# Patient Record
Sex: MALE | Race: WHITE | NOT HISPANIC OR LATINO
[De-identification: names, ages, dates, MRNs, and addresses within clinical notes are randomized per-mention and may not be internally consistent; named-entity substitution may affect disease eponyms.]

---

## 2018-02-27 ENCOUNTER — RESULT REVIEW (OUTPATIENT)
Age: 78
End: 2018-02-27

## 2018-05-29 ENCOUNTER — RESULT REVIEW (OUTPATIENT)
Age: 78
End: 2018-05-29

## 2018-10-03 ENCOUNTER — APPOINTMENT (OUTPATIENT)
Dept: UROLOGY | Facility: CLINIC | Age: 78
End: 2018-10-03
Payer: MEDICARE

## 2018-10-03 VITALS
HEART RATE: 76 BPM | TEMPERATURE: 98.2 F | WEIGHT: 230 LBS | DIASTOLIC BLOOD PRESSURE: 84 MMHG | OXYGEN SATURATION: 96 % | HEIGHT: 73 IN | BODY MASS INDEX: 30.48 KG/M2 | SYSTOLIC BLOOD PRESSURE: 166 MMHG

## 2018-10-03 DIAGNOSIS — Z80.0 FAMILY HISTORY OF MALIGNANT NEOPLASM OF DIGESTIVE ORGANS: ICD-10-CM

## 2018-10-03 DIAGNOSIS — Z78.9 OTHER SPECIFIED HEALTH STATUS: ICD-10-CM

## 2018-10-03 DIAGNOSIS — Z80.7 FAMILY HISTORY OF OTHER MALIGNANT NEOPLASMS OF LYMPHOID, HEMATOPOIETIC AND RELATED TISSUES: ICD-10-CM

## 2018-10-03 PROCEDURE — 99204 OFFICE O/P NEW MOD 45 MIN: CPT

## 2018-10-03 RX ORDER — ALLOPURINOL 100 MG/1
100 TABLET ORAL
Refills: 0 | Status: ACTIVE | COMMUNITY

## 2018-10-04 PROBLEM — Z78.9 SOCIAL ALCOHOL USE: Status: ACTIVE | Noted: 2018-10-04

## 2018-10-04 PROBLEM — Z80.7 FAMILY HISTORY OF NON-HODGKIN'S LYMPHOMA: Status: ACTIVE | Noted: 2018-10-04

## 2018-10-04 PROBLEM — Z80.0 FAMILY HISTORY OF COLON CANCER: Status: ACTIVE | Noted: 2018-10-04

## 2018-10-04 LAB
APPEARANCE: CLEAR
BACTERIA: NEGATIVE
BILIRUBIN URINE: NEGATIVE
BLOOD URINE: NEGATIVE
COLOR: YELLOW
CORE LAB FLUID CYTOLOGY: NORMAL
GLUCOSE QUALITATIVE U: NEGATIVE MG/DL
KETONES URINE: NEGATIVE
LEUKOCYTE ESTERASE URINE: NEGATIVE
MICROSCOPIC-UA: NORMAL
NITRITE URINE: NEGATIVE
PH URINE: 6
PROTEIN URINE: 100 MG/DL
RED BLOOD CELLS URINE: 0 /HPF
SPECIFIC GRAVITY URINE: 1.01
SQUAMOUS EPITHELIAL CELLS: 0 /HPF
UROBILINOGEN URINE: NEGATIVE MG/DL
WHITE BLOOD CELLS URINE: 0 /HPF

## 2018-10-31 ENCOUNTER — APPOINTMENT (OUTPATIENT)
Dept: UROLOGY | Facility: CLINIC | Age: 78
End: 2018-10-31
Payer: MEDICARE

## 2018-10-31 VITALS
DIASTOLIC BLOOD PRESSURE: 73 MMHG | WEIGHT: 230 LBS | OXYGEN SATURATION: 97 % | HEIGHT: 73 IN | HEART RATE: 71 BPM | RESPIRATION RATE: 14 BRPM | BODY MASS INDEX: 30.48 KG/M2 | SYSTOLIC BLOOD PRESSURE: 136 MMHG

## 2018-10-31 PROCEDURE — 99213 OFFICE O/P EST LOW 20 MIN: CPT

## 2019-04-17 ENCOUNTER — APPOINTMENT (OUTPATIENT)
Dept: UROLOGY | Facility: CLINIC | Age: 79
End: 2019-04-17
Payer: MEDICARE

## 2019-04-17 VITALS
SYSTOLIC BLOOD PRESSURE: 163 MMHG | WEIGHT: 231 LBS | OXYGEN SATURATION: 97 % | HEIGHT: 73 IN | DIASTOLIC BLOOD PRESSURE: 79 MMHG | BODY MASS INDEX: 30.62 KG/M2 | HEART RATE: 77 BPM

## 2019-04-17 PROCEDURE — 99213 OFFICE O/P EST LOW 20 MIN: CPT

## 2019-04-17 NOTE — HISTORY OF PRESENT ILLNESS
[FreeTextEntry1] : Here for 6 month follow up.\par Remains on oxybutynin ER 10 mg.  No adverse effects from the medication.\par Has not had any further episodes of urge incontinence following the medication.\par Normal flow, complete emptying.\par

## 2019-04-17 NOTE — PHYSICAL EXAM
[General Appearance - Well Developed] : well developed [General Appearance - Well Nourished] : well nourished [Normal Appearance] : normal appearance [Well Groomed] : well groomed [General Appearance - In No Acute Distress] : no acute distress [Abdomen Soft] : soft [Costovertebral Angle Tenderness] : no ~M costovertebral angle tenderness [Urinary Bladder Findings] : the bladder was normal on palpation [Abdomen Tenderness] : non-tender

## 2019-05-21 ENCOUNTER — RESULT REVIEW (OUTPATIENT)
Age: 79
End: 2019-05-21

## 2019-07-17 ENCOUNTER — APPOINTMENT (OUTPATIENT)
Dept: UROLOGY | Facility: CLINIC | Age: 79
End: 2019-07-17
Payer: MEDICARE

## 2019-07-17 VITALS
OXYGEN SATURATION: 99 % | HEART RATE: 68 BPM | SYSTOLIC BLOOD PRESSURE: 112 MMHG | BODY MASS INDEX: 28.49 KG/M2 | DIASTOLIC BLOOD PRESSURE: 64 MMHG | WEIGHT: 215 LBS | HEIGHT: 73 IN

## 2019-07-17 PROCEDURE — 99213 OFFICE O/P EST LOW 20 MIN: CPT

## 2019-08-09 ENCOUNTER — INPATIENT (INPATIENT)
Facility: HOSPITAL | Age: 79
LOS: 12 days | Discharge: HOME HEALTH | DRG: 344 | End: 2019-08-22
Attending: SURGERY | Admitting: SURGERY
Payer: COMMERCIAL

## 2019-08-09 VITALS
DIASTOLIC BLOOD PRESSURE: 53 MMHG | HEIGHT: 73 IN | HEART RATE: 107 BPM | OXYGEN SATURATION: 97 % | RESPIRATION RATE: 20 BRPM | TEMPERATURE: 98 F | WEIGHT: 205.03 LBS | SYSTOLIC BLOOD PRESSURE: 83 MMHG

## 2019-08-09 DIAGNOSIS — N17.9 ACUTE KIDNEY FAILURE, UNSPECIFIED: ICD-10-CM

## 2019-08-09 DIAGNOSIS — A41.9 SEPSIS, UNSPECIFIED ORGANISM: ICD-10-CM

## 2019-08-09 DIAGNOSIS — I10 ESSENTIAL (PRIMARY) HYPERTENSION: ICD-10-CM

## 2019-08-09 DIAGNOSIS — K26.9 DUODENAL ULCER, UNSPECIFIED AS ACUTE OR CHRONIC, WITHOUT HEMORRHAGE OR PERFORATION: ICD-10-CM

## 2019-08-09 DIAGNOSIS — K56.609 UNSPECIFIED INTESTINAL OBSTRUCTION, UNSPECIFIED AS TO PARTIAL VERSUS COMPLETE OBSTRUCTION: ICD-10-CM

## 2019-08-09 DIAGNOSIS — E87.5 HYPERKALEMIA: ICD-10-CM

## 2019-08-09 DIAGNOSIS — E87.1 HYPO-OSMOLALITY AND HYPONATREMIA: ICD-10-CM

## 2019-08-09 DIAGNOSIS — E87.2 ACIDOSIS: ICD-10-CM

## 2019-08-09 LAB
ALBUMIN SERPL ELPH-MCNC: 2.3 G/DL — LOW (ref 3.3–5)
ALP SERPL-CCNC: 491 U/L — HIGH (ref 40–120)
ALT FLD-CCNC: 8 U/L — LOW (ref 10–45)
ANION GAP SERPL CALC-SCNC: 13 MMOL/L — SIGNIFICANT CHANGE UP (ref 5–17)
ANION GAP SERPL CALC-SCNC: 15 MMOL/L — SIGNIFICANT CHANGE UP (ref 5–17)
ANION GAP SERPL CALC-SCNC: 16 MMOL/L — SIGNIFICANT CHANGE UP (ref 5–17)
APPEARANCE UR: ABNORMAL
APTT BLD: 36.2 SEC — SIGNIFICANT CHANGE UP (ref 27.5–36.3)
APTT BLD: 38.2 SEC — HIGH (ref 27.5–36.3)
AST SERPL-CCNC: 7 U/L — LOW (ref 10–40)
BACTERIA # UR AUTO: NEGATIVE — SIGNIFICANT CHANGE UP
BASE EXCESS BLDV CALC-SCNC: -6 MMOL/L — LOW (ref -2–2)
BASE EXCESS BLDV CALC-SCNC: -7.1 MMOL/L — LOW (ref -2–2)
BASOPHILS # BLD AUTO: 0 K/UL — SIGNIFICANT CHANGE UP (ref 0–0.2)
BASOPHILS NFR BLD AUTO: 0.1 % — SIGNIFICANT CHANGE UP (ref 0–2)
BILIRUB SERPL-MCNC: 0.7 MG/DL — SIGNIFICANT CHANGE UP (ref 0.2–1.2)
BILIRUB UR-MCNC: ABNORMAL
BLD GP AB SCN SERPL QL: NEGATIVE — SIGNIFICANT CHANGE UP
BUN SERPL-MCNC: 63 MG/DL — HIGH (ref 7–23)
BUN SERPL-MCNC: 63 MG/DL — HIGH (ref 7–23)
BUN SERPL-MCNC: 68 MG/DL — HIGH (ref 7–23)
CA-I SERPL-SCNC: 1.17 MMOL/L — SIGNIFICANT CHANGE UP (ref 1.12–1.3)
CA-I SERPL-SCNC: 1.22 MMOL/L — SIGNIFICANT CHANGE UP (ref 1.12–1.3)
CALCIUM SERPL-MCNC: 8.2 MG/DL — LOW (ref 8.4–10.5)
CALCIUM SERPL-MCNC: 8.4 MG/DL — SIGNIFICANT CHANGE UP (ref 8.4–10.5)
CALCIUM SERPL-MCNC: 9.7 MG/DL — SIGNIFICANT CHANGE UP (ref 8.4–10.5)
CHLORIDE BLDV-SCNC: 103 MMOL/L — SIGNIFICANT CHANGE UP (ref 96–108)
CHLORIDE BLDV-SCNC: 105 MMOL/L — SIGNIFICANT CHANGE UP (ref 96–108)
CHLORIDE SERPL-SCNC: 100 MMOL/L — SIGNIFICANT CHANGE UP (ref 96–108)
CHLORIDE SERPL-SCNC: 100 MMOL/L — SIGNIFICANT CHANGE UP (ref 96–108)
CHLORIDE SERPL-SCNC: 96 MMOL/L — SIGNIFICANT CHANGE UP (ref 96–108)
CO2 BLDV-SCNC: 19 MMOL/L — LOW (ref 22–30)
CO2 BLDV-SCNC: 20 MMOL/L — LOW (ref 22–30)
CO2 SERPL-SCNC: 14 MMOL/L — LOW (ref 22–31)
CO2 SERPL-SCNC: 15 MMOL/L — LOW (ref 22–31)
CO2 SERPL-SCNC: 17 MMOL/L — LOW (ref 22–31)
COLOR SPEC: YELLOW — SIGNIFICANT CHANGE UP
CREAT SERPL-MCNC: 2.24 MG/DL — HIGH (ref 0.5–1.3)
CREAT SERPL-MCNC: 2.39 MG/DL — HIGH (ref 0.5–1.3)
CREAT SERPL-MCNC: 2.74 MG/DL — HIGH (ref 0.5–1.3)
DIFF PNL FLD: NEGATIVE — SIGNIFICANT CHANGE UP
EOSINOPHIL # BLD AUTO: 0 K/UL — SIGNIFICANT CHANGE UP (ref 0–0.5)
EOSINOPHIL NFR BLD AUTO: 0.1 % — SIGNIFICANT CHANGE UP (ref 0–6)
EPI CELLS # UR: 1 /HPF — SIGNIFICANT CHANGE UP
GAS PNL BLDA: SIGNIFICANT CHANGE UP
GAS PNL BLDV: 124 MMOL/L — LOW (ref 135–145)
GAS PNL BLDV: 125 MMOL/L — LOW (ref 135–145)
GAS PNL BLDV: SIGNIFICANT CHANGE UP
GLUCOSE BLDV-MCNC: 100 MG/DL — HIGH (ref 70–99)
GLUCOSE BLDV-MCNC: 108 MG/DL — HIGH (ref 70–99)
GLUCOSE SERPL-MCNC: 108 MG/DL — HIGH (ref 70–99)
GLUCOSE SERPL-MCNC: 110 MG/DL — HIGH (ref 70–99)
GLUCOSE SERPL-MCNC: 89 MG/DL — SIGNIFICANT CHANGE UP (ref 70–99)
GLUCOSE UR QL: NEGATIVE — SIGNIFICANT CHANGE UP
HCO3 BLDV-SCNC: 18 MMOL/L — LOW (ref 21–29)
HCO3 BLDV-SCNC: 19 MMOL/L — LOW (ref 21–29)
HCT VFR BLD CALC: 26 % — LOW (ref 39–50)
HCT VFR BLD CALC: 32.6 % — LOW (ref 39–50)
HCT VFR BLDA CALC: 27 % — LOW (ref 39–50)
HCT VFR BLDA CALC: 31 % — LOW (ref 39–50)
HGB BLD CALC-MCNC: 10.1 G/DL — LOW (ref 13–17)
HGB BLD CALC-MCNC: 8.7 G/DL — LOW (ref 13–17)
HGB BLD-MCNC: 10 G/DL — LOW (ref 13–17)
HGB BLD-MCNC: 8.1 G/DL — LOW (ref 13–17)
HYALINE CASTS # UR AUTO: 3 /LPF — HIGH (ref 0–2)
INR BLD: 1.46 RATIO — HIGH (ref 0.88–1.16)
INR BLD: 1.67 RATIO — HIGH (ref 0.88–1.16)
KETONES UR-MCNC: NEGATIVE — SIGNIFICANT CHANGE UP
LACTATE BLDV-MCNC: 3.2 MMOL/L — HIGH (ref 0.7–2)
LACTATE BLDV-MCNC: 3.2 MMOL/L — HIGH (ref 0.7–2)
LEUKOCYTE ESTERASE UR-ACNC: NEGATIVE — SIGNIFICANT CHANGE UP
LYMPHOCYTES # BLD AUTO: 1.6 K/UL — SIGNIFICANT CHANGE UP (ref 1–3.3)
LYMPHOCYTES # BLD AUTO: 13.5 % — SIGNIFICANT CHANGE UP (ref 13–44)
MAGNESIUM SERPL-MCNC: 2.6 MG/DL — SIGNIFICANT CHANGE UP (ref 1.6–2.6)
MCHC RBC-ENTMCNC: 26.7 PG — LOW (ref 27–34)
MCHC RBC-ENTMCNC: 27.2 PG — SIGNIFICANT CHANGE UP (ref 27–34)
MCHC RBC-ENTMCNC: 30.5 GM/DL — LOW (ref 32–36)
MCHC RBC-ENTMCNC: 31.1 GM/DL — LOW (ref 32–36)
MCV RBC AUTO: 87.3 FL — SIGNIFICANT CHANGE UP (ref 80–100)
MCV RBC AUTO: 87.5 FL — SIGNIFICANT CHANGE UP (ref 80–100)
MONOCYTES # BLD AUTO: 1.7 K/UL — HIGH (ref 0–0.9)
MONOCYTES NFR BLD AUTO: 14.6 % — HIGH (ref 2–14)
NEUTROPHILS # BLD AUTO: 8.4 K/UL — HIGH (ref 1.8–7.4)
NEUTROPHILS NFR BLD AUTO: 71.7 % — SIGNIFICANT CHANGE UP (ref 43–77)
NITRITE UR-MCNC: NEGATIVE — SIGNIFICANT CHANGE UP
OTHER CELLS CSF MANUAL: 3 ML/DL — LOW (ref 18–22)
PCO2 BLDV: 36 MMHG — SIGNIFICANT CHANGE UP (ref 35–50)
PCO2 BLDV: 37 MMHG — SIGNIFICANT CHANGE UP (ref 35–50)
PH BLDV: 7.32 — LOW (ref 7.35–7.45)
PH BLDV: 7.32 — LOW (ref 7.35–7.45)
PH UR: 5.5 — SIGNIFICANT CHANGE UP (ref 5–8)
PHOSPHATE SERPL-MCNC: 4.6 MG/DL — HIGH (ref 2.5–4.5)
PLATELET # BLD AUTO: 339 K/UL — SIGNIFICANT CHANGE UP (ref 150–400)
PLATELET # BLD AUTO: 430 K/UL — HIGH (ref 150–400)
PO2 BLDV: 22 MMHG — LOW (ref 25–45)
PO2 BLDV: <20 MMHG — LOW (ref 25–45)
POTASSIUM BLDV-SCNC: 5.2 MMOL/L — SIGNIFICANT CHANGE UP (ref 3.5–5.3)
POTASSIUM BLDV-SCNC: 5.7 MMOL/L — HIGH (ref 3.5–5.3)
POTASSIUM SERPL-MCNC: 5.6 MMOL/L — HIGH (ref 3.5–5.3)
POTASSIUM SERPL-MCNC: 5.6 MMOL/L — HIGH (ref 3.5–5.3)
POTASSIUM SERPL-MCNC: 6.2 MMOL/L — CRITICAL HIGH (ref 3.5–5.3)
POTASSIUM SERPL-SCNC: 5.6 MMOL/L — HIGH (ref 3.5–5.3)
POTASSIUM SERPL-SCNC: 5.6 MMOL/L — HIGH (ref 3.5–5.3)
POTASSIUM SERPL-SCNC: 6.2 MMOL/L — CRITICAL HIGH (ref 3.5–5.3)
PROT SERPL-MCNC: 6.7 G/DL — SIGNIFICANT CHANGE UP (ref 6–8.3)
PROT UR-MCNC: ABNORMAL
PROTHROM AB SERPL-ACNC: 16.8 SEC — HIGH (ref 10–12.9)
PROTHROM AB SERPL-ACNC: 19.4 SEC — HIGH (ref 10–12.9)
RBC # BLD: 2.98 M/UL — LOW (ref 4.2–5.8)
RBC # BLD: 3.73 M/UL — LOW (ref 4.2–5.8)
RBC # FLD: 13.7 % — SIGNIFICANT CHANGE UP (ref 10.3–14.5)
RBC # FLD: 13.9 % — SIGNIFICANT CHANGE UP (ref 10.3–14.5)
RBC CASTS # UR COMP ASSIST: 4 /HPF — SIGNIFICANT CHANGE UP (ref 0–4)
RH IG SCN BLD-IMP: POSITIVE — SIGNIFICANT CHANGE UP
SAO2 % BLDV: 21 % — LOW (ref 67–88)
SAO2 % BLDV: 29 % — LOW (ref 67–88)
SODIUM SERPL-SCNC: 128 MMOL/L — LOW (ref 135–145)
SODIUM SERPL-SCNC: 129 MMOL/L — LOW (ref 135–145)
SODIUM SERPL-SCNC: 129 MMOL/L — LOW (ref 135–145)
SP GR SPEC: 1.02 — SIGNIFICANT CHANGE UP (ref 1.01–1.02)
UROBILINOGEN FLD QL: ABNORMAL
WBC # BLD: 11.8 K/UL — HIGH (ref 3.8–10.5)
WBC # BLD: 7.8 K/UL — SIGNIFICANT CHANGE UP (ref 3.8–10.5)
WBC # FLD AUTO: 11.8 K/UL — HIGH (ref 3.8–10.5)
WBC # FLD AUTO: 7.8 K/UL — SIGNIFICANT CHANGE UP (ref 3.8–10.5)
WBC UR QL: 1 /HPF — SIGNIFICANT CHANGE UP (ref 0–5)

## 2019-08-09 PROCEDURE — 99291 CRITICAL CARE FIRST HOUR: CPT

## 2019-08-09 PROCEDURE — 93010 ELECTROCARDIOGRAM REPORT: CPT

## 2019-08-09 PROCEDURE — 74174 CTA ABD&PLVS W/CONTRAST: CPT | Mod: 26

## 2019-08-09 PROCEDURE — 74176 CT ABD & PELVIS W/O CONTRAST: CPT | Mod: 26,59

## 2019-08-09 PROCEDURE — 99285 EMERGENCY DEPT VISIT HI MDM: CPT

## 2019-08-09 PROCEDURE — 74018 RADEX ABDOMEN 1 VIEW: CPT | Mod: 26,59

## 2019-08-09 PROCEDURE — 71045 X-RAY EXAM CHEST 1 VIEW: CPT | Mod: 26

## 2019-08-09 RX ORDER — PANTOPRAZOLE SODIUM 20 MG/1
40 TABLET, DELAYED RELEASE ORAL DAILY
Refills: 0 | Status: DISCONTINUED | OUTPATIENT
Start: 2019-08-09 | End: 2019-08-10

## 2019-08-09 RX ORDER — ALBUTEROL 90 UG/1
10 AEROSOL, METERED ORAL ONCE
Refills: 0 | Status: COMPLETED | OUTPATIENT
Start: 2019-08-09 | End: 2019-08-09

## 2019-08-09 RX ORDER — INSULIN HUMAN 100 [IU]/ML
10 INJECTION, SOLUTION SUBCUTANEOUS ONCE
Refills: 0 | Status: COMPLETED | OUTPATIENT
Start: 2019-08-09 | End: 2019-08-09

## 2019-08-09 RX ORDER — SODIUM CHLORIDE 9 MG/ML
1000 INJECTION, SOLUTION INTRAVENOUS ONCE
Refills: 0 | Status: COMPLETED | OUTPATIENT
Start: 2019-08-09 | End: 2019-08-09

## 2019-08-09 RX ORDER — DEXTROSE 50 % IN WATER 50 %
50 SYRINGE (ML) INTRAVENOUS ONCE
Refills: 0 | Status: COMPLETED | OUTPATIENT
Start: 2019-08-09 | End: 2019-08-09

## 2019-08-09 RX ORDER — SODIUM CHLORIDE 9 MG/ML
1000 INJECTION INTRAMUSCULAR; INTRAVENOUS; SUBCUTANEOUS
Refills: 0 | Status: DISCONTINUED | OUTPATIENT
Start: 2019-08-09 | End: 2019-08-10

## 2019-08-09 RX ORDER — ALBUTEROL 90 UG/1
10 AEROSOL, METERED ORAL ONCE
Refills: 0 | Status: DISCONTINUED | OUTPATIENT
Start: 2019-08-09 | End: 2019-08-09

## 2019-08-09 RX ORDER — PIPERACILLIN AND TAZOBACTAM 4; .5 G/20ML; G/20ML
3.38 INJECTION, POWDER, LYOPHILIZED, FOR SOLUTION INTRAVENOUS ONCE
Refills: 0 | Status: COMPLETED | OUTPATIENT
Start: 2019-08-09 | End: 2019-08-09

## 2019-08-09 RX ORDER — SODIUM CHLORIDE 9 MG/ML
500 INJECTION INTRAMUSCULAR; INTRAVENOUS; SUBCUTANEOUS ONCE
Refills: 0 | Status: COMPLETED | OUTPATIENT
Start: 2019-08-09 | End: 2019-08-10

## 2019-08-09 RX ORDER — CHLORHEXIDINE GLUCONATE 213 G/1000ML
1 SOLUTION TOPICAL DAILY
Refills: 0 | Status: DISCONTINUED | OUTPATIENT
Start: 2019-08-09 | End: 2019-08-10

## 2019-08-09 RX ORDER — SODIUM CHLORIDE 9 MG/ML
1000 INJECTION INTRAMUSCULAR; INTRAVENOUS; SUBCUTANEOUS ONCE
Refills: 0 | Status: COMPLETED | OUTPATIENT
Start: 2019-08-09 | End: 2019-08-09

## 2019-08-09 RX ORDER — LEVOTHYROXINE SODIUM 125 MCG
87.5 TABLET ORAL AT BEDTIME
Refills: 0 | Status: DISCONTINUED | OUTPATIENT
Start: 2019-08-09 | End: 2019-08-10

## 2019-08-09 RX ORDER — SODIUM CHLORIDE 9 MG/ML
1000 INJECTION, SOLUTION INTRAVENOUS
Refills: 0 | Status: DISCONTINUED | OUTPATIENT
Start: 2019-08-09 | End: 2019-08-09

## 2019-08-09 RX ORDER — ENOXAPARIN SODIUM 100 MG/ML
30 INJECTION SUBCUTANEOUS DAILY
Refills: 0 | Status: DISCONTINUED | OUTPATIENT
Start: 2019-08-09 | End: 2019-08-10

## 2019-08-09 RX ADMIN — INSULIN HUMAN 10 UNIT(S): 100 INJECTION, SOLUTION SUBCUTANEOUS at 13:04

## 2019-08-09 RX ADMIN — ALBUTEROL 10 MILLIGRAM(S): 90 AEROSOL, METERED ORAL at 13:30

## 2019-08-09 RX ADMIN — SODIUM CHLORIDE 125 MILLILITER(S): 9 INJECTION, SOLUTION INTRAVENOUS at 20:03

## 2019-08-09 RX ADMIN — SODIUM CHLORIDE 1000 MILLILITER(S): 9 INJECTION, SOLUTION INTRAVENOUS at 18:38

## 2019-08-09 RX ADMIN — SODIUM CHLORIDE 1000 MILLILITER(S): 9 INJECTION, SOLUTION INTRAVENOUS at 12:40

## 2019-08-09 RX ADMIN — SODIUM CHLORIDE 1000 MILLILITER(S): 9 INJECTION INTRAMUSCULAR; INTRAVENOUS; SUBCUTANEOUS at 15:43

## 2019-08-09 RX ADMIN — Medication 50 MILLILITER(S): at 23:41

## 2019-08-09 RX ADMIN — ALBUTEROL 10 MILLIGRAM(S): 90 AEROSOL, METERED ORAL at 23:50

## 2019-08-09 RX ADMIN — INSULIN HUMAN 10 UNIT(S): 100 INJECTION, SOLUTION SUBCUTANEOUS at 23:41

## 2019-08-09 RX ADMIN — Medication 50 MILLILITER(S): at 13:03

## 2019-08-09 RX ADMIN — PIPERACILLIN AND TAZOBACTAM 200 GRAM(S): 4; .5 INJECTION, POWDER, LYOPHILIZED, FOR SOLUTION INTRAVENOUS at 15:43

## 2019-08-09 NOTE — H&P ADULT - ASSESSMENT
78y old  Male who presents with PMHx of duodenal ulcer (questionable duodenal perforation in past in charts however family denies), duodenal stricture requiring EGD and ballooning presenting to the ER with three weeks of decreased PO intake and abdominal pain found to have a small obstruction on CT imaging with transition point in the RLQ. Patient without nausea or vomiting and emptied stomach on CT scan. Will need admission for SBO and hopeful conservative management. 78y old  Male who presents with PMHx of duodenal ulcer (questionable duodenal perforation in past in charts however family denies), duodenal stricture requiring EGD and ballooning presenting to the ER with three weeks of decreased PO intake and abdominal pain found to have a small obstruction on CT imaging with transition point in the RLQ. In ED pt was persistently hypotensive w/o response to fluids. Pt was admitted to SICU for hemodynamic monitoring.      - Admit to Dr. Byrd   - Admit to SICU for hemodynamic monitoring   - Monitor GI fxn   - NPO/IVF  - CT abd/pelv w/ con       Discussed with Dr. Byrd

## 2019-08-09 NOTE — ED PROVIDER NOTE - PMH
BRIGID (acute kidney injury)    Duodenal ulcer disease    Hyperparathyroidism    Hypertension    SBO (small bowel obstruction)

## 2019-08-09 NOTE — ED PROVIDER NOTE - ATTENDING CONTRIBUTION TO CARE
78M pmhx of Duodenal ulcer (h/o perf), HTN, hyperparathyroid presents with 3 weeks of worsening weakness with decrease po intake and abd pain distension with prior sbo due to multiple duodenal ulcers causing strictures requiring dilation by egd.  plain films dilated small loops, ct ordered, ivf with bolus likely dehydration, ivf bolus.

## 2019-08-09 NOTE — H&P ADULT - HISTORY OF PRESENT ILLNESS
Patient is a 78y old  Male who presents with PMHx of duodenal ulcer (questionable duodenal perforation in past in charts however family denies), duodenal stricture requiring EGD and ballooning presenting to the ER with three weeks of decreased PO intake and abdominal pain that was vague and located in the epigastric area, worse with increased intake however not associated with types of foods. He states no radiation, na denies experiencing pain similar to this before. States passed flatus and BM last night, however admits to bloating today. He states he has never been nausea or had emesis. Denies fever, chills, CP, SOB, nausea, vomiting, diarrhea, constipation, and urinary symptoms.     ROS: 10-system review is otherwise negative except HPI above.      --------------------------------------------------------------------------------------------

## 2019-08-09 NOTE — ED PROVIDER NOTE - OBJECTIVE STATEMENT
78M pmhx of Duodenal ulcer (h/o perf), HTN, hyperparathyroid presents with 3 weeks of worsening weakness and decreased appetite, though more pronounced in last 3 days. Denies fevers, chills, N/V/D. Notes some abdominal girth. Denies rectal bleeding, urinary complaints. Daughter also notes significant weight loss over this time period.

## 2019-08-09 NOTE — H&P ADULT - NSHPLABSRESULTS_GEN_ALL_CORE
8.1    7.8   )-----------( 339      ( 09 Aug 2019 22:20 )             26.0           128<L>  |  100  |  63<H>  ----------------------------<  110<H>  5.6<H>   |  15<L>  |  2.24<H>    Ca    8.4      09 Aug 2019 22:20  Phos  4.6       Mg     2.6         TPro  6.7  /  Alb  2.3<L>  /  TBili  0.7  /  DBili  x   /  AST  7<L>  /  ALT  8<L>  /  AlkPhos  491<H>            ABG - ( 09 Aug 2019 22:17 )  pH, Arterial: 7.42  pH, Blood: x     /  pCO2: 26    /  pO2: 83    / HCO3: 16    / Base Excess: -6.9  /  SaO2: 95                  Urinalysis Basic - ( 09 Aug 2019 17:33 )    Color: Yellow / Appearance: Slightly Turbid / S.023 / pH: x  Gluc: x / Ketone: Negative  / Bili: Small / Urobili: 2 mg/dL   Blood: x / Protein: 30 mg/dL / Nitrite: Negative   Leuk Esterase: Negative / RBC: 4 /hpf / WBC 1 /HPF   Sq Epi: x / Non Sq Epi: 1 /hpf / Bacteria: Negative        PT/INR - ( 09 Aug 2019 22:21 )   PT: 19.4 sec;   INR: 1.67 ratio         PTT - ( 09 Aug 2019 22:21 )  PTT:38.2 sec    Lactate Trend      CT abd/pel  Prelim read   INTERPRETATION: Unchanged small bowel obstruction. The bilateral renal   arteries, celiac artery, superior mesenteric artery, and inferior mesenteric   artery are patent. Limited evaluation of the bowel for ischemia secondary to   oral contrast from prior study. No pneumatosis.

## 2019-08-09 NOTE — CONSULT NOTE ADULT - PROBLEM SELECTOR RECOMMENDATION 3
Hyponatremia hypovolemia likely 2/2 volume depletion   - on admission Na 129, glucose normal 108, UA specific gravity 1.023    PLAN:  - please obtain serum osmolality, urine osmolality, urine sodium level  - trend Na level

## 2019-08-09 NOTE — ED PROVIDER NOTE - CLINICAL SUMMARY MEDICAL DECISION MAKING FREE TEXT BOX
78M p/w weight loss, decreased appetite, hypotensive in ED. CA vs. duodenal ulcer vs. SBO. Will get labs, CTAP. admit.

## 2019-08-09 NOTE — CHART NOTE - NSCHARTNOTEFT_GEN_A_CORE
Patient may receive IV contrast despite BRIGID in order to rule out life-threatening ischemia.    Discussed with attending, Dr. Byrd  Pager [phone] Patient may receive IV contrast despite BRIGID in order to rule out life-threatening ischemia.  Explained risks and benefits of receiving IV contrast, specifically contrast nephropathy and worsening renal failure that may require dialysis, to patient and his family. Patient is agreeable to undergoing CT.     Discussed with attending, Dr. Byrd  Pager [phone]

## 2019-08-09 NOTE — H&P ADULT - NSICDXPASTMEDICALHX_GEN_ALL_CORE_FT
PAST MEDICAL HISTORY:  BRIGID (acute kidney injury)     Duodenal ulcer disease     Hyperparathyroidism     Hypertension     SBO (small bowel obstruction)

## 2019-08-09 NOTE — CONSULT NOTE ADULT - SUBJECTIVE AND OBJECTIVE BOX
[facility] DIVISION OF KIDNEY DISEASES AND HYPERTENSION -- INITIAL CONSULT NOTE  --------------------------------------------------------------------------------  HPI:  78M PMHx duodenal ulcer and duodenal stricture s/p EGD and ballooning p/w epigastric pain & decrease PO intake x 3 weeks.  Abdominal pain described as vague, non radiating, epigastric, exacerbated by food intake.  Denies nausea, vomiting, fever, chills, able pass gas.      In ED, pertinent findings:   - Triage vitals noted 102/51,    - labs significant for K 6.2 (non hemolyzed), Cr 2.74 (baseline fluctuates 1.4-1.8 March-July 2019 per [facility]), HCO3 17, lactate 3.2, Urinalysis specific gravity 1.023, protein/urobilinogen, small bilirubin.  - repeat lab Cr downtrend 2.74 to 2.39 and K 6.2 to 5.6 after treatment albuterol/insulin/D50, IVF LR 1L bolus x 2, NS 1L bolus x 1  - imaging significant for CT A/P & AXR for small bowel obstruction  - Admit for concern SBO    Nephrology consulted for hyperkalemia and acute on CKD.       PAST HISTORY  --------------------------------------------------------------------------------  PAST MEDICAL & SURGICAL HISTORY:  Hyperparathyroidism  Hypertension  BRIGID (acute kidney injury)  SBO (small bowel obstruction)  Duodenal ulcer disease  No significant past surgical history    FAMILY HISTORY:  Family history of non-Hodgkin's lymphoma    PAST SOCIAL HISTORY:    ALLERGIES & MEDICATIONS  --------------------------------------------------------------------------------  Allergies    Cipro (Rash)  niacin (Flushing; Rash)  Reglan (Anaphylaxis)    Intolerances      Standing Inpatient Medications  lactated ringers. 1000 milliLiter(s) IV Continuous <Continuous>    PRN Inpatient Medications      REVIEW OF SYSTEMS  --------------------------------------------------------------------------------  Gen: + decrease PO intake.  No fatigue, fevers/chills, weakness  Skin: No rashes  Respiratory: No dyspnea, cough, wheezing, hemoptysis  CV: No chest pain, PND, orthopnea  GI: + abdominal pain, bloating.  No diarrhea, constipation, nausea, vomiting, melena, hematochezia  : No increased frequency, dysuria, hematuria, nocturia  MSK: No joint pain/swelling; no back pain; no edema  Neuro: No dizziness/lightheadedness, weakness  Heme: No easy bruising or bleeding      All other systems were reviewed and are negative, except as noted.    VITALS/PHYSICAL EXAM  --------------------------------------------------------------------------------  T(C): 36.7 (08-09-19 @ 19:42), Max: 36.7 (08-09-19 @ 19:42)  HR: 104 (08-09-19 @ 20:55) (94 - 107)  BP: 102/51 (08-09-19 @ 20:55) (83/53 - 102/51)  RR: 18 (08-09-19 @ 20:55) (18 - 20)  SpO2: 97% (08-09-19 @ 20:55) (97% - 100%)  Wt(kg): --  Height (cm): 185.42 (08-09-19 @ 11:14)  Weight (kg): 93 (08-09-19 @ 11:14)  BMI (kg/m2): 27.1 (08-09-19 @ 11:14)  BSA (m2): 2.17 (08-09-19 @ 11:14)      Physical Exam:  	Gen: NAD, well-appearing  	HEENT: PERRL, supple neck, clear oropharynx  	Pulm: CTA B/L  	CV: RRR, S1S2; no rub  	Abd: +BS, soft, + distended, nontender  	: No suprapubic tenderness  	LE: Warm, intact strength; no edema  	Skin: Warm, without rashes  	Vascular access:    LABS/STUDIES  --------------------------------------------------------------------------------              10.0   11.8  >-----------<  430      [08-09-19 @ 11:56]              32.6     129  |  100  |  63  ----------------------------<  89      [08-09-19 @ 15:25]  5.6   |  14  |  2.39        Ca     8.2     [08-09-19 @ 15:25]    TPro  6.7  /  Alb  2.3  /  TBili  0.7  /  DBili  x   /  AST  7   /  ALT  8   /  AlkPhos  491  [08-09-19 @ 11:56]    PT/INR: PT 16.8 , INR 1.46       [08-09-19 @ 11:56]  PTT: 36.2       [08-09-19 @ 11:56]      Creatinine Trend:  SCr 2.39 [08-09 @ 15:25]  SCr 2.74 [08-09 @ 11:56]    Urinalysis - [08-09-19 @ 17:33]      Color Yellow / Appearance Slightly Turbid / SG 1.023 / pH 5.5      Gluc Negative / Ketone Negative  / Bili Small / Urobili 2 mg/dL       Blood Negative / Protein 30 mg/dL / Leuk Est Negative / Nitrite Negative      RBC 4 / WBC 1 / Hyaline 3 / Gran  / Sq Epi  / Non Sq Epi 1 / Bacteria Negative [facility] DIVISION OF KIDNEY DISEASES AND HYPERTENSION -- INITIAL CONSULT NOTE  --------------------------------------------------------------------------------  HPI:  78M PMHx duodenal ulcer and duodenal stricture s/p EGD and ballooning p/w epigastric pain & decrease PO intake x 3 weeks.  Abdominal pain described as vague, non radiating, epigastric, exacerbated by food intake.  Denies nausea, vomiting, fever, chills, able pass gas.      In ED, pertinent findings:   - Triage vitals noted 102/51,    - labs significant for K 6.2 (non hemolyzed), Cr 2.74 (baseline fluctuates 1.4-1.8 March-July 2019 per [facility]), HCO3 17, lactate 3.2, Urinalysis specific gravity 1.023, protein/urobilinogen, small bilirubin.  - repeat lab Cr downtrend 2.74 to 2.39 and K 6.2 to 5.6 after treatment albuterol/insulin/D50, IVF LR 1L bolus x 2, NS 1L bolus x 1  - imaging significant for CT A/P & AXR for small bowel obstruction  - Admit for SBO concern for ischemic bowel given elevated lactate (plan CT w/ contrast)    Nephrology consulted for hyperkalemia and acute on CKD.       PAST HISTORY  --------------------------------------------------------------------------------  PAST MEDICAL & SURGICAL HISTORY:  Hyperparathyroidism  Hypertension  BRIGID (acute kidney injury)  SBO (small bowel obstruction)  Duodenal ulcer disease  No significant past surgical history    FAMILY HISTORY:  Family history of non-Hodgkin's lymphoma    PAST SOCIAL HISTORY:    ALLERGIES & MEDICATIONS  --------------------------------------------------------------------------------  Allergies    Cipro (Rash)  niacin (Flushing; Rash)  Reglan (Anaphylaxis)    Intolerances      Standing Inpatient Medications  lactated ringers. 1000 milliLiter(s) IV Continuous <Continuous>    PRN Inpatient Medications      REVIEW OF SYSTEMS  --------------------------------------------------------------------------------  Gen: + decrease PO intake.  No fatigue, fevers/chills, weakness  Skin: No rashes  Respiratory: No dyspnea, cough, wheezing, hemoptysis  CV: No chest pain, PND, orthopnea  GI: + abdominal pain, bloating.  No diarrhea, constipation, nausea, vomiting, melena, hematochezia  : No increased frequency, dysuria, hematuria, nocturia  MSK: No joint pain/swelling; no back pain; no edema  Neuro: No dizziness/lightheadedness, weakness  Heme: No easy bruising or bleeding      All other systems were reviewed and are negative, except as noted.    VITALS/PHYSICAL EXAM  --------------------------------------------------------------------------------  T(C): 36.7 (08-09-19 @ 19:42), Max: 36.7 (08-09-19 @ 19:42)  HR: 104 (08-09-19 @ 20:55) (94 - 107)  BP: 102/51 (08-09-19 @ 20:55) (83/53 - 102/51)  RR: 18 (08-09-19 @ 20:55) (18 - 20)  SpO2: 97% (08-09-19 @ 20:55) (97% - 100%)  Wt(kg): --  Height (cm): 185.42 (08-09-19 @ 11:14)  Weight (kg): 93 (08-09-19 @ 11:14)  BMI (kg/m2): 27.1 (08-09-19 @ 11:14)  BSA (m2): 2.17 (08-09-19 @ 11:14)      Physical Exam:  	Gen: NAD, well-appearing  	HEENT: PERRL, supple neck, clear oropharynx  	Pulm: CTA B/L  	CV: RRR, S1S2; no rub  	Abd: +BS, soft, + distended, nontender  	: No suprapubic tenderness  	LE: Warm, intact strength; no edema  	Skin: Warm, without rashes  	Vascular access:    LABS/STUDIES  --------------------------------------------------------------------------------              10.0   11.8  >-----------<  430      [08-09-19 @ 11:56]              32.6     129  |  100  |  63  ----------------------------<  89      [08-09-19 @ 15:25]  5.6   |  14  |  2.39        Ca     8.2     [08-09-19 @ 15:25]    TPro  6.7  /  Alb  2.3  /  TBili  0.7  /  DBili  x   /  AST  7   /  ALT  8   /  AlkPhos  491  [08-09-19 @ 11:56]    PT/INR: PT 16.8 , INR 1.46       [08-09-19 @ 11:56]  PTT: 36.2       [08-09-19 @ 11:56]      Creatinine Trend:  SCr 2.39 [08-09 @ 15:25]  SCr 2.74 [08-09 @ 11:56]    Urinalysis - [08-09-19 @ 17:33]      Color Yellow / Appearance Slightly Turbid / SG 1.023 / pH 5.5      Gluc Negative / Ketone Negative  / Bili Small / Urobili 2 mg/dL       Blood Negative / Protein 30 mg/dL / Leuk Est Negative / Nitrite Negative      RBC 4 / WBC 1 / Hyaline 3 / Gran  / Sq Epi  / Non Sq Epi 1 / Bacteria Negative [facility] DIVISION OF KIDNEY DISEASES AND HYPERTENSION -- INITIAL CONSULT NOTE  --------------------------------------------------------------------------------  HPI:  78M PMHx CKDII/III, duodenal ulcer and duodenal stricture s/p EGD and ballooning p/w epigastric pain & decrease PO intake x 3 weeks.  Abdominal pain described as vague, non radiating, epigastric, exacerbated by food intake.  Denies nausea, vomiting, fever, chills, able pass gas.  Patient report last few weeks has decrease PO intake in order to "loss weight" and noted decrease UOP.  Had multiple episodes of diarrhea after started on fiber for constipation by PMD (stopped fiber 2-3 weeks ago).    In ED, pertinent findings:   - Triage vitals noted 102/51,    - labs significant for K 6.2 (non hemolyzed), Cr 2.74 (baseline fluctuates 1.4-1.8 March-July 2019 per [facility]), HCO3 17, lactate 3.2, Urinalysis specific gravity 1.023, protein/urobilinogen, small bilirubin.  - repeat lab Cr downtrend 2.74 to 2.39 and K 6.2 to 5.6 after treatment albuterol/insulin/D50, IVF LR 1L bolus x 2, NS 1L bolus x 1  - imaging significant for CT A/P & AXR for small bowel obstruction  - Admit for SBO concern for ischemic bowel given elevated lactate (plan CT w/ contrast)  - outpatient nephrologist Dr. Corbett    Nephrology consulted for hyperkalemia and acute on CKD.       PAST HISTORY  --------------------------------------------------------------------------------  PAST MEDICAL & SURGICAL HISTORY:  Hyperparathyroidism  Hypertension  BRIGID (acute kidney injury)  SBO (small bowel obstruction)  Duodenal ulcer disease  No significant past surgical history    FAMILY HISTORY:  Family history of non-Hodgkin's lymphoma    PAST SOCIAL HISTORY:    ALLERGIES & MEDICATIONS  --------------------------------------------------------------------------------  Allergies    Cipro (Rash)  niacin (Flushing; Rash)  Reglan (Anaphylaxis)    Intolerances      Standing Inpatient Medications  lactated ringers. 1000 milliLiter(s) IV Continuous <Continuous>    PRN Inpatient Medications      REVIEW OF SYSTEMS  --------------------------------------------------------------------------------  Gen: + decrease PO intake.  No fatigue, fevers/chills, weakness  Skin: No rashes  Respiratory: No dyspnea, cough, wheezing, hemoptysis  CV: No chest pain, PND, orthopnea  GI: + abdominal pain, bloating.  No diarrhea, constipation, nausea, vomiting, melena, hematochezia  : No increased frequency, dysuria, hematuria, nocturia  MSK: No joint pain/swelling; no back pain; no edema  Neuro: No dizziness/lightheadedness, weakness  Heme: No easy bruising or bleeding      All other systems were reviewed and are negative, except as noted.    VITALS/PHYSICAL EXAM  --------------------------------------------------------------------------------  T(C): 36.7 (08-09-19 @ 19:42), Max: 36.7 (08-09-19 @ 19:42)  HR: 104 (08-09-19 @ 20:55) (94 - 107)  BP: 102/51 (08-09-19 @ 20:55) (83/53 - 102/51)  RR: 18 (08-09-19 @ 20:55) (18 - 20)  SpO2: 97% (08-09-19 @ 20:55) (97% - 100%)  Wt(kg): --  Height (cm): 185.42 (08-09-19 @ 11:14)  Weight (kg): 93 (08-09-19 @ 11:14)  BMI (kg/m2): 27.1 (08-09-19 @ 11:14)  BSA (m2): 2.17 (08-09-19 @ 11:14)      Physical Exam:  	Gen: NAD, well-appearing  	HEENT: PERRL, supple neck, clear oropharynx  	Pulm: CTA B/L  	CV: systolic murmur, RRR, S1S2  	Abd: decrease BS, soft, + distended, nontender  	: No suprapubic tenderness, + talavera catheter in place  	LE: Warm, intact strength; no edema  	Skin: Warm, without rashes  	Vascular access:    LABS/STUDIES  --------------------------------------------------------------------------------              10.0   11.8  >-----------<  430      [08-09-19 @ 11:56]              32.6     129  |  100  |  63  ----------------------------<  89      [08-09-19 @ 15:25]  5.6   |  14  |  2.39        Ca     8.2     [08-09-19 @ 15:25]    TPro  6.7  /  Alb  2.3  /  TBili  0.7  /  DBili  x   /  AST  7   /  ALT  8   /  AlkPhos  491  [08-09-19 @ 11:56]    PT/INR: PT 16.8 , INR 1.46       [08-09-19 @ 11:56]  PTT: 36.2       [08-09-19 @ 11:56]      Creatinine Trend:  SCr 2.39 [08-09 @ 15:25]  SCr 2.74 [08-09 @ 11:56]    Urinalysis - [08-09-19 @ 17:33]      Color Yellow / Appearance Slightly Turbid / SG 1.023 / pH 5.5      Gluc Negative / Ketone Negative  / Bili Small / Urobili 2 mg/dL       Blood Negative / Protein 30 mg/dL / Leuk Est Negative / Nitrite Negative      RBC 4 / WBC 1 / Hyaline 3 / Gran  / Sq Epi  / Non Sq Epi 1 / Bacteria Negative

## 2019-08-09 NOTE — CONSULT NOTE ADULT - ASSESSMENT
ASSESSMENT: 78yMale with PMH hypothyroidism, HTN, duodenal ulcer who presents with small bowel obstruction    PLAN:   Neurologic: acute pain  - Serial abdominal exams before administration of pain medication    Respiratory: no acute issues  - Incentive spirometry to prevent atelectasis    Cardiovascular: hypotension likely 2/2 septic shock, s/p 3L fluid resuscitation in ED, with hyperlactatemia    Gastrointestinal/Nutrition:    Genitourinary/Renal:    Hematologic:    Infectious Disease:    Endocrine:    Disposition: ASSESSMENT: 78yMale with PMH hypothyroidism, HTN, duodenal ulcer who presents with small bowel obstruction    PLAN:   Neurologic: acute pain  - Serial abdominal exams before administration of pain medication    Respiratory: no acute issues  - Incentive spirometry to prevent atelectasis    Cardiovascular: hypotension likely 2/2 septsis, s/p 3L fluid resuscitation in ED, with hyperlactatemia  - Monitor vital signs  - Trend lactate q 4 hours  - Bedside echocardiography to determine volume status    Gastrointestinal/Nutrition: h/o duodenal ulcer; primary SBO  - Serial abdominal exams  - NPO  - Protonix for h/o duodenal ulcer  - Follow up read of CTA  to r/o mesenteric ischemia    Genitourinary/Renal: BRIGID; hyperkalemia to 6.2 s/p insulin, D50  - NS @ 100 ml/hr  - carlos Magaña I&Os  - Nephrology consult  - BMP q 4 hours    Hematologic: no acute issues  - Lovenox 30mg qd for VTE ppx\    Infectious Disease: no acute issues; s/p 1 dose of Zosyn in ED  - Culture if febrile  - No need to continue antibiotics    Endocrine: hypothyroidism  - Continue levothyroxine    Disposition: FAITH PhippsC  [id number]

## 2019-08-09 NOTE — ED PROVIDER NOTE - NS ED ROS FT
CONST: no fevers, no chills, +weight loss, +fatigue  EYES: no pain, no vision changes  ENT: no sore throat, no ear pain, no change in hearing  CV: no chest pain, no leg swelling  RESP: no shortness of breath, no cough  ABD: +abdominal pain, no nausea, no vomiting, no diarrhea  : no dysuria, no flank pain, no hematuria  MSK: no back pain, no extremity pain  NEURO: no headache or additional neurologic complaints  HEME: no easy bleeding  SKIN:  no rash

## 2019-08-09 NOTE — CONSULT NOTE ADULT - PROBLEM SELECTOR RECOMMENDATION 2
Hyperkalemia likely 2/2 Acute on CKD  - on admission K 6.2, downtrend to 5.6, s/p ED tx albuterol/insulin/d50/IVF    PLAN:  - continue medical management for now, can use lokelma (new potassium binder) AVOID Kayexalate  - trend K Hyperkalemia likely 2/2 Acute on CKD  - on admission K 6.2, downtrend to 5.6, s/p ED tx albuterol/insulin/d50/IVF    PLAN:  - trend K, if repeat K elevated can give lasix IV 20 for potassium excretion  - no indication for HD for now Hyperkalemia likely 2/2 Acute on CKD  - on admission K 6.2, downtrend to 5.6, s/p ED tx albuterol/insulin/d50/IVF    PLAN:  - trend K, if repeat K elevated can give lasix IV 20 for urinary potassium excretion  - no indication for HD

## 2019-08-09 NOTE — H&P ADULT - PROBLEM SELECTOR PLAN 1
Admit to General Surgery   NPO and IVF  No need for Abx at this time (leukocytosis can be elevated in setting of dehydration)  Hold NGT at this time, patient has not had nausea or vomiting if he does become nauseas or vomits will need NGT placement  Serial abdominal exams  F/u Abd XR in AM  F/u labs in AM  Antiemetics  Pain control  Electrolytes repletions PRN

## 2019-08-09 NOTE — ED ADULT NURSE NOTE - OBJECTIVE STATEMENT
78y male arrived to ED complaining of weakness. PMHx HTN, anxiety, duodenal ulcer, hyperparathyroidism, BRIGID, benign ab mass. No PSHx. Patient accompanied by daughter at bedside, reports that patient has been weak, unsteady gait, weight loss x several days. Patient endorses ab pain, radiating to back. Ab distended. +pain to palpitation. Patient took Tylenol prior to arrival with little relief. 78y male arrived to ED complaining of weakness. PMHx HTN, anxiety, duodenal ulcer, hyperparathyroidism, BRIGID, benign ab mass. No PSHx. Patient accompanied by daughter at bedside, reports that patient has been weak, unsteady gait, weight loss x several days. Patient endorses ab pain, radiating to back. Ab distended. +pain to palpitation. Patient took Tylenol prior to arrival with little relief. Patient endorses last BM last night (diarrhea), no blood in stool. Patient denies CP, SOB, n/v, fever, burning urination. Endorses chills. Had denture placed 2 days ago. Patient A&Ox4, ambulates independently at baseline, hypotensive in ED, mildly tachycardic.

## 2019-08-09 NOTE — CONSULT NOTE ADULT - SUBJECTIVE AND OBJECTIVE BOX
SURGICAL INTENSIVE CARE UNIT CONSULT NOTE    HPI:  Patient is a 78y old  Male who presents with PMHx of duodenal ulcer (questionable duodenal perforation in past in charts however family denies), duodenal stricture requiring EGD and ballooning presenting to the ER with three weeks of decreased PO intake and abdominal pain that was vague and located in the epigastric area, worse with increased intake however not associated with types of foods. He states no radiation, na denies experiencing pain similar to this before. States passed flatus and BM last night, however admits to bloating today. He states he has never been nausea or had emesis. Denies fever, chills, CP, SOB, nausea, vomiting, diarrhea, constipation, and urinary symptoms.     ROS: 10-system review is otherwise negative except HPI above    SICU consulted for Persistent Lactate, hypotension with fluid resuscitation, Hyperkalemia. Evaluated in ED, patient having diffuse abdominal pain, distension.     He will be undergoing CTA to rule out mesenteric ischemia in setting of BRIGID.       PAST MEDICAL HISTORY: Hyperparathyroidism  Hypertension  BRIGID (acute kidney injury)  SBO (small bowel obstruction)  Duodenal ulcer disease      PAST SURGICAL HISTORY: No significant past surgical history      FAMILY HISTORY: Family history of non-Hodgkin's lymphoma    HOME MEDICATIONS:  · 	amLODIPine 10 mg oral tablet: 1 tab(s) orally once a day  · 	ALPRAZolam 0.25 mg oral tablet: 1 tab(s) orally 2 times a day MDD:2  · 	levothyroxine 175 mcg (0.175 mg) oral tablet: 1 tab(s) orally once a day  · 	Protonix 40 mg oral delayed release tablet: 1 tab(s) orally once a day  · 	metoprolol tartrate 50 mg oral tablet: 1 tab(s) orally 2 times a day  · 	Diovan 160 mg oral tablet: 1 tab(s) orally once a day  · 	sucralfate 1 g oral tablet:  orally once a day  · 	simvastatin 40 mg oral tablet: 1 tab(s) orally once a day (at bedtime)    ALLERGIES: Cipro (Rash)  niacin (Flushing; Rash)  Reglan (Anaphylaxis)      VITAL SIGNS:  ICU Vital Signs Last 24 Hrs  T(C): 36.7 (09 Aug 2019 19:42), Max: 36.7 (09 Aug 2019 19:42)  T(F): 98 (09 Aug 2019 19:42), Max: 98 (09 Aug 2019 19:42)  HR: 102 (09 Aug 2019 19:42) (94 - 107)  BP: 96/47 (09 Aug 2019 19:42) (83/53 - 99/62)  RR: 18 (09 Aug 2019 19:42) (18 - 20)  SpO2: 97% (09 Aug 2019 19:42) (97% - 100%)      NEURO  Exam: AOx3.     RESPIRATORY:   Exam: Nonlabored breathing.       CARDIOVASCULAR:   VBG - ( 09 Aug 2019 20:32 )  pH: 7.32  /  pCO2: 36    /  pO2: 22    / HCO3: 18    / Base Excess: -7.1  /  SaO2: 29     Lactate: 3.2              Exam:  Cardiac Rhythm:      GI/NUTRITION  Exam:  Diet:      GENITOURINARY/RENAL  lactated ringers. 1000 milliLiter(s) IV Continuous <Continuous>      Weight (kg): 93 (08-09 @ 11:14)  08-09    129<L>  |  100  |  63<H>  ----------------------------<  89  5.6<H>   |  14<L>  |  2.39<H>    Ca    8.2<L>      09 Aug 2019 15:25    TPro  6.7  /  Alb  2.3<L>  /  TBili  0.7  /  DBili  x   /  AST  7<L>  /  ALT  8<L>  /  AlkPhos  491<H>  08-09    [ ] Magaña catheter, indication: urine output monitoring in critically ill patient    HEMATOLOGIC  [ ] VTE Prophylaxis:                          10.0   11.8  )-----------( 430      ( 09 Aug 2019 11:56 )             32.6     PT/INR - ( 09 Aug 2019 11:56 )   PT: 16.8 sec;   INR: 1.46 ratio         PTT - ( 09 Aug 2019 11:56 )  PTT:36.2 sec  Transfusion: [ ] PRBC	[ ] Platelets	[ ] FFP	[ ] Cryoprecipitate      INFECTIOUS DISEASES    RECENT CULTURES:      ENDOCRINE    CAPILLARY BLOOD GLUCOSE      POCT Blood Glucose.: 93 mg/dL (09 Aug 2019 15:11)  POCT Blood Glucose.: 88 mg/dL (09 Aug 2019 12:52)      PATIENT CARE ACCESS DEVICES:  [ ] Peripheral IV  [ ] Central Venous Line	[ ] R	[ ] L	[ ] IJ	[ ] Fem	[ ] SC	Placed:   [ ] Arterial Line		[ ] R	[ ] L	[ ] Fem	[ ] Rad	[ ] Ax	Placed:   [ ] PICC:					[ ] Mediport  [ ] Urinary Catheter, Date Placed:   [x] Necessity of urinary, arterial, and venous catheters discussed    OTHER MEDICATIONS:     IMAGING STUDIES: SURGICAL INTENSIVE CARE UNIT CONSULT NOTE    HPI:  Patient is a 78y old male who presents with PMHx of duodenal ulcer (questionable duodenal perforation in past in charts however family denies), duodenal stricture requiring EGD and ballooning, hypothyroidism, HTN presenting to the ER on 8/9 with three weeks of decreased PO intake and abdominal pain that was vague and located in the epigastric area, worse with increased intake however not associated with types of foods. He states no radiation, and denies experiencing pain similar to this before. States passed flatus and BM last night, endorses diarrhea, however admits to bloating today. He states he has never been nauseous or had emesis. Denies fever, chills, CP, SOB, nausea, vomiting, constipation, and urinary symptoms.     ROS: 10-system review is otherwise negative except HPI above    SICU consulted for Persistent Lactate, hypotension with fluid resuscitation, Hyperkalemia. Evaluated in ED, patient having diffuse abdominal pain, distension.     He will be undergoing CTA to rule out mesenteric ischemia in setting of BRIGID.       PAST MEDICAL HISTORY: Hyperparathyroidism  Hypertension  BRIGID (acute kidney injury)  SBO (small bowel obstruction)  Duodenal ulcer disease      PAST SURGICAL HISTORY: No significant past surgical history      FAMILY HISTORY: Family history of non-Hodgkin's lymphoma    HOME MEDICATIONS:  · 	amLODIPine 10 mg oral tablet: 1 tab(s) orally once a day  · 	ALPRAZolam 0.25 mg oral tablet: 1 tab(s) orally 2 times a day MDD:2  · 	levothyroxine 175 mcg (0.175 mg) oral tablet: 1 tab(s) orally once a day  · 	Protonix 40 mg oral delayed release tablet: 1 tab(s) orally once a day  · 	metoprolol tartrate 50 mg oral tablet: 1 tab(s) orally 2 times a day  · 	Diovan 160 mg oral tablet: 1 tab(s) orally once a day  · 	sucralfate 1 g oral tablet:  orally once a day  · 	simvastatin 40 mg oral tablet: 1 tab(s) orally once a day (at bedtime)    ALLERGIES: Cipro (Rash)  niacin (Flushing; Rash)  Reglan (Anaphylaxis)      VITAL SIGNS:  ICU Vital Signs Last 24 Hrs  T(C): 36.7 (09 Aug 2019 19:42), Max: 36.7 (09 Aug 2019 19:42)  T(F): 98 (09 Aug 2019 19:42), Max: 98 (09 Aug 2019 19:42)  HR: 102 (09 Aug 2019 19:42) (94 - 107)  BP: 96/47 (09 Aug 2019 19:42) (83/53 - 99/62)  RR: 18 (09 Aug 2019 19:42) (18 - 20)  SpO2: 97% (09 Aug 2019 19:42) (97% - 100%)      NEURO  Exam: AOx3.     RESPIRATORY:   Exam: Nonlabored breathing.       CARDIOVASCULAR:   VBG - ( 09 Aug 2019 20:32 )  pH: 7.32  /  pCO2: 36    /  pO2: 22    / HCO3: 18    / Base Excess: -7.1  /  SaO2: 29     Lactate: 3.2      Exam: regular rhythm, tachycardia  Cardiac Rhythm: sinus tachycardia      GI/NUTRITION  Exam: soft, nontender, nondistended  Diet: NPO      GENITOURINARY/RENAL  lactated ringers. 1000 milliLiter(s) IV Continuous <Continuous>      Weight (kg): 93 (08-09 @ 11:14)  08-09    129<L>  |  100  |  63<H>  ----------------------------<  89  5.6<H>   |  14<L>  |  2.39<H>    Ca    8.2<L>      09 Aug 2019 15:25    TPro  6.7  /  Alb  2.3<L>  /  TBili  0.7  /  DBili  x   /  AST  7<L>  /  ALT  8<L>  /  AlkPhos  491<H>  08-09    [ x] Magaña catheter, indication: urine output monitoring in critically ill patient    HEMATOLOGIC  [x ] VTE Prophylaxis: Lovenox                          10.0   11.8  )-----------( 430      ( 09 Aug 2019 11:56 )             32.6     PT/INR - ( 09 Aug 2019 11:56 )   PT: 16.8 sec;   INR: 1.46 ratio         PTT - ( 09 Aug 2019 11:56 )  PTT:36.2 sec  Transfusion: [ ] PRBC	[ ] Platelets	[ ] FFP	[ ] Cryoprecipitate      INFECTIOUS DISEASES    RECENT CULTURES: x      ENDOCRINE  CAPILLARY BLOOD GLUCOSE  POCT Blood Glucose.: 93 mg/dL (09 Aug 2019 15:11)  POCT Blood Glucose.: 88 mg/dL (09 Aug 2019 12:52)      PATIENT CARE ACCESS DEVICES:  [x ] Peripheral IV  [ ] Central Venous Line	[ ] R	[ ] L	[ ] IJ	[ ] Fem	[ ] SC	Placed:   [ ] Arterial Line		[ ] R	[ ] L	[ ] Fem	[ ] Rad	[ ] Ax	Placed:   [ ] PICC:					[ ] Mediport  [x ] Urinary Catheter, Date Placed: 8/9  [x] Necessity of urinary, arterial, and venous catheters discussed    OTHER MEDICATIONS: x    IMAGING STUDIES: < from: CT Abdomen and Pelvis w/ Oral Cont (08.09.19 @ 13:47) >    BOWEL: Small bowel obstruction with transition in the right hemiabdomen.   Colonic diverticulosis.  PERITONEUM: Trace ascites. A 4.3 x 3.5 cm peripherally calcified   structure within the mesentery of the left upper quadrant, etiology   unclear and without change from prior imaging dating to 2014.  VESSELS: Atherosclerotic changes.  RETROPERITONEUM/LYMPH NODES: No lymphadenopathy.    ABDOMINAL WALL: Within normal limits.  BONES: Degenerative changes. Transitional L5 vertebral body.    LIVER: Hepatomegaly.  BILE DUCTS: Normal caliber.  GALLBLADDER: Within normal limits.  SPLEEN: Splenomegaly. Mild calcification at the periphery of the spleen   likely related to prior trauma.  PANCREAS: Within normal limits.  ADRENALS: Withinnormal limits.  KIDNEYS/URETERS: Right renal cyst.    IMPRESSION:     Small bowel obstruction.

## 2019-08-09 NOTE — H&P ADULT - NSHPPHYSICALEXAM_GEN_ALL_CORE
PHYSICAL EXAM:      Constitutional: Laying in bed comfortable appearing    Eyes: PERRLA, EOMI    ENMT: Appearing WNL    Neck: Supple, no masses    Breasts: Nontender    Back: No C/T/L/S midline tenderness    Respiratory: CTA bilaterally.    Cardiovascular: RRR, no murmur, no rubs no gallops    Gastrointestinal: Soft, distended, nontender, no palpable hernias, no rebound, no guarding and no peritoneal signs.    Extremities: Moving all 4 well.    Vascular: intact    Neurological: AxO x 3    Skin: Intact PHYSICAL EXAM:      Constitutional: Laying in bed comfortable appearing    Eyes: PERRLA, EOMI    ENMT: Appearing WNL    Neck: Supple, no masses    Back: No C/T/L/S midline tenderness    Respiratory: CTA bilaterally.    Cardiovascular: RRR, no murmur, no rubs no gallops    Gastrointestinal: Soft, distended, nontender, no palpable hernias, no rebound, no guarding and no peritoneal signs.    Extremities: Moving all 4 well.    Vascular: intact    Neurological: AxO x 3    Skin: Intact

## 2019-08-09 NOTE — CONSULT NOTE ADULT - ASSESSMENT
78M PMHx duodenal ulcer and duodenal stricture s/p EGD and ballooning p/w epigastric pain & decrease PO intake x 3 weeks - admit for SBO seen CT A/P.      Nephrology consulted for Acute on CKD and hyperkalemia  - on admission SCr 2.74 (baseline fluctuates 1.4-1.8 March-July 2019 in [facility]), K 6.2 downtrend 5.6  - s/p ED 1LR bolus x 2, NS 1L Bolus x 1 and hyperK treatement (albuterol/insulin/d50/IVF)  - prior renal u/s 2016 normal kidneys 78M PMHx CKDII/III, duodenal ulcer and duodenal stricture s/p EGD and ballooning p/w epigastric pain & decrease PO intake x 3 weeks - admit for SBO seen CT A/P.      Nephrology consulted for Acute on CKD and hyperkalemia  - on admission SCr 2.74 (baseline fluctuates 1.4-1.8 March-July 2019 in [facility]), K 6.2 downtrend 5.6  - s/p ED 1LR bolus x 2, NS 1L Bolus x 1 and hyperK treatement (albuterol/insulin/d50/IVF)  - prior renal u/s 2016 normal kidneys

## 2019-08-09 NOTE — CONSULT NOTE ADULT - PROBLEM SELECTOR RECOMMENDATION 9
Acute on CKD likely pre renal in setting decrease PO intake, oxybutynin  - on admission SCr 2.74 (baseline fluctuates 1.4-1.8 March-July 2019 in [facility]) downtrend SCr 2.39 post IVF, UA specific gravity 1.023  - s/p ED 2L LR and 1L NS     PLAN:  - please obtain urine electrolytes (Na, K, Cr, Urea, Osmolality)  - please obtain renal ultrasound  - bladder scan w/ post void residual r/o obstruction  - avoid nephrotoxic agents.  Renally dose medications  - Trend SCr, monitor UOP  - no absolute indication for hemodialysis for now (will consent in case emergent indication arises) Acute on CKD likely pre renal in setting decrease PO intake, oxybutynin  - on admission SCr 2.74 (baseline fluctuates 1.4-1.8 March-July 2019 in [facility]) downtrend SCr 2.39 post IVF, UA specific gravity 1.023  - s/p ED 2L LR and 1L NS     PLAN:  - c/w IVF Normal saline   - c/w talavera catheter  - avoid nephrotoxic agents.  Renally dose medications  - Trend SCr, monitor UOP  - no absolute indication for hemodialysis for now (will consent in case emergent indication arises) Acute on CKD likely pre renal volume depletion in setting decrease PO intake, diarrhea  - on admission SCr 2.74 (baseline fluctuates 1.4-1.8 March-July 2019 in [facility]) downtrend SCr 2.39 post IVF, UA specific gravity 1.023  - s/p ED 2L LR and 1L NS     PLAN:  - c/w IVF Normal saline 100 cc/hr  - c/w talavera catheter  - avoid nephrotoxic agents.  Renally dose medications  - Trend SCr, monitor UOP  - no absolute indication for hemodialysis for now Acute on CKDII/III likely pre renal volume depletion in setting decrease PO intake, diarrhea  - on admission SCr 2.74 (baseline fluctuates 1.4-1.8 March-July 2019 in [facility]) downtrend SCr 2.39 post IVF, UA specific gravity 1.023  - s/p ED 2L LR and 1L NS     PLAN:  - c/w IVF Normal saline 100 cc/hr  - c/w talavera catheter  - avoid nephrotoxic agents.  Renally dose medications  - Trend SCr, monitor UOP  - no absolute indication for hemodialysis

## 2019-08-09 NOTE — ED PROVIDER NOTE - PHYSICAL EXAMINATION
Heather Nicolas DO.:   GENERAL: Patient awake alert NAD.  HEENT: NC/AT, very dry mucous membranes, PERRL, EOMI.  LUNGS: CTAB, no wheezes or crackles.   CARDIAC: RRR, no m/r/g.    ABDOMEN: Soft, +tender in epigastric area, +distended, No rebound, guarding. No CVA tenderness.   EXT: No edema. No calf tenderness. CV 2+DP/PT bilaterally.   MSK: No spinal tenderness, no pain with movement, no deformities.  NEURO: A&Ox3. Moving all extremities.  SKIN: Warm and dry. No rash.  PSYCH: Normal affect.

## 2019-08-10 ENCOUNTER — TRANSCRIPTION ENCOUNTER (OUTPATIENT)
Age: 79
End: 2019-08-10

## 2019-08-10 LAB
ANION GAP SERPL CALC-SCNC: 13 MMOL/L — SIGNIFICANT CHANGE UP (ref 5–17)
ANION GAP SERPL CALC-SCNC: 14 MMOL/L — SIGNIFICANT CHANGE UP (ref 5–17)
ANION GAP SERPL CALC-SCNC: 16 MMOL/L — SIGNIFICANT CHANGE UP (ref 5–17)
BUN SERPL-MCNC: 48 MG/DL — HIGH (ref 7–23)
BUN SERPL-MCNC: 51 MG/DL — HIGH (ref 7–23)
BUN SERPL-MCNC: 54 MG/DL — HIGH (ref 7–23)
BUN SERPL-MCNC: 57 MG/DL — HIGH (ref 7–23)
BUN SERPL-MCNC: 60 MG/DL — HIGH (ref 7–23)
BUN SERPL-MCNC: 61 MG/DL — HIGH (ref 7–23)
CALCIUM SERPL-MCNC: 8.3 MG/DL — LOW (ref 8.4–10.5)
CALCIUM SERPL-MCNC: 8.5 MG/DL — SIGNIFICANT CHANGE UP (ref 8.4–10.5)
CALCIUM SERPL-MCNC: 8.6 MG/DL — SIGNIFICANT CHANGE UP (ref 8.4–10.5)
CALCIUM SERPL-MCNC: 8.8 MG/DL — SIGNIFICANT CHANGE UP (ref 8.4–10.5)
CHLORIDE SERPL-SCNC: 101 MMOL/L — SIGNIFICANT CHANGE UP (ref 96–108)
CHLORIDE SERPL-SCNC: 102 MMOL/L — SIGNIFICANT CHANGE UP (ref 96–108)
CHLORIDE SERPL-SCNC: 103 MMOL/L — SIGNIFICANT CHANGE UP (ref 96–108)
CHLORIDE SERPL-SCNC: 103 MMOL/L — SIGNIFICANT CHANGE UP (ref 96–108)
CO2 SERPL-SCNC: 14 MMOL/L — LOW (ref 22–31)
CO2 SERPL-SCNC: 15 MMOL/L — LOW (ref 22–31)
CO2 SERPL-SCNC: 17 MMOL/L — LOW (ref 22–31)
CO2 SERPL-SCNC: 17 MMOL/L — LOW (ref 22–31)
CO2 SERPL-SCNC: 18 MMOL/L — LOW (ref 22–31)
CO2 SERPL-SCNC: 19 MMOL/L — LOW (ref 22–31)
CREAT ?TM UR-MCNC: 35 MG/DL — SIGNIFICANT CHANGE UP
CREAT SERPL-MCNC: 1.91 MG/DL — HIGH (ref 0.5–1.3)
CREAT SERPL-MCNC: 1.94 MG/DL — HIGH (ref 0.5–1.3)
CREAT SERPL-MCNC: 2.01 MG/DL — HIGH (ref 0.5–1.3)
CREAT SERPL-MCNC: 2.11 MG/DL — HIGH (ref 0.5–1.3)
CREAT SERPL-MCNC: 2.16 MG/DL — HIGH (ref 0.5–1.3)
CREAT SERPL-MCNC: 2.17 MG/DL — HIGH (ref 0.5–1.3)
CULTURE RESULTS: NO GROWTH — SIGNIFICANT CHANGE UP
GAS PNL BLDA: SIGNIFICANT CHANGE UP
GLUCOSE BLDC GLUCOMTR-MCNC: 146 MG/DL — HIGH (ref 70–99)
GLUCOSE SERPL-MCNC: 122 MG/DL — HIGH (ref 70–99)
GLUCOSE SERPL-MCNC: 126 MG/DL — HIGH (ref 70–99)
GLUCOSE SERPL-MCNC: 140 MG/DL — HIGH (ref 70–99)
GLUCOSE SERPL-MCNC: 154 MG/DL — HIGH (ref 70–99)
GLUCOSE SERPL-MCNC: 157 MG/DL — HIGH (ref 70–99)
GLUCOSE SERPL-MCNC: 175 MG/DL — HIGH (ref 70–99)
HCT VFR BLD CALC: 28.3 % — LOW (ref 39–50)
HCT VFR BLD CALC: 28.5 % — LOW (ref 39–50)
HCT VFR BLD CALC: 29.3 % — LOW (ref 39–50)
HCT VFR BLD CALC: 30 % — LOW (ref 39–50)
HCT VFR BLD CALC: 30.1 % — LOW (ref 39–50)
HGB BLD-MCNC: 8.8 G/DL — LOW (ref 13–17)
HGB BLD-MCNC: 9 G/DL — LOW (ref 13–17)
HGB BLD-MCNC: 9.3 G/DL — LOW (ref 13–17)
HGB BLD-MCNC: 9.4 G/DL — LOW (ref 13–17)
HGB BLD-MCNC: 9.4 G/DL — LOW (ref 13–17)
MAGNESIUM SERPL-MCNC: 2.6 MG/DL — SIGNIFICANT CHANGE UP (ref 1.6–2.6)
MAGNESIUM SERPL-MCNC: 2.7 MG/DL — HIGH (ref 1.6–2.6)
MAGNESIUM SERPL-MCNC: 2.8 MG/DL — HIGH (ref 1.6–2.6)
MCHC RBC-ENTMCNC: 27 PG — SIGNIFICANT CHANGE UP (ref 27–34)
MCHC RBC-ENTMCNC: 27.3 PG — SIGNIFICANT CHANGE UP (ref 27–34)
MCHC RBC-ENTMCNC: 27.5 PG — SIGNIFICANT CHANGE UP (ref 27–34)
MCHC RBC-ENTMCNC: 27.6 PG — SIGNIFICANT CHANGE UP (ref 27–34)
MCHC RBC-ENTMCNC: 27.7 PG — SIGNIFICANT CHANGE UP (ref 27–34)
MCHC RBC-ENTMCNC: 31 GM/DL — LOW (ref 32–36)
MCHC RBC-ENTMCNC: 31.1 GM/DL — LOW (ref 32–36)
MCHC RBC-ENTMCNC: 31.2 GM/DL — LOW (ref 32–36)
MCHC RBC-ENTMCNC: 31.6 GM/DL — LOW (ref 32–36)
MCHC RBC-ENTMCNC: 31.6 GM/DL — LOW (ref 32–36)
MCV RBC AUTO: 87.1 FL — SIGNIFICANT CHANGE UP (ref 80–100)
MCV RBC AUTO: 87.2 FL — SIGNIFICANT CHANGE UP (ref 80–100)
MCV RBC AUTO: 87.5 FL — SIGNIFICANT CHANGE UP (ref 80–100)
MCV RBC AUTO: 87.5 FL — SIGNIFICANT CHANGE UP (ref 80–100)
MCV RBC AUTO: 88.4 FL — SIGNIFICANT CHANGE UP (ref 80–100)
OSMOLALITY SERPL: 300 MOSMOL/KG — SIGNIFICANT CHANGE UP (ref 280–301)
OSMOLALITY UR: 305 MOS/KG — SIGNIFICANT CHANGE UP (ref 300–900)
PHOSPHATE SERPL-MCNC: 5.2 MG/DL — HIGH (ref 2.5–4.5)
PHOSPHATE SERPL-MCNC: 5.5 MG/DL — HIGH (ref 2.5–4.5)
PHOSPHATE SERPL-MCNC: 5.5 MG/DL — HIGH (ref 2.5–4.5)
PHOSPHATE SERPL-MCNC: 5.7 MG/DL — HIGH (ref 2.5–4.5)
PHOSPHATE SERPL-MCNC: 6 MG/DL — HIGH (ref 2.5–4.5)
PLATELET # BLD AUTO: 437 K/UL — HIGH (ref 150–400)
PLATELET # BLD AUTO: 441 K/UL — HIGH (ref 150–400)
PLATELET # BLD AUTO: 447 K/UL — HIGH (ref 150–400)
PLATELET # BLD AUTO: 456 K/UL — HIGH (ref 150–400)
PLATELET # BLD AUTO: 474 K/UL — HIGH (ref 150–400)
POTASSIUM SERPL-MCNC: 5 MMOL/L — SIGNIFICANT CHANGE UP (ref 3.5–5.3)
POTASSIUM SERPL-MCNC: 5.1 MMOL/L — SIGNIFICANT CHANGE UP (ref 3.5–5.3)
POTASSIUM SERPL-MCNC: 5.2 MMOL/L — SIGNIFICANT CHANGE UP (ref 3.5–5.3)
POTASSIUM SERPL-MCNC: 5.9 MMOL/L — HIGH (ref 3.5–5.3)
POTASSIUM SERPL-SCNC: 5 MMOL/L — SIGNIFICANT CHANGE UP (ref 3.5–5.3)
POTASSIUM SERPL-SCNC: 5.1 MMOL/L — SIGNIFICANT CHANGE UP (ref 3.5–5.3)
POTASSIUM SERPL-SCNC: 5.2 MMOL/L — SIGNIFICANT CHANGE UP (ref 3.5–5.3)
POTASSIUM SERPL-SCNC: 5.9 MMOL/L — HIGH (ref 3.5–5.3)
POTASSIUM UR-SCNC: 24 MMOL/L — SIGNIFICANT CHANGE UP
RBC # BLD: 3.23 M/UL — LOW (ref 4.2–5.8)
RBC # BLD: 3.27 M/UL — LOW (ref 4.2–5.8)
RBC # BLD: 3.35 M/UL — LOW (ref 4.2–5.8)
RBC # BLD: 3.4 M/UL — LOW (ref 4.2–5.8)
RBC # BLD: 3.46 M/UL — LOW (ref 4.2–5.8)
RBC # FLD: 13.8 % — SIGNIFICANT CHANGE UP (ref 10.3–14.5)
RBC # FLD: 13.8 % — SIGNIFICANT CHANGE UP (ref 10.3–14.5)
RBC # FLD: 13.9 % — SIGNIFICANT CHANGE UP (ref 10.3–14.5)
SODIUM SERPL-SCNC: 130 MMOL/L — LOW (ref 135–145)
SODIUM SERPL-SCNC: 131 MMOL/L — LOW (ref 135–145)
SODIUM SERPL-SCNC: 132 MMOL/L — LOW (ref 135–145)
SODIUM SERPL-SCNC: 133 MMOL/L — LOW (ref 135–145)
SODIUM SERPL-SCNC: 134 MMOL/L — LOW (ref 135–145)
SODIUM SERPL-SCNC: 135 MMOL/L — SIGNIFICANT CHANGE UP (ref 135–145)
SODIUM UR-SCNC: 29 MMOL/L — SIGNIFICANT CHANGE UP
SPECIMEN SOURCE: SIGNIFICANT CHANGE UP
UUN UR-MCNC: 449 MG/DL — SIGNIFICANT CHANGE UP
WBC # BLD: 10.6 K/UL — HIGH (ref 3.8–10.5)
WBC # BLD: 11 K/UL — HIGH (ref 3.8–10.5)
WBC # BLD: 11.1 K/UL — HIGH (ref 3.8–10.5)
WBC # BLD: 11.5 K/UL — HIGH (ref 3.8–10.5)
WBC # BLD: 12.7 K/UL — HIGH (ref 3.8–10.5)
WBC # FLD AUTO: 10.6 K/UL — HIGH (ref 3.8–10.5)
WBC # FLD AUTO: 11 K/UL — HIGH (ref 3.8–10.5)
WBC # FLD AUTO: 11.1 K/UL — HIGH (ref 3.8–10.5)
WBC # FLD AUTO: 11.5 K/UL — HIGH (ref 3.8–10.5)
WBC # FLD AUTO: 12.7 K/UL — HIGH (ref 3.8–10.5)

## 2019-08-10 PROCEDURE — 99223 1ST HOSP IP/OBS HIGH 75: CPT | Mod: GC

## 2019-08-10 PROCEDURE — 99291 CRITICAL CARE FIRST HOUR: CPT

## 2019-08-10 PROCEDURE — 49000 EXPLORATION OF ABDOMEN: CPT

## 2019-08-10 PROCEDURE — 71045 X-RAY EXAM CHEST 1 VIEW: CPT | Mod: 26

## 2019-08-10 PROCEDURE — 36620 INSERTION CATHETER ARTERY: CPT

## 2019-08-10 RX ORDER — HYDROMORPHONE HYDROCHLORIDE 2 MG/ML
0.25 INJECTION INTRAMUSCULAR; INTRAVENOUS; SUBCUTANEOUS
Refills: 0 | Status: DISCONTINUED | OUTPATIENT
Start: 2019-08-10 | End: 2019-08-12

## 2019-08-10 RX ORDER — FENTANYL CITRATE 50 UG/ML
1 INJECTION INTRAVENOUS
Qty: 5000 | Refills: 0 | Status: DISCONTINUED | OUTPATIENT
Start: 2019-08-10 | End: 2019-08-10

## 2019-08-10 RX ORDER — PIPERACILLIN AND TAZOBACTAM 4; .5 G/20ML; G/20ML
3.38 INJECTION, POWDER, LYOPHILIZED, FOR SOLUTION INTRAVENOUS ONCE
Refills: 0 | Status: DISCONTINUED | OUTPATIENT
Start: 2019-08-10 | End: 2019-08-10

## 2019-08-10 RX ORDER — FENTANYL CITRATE 50 UG/ML
50 INJECTION INTRAVENOUS ONCE
Refills: 0 | Status: DISCONTINUED | OUTPATIENT
Start: 2019-08-10 | End: 2019-08-10

## 2019-08-10 RX ORDER — HYDROMORPHONE HYDROCHLORIDE 2 MG/ML
0.5 INJECTION INTRAMUSCULAR; INTRAVENOUS; SUBCUTANEOUS
Refills: 0 | Status: DISCONTINUED | OUTPATIENT
Start: 2019-08-10 | End: 2019-08-10

## 2019-08-10 RX ORDER — CHLORHEXIDINE GLUCONATE 213 G/1000ML
1 SOLUTION TOPICAL DAILY
Refills: 0 | Status: DISCONTINUED | OUTPATIENT
Start: 2019-08-10 | End: 2019-08-14

## 2019-08-10 RX ORDER — PANTOPRAZOLE SODIUM 20 MG/1
40 TABLET, DELAYED RELEASE ORAL DAILY
Refills: 0 | Status: DISCONTINUED | OUTPATIENT
Start: 2019-08-10 | End: 2019-08-15

## 2019-08-10 RX ORDER — INSULIN HUMAN 100 [IU]/ML
10 INJECTION, SOLUTION SUBCUTANEOUS ONCE
Refills: 0 | Status: COMPLETED | OUTPATIENT
Start: 2019-08-10 | End: 2019-08-10

## 2019-08-10 RX ORDER — HYDROMORPHONE HYDROCHLORIDE 2 MG/ML
0.5 INJECTION INTRAMUSCULAR; INTRAVENOUS; SUBCUTANEOUS ONCE
Refills: 0 | Status: DISCONTINUED | OUTPATIENT
Start: 2019-08-10 | End: 2019-08-10

## 2019-08-10 RX ORDER — DEXTROSE 50 % IN WATER 50 %
50 SYRINGE (ML) INTRAVENOUS ONCE
Refills: 0 | Status: COMPLETED | OUTPATIENT
Start: 2019-08-10 | End: 2019-08-10

## 2019-08-10 RX ORDER — ACETAMINOPHEN 500 MG
1000 TABLET ORAL ONCE
Refills: 0 | Status: COMPLETED | OUTPATIENT
Start: 2019-08-10 | End: 2019-08-10

## 2019-08-10 RX ORDER — SODIUM CHLORIDE 9 MG/ML
1000 INJECTION INTRAMUSCULAR; INTRAVENOUS; SUBCUTANEOUS
Refills: 0 | Status: DISCONTINUED | OUTPATIENT
Start: 2019-08-10 | End: 2019-08-10

## 2019-08-10 RX ORDER — SODIUM BICARBONATE 1 MEQ/ML
50 SYRINGE (ML) INTRAVENOUS ONCE
Refills: 0 | Status: COMPLETED | OUTPATIENT
Start: 2019-08-10 | End: 2019-08-10

## 2019-08-10 RX ORDER — SODIUM BICARBONATE 1 MEQ/ML
0.08 SYRINGE (ML) INTRAVENOUS
Qty: 75 | Refills: 0 | Status: DISCONTINUED | OUTPATIENT
Start: 2019-08-10 | End: 2019-08-11

## 2019-08-10 RX ORDER — ALBUMIN HUMAN 25 %
250 VIAL (ML) INTRAVENOUS ONCE
Refills: 0 | Status: COMPLETED | OUTPATIENT
Start: 2019-08-10 | End: 2019-08-10

## 2019-08-10 RX ORDER — CHLORHEXIDINE GLUCONATE 213 G/1000ML
15 SOLUTION TOPICAL EVERY 12 HOURS
Refills: 0 | Status: DISCONTINUED | OUTPATIENT
Start: 2019-08-10 | End: 2019-08-10

## 2019-08-10 RX ORDER — ENOXAPARIN SODIUM 100 MG/ML
30 INJECTION SUBCUTANEOUS DAILY
Refills: 0 | Status: DISCONTINUED | OUTPATIENT
Start: 2019-08-10 | End: 2019-08-11

## 2019-08-10 RX ORDER — PIPERACILLIN AND TAZOBACTAM 4; .5 G/20ML; G/20ML
3.38 INJECTION, POWDER, LYOPHILIZED, FOR SOLUTION INTRAVENOUS EVERY 8 HOURS
Refills: 0 | Status: DISCONTINUED | OUTPATIENT
Start: 2019-08-10 | End: 2019-08-12

## 2019-08-10 RX ORDER — PHENYLEPHRINE HYDROCHLORIDE 10 MG/ML
1 INJECTION INTRAVENOUS
Qty: 40 | Refills: 0 | Status: DISCONTINUED | OUTPATIENT
Start: 2019-08-10 | End: 2019-08-11

## 2019-08-10 RX ORDER — SODIUM CHLORIDE 9 MG/ML
1000 INJECTION INTRAMUSCULAR; INTRAVENOUS; SUBCUTANEOUS ONCE
Refills: 0 | Status: COMPLETED | OUTPATIENT
Start: 2019-08-10 | End: 2019-08-10

## 2019-08-10 RX ORDER — DEXMEDETOMIDINE HYDROCHLORIDE IN 0.9% SODIUM CHLORIDE 4 UG/ML
0.5 INJECTION INTRAVENOUS
Qty: 200 | Refills: 0 | Status: DISCONTINUED | OUTPATIENT
Start: 2019-08-10 | End: 2019-08-10

## 2019-08-10 RX ORDER — LEVOTHYROXINE SODIUM 125 MCG
125 TABLET ORAL AT BEDTIME
Refills: 0 | Status: DISCONTINUED | OUTPATIENT
Start: 2019-08-10 | End: 2019-08-20

## 2019-08-10 RX ADMIN — CHLORHEXIDINE GLUCONATE 15 MILLILITER(S): 213 SOLUTION TOPICAL at 05:40

## 2019-08-10 RX ADMIN — Medication 100 MEQ/KG/HR: at 10:45

## 2019-08-10 RX ADMIN — HYDROMORPHONE HYDROCHLORIDE 0.5 MILLIGRAM(S): 2 INJECTION INTRAMUSCULAR; INTRAVENOUS; SUBCUTANEOUS at 04:29

## 2019-08-10 RX ADMIN — PIPERACILLIN AND TAZOBACTAM 25 GRAM(S): 4; .5 INJECTION, POWDER, LYOPHILIZED, FOR SOLUTION INTRAVENOUS at 05:40

## 2019-08-10 RX ADMIN — Medication 1000 MILLIGRAM(S): at 05:16

## 2019-08-10 RX ADMIN — Medication 125 MILLILITER(S): at 22:14

## 2019-08-10 RX ADMIN — HYDROMORPHONE HYDROCHLORIDE 0.5 MILLIGRAM(S): 2 INJECTION INTRAMUSCULAR; INTRAVENOUS; SUBCUTANEOUS at 16:06

## 2019-08-10 RX ADMIN — SODIUM CHLORIDE 3000 MILLILITER(S): 9 INJECTION INTRAMUSCULAR; INTRAVENOUS; SUBCUTANEOUS at 00:26

## 2019-08-10 RX ADMIN — PHENYLEPHRINE HYDROCHLORIDE 34.88 MICROGRAM(S)/KG/MIN: 10 INJECTION INTRAVENOUS at 10:45

## 2019-08-10 RX ADMIN — FENTANYL CITRATE 50 MICROGRAM(S): 50 INJECTION INTRAVENOUS at 04:28

## 2019-08-10 RX ADMIN — Medication 125 MICROGRAM(S): at 21:56

## 2019-08-10 RX ADMIN — HYDROMORPHONE HYDROCHLORIDE 0.5 MILLIGRAM(S): 2 INJECTION INTRAMUSCULAR; INTRAVENOUS; SUBCUTANEOUS at 16:21

## 2019-08-10 RX ADMIN — SODIUM CHLORIDE 6000 MILLILITER(S): 9 INJECTION INTRAMUSCULAR; INTRAVENOUS; SUBCUTANEOUS at 05:35

## 2019-08-10 RX ADMIN — Medication 100 MEQ/KG/HR: at 06:21

## 2019-08-10 RX ADMIN — HYDROMORPHONE HYDROCHLORIDE 0.5 MILLIGRAM(S): 2 INJECTION INTRAMUSCULAR; INTRAVENOUS; SUBCUTANEOUS at 04:14

## 2019-08-10 RX ADMIN — Medication 50 MILLIEQUIVALENT(S): at 05:36

## 2019-08-10 RX ADMIN — Medication 400 MILLIGRAM(S): at 10:42

## 2019-08-10 RX ADMIN — FENTANYL CITRATE 4.65 MICROGRAM(S)/KG/HR: 50 INJECTION INTRAVENOUS at 05:35

## 2019-08-10 RX ADMIN — INSULIN HUMAN 10 UNIT(S): 100 INJECTION, SOLUTION SUBCUTANEOUS at 05:36

## 2019-08-10 RX ADMIN — HYDROMORPHONE HYDROCHLORIDE 0.25 MILLIGRAM(S): 2 INJECTION INTRAMUSCULAR; INTRAVENOUS; SUBCUTANEOUS at 20:00

## 2019-08-10 RX ADMIN — PHENYLEPHRINE HYDROCHLORIDE 34.88 MICROGRAM(S)/KG/MIN: 10 INJECTION INTRAVENOUS at 05:35

## 2019-08-10 RX ADMIN — HYDROMORPHONE HYDROCHLORIDE 0.5 MILLIGRAM(S): 2 INJECTION INTRAMUSCULAR; INTRAVENOUS; SUBCUTANEOUS at 12:21

## 2019-08-10 RX ADMIN — Medication 400 MILLIGRAM(S): at 16:08

## 2019-08-10 RX ADMIN — ENOXAPARIN SODIUM 30 MILLIGRAM(S): 100 INJECTION SUBCUTANEOUS at 12:13

## 2019-08-10 RX ADMIN — PHENYLEPHRINE HYDROCHLORIDE 34.88 MICROGRAM(S)/KG/MIN: 10 INJECTION INTRAVENOUS at 21:56

## 2019-08-10 RX ADMIN — DEXMEDETOMIDINE HYDROCHLORIDE IN 0.9% SODIUM CHLORIDE 11.62 MICROGRAM(S)/KG/HR: 4 INJECTION INTRAVENOUS at 04:14

## 2019-08-10 RX ADMIN — Medication 1000 MILLIGRAM(S): at 16:21

## 2019-08-10 RX ADMIN — HYDROMORPHONE HYDROCHLORIDE 0.25 MILLIGRAM(S): 2 INJECTION INTRAMUSCULAR; INTRAVENOUS; SUBCUTANEOUS at 19:45

## 2019-08-10 RX ADMIN — PIPERACILLIN AND TAZOBACTAM 25 GRAM(S): 4; .5 INJECTION, POWDER, LYOPHILIZED, FOR SOLUTION INTRAVENOUS at 21:55

## 2019-08-10 RX ADMIN — FENTANYL CITRATE 50 MICROGRAM(S): 50 INJECTION INTRAVENOUS at 04:13

## 2019-08-10 RX ADMIN — Medication 50 MILLILITER(S): at 05:36

## 2019-08-10 RX ADMIN — Medication 1000 MILLIGRAM(S): at 11:00

## 2019-08-10 RX ADMIN — Medication 400 MILLIGRAM(S): at 05:01

## 2019-08-10 RX ADMIN — Medication 100 MEQ/KG/HR: at 21:55

## 2019-08-10 RX ADMIN — PANTOPRAZOLE SODIUM 40 MILLIGRAM(S): 20 TABLET, DELAYED RELEASE ORAL at 12:13

## 2019-08-10 RX ADMIN — PIPERACILLIN AND TAZOBACTAM 25 GRAM(S): 4; .5 INJECTION, POWDER, LYOPHILIZED, FOR SOLUTION INTRAVENOUS at 13:50

## 2019-08-10 RX ADMIN — HYDROMORPHONE HYDROCHLORIDE 0.5 MILLIGRAM(S): 2 INJECTION INTRAMUSCULAR; INTRAVENOUS; SUBCUTANEOUS at 12:36

## 2019-08-10 RX ADMIN — SODIUM CHLORIDE 100 MILLILITER(S): 9 INJECTION INTRAMUSCULAR; INTRAVENOUS; SUBCUTANEOUS at 04:15

## 2019-08-10 NOTE — PROGRESS NOTE ADULT - ATTENDING COMMENTS
#vanessa on ckd- prerenal- improving with ivf; has sadaf  #hyperkalemia- stable  #met acidosis- stable; on bicarb drip  monitor serum ph  dr mcclelland (pt's nephrologist) to resume care

## 2019-08-10 NOTE — PROGRESS NOTE ADULT - ATTENDING COMMENTS
Patient seen and examined on AM SICU rounds  Chart reviewed  Off sedation able to follow commands  Hemodynamically unstable, requiring low-dose Marino-Synephrine infusion to maintain MAP greater than 65.  SVV = 6-10  oxygenating well  Urine output adequate, creatinine =2.17  + BRIGID with mild hyperkalemia (K=5.9 this AM)     - Spontaneous breathing trail on PSV 5 / PEEP 5 / FiO2 30% done on rounds.  ABG and wean parameters good - patient extubated without immediate complications.  - Will follow serial electrolytes / creatinine to assess BRIGID  - Likely will need repeat exploration in OR next 24-48 hours  - 35 minutes direct critical care

## 2019-08-10 NOTE — PROGRESS NOTE ADULT - SUBJECTIVE AND OBJECTIVE BOX
Trauma Surgery Daily Progress Note     77 yo Male who presents with PMHx of duodenal ulcer, duodenal stricture requiring EGD and ballooning presenting to the ER with three weeks of decreased PO intake and abdominal pain that was vague and located in the epigastric area, worse with increased intake however not associated with types of foods. He states no radiation, na denies experiencing pain similar to this before. States passed flatus and BM last night, however admits to bloating today. He states he has never been nausea or had emesis. Denies fever, chills, CP, SOB, nausea, vomiting, diarrhea, constipation, and urinary symptoms.     --------------------------------------------------------------------------------------------------------------------  SUBJECTIVE / 24H EVENTS  Patient seen and examined on morning rounds. No acute events overnight.      OBJECTIVE:    VITAL SIGNS:  T(C): 36.3 (08-10-19 @ 07:15), Max: 37.9 (19 @ 21:49)  HR: 90 (08-10-19 @ 10:37) (75 - 129)  BP: 105/56 (08-10-19 @ 08:15) (81/49 - 168/75)  RR: 23 (08-10-19 @ 10:36) (15 - 32)  SpO2: 99% (08-10-19 @ 10:37) (93% - 100%)  Daily Height in cm: 185.42 (10 Aug 2019 00:44)    Daily   POCT Blood Glucose.: 146 mg/dL (08-10-19 @ 00:55)  POCT Blood Glucose.: 93 mg/dL (19 @ 15:11)  POCT Blood Glucose.: 88 mg/dL (19 @ 12:52)      PHYSICAL EXAM:  Gen: NAD  LS: Respirations unlabored  GI: Soft. Nontender. Nondistended. BS+.  Ext: Warm, well perfused      19 @ 07:01  -  08-10-19 @ 07:00  --------------------------------------------------------  IN:    fentaNYL Infusion.: 9.4 mL    IV PiggyBack: 50 mL    phenylephrine   Infusion: 80.2 mL    sodium bicarbonate  Infusion: 200 mL    sodium chloride 0.9%: 200 mL    sodium chloride 0.9%: 500 mL    Sodium Chloride 0.9% IV Bolus: 1500 mL  Total IN: 2539.6 mL    OUT:    Indwelling Catheter - Urethral: 1200 mL    Nasoenteral Tube: 400 mL    VAC (Vacuum Assisted Closure) System: 100 mL  Total OUT: 1700 mL    Total NET: 839.6 mL      08-10-19 @ 07:01  -  08-10-19 @ 11:06  --------------------------------------------------------  IN:    fentaNYL Infusion.: 4.7 mL    IV PiggyBack: 50 mL    phenylephrine   Infusion: 129 mL    sodium bicarbonate  Infusion: 300 mL  Total IN: 483.7 mL    OUT:    Indwelling Catheter - Urethral: 420 mL  Total OUT: 420 mL    Total NET: 63.7 mL          LAB VALUES:  08-10    133<L>  |  102  |  57<H>  ----------------------------<  175<H>  5.2   |  17<L>  |  2.17<H>    Ca    8.5      10 Aug 2019 08:11  Phos  5.2     08-10  Mg     2.8     08-10    TPro  6.7  /  Alb  2.3<L>  /  TBili  0.7  /  DBili  x   /  AST  7<L>  /  ALT  8<L>  /  AlkPhos  491<H>                                 8.8    11.5  )-----------( 456      ( 10 Aug 2019 08:11 )             28.3     LIVER FUNCTIONS - ( 09 Aug 2019 11:56 )  Alb: 2.3 g/dL / Pro: 6.7 g/dL / ALK PHOS: 491 U/L / ALT: 8 U/L / AST: 7 U/L / GGT: x           PT/INR - ( 09 Aug 2019 22:21 )   PT: 19.4 sec;   INR: 1.67 ratio         PTT - ( 09 Aug 2019 22:21 )  PTT:38.2 sec  ABG - ( 10 Aug 2019 10:11 )  pH, Arterial: 7.39  pH, Blood: x     /  pCO2: 30    /  pO2: 184   / HCO3: 18    / Base Excess: -5.8  /  SaO2: 97                    Urinalysis Basic - ( 09 Aug 2019 17:33 )    Color: Yellow / Appearance: Slightly Turbid / S.023 / pH: x  Gluc: x / Ketone: Negative  / Bili: Small / Urobili: 2 mg/dL   Blood: x / Protein: 30 mg/dL / Nitrite: Negative   Leuk Esterase: Negative / RBC: 4 /hpf / WBC 1 /HPF   Sq Epi: x / Non Sq Epi: 1 /hpf / Bacteria: Negative        MICROBIOLOGY:      RADIOLOGY:  PACS Image: Image(s) Available (08-10-19 @ 05:43)  PACS Image: Image(s) Available (19 @ 21:29)  PACS Image: Image(s) Available (19 @ 15:26)  PACS Image: Image(s) Available (19 @ 13:47)  PACS Image: Image(s) Available (19 @ 12:05)        MEDICATIONS  (STANDING):  acetaminophen  IVPB .. 1000 milliGRAM(s) IV Intermittent once  chlorhexidine 2% Cloths 1 Application(s) Topical daily  enoxaparin Injectable 30 milliGRAM(s) SubCutaneous daily  levothyroxine Injectable 125 MICROGram(s) IV Push at bedtime  pantoprazole  Injectable 40 milliGRAM(s) IV Push daily  phenylephrine    Infusion 1 MICROgram(s)/kG/Min (34.875 mL/Hr) IV Continuous <Continuous>  piperacillin/tazobactam IVPB.. 3.375 Gram(s) IV Intermittent every 8 hours  sodium bicarbonate  Infusion 0.081 mEq/kG/Hr (100 mL/Hr) IV Continuous <Continuous>    MEDICATIONS  (PRN):  HYDROmorphone  Injectable 0.5 milliGRAM(s) IV Push every 3 hours PRN Moderate Pain (4 - 6) Trauma Surgery Daily Progress Note     77 yo Male who presents with PMHx of duodenal ulcer, duodenal stricture requiring EGD and ballooning presenting to the ER with three weeks of decreased PO intake and abdominal pain that was vague and located in the epigastric area, worse with increased intake. Went urgently to OR overnight for exlap. Diffuse mesenteric lymphadenopathy noted, bowel palpated at transition point, no discrete obstruction felt. Surgical Oncology consulted intra-operatively. Placement of abthera, brought back to ICU intubated.    --------------------------------------------------------------------------------------------------------------------  SUBJECTIVE / 24H EVENTS  Patient seen and examined on morning rounds.   Post Op check performed bedside 4 hours after procedure  Pt doing better this AM. Decreased pressor requirements and Pt extubated successfully      OBJECTIVE:  VITAL SIGNS:  T(C): 36.3 (08-10-19 @ 07:15), Max: 37.9 (19 @ 21:49)  HR: 90 (08-10-19 @ 10:37) (75 - 129)  BP: 105/56 (08-10-19 @ 08:15) (81/49 - 168/75)  RR: 23 (08-10-19 @ 10:36) (15 - 32)  SpO2: 99% (08-10-19 @ 10:37) (93% - 100%)  Daily Height in cm: 185.42 (10 Aug 2019 00:44)    Daily   POCT Blood Glucose.: 146 mg/dL (08-10-19 @ 00:55)  POCT Blood Glucose.: 93 mg/dL (19 @ 15:11)  POCT Blood Glucose.: 88 mg/dL (19 @ 12:52)      PHYSICAL EXAM:  Gen: Sedated and Intubated  Pulm: Mechanical Ventilation  GI: Soft, nondistended. Lower abdominal abthera in place, holding suction.         19 @ 07:01  -  08-10-19 @ 07:00  --------------------------------------------------------  IN:    fentaNYL Infusion.: 9.4 mL    IV PiggyBack: 50 mL    phenylephrine   Infusion: 80.2 mL    sodium bicarbonate  Infusion: 200 mL    sodium chloride 0.9%: 200 mL    sodium chloride 0.9%: 500 mL    Sodium Chloride 0.9% IV Bolus: 1500 mL  Total IN: 2539.6 mL    OUT:    Indwelling Catheter - Urethral: 1200 mL    Nasoenteral Tube: 400 mL    VAC (Vacuum Assisted Closure) System: 100 mL  Total OUT: 1700 mL    Total NET: 839.6 mL      08-10-19 @ 07:  -  08-10-19 @ 11:06  --------------------------------------------------------  IN:    fentaNYL Infusion.: 4.7 mL    IV PiggyBack: 50 mL    phenylephrine   Infusion: 129 mL    sodium bicarbonate  Infusion: 300 mL  Total IN: 483.7 mL    OUT:    Indwelling Catheter - Urethral: 420 mL  Total OUT: 420 mL    Total NET: 63.7 mL          LAB VALUES:  08-10    133<L>  |  102  |  57<H>  ----------------------------<  175<H>  5.2   |  17<L>  |  2.17<H>    Ca    8.5      10 Aug 2019 08:11  Phos  5.2     0810  Mg     2.8     10    TPro  6.7  /  Alb  2.3<L>  /  TBili  0.7  /  DBili  x   /  AST  7<L>  /  ALT  8<L>  /  AlkPhos  491<H>                                 8.8    11.5  )-----------( 456      ( 10 Aug 2019 08:11 )             28.3     LIVER FUNCTIONS - ( 09 Aug 2019 11:56 )  Alb: 2.3 g/dL / Pro: 6.7 g/dL / ALK PHOS: 491 U/L / ALT: 8 U/L / AST: 7 U/L / GGT: x           PT/INR - ( 09 Aug 2019 22:21 )   PT: 19.4 sec;   INR: 1.67 ratio         PTT - ( 09 Aug 2019 22:21 )  PTT:38.2 sec  ABG - ( 10 Aug 2019 10:11 )  pH, Arterial: 7.39  pH, Blood: x     /  pCO2: 30    /  pO2: 184   / HCO3: 18    / Base Excess: -5.8  /  SaO2: 97                    Urinalysis Basic - ( 09 Aug 2019 17:33 )    Color: Yellow / Appearance: Slightly Turbid / S.023 / pH: x  Gluc: x / Ketone: Negative  / Bili: Small / Urobili: 2 mg/dL   Blood: x / Protein: 30 mg/dL / Nitrite: Negative   Leuk Esterase: Negative / RBC: 4 /hpf / WBC 1 /HPF   Sq Epi: x / Non Sq Epi: 1 /hpf / Bacteria: Negative        MICROBIOLOGY:      RADIOLOGY:  PACS Image: Image(s) Available (08-10-19 @ 05:43)  PACS Image: Image(s) Available (19 @ 21:29)  PACS Image: Image(s) Available (19 @ 15:26)  PACS Image: Image(s) Available (19 @ 13:47)  PACS Image: Image(s) Available (19 @ 12:05)        MEDICATIONS  (STANDING):  acetaminophen  IVPB .. 1000 milliGRAM(s) IV Intermittent once  chlorhexidine 2% Cloths 1 Application(s) Topical daily  enoxaparin Injectable 30 milliGRAM(s) SubCutaneous daily  levothyroxine Injectable 125 MICROGram(s) IV Push at bedtime  pantoprazole  Injectable 40 milliGRAM(s) IV Push daily  phenylephrine    Infusion 1 MICROgram(s)/kG/Min (34.875 mL/Hr) IV Continuous <Continuous>  piperacillin/tazobactam IVPB.. 3.375 Gram(s) IV Intermittent every 8 hours  sodium bicarbonate  Infusion 0.081 mEq/kG/Hr (100 mL/Hr) IV Continuous <Continuous>    MEDICATIONS  (PRN):  HYDROmorphone  Injectable 0.5 milliGRAM(s) IV Push every 3 hours PRN Moderate Pain (4 - 6)

## 2019-08-10 NOTE — BRIEF OPERATIVE NOTE - COMMENTS
will return for second look in 48-72 hours to re-evaluate small bowel for viability and obtain frozen sections for pathology

## 2019-08-10 NOTE — AIRWAY REMOVAL NOTE  ADULT & PEDS - ARTIFICAL AIRWAY REMOVAL COMMENTS
Written order for extubation verified. The patient was identified by full name and birth date compared to the identification band. Present during the procedure was AYUSH Levine.

## 2019-08-10 NOTE — PROGRESS NOTE ADULT - ASSESSMENT
A/P: 79 yo Male with PMHx of duodenal ulcer, duodenal stricture requiring EGD and ballooning who presented with concern for small bowel obstruction s/p emergent exlap. Findings: Diffuse mesenteric lymphadenopathy noted, bowel palpated at transition point, no discrete obstruction felt. Surgical Oncology consulted intra-operatively. Placement of abthera, brought back to ICU intubated.    - Plan to return to OR Sunday: re-evaluate small bowel for viability and obtain frozen sections for pathology  - Surgical Oncology  - Continue NGT, NPO, IVF  - CTA - f/u official read  - Lovenox 30mg qd for VTE ppx. A/P: 77 yo Male with PMHx of duodenal ulcer, duodenal stricture requiring EGD and ballooning who presented with concern for small bowel obstruction s/p emergent exlap. Findings: Diffuse mesenteric lymphadenopathy noted, bowel palpated at transition point, no discrete obstruction felt. Surgical Oncology consulted intra-operatively. Placement of abthera, brought back to ICU intubated.    - Plan to return to OR Sunday: re-evaluate small bowel for viability and obtain frozen sections for pathology  - Consult Surgical Oncology  - CTA - f/u official read  - Continue NGT, NPO, IVF  - Lovenox 30mg qd for VTE ppx.

## 2019-08-10 NOTE — PROGRESS NOTE ADULT - ASSESSMENT
ASSESSMENT: 78yMale with PMH hypothyroidism, HTN, duodenal ulcer who presents with small bowel obstruction, now s/p 8/10 exploratory laparotomy, findings of bulky lymphadenopathy, placement of Abthera with planned takeback 8/11.     PLAN:   Neurologic: Postoperative pain.   - Precedex for sedation.   - Dilaudid, IV tylenol for pain control. Toradol if additional pain needs.     Respiratory: Ventilated.   - Current settings 500/5/50/14.  - Wean vent as tolerated.     Cardiovascular: hypotension likely 2/2 sepsis, s/p 3L fluid resuscitation in ED, with hyperlactatemia  - Monitor vital signs  - Lactate downtrending.   - BP within normal limits, no pressor requirements.     Gastrointestinal/Nutrition: h/o duodenal ulcer; primary SBO.   - Appreciate Surgical Oncology recs: plan takeback Sunday, 8/11.   - To continue Nasogastric Tube, Nil Per Os, Intravenous Fluids.   - Protonix for h/o duodenal ulcer  - Follow up read of official CTA  to r/o mesenteric ischemia    Genitourinary/Renal: BRIGID; hyperkalemia to 6.2 s/p insulin, D50, hyponatremia  - Appreciate Nephrology recs: no urgent HD needs.   -- Hyperkalemia improved, last at 5.2, okay for lasix if needed.   -- Hyponatremia: F/u Serum osm, urine osm, urine sodium.   -- BRIGID with downtrending Cr, last 2.1.   - Magaña, strict I&Os  - BMP q 4 hours    Hematologic: no acute issues  - Lovenox 30mg qd for VTE ppx.     Infectious Disease: no acute issues; s/p 1 dose of Zosyn in ED  - Culture if febrile  - No need to continue antibiotics    Endocrine: hypothyroidism  - Continue levothyroxine    DISCO:   SICU.

## 2019-08-10 NOTE — PROGRESS NOTE ADULT - ASSESSMENT
78M with PMHx of duodenal ulcer, duodenal stricture requiring EGD and ballooning who presented with concern for small bowel obstruction s/p emergent exlap. Findings: Diffuse mesenteric lymphadenopathy noted, bowel palpated at transition point, no discrete obstruction felt. Surgical Oncology consulted intra-operatively. Placement of abthera, brought back to ICU intubated.    - OR Sunday 8/11: re-evaluate small bowel for viability and obtain frozen sections for pathology

## 2019-08-10 NOTE — PROGRESS NOTE ADULT - ASSESSMENT
78M PMHx CKDII/III, duodenal ulcer and duodenal stricture s/p EGD and ballooning p/w epigastric pain & decrease PO intake x 3 weeks - admit for SBO seen CT A/P.    Nephrology consulted for Acute on CKD and hyperkalemia

## 2019-08-10 NOTE — PROCEDURE NOTE - NSPROCDETAILS_GEN_ALL_CORE
positive blood return obtained via catheter/connected to a pressurized flush line/Seldinger technique/all materials/supplies accounted for at end of procedure/location identified, draped/prepped, sterile technique used, needle inserted/introduced/sutured in place

## 2019-08-10 NOTE — PROGRESS NOTE ADULT - SUBJECTIVE AND OBJECTIVE BOX
[facility] DIVISION OF KIDNEY DISEASES AND HYPERTENSION -- FOLLOW UP NOTE  --------------------------------------------------------------------------------    24 hour events/subjective: Patient seen and examined at the bedside. Awake on the vent, on fentanyl. Nods no for any pain        PAST HISTORY  --------------------------------------------------------------------------------  No significant changes to PMH, PSH, FHx, SHx, unless otherwise noted    ALLERGIES & MEDICATIONS  --------------------------------------------------------------------------------  Allergies    Cipro (Rash)  niacin (Flushing; Rash)  Reglan (Anaphylaxis)    Intolerances      Standing Inpatient Medications  acetaminophen  IVPB .. 1000 milliGRAM(s) IV Intermittent once  chlorhexidine 2% Cloths 1 Application(s) Topical daily  enoxaparin Injectable 30 milliGRAM(s) SubCutaneous daily  levothyroxine Injectable 125 MICROGram(s) IV Push at bedtime  pantoprazole  Injectable 40 milliGRAM(s) IV Push daily  phenylephrine    Infusion 1 MICROgram(s)/kG/Min IV Continuous <Continuous>  piperacillin/tazobactam IVPB.. 3.375 Gram(s) IV Intermittent every 8 hours  sodium bicarbonate  Infusion 0.081 mEq/kG/Hr IV Continuous <Continuous>    PRN Inpatient Medications  HYDROmorphone  Injectable 0.5 milliGRAM(s) IV Push every 3 hours PRN      REVIEW OF SYSTEMS  --------------------------------------------------------------------------------  Unable to obtain 2/2 clinical condition. Nods no for pain    >>> <<<    VITALS/PHYSICAL EXAM  --------------------------------------------------------------------------------  T(C): 36.6 (08-10-19 @ 11:00), Max: 37.9 (08-09-19 @ 21:49)  HR: 79 (08-10-19 @ 11:00) (75 - 129)  BP: 105/56 (08-10-19 @ 08:15) (81/49 - 168/75)  RR: 16 (08-10-19 @ 11:00) (15 - 32)  SpO2: 97% (08-10-19 @ 11:00) (93% - 100%)  Wt(kg): --  Height (cm): 185.42 (08-10-19 @ 00:44)  Weight (kg): 93 (08-10-19 @ 00:44)  BMI (kg/m2): 27.1 (08-10-19 @ 00:44)  BSA (m2): 2.17 (08-10-19 @ 00:44)      08-09-19 @ 07:01  -  08-10-19 @ 07:00  --------------------------------------------------------  IN: 2539.6 mL / OUT: 1700 mL / NET: 839.6 mL    08-10-19 @ 07:01  -  08-10-19 @ 11:38  --------------------------------------------------------  IN: 611.6 mL / OUT: 560 mL / NET: 51.6 mL      Physical Exam:    	Gen: NAD, ET tube in placed  	HEENT: PERRL, ET tube in placed  	Pulm: CTA B/L anteriorly   	CV: RRR, S1S2; no rub  	Abd: +BS, soft, nontender/nondistended       	: No suprapubic tenderness, talavera cathter in place  	LE: Warm, no clubbing, intact strength; no edema  	Neuro: Unable to assess      	Vascular Access:      LABS/STUDIES  --------------------------------------------------------------------------------              8.8    11.5  >-----------<  456      [08-10-19 @ 08:11]              28.3     133  |  102  |  57  ----------------------------<  175      [08-10-19 @ 08:11]  5.2   |  17  |  2.17        Ca     8.5     [08-10-19 @ 08:11]      Mg     2.8     [08-10-19 @ 08:11]      Phos  5.2     [08-10-19 @ 08:11]    TPro  6.7  /  Alb  2.3  /  TBili  0.7  /  DBili  x   /  AST  7   /  ALT  8   /  AlkPhos  491  [08-09-19 @ 11:56]    PT/INR: PT 19.4 , INR 1.67       [08-09-19 @ 22:21]  PTT: 38.2       [08-09-19 @ 22:21]    Serum Osmolality 300      [08-10-19 @ 08:11]    Creatinine Trend:  SCr 2.17 [08-10 @ 08:11]  SCr 2.16 [08-10 @ 04:23]  SCr 2.11 [08-10 @ 01:40]  SCr 2.24 [08-09 @ 22:20]  SCr 2.39 [08-09 @ 15:25]    Urinalysis - [08-09-19 @ 17:33]      Color Yellow / Appearance Slightly Turbid / SG 1.023 / pH 5.5      Gluc Negative / Ketone Negative  / Bili Small / Urobili 2 mg/dL       Blood Negative / Protein 30 mg/dL / Leuk Est Negative / Nitrite Negative      RBC 4 / WBC 1 / Hyaline 3 / Gran  / Sq Epi  / Non Sq Epi 1 / Bacteria Negative    Urine Creatinine 35      [08-10-19 @ 01:43]  Urine Sodium 29      [08-10-19 @ 01:43]  Urine Potassium 24      [08-10-19 @ 01:43]  Urine Osmolality 305      [08-10-19 @ 01:43]

## 2019-08-10 NOTE — PROGRESS NOTE ADULT - SUBJECTIVE AND OBJECTIVE BOX
GENERAL SURGERY DAILY PROGRESS NOTE:    Interval:  No acute events overnight endorsed.    Subjective:  Patient seen and examined this am. Marlene holland. Answering questions. Fentanyl. Marino 1.3    Vital Signs Last 24 Hrs  T(C): 36.1 (10 Aug 2019 15:00), Max: 37.9 (09 Aug 2019 21:49)  T(F): 96.9 (10 Aug 2019 15:00), Max: 100.3 (09 Aug 2019 21:49)  HR: 78 (10 Aug 2019 15:15) (73 - 129)  BP: 105/56 (10 Aug 2019 08:15) (81/49 - 168/75)  BP(mean): 75 (10 Aug 2019 08:15) (60 - 108)  RR: 20 (10 Aug 2019 15:15) (14 - 32)  SpO2: 98% (10 Aug 2019 15:15) (93% - 100%)    Exam:  Gen: NAD, resting in bed, alert and responding appropriately  Abd: Soft, distended, Abthera vac  Ext: No edema, WWP  Neuro: AAOx3, no focal deficits    I&O's Detail    09 Aug 2019 07:01  -  10 Aug 2019 07:00  --------------------------------------------------------  IN:    fentaNYL Infusion.: 9.4 mL    IV PiggyBack: 50 mL    phenylephrine   Infusion: 80.2 mL    sodium bicarbonate  Infusion: 200 mL    sodium chloride 0.9%: 200 mL    sodium chloride 0.9%: 500 mL    Sodium Chloride 0.9% IV Bolus: 1500 mL  Total IN: 2539.6 mL    OUT:    Indwelling Catheter - Urethral: 1200 mL    Nasoenteral Tube: 400 mL    VAC (Vacuum Assisted Closure) System: 100 mL  Total OUT: 1700 mL    Total NET: 839.6 mL      10 Aug 2019 07:  -  10 Aug 2019 16:45  --------------------------------------------------------  IN:    fentaNYL Infusion.: 4.7 mL    IV PiggyBack: 75 mL    phenylephrine   Infusion: 240.3 mL    sodium bicarbonate  Infusion: 800 mL  Total IN: 1120 mL    OUT:    Indwelling Catheter - Urethral: 1065 mL  Total OUT: 1065 mL    Total NET: 55 mL          Daily Height in cm: 185.42 (10 Aug 2019 00:44)    Daily     MEDICATIONS  (STANDING):  chlorhexidine 2% Cloths 1 Application(s) Topical daily  enoxaparin Injectable 30 milliGRAM(s) SubCutaneous daily  levothyroxine Injectable 125 MICROGram(s) IV Push at bedtime  pantoprazole  Injectable 40 milliGRAM(s) IV Push daily  phenylephrine    Infusion 1 MICROgram(s)/kG/Min (34.875 mL/Hr) IV Continuous <Continuous>  piperacillin/tazobactam IVPB.. 3.375 Gram(s) IV Intermittent every 8 hours  sodium bicarbonate  Infusion 0.081 mEq/kG/Hr (100 mL/Hr) IV Continuous <Continuous>    MEDICATIONS  (PRN):  HYDROmorphone  Injectable 0.5 milliGRAM(s) IV Push every 3 hours PRN Moderate Pain (4 - 6)      LABS:                        9.4    11.0  )-----------( 474      ( 10 Aug 2019 16:25 )             30.1     08-10    132<L>  |  102  |  54<H>  ----------------------------<  157<H>  5.2   |  17<L>  |  2.01<H>    Ca    8.5      10 Aug 2019 12:38  Phos  5.5     08-10  Mg     2.8     08-10    TPro  6.7  /  Alb  2.3<L>  /  TBili  0.7  /  DBili  x   /  AST  7<L>  /  ALT  8<L>  /  AlkPhos  491<H>      PT/INR - ( 09 Aug 2019 22:21 )   PT: 19.4 sec;   INR: 1.67 ratio         PTT - ( 09 Aug 2019 22:21 )  PTT:38.2 sec  Urinalysis Basic - ( 09 Aug 2019 17:33 )    Color: Yellow / Appearance: Slightly Turbid / S.023 / pH: x  Gluc: x / Ketone: Negative  / Bili: Small / Urobili: 2 mg/dL   Blood: x / Protein: 30 mg/dL / Nitrite: Negative   Leuk Esterase: Negative / RBC: 4 /hpf / WBC 1 /HPF   Sq Epi: x / Non Sq Epi: 1 /hpf / Bacteria: Negative        Dispo:     CHAS Brown, PGY-1  [facility] Surgery  [id number] with any questions

## 2019-08-10 NOTE — PROGRESS NOTE ADULT - SUBJECTIVE AND OBJECTIVE BOX
SURGICAL INTENSIVE CARE UNIT DAILY PROGRESS NOTE    HPI:  Patient is a 78y old  Male who presents with PMHx of duodenal ulcer (questionable duodenal perforation in past in charts however family denies), duodenal stricture requiring EGD and ballooning presenting to the ER with three weeks of decreased PO intake and abdominal pain that was vague and located in the epigastric area, worse with increased intake however not associated with types of foods. He states no radiation, na denies experiencing pain similar to this before. States passed flatus and BM last night, however admits to bloating today. He states he has never been nausea or had emesis. Denies fever, chills, CP, SOB, nausea, vomiting, diarrhea, constipation, and urinary symptoms.     ROS: 10-system review is otherwise negative except HPI above.      -------------------------------------------------------------------------------------------- (09 Aug 2019 17:35)      24 HOUR EVENTS:  Went urgently to OR overnight for exlap. Diffuse mesenteric lymphadenopathy noted, bowel palpated at transition point, no discrete obstruction felt. Surgical Oncology consulted intra-operatively. Placement of abthera, brought back to ICU intubated.     SUBJECTIVE/ROS:  [ ] A ten-point review of systems was otherwise negative except as noted.  [ x] Due to altered mental status/intubation, subjective information were not able to be obtained from the patient. History was obtained, to the extent possible, from review of the chart and collateral sources of information.      NEURO  Exam: Intubated.   Meds: dexmedetomidine Infusion 0.5 MICROgram(s)/kG/Hr IV Continuous <Continuous>  fentaNYL    Injectable 50 MICROGram(s) IV Push once  HYDROmorphone  Injectable 0.5 milliGRAM(s) IV Push every 3 hours PRN Moderate Pain (4 - 6)    [x] Adequacy of sedation and pain control has been assessed and adjusted      RESPIRATORY  RR: 28 (08-10-19 @ 04:08) (18 - 32)  SpO2: 98% (08-10-19 @ 04:08) (93% - 100%)  Exam: Mechanical.   Mechanical Ventilation:   ABG - ( 10 Aug 2019 03:08 )  pH: 7.25  /  pCO2: 43    /  pO2: 204   / HCO3: 18    / Base Excess: -8.3  /  SaO2: 97      Lactate: x          CARDIOVASCULAR  HR: 129 (08-10-19 @ 04:08) (94 - 129)  BP: 168/75 (08-10-19 @ 04:08) (83/53 - 168/75)  BP(mean): 108 (08-10-19 @ 04:08) (68 - 108)  ABP: 162/55 (08-10-19 @ 04:08) (119/39 - 162/55)  ABP(mean): 99 (08-10-19 @ 04:08) (63 - 99)  VBG - ( 09 Aug 2019 20:32 )  pH: 7.32  /  pCO2: 36    /  pO2: 22    / HCO3: 18    / Base Excess: -7.1  /  SaO2: 29     Lactate: 3.2                Exam: tachycardic,   Cardiac Rhythm: sinus  Perfusion     [x]Adequate   [ ]Inadequate  Mentation   [x]Normal       [ ]Reduced  Extremities  [x]Warm         [ ]Cool  Volume Status [ ]Hypervolemic [x]Euvolemic [ ]Hypovolemic  Meds:       GI/NUTRITION  Exam: soft, nondistended. Lower abdominal abthera placed, holding suction.   Diet: NPO.   Meds: pantoprazole  Injectable 40 milliGRAM(s) IV Push daily      GENITOURINARY  I&O's Detail    08-09 @ 07:01  -  08-10 @ 04:12  --------------------------------------------------------  IN:    sodium chloride 0.9%: 400 mL    Sodium Chloride 0.9% IV Bolus: 500 mL  Total IN: 900 mL    OUT:    Indwelling Catheter - Urethral: 800 mL  Total OUT: 800 mL    Total NET: 100 mL        Weight (kg): 93 (08-10 @ 00:44)  08-10    130<L>  |  102  |  61<H>  ----------------------------<  122<H>  5.2   |  15<L>  |  2.11<H>    Ca    8.3<L>      10 Aug 2019 01:40  Phos  4.6     08-09  Mg     2.6     08-09    TPro  6.7  /  Alb  2.3<L>  /  TBili  0.7  /  DBili  x   /  AST  7<L>  /  ALT  8<L>  /  AlkPhos  491<H>  08-09    [x] Magaña catheter, indication: N/A  Meds: sodium chloride 0.9%. 1000 milliLiter(s) IV Continuous <Continuous>        HEMATOLOGIC  Meds: enoxaparin Injectable 30 milliGRAM(s) SubCutaneous daily    [x] VTE Prophylaxis                        8.1    7.8   )-----------( 339      ( 09 Aug 2019 22:20 )             26.0     PT/INR - ( 09 Aug 2019 22:21 )   PT: 19.4 sec;   INR: 1.67 ratio         PTT - ( 09 Aug 2019 22:21 )  PTT:38.2 sec  Transfusion     [ ] PRBC   [ ] Platelets   [ ] FFP   [ ] Cryoprecipitate      INFECTIOUS DISEASES  WBC Count: 7.8 K/uL (08-09 @ 22:20)  WBC Count: 11.8 K/uL (08-09 @ 11:56)    RECENT CULTURES:    Meds:       ENDOCRINE  CAPILLARY BLOOD GLUCOSE      POCT Blood Glucose.: 146 mg/dL (10 Aug 2019 00:55)  POCT Blood Glucose.: 93 mg/dL (09 Aug 2019 15:11)  POCT Blood Glucose.: 88 mg/dL (09 Aug 2019 12:52)    Meds: levothyroxine Injectable 125 MICROGram(s) IV Push at bedtime        ACCESS DEVICES:  [ ] Peripheral IV  [ ] Central Venous Line	[ ] R	[ ] L	[ ] IJ	[ ] Fem	[ ] SC	Placed:   [ ] Arterial Line		[ ] R	[ ] L	[ ] Fem	[ ] Rad	[ ] Ax	Placed:   [ ] PICC:					[ ] Mediport  [ ] Urinary Catheter, Date Placed:   [x] Necessity of urinary, arterial, and venous catheters discussed    OTHER MEDICATIONS:  chlorhexidine 0.12% Liquid 15 milliLiter(s) Oral Mucosa every 12 hours  chlorhexidine 2% Cloths 1 Application(s) Topical daily      CODE STATUS: Full code.       IMAGING:   < from: CT Angio Abdomen and Pelvis w/ IV Cont (08.09.19 @ 21:29) >    ******PRELIMINARY REPORT******    ******PRELIMINARY REPORT******              INTERPRETATION:  Unchanged small bowel obstruction. The bilateral renal   arteries, celiac artery, superior mesenteric artery, and inferior   mesenteric artery are patent. Limited evaluation of the bowel for   ischemia secondary to oral contrast from prior study. No pneumatosis.   Follow-up official report. These findings were discussed with SUNITA Zepeda at 8/9/2019 11:06 PM by Dr. Jones with read back confirmation.          < end of copied text >

## 2019-08-11 ENCOUNTER — RESULT REVIEW (OUTPATIENT)
Age: 79
End: 2019-08-11

## 2019-08-11 DIAGNOSIS — A41.9 SEPSIS, UNSPECIFIED ORGANISM: ICD-10-CM

## 2019-08-11 LAB
ANION GAP SERPL CALC-SCNC: 10 MMOL/L — SIGNIFICANT CHANGE UP (ref 5–17)
ANION GAP SERPL CALC-SCNC: 13 MMOL/L — SIGNIFICANT CHANGE UP (ref 5–17)
ANION GAP SERPL CALC-SCNC: 9 MMOL/L — SIGNIFICANT CHANGE UP (ref 5–17)
ANISOCYTOSIS BLD QL: SLIGHT — SIGNIFICANT CHANGE UP
APTT BLD: 31.9 SEC — SIGNIFICANT CHANGE UP (ref 27.5–36.3)
APTT BLD: 35.4 SEC — SIGNIFICANT CHANGE UP (ref 27.5–36.3)
BASOPHILS # BLD AUTO: 0 K/UL — SIGNIFICANT CHANGE UP (ref 0–0.2)
BLD GP AB SCN SERPL QL: NEGATIVE — SIGNIFICANT CHANGE UP
BUN SERPL-MCNC: 37 MG/DL — HIGH (ref 7–23)
BUN SERPL-MCNC: 41 MG/DL — HIGH (ref 7–23)
BUN SERPL-MCNC: 45 MG/DL — HIGH (ref 7–23)
CALCIUM SERPL-MCNC: 8.8 MG/DL — SIGNIFICANT CHANGE UP (ref 8.4–10.5)
CALCIUM SERPL-MCNC: 8.8 MG/DL — SIGNIFICANT CHANGE UP (ref 8.4–10.5)
CALCIUM SERPL-MCNC: 8.9 MG/DL — SIGNIFICANT CHANGE UP (ref 8.4–10.5)
CEA SERPL-MCNC: 1.6 NG/ML — SIGNIFICANT CHANGE UP (ref 0–3.8)
CHLORIDE SERPL-SCNC: 103 MMOL/L — SIGNIFICANT CHANGE UP (ref 96–108)
CHLORIDE SERPL-SCNC: 103 MMOL/L — SIGNIFICANT CHANGE UP (ref 96–108)
CHLORIDE SERPL-SCNC: 104 MMOL/L — SIGNIFICANT CHANGE UP (ref 96–108)
CO2 SERPL-SCNC: 20 MMOL/L — LOW (ref 22–31)
CO2 SERPL-SCNC: 20 MMOL/L — LOW (ref 22–31)
CO2 SERPL-SCNC: 22 MMOL/L — SIGNIFICANT CHANGE UP (ref 22–31)
CREAT SERPL-MCNC: 1.69 MG/DL — HIGH (ref 0.5–1.3)
CREAT SERPL-MCNC: 1.8 MG/DL — HIGH (ref 0.5–1.3)
CREAT SERPL-MCNC: 1.83 MG/DL — HIGH (ref 0.5–1.3)
EOSINOPHIL # BLD AUTO: 0 K/UL — SIGNIFICANT CHANGE UP (ref 0–0.5)
GLUCOSE SERPL-MCNC: 117 MG/DL — HIGH (ref 70–99)
GLUCOSE SERPL-MCNC: 121 MG/DL — HIGH (ref 70–99)
GLUCOSE SERPL-MCNC: 135 MG/DL — HIGH (ref 70–99)
GRAM STN FLD: SIGNIFICANT CHANGE UP
HCT VFR BLD CALC: 28.2 % — LOW (ref 39–50)
HCT VFR BLD CALC: 28.7 % — LOW (ref 39–50)
HGB BLD-MCNC: 9 G/DL — LOW (ref 13–17)
HGB BLD-MCNC: 9.1 G/DL — LOW (ref 13–17)
HYPOCHROMIA BLD QL: SLIGHT — SIGNIFICANT CHANGE UP
INR BLD: 1.23 RATIO — HIGH (ref 0.88–1.16)
INR BLD: 1.98 RATIO — HIGH (ref 0.88–1.16)
LYMPHOCYTES # BLD AUTO: 1.4 K/UL — SIGNIFICANT CHANGE UP (ref 1–3.3)
LYMPHOCYTES # BLD AUTO: 13 % — SIGNIFICANT CHANGE UP (ref 13–44)
MAGNESIUM SERPL-MCNC: 2.3 MG/DL — SIGNIFICANT CHANGE UP (ref 1.6–2.6)
MAGNESIUM SERPL-MCNC: 2.4 MG/DL — SIGNIFICANT CHANGE UP (ref 1.6–2.6)
MAGNESIUM SERPL-MCNC: 2.5 MG/DL — SIGNIFICANT CHANGE UP (ref 1.6–2.6)
MCHC RBC-ENTMCNC: 27.8 PG — SIGNIFICANT CHANGE UP (ref 27–34)
MCHC RBC-ENTMCNC: 27.9 PG — SIGNIFICANT CHANGE UP (ref 27–34)
MCHC RBC-ENTMCNC: 31.8 GM/DL — LOW (ref 32–36)
MCHC RBC-ENTMCNC: 31.9 GM/DL — LOW (ref 32–36)
MCV RBC AUTO: 87.4 FL — SIGNIFICANT CHANGE UP (ref 80–100)
MCV RBC AUTO: 87.6 FL — SIGNIFICANT CHANGE UP (ref 80–100)
MONOCYTES # BLD AUTO: 2 K/UL — HIGH (ref 0–0.9)
MONOCYTES NFR BLD AUTO: 25 % — HIGH (ref 2–14)
NEUTROPHILS # BLD AUTO: 8.1 K/UL — HIGH (ref 1.8–7.4)
NEUTROPHILS NFR BLD AUTO: 45 % — SIGNIFICANT CHANGE UP (ref 43–77)
NEUTS BAND # BLD: 17 % — HIGH (ref 0–8)
PHOSPHATE SERPL-MCNC: 4.5 MG/DL — SIGNIFICANT CHANGE UP (ref 2.5–4.5)
PHOSPHATE SERPL-MCNC: 5 MG/DL — HIGH (ref 2.5–4.5)
PHOSPHATE SERPL-MCNC: 5.6 MG/DL — HIGH (ref 2.5–4.5)
PLAT MORPH BLD: NORMAL — SIGNIFICANT CHANGE UP
PLATELET # BLD AUTO: 419 K/UL — HIGH (ref 150–400)
PLATELET # BLD AUTO: 448 K/UL — HIGH (ref 150–400)
POTASSIUM SERPL-MCNC: 4.8 MMOL/L — SIGNIFICANT CHANGE UP (ref 3.5–5.3)
POTASSIUM SERPL-MCNC: 4.9 MMOL/L — SIGNIFICANT CHANGE UP (ref 3.5–5.3)
POTASSIUM SERPL-MCNC: 5 MMOL/L — SIGNIFICANT CHANGE UP (ref 3.5–5.3)
POTASSIUM SERPL-SCNC: 4.8 MMOL/L — SIGNIFICANT CHANGE UP (ref 3.5–5.3)
POTASSIUM SERPL-SCNC: 4.9 MMOL/L — SIGNIFICANT CHANGE UP (ref 3.5–5.3)
POTASSIUM SERPL-SCNC: 5 MMOL/L — SIGNIFICANT CHANGE UP (ref 3.5–5.3)
PROTHROM AB SERPL-ACNC: 14.1 SEC — HIGH (ref 10–12.9)
PROTHROM AB SERPL-ACNC: 23.2 SEC — HIGH (ref 10–12.9)
RBC # BLD: 3.21 M/UL — LOW (ref 4.2–5.8)
RBC # BLD: 3.28 M/UL — LOW (ref 4.2–5.8)
RBC # FLD: 13.7 % — SIGNIFICANT CHANGE UP (ref 10.3–14.5)
RBC # FLD: 13.9 % — SIGNIFICANT CHANGE UP (ref 10.3–14.5)
RBC BLD AUTO: ABNORMAL
RH IG SCN BLD-IMP: POSITIVE — SIGNIFICANT CHANGE UP
SODIUM SERPL-SCNC: 133 MMOL/L — LOW (ref 135–145)
SODIUM SERPL-SCNC: 135 MMOL/L — SIGNIFICANT CHANGE UP (ref 135–145)
SODIUM SERPL-SCNC: 136 MMOL/L — SIGNIFICANT CHANGE UP (ref 135–145)
SPECIMEN SOURCE: SIGNIFICANT CHANGE UP
WBC # BLD: 11.5 K/UL — HIGH (ref 3.8–10.5)
WBC # BLD: 7.2 K/UL — SIGNIFICANT CHANGE UP (ref 3.8–10.5)
WBC # FLD AUTO: 11.5 K/UL — HIGH (ref 3.8–10.5)
WBC # FLD AUTO: 7.2 K/UL — SIGNIFICANT CHANGE UP (ref 3.8–10.5)

## 2019-08-11 PROCEDURE — 99291 CRITICAL CARE FIRST HOUR: CPT

## 2019-08-11 PROCEDURE — 71045 X-RAY EXAM CHEST 1 VIEW: CPT | Mod: 26,77

## 2019-08-11 PROCEDURE — 49000 EXPLORATION OF ABDOMEN: CPT | Mod: 58

## 2019-08-11 PROCEDURE — 49203: CPT

## 2019-08-11 PROCEDURE — 71045 X-RAY EXAM CHEST 1 VIEW: CPT | Mod: 26

## 2019-08-11 PROCEDURE — 99233 SBSQ HOSP IP/OBS HIGH 50: CPT | Mod: GC

## 2019-08-11 PROCEDURE — 38747 REMOVE ABDOMINAL LYMPH NODES: CPT

## 2019-08-11 RX ORDER — HYDROMORPHONE HYDROCHLORIDE 2 MG/ML
0.5 INJECTION INTRAMUSCULAR; INTRAVENOUS; SUBCUTANEOUS ONCE
Refills: 0 | Status: DISCONTINUED | OUTPATIENT
Start: 2019-08-11 | End: 2019-08-11

## 2019-08-11 RX ORDER — PHYTONADIONE (VIT K1) 5 MG
10 TABLET ORAL ONCE
Refills: 0 | Status: COMPLETED | OUTPATIENT
Start: 2019-08-11 | End: 2019-08-11

## 2019-08-11 RX ORDER — ACETAMINOPHEN 500 MG
1000 TABLET ORAL ONCE
Refills: 0 | Status: COMPLETED | OUTPATIENT
Start: 2019-08-11 | End: 2019-08-11

## 2019-08-11 RX ORDER — ENOXAPARIN SODIUM 100 MG/ML
40 INJECTION SUBCUTANEOUS DAILY
Refills: 0 | Status: DISCONTINUED | OUTPATIENT
Start: 2019-08-11 | End: 2019-08-22

## 2019-08-11 RX ORDER — SODIUM CHLORIDE 9 MG/ML
1000 INJECTION, SOLUTION INTRAVENOUS
Refills: 0 | Status: DISCONTINUED | OUTPATIENT
Start: 2019-08-11 | End: 2019-08-14

## 2019-08-11 RX ORDER — METOPROLOL TARTRATE 50 MG
5 TABLET ORAL EVERY 6 HOURS
Refills: 0 | Status: DISCONTINUED | OUTPATIENT
Start: 2019-08-11 | End: 2019-08-21

## 2019-08-11 RX ORDER — SODIUM CHLORIDE 9 MG/ML
1000 INJECTION, SOLUTION INTRAVENOUS
Refills: 0 | Status: DISCONTINUED | OUTPATIENT
Start: 2019-08-11 | End: 2019-08-11

## 2019-08-11 RX ORDER — ERYTHROPOIETIN 10000 [IU]/ML
10000 INJECTION, SOLUTION INTRAVENOUS; SUBCUTANEOUS
Refills: 0 | Status: DISCONTINUED | OUTPATIENT
Start: 2019-08-11 | End: 2019-08-12

## 2019-08-11 RX ADMIN — PANTOPRAZOLE SODIUM 40 MILLIGRAM(S): 20 TABLET, DELAYED RELEASE ORAL at 12:01

## 2019-08-11 RX ADMIN — Medication 1000 MILLIGRAM(S): at 15:37

## 2019-08-11 RX ADMIN — Medication 1000 MILLIGRAM(S): at 03:30

## 2019-08-11 RX ADMIN — Medication 100 MEQ/KG/HR: at 09:14

## 2019-08-11 RX ADMIN — HYDROMORPHONE HYDROCHLORIDE 0.5 MILLIGRAM(S): 2 INJECTION INTRAMUSCULAR; INTRAVENOUS; SUBCUTANEOUS at 20:12

## 2019-08-11 RX ADMIN — Medication 400 MILLIGRAM(S): at 03:15

## 2019-08-11 RX ADMIN — Medication 1000 MILLIGRAM(S): at 21:27

## 2019-08-11 RX ADMIN — PIPERACILLIN AND TAZOBACTAM 25 GRAM(S): 4; .5 INJECTION, POWDER, LYOPHILIZED, FOR SOLUTION INTRAVENOUS at 21:43

## 2019-08-11 RX ADMIN — HYDROMORPHONE HYDROCHLORIDE 0.25 MILLIGRAM(S): 2 INJECTION INTRAMUSCULAR; INTRAVENOUS; SUBCUTANEOUS at 19:13

## 2019-08-11 RX ADMIN — HYDROMORPHONE HYDROCHLORIDE 0.25 MILLIGRAM(S): 2 INJECTION INTRAMUSCULAR; INTRAVENOUS; SUBCUTANEOUS at 06:08

## 2019-08-11 RX ADMIN — SODIUM CHLORIDE 50 MILLILITER(S): 9 INJECTION, SOLUTION INTRAVENOUS at 10:33

## 2019-08-11 RX ADMIN — HYDROMORPHONE HYDROCHLORIDE 0.25 MILLIGRAM(S): 2 INJECTION INTRAMUSCULAR; INTRAVENOUS; SUBCUTANEOUS at 19:28

## 2019-08-11 RX ADMIN — CHLORHEXIDINE GLUCONATE 1 APPLICATION(S): 213 SOLUTION TOPICAL at 05:02

## 2019-08-11 RX ADMIN — Medication 400 MILLIGRAM(S): at 21:12

## 2019-08-11 RX ADMIN — Medication 1000 MILLIGRAM(S): at 09:29

## 2019-08-11 RX ADMIN — HYDROMORPHONE HYDROCHLORIDE 0.25 MILLIGRAM(S): 2 INJECTION INTRAMUSCULAR; INTRAVENOUS; SUBCUTANEOUS at 05:13

## 2019-08-11 RX ADMIN — Medication 125 MICROGRAM(S): at 21:45

## 2019-08-11 RX ADMIN — PIPERACILLIN AND TAZOBACTAM 25 GRAM(S): 4; .5 INJECTION, POWDER, LYOPHILIZED, FOR SOLUTION INTRAVENOUS at 05:02

## 2019-08-11 RX ADMIN — HYDROMORPHONE HYDROCHLORIDE 0.25 MILLIGRAM(S): 2 INJECTION INTRAMUSCULAR; INTRAVENOUS; SUBCUTANEOUS at 01:12

## 2019-08-11 RX ADMIN — PHENYLEPHRINE HYDROCHLORIDE 34.88 MICROGRAM(S)/KG/MIN: 10 INJECTION INTRAVENOUS at 09:15

## 2019-08-11 RX ADMIN — Medication 400 MILLIGRAM(S): at 09:14

## 2019-08-11 RX ADMIN — HYDROMORPHONE HYDROCHLORIDE 0.25 MILLIGRAM(S): 2 INJECTION INTRAMUSCULAR; INTRAVENOUS; SUBCUTANEOUS at 10:39

## 2019-08-11 RX ADMIN — SODIUM CHLORIDE 50 MILLILITER(S): 9 INJECTION, SOLUTION INTRAVENOUS at 21:42

## 2019-08-11 RX ADMIN — ENOXAPARIN SODIUM 40 MILLIGRAM(S): 100 INJECTION SUBCUTANEOUS at 12:01

## 2019-08-11 RX ADMIN — HYDROMORPHONE HYDROCHLORIDE 0.5 MILLIGRAM(S): 2 INJECTION INTRAMUSCULAR; INTRAVENOUS; SUBCUTANEOUS at 19:57

## 2019-08-11 RX ADMIN — HYDROMORPHONE HYDROCHLORIDE 0.25 MILLIGRAM(S): 2 INJECTION INTRAMUSCULAR; INTRAVENOUS; SUBCUTANEOUS at 10:24

## 2019-08-11 RX ADMIN — HYDROMORPHONE HYDROCHLORIDE 0.25 MILLIGRAM(S): 2 INJECTION INTRAMUSCULAR; INTRAVENOUS; SUBCUTANEOUS at 01:27

## 2019-08-11 RX ADMIN — Medication 102 MILLIGRAM(S): at 10:32

## 2019-08-11 RX ADMIN — Medication 400 MILLIGRAM(S): at 15:22

## 2019-08-11 RX ADMIN — PIPERACILLIN AND TAZOBACTAM 25 GRAM(S): 4; .5 INJECTION, POWDER, LYOPHILIZED, FOR SOLUTION INTRAVENOUS at 13:32

## 2019-08-11 NOTE — BRIEF OPERATIVE NOTE - NSICDXBRIEFPREOP_GEN_ALL_CORE_FT
PRE-OP DIAGNOSIS:  Small bowel obstruction 10-Aug-2019 04:08:31  Patrica Brown
PRE-OP DIAGNOSIS:  Small bowel obstruction 11-Aug-2019 19:10:30  Romie Ingram

## 2019-08-11 NOTE — PROGRESS NOTE ADULT - ATTENDING COMMENTS
Patient seen and examined on AM SICU rounds  Extubated yesterday, no signs of recurrent respiratory failure  Requiring vasopressors yesterday, now with MAP greater than 65 consistently without pressors  hematocrit stable  Creatinine slowly trending down    - Resolving respiratory failure and postoperative shock  - Acute kidney injury slowly improving  - For OR today for 2nd look laparotomy  - 35 minutes direct critical care today Patient seen and examined on AM SICU rounds  Extubated yesterday, no signs of recurrent respiratory failure  Requiring vasopressors yesterday, now with MAP greater than 65 consistently without pressors  hematocrit stable  Creatinine slowly trending down    - Resolving respiratory failure and postoperative shock (the patient's postoperative respiratory failure was related to the complex nature of the surgical procedures, and now has resolved)  - Acute kidney injury slowly improving  - For OR today for 2nd look laparotomy  - 35 minutes direct critical care today

## 2019-08-11 NOTE — PRE-ANESTHESIA EVALUATION ADULT - NSANTHINPATMEDSFT_GEN_ALL_CORE
zosyn at 15:43 today
ofirmev at 9 AM, protonix and lovenox at noon, zosyn at 12:00, dilaudid at 10 Am

## 2019-08-11 NOTE — PROGRESS NOTE ADULT - ASSESSMENT
A/P: 77 yo Male with PMHx of duodenal ulcer, duodenal stricture requiring EGD and ballooning who presented with concern for small bowel obstruction s/p emergent exlap. Findings: Diffuse mesenteric lymphadenopathy noted, bowel palpated at transition point, no discrete obstruction felt. Surgical Oncology consulted intra-operatively. Placement of abthera, brought back to ICU intubated.    - RTOR today: re-evaluate small bowel for viability and obtain frozen sections for pathology  - Continue NGT, NPO, IVF  - Lovenox 30mg qd for VTE ppx.   - Care per SICU    ACS [id number]

## 2019-08-11 NOTE — BRIEF OPERATIVE NOTE - NSICDXBRIEFPROCEDURE_GEN_ALL_CORE_FT
PROCEDURES:  Exploratory laparotomy 10-Aug-2019 04:08:06  Patrica Brown
PROCEDURES:  Biopsy, lymph node, mesenteric 11-Aug-2019 19:11:07  Romie Ingram  Exploratory laparotomy 10-Aug-2019 04:08:06  Patrica Brown

## 2019-08-11 NOTE — PRE-ANESTHESIA EVALUATION ADULT - NSANTHPMHFT_GEN_ALL_CORE
78M PMHx CKDII/III, elevated cholesterol, duodenal ulcer and duodenal stricture s/p EGD 11/2016 and ballooning p/w epigastric pain & decrease PO intake x 3 weeks.  Abdominal pain described as vague, non radiating, epigastric, exacerbated by food intake.  Denies nausea, vomiting, fever, chills, able pass gas.  Patient report last few weeks has decrease PO intake in order to "loss weight" and noted decrease UOP.  Had multiple episodes of diarrhea after started on fiber for constipation by PMD (stopped fiber 2-3 weeks ago).  K 6.2 (non hemolyzed), Cr 2.74 (baseline fluctuates 1.4-1.8 March-July 2019, lactate 3.2,............ Cr downtrend 2.74 to 2.39 and K 6.2 to 5.6 after treatment albuterol/insulin/D50, IVF LR 1L bolus x 2, NS 1L bolus x 1...CT ABDOMEN:  Small bowel obstruction with transition in the right hemiabdomen. Colonic diverticulosis. postop creat 1.8 and k 4.8  s/p emergent exlap. Findings: Diffuse mesenteric lymphadenopathy noted, bowel palpated at transition point, no discrete obstruction felt. Surgical Oncology consulted intra-operatively. Placement of abthera, brought back to ICU intubated. extubated 8/10/2019. Now to OR to re-evaluate small bowel for viability and obtain frozen sections for pathology
78M PMHx CKDII/III, elevated cholesterol, duodenal ulcer and duodenal stricture s/p EGD 11/2016 and ballooning p/w epigastric pain & decrease PO intake x 3 weeks.  Abdominal pain described as vague, non radiating, epigastric, exacerbated by food intake.  Denies nausea, vomiting, fever, chills, able pass gas.  Patient report last few weeks has decrease PO intake in order to "loss weight" and noted decrease UOP.  Had multiple episodes of diarrhea after started on fiber for constipation by PMD (stopped fiber 2-3 weeks ago).  K 6.2 (non hemolyzed), Cr 2.74 (baseline fluctuates 1.4-1.8 March-July 2019, lactate 3.2,............ Cr downtrend 2.74 to 2.39 and K 6.2 to 5.6 after treatment albuterol/insulin/D50, IVF LR 1L bolus x 2, NS 1L bolus x 1...CT ABDOMEN:  Small bowel obstruction with transition in the right hemiabdomen.   Colonic diverticulosis.

## 2019-08-11 NOTE — CONSULT NOTE ADULT - SUBJECTIVE AND OBJECTIVE BOX
Patient is a 78y Male whom presented to the hospital with ckd and brigid , hyperkalemia     PAST MEDICAL & SURGICAL HISTORY:  Hyperparathyroidism  Hypertension  BRIGID (acute kidney injury)  SBO (small bowel obstruction)  Duodenal ulcer disease  No significant past surgical history      MEDICATIONS  (STANDING):  acetaminophen  IVPB .. 1000 milliGRAM(s) IV Intermittent once  acetaminophen  IVPB .. 1000 milliGRAM(s) IV Intermittent once  chlorhexidine 2% Cloths 1 Application(s) Topical daily  dextrose 5% + lactated ringers. 1000 milliLiter(s) (50 mL/Hr) IV Continuous <Continuous>  enoxaparin Injectable 40 milliGRAM(s) SubCutaneous daily  epoetin ava Injectable 33405 Unit(s) SubCutaneous <User Schedule>  levothyroxine Injectable 125 MICROGram(s) IV Push at bedtime  pantoprazole  Injectable 40 milliGRAM(s) IV Push daily  piperacillin/tazobactam IVPB.. 3.375 Gram(s) IV Intermittent every 8 hours      Allergies    Cipro (Rash)  niacin (Flushing; Rash)  Reglan (Anaphylaxis)    Intolerances        SOCIAL HISTORY:  Denies ETOh,Smoking,     FAMILY HISTORY:  Family history of non-Hodgkin's lymphoma      REVIEW OF SYSTEMS:    CONSTITUTIONAL: pos  weakness, no  fevers or chills  EYES/ENT: No visual changes;  no throat pain   NECK: No pain or stiffness  RESPIRATORY: No cough, wheezing, hemoptysis; No shortness of breath  CARDIOVASCULAR: No chest pain or palpitations  GASTROINTESTINAL: No abdominal or epigastric pain. No nausea, vomiting,     No diarrhea or constipation. No melena   GENITOURINARY: No dysuria, frequency or hematuria  NEUROLOGICAL: No numbness or weakness  SKIN: dry      VITAL:  T(C): , Max: 37.3 (19 @ 11:00)  T(F): , Max: 99.1 (19 @ 11:00)  HR: 78 (19 @ 13:00)  BP: 116/56 (19 @ 07:45)  BP(mean): 80 (19 @ 07:45)  RR: 15 (19 @ 13:00)  SpO2: 93% (19 @ 13:00)  Wt(kg): --    I and O's:    08-10 @ 07:  -   @ 07:00  --------------------------------------------------------  IN: 3643.3 mL / OUT: 3540 mL / NET: 103.3 mL     @ 07:01  -   @ 13:26  --------------------------------------------------------  IN: 500 mL / OUT: 530 mL / NET: -30 mL          PHYSICAL EXAM:  ng tube is place    Constitutional: NAD  HEENT: conjunctive   clear   Neck:  No JVD  Respiratory: CTAB  Cardiovascular: S1 and S2  Gastrointestinal:  soft, pos distened   Extremities: No peripheral edema  Neurological:  no focal deficits  Psychiatric: Normal mood, normal affect  Skin: dry   Access: Not applicable    LABS:                        9.0    11.5  )-----------( 448      ( 11 Aug 2019 00:33 )             28.2     0811    136  |  103  |  45<H>  ----------------------------<  121<H>  4.9   |  20<L>  |  1.83<H>    Ca    8.8      11 Aug 2019 00:33  Phos  5.6       Mg     2.4     11        Urine Studies:  Urinalysis Basic - ( 09 Aug 2019 17:33 )    Color: Yellow / Appearance: Slightly Turbid / S.023 / pH: x  Gluc: x / Ketone: Negative  / Bili: Small / Urobili: 2 mg/dL   Blood: x / Protein: 30 mg/dL / Nitrite: Negative   Leuk Esterase: Negative / RBC: 4 /hpf / WBC 1 /HPF   Sq Epi: x / Non Sq Epi: 1 /hpf / Bacteria: Negative      Creatinine, Random Urine: 35 mg/dL (08-10 @ 01:43)  Sodium, Random Urine: 29 mmol/L (08-10 @ 01:43)  Osmolality, Random Urine: 305 mos/kg (08-10 @ 01:43)  Potassium, Random Urine: 24 mmol/L (08-10 @ 01:43)        RADIOLOGY & ADDITIONAL STUDIES:

## 2019-08-11 NOTE — BRIEF OPERATIVE NOTE - OPERATION/FINDINGS
primary small bowel obstruction  midline laparotomy  murky ascites, small volume, encountered  distended, hyperemic small bowel from LoT to ileum -- terminal ileum collapsed with gradual transition point where bowel appeared thickened, nodular, but not completely obstructed  small bowel contents milked proximally (500 suctioned from NGT)  distended small bowel with areas of hyperemia and some dusky patches  bulky lymphadenopathy within small bowel mesentery; large calcified mass at root of mesentery  small bowel run from LoT to TI multiple times, no other abnormalities  unable to palpate liver as it was adherent to abdominal wall  abthera vac left in place
Re-exploratory laparotomy. All bowel appeared pink and viable. Serosal implants noted on terminal ileum along with adjacant enlarged mesenteric lymph nodes. Frozen biopsies taken of serosal implant (lymphoid aggregates) and mesenteric lymph node (suspicious for follicular lymphoma). Second mesenteric lymph node taken, placed in formalin and sent for flow cytology. Abdomen closed using #1 looped PDS. Skin stapled.

## 2019-08-11 NOTE — PROGRESS NOTE ADULT - SUBJECTIVE AND OBJECTIVE BOX
SICU DAILY PROGRESS NOTE    78y old  Male who presents with PMHx of duodenal ulcer (questionable duodenal perforation in past in charts however family denies), duodenal stricture requiring EGD and ballooning presenting to the ER with three weeks of decreased PO intake and abdominal pain that was vague and located in the epigastric area, worse with increased intake however not associated with types of foods. He states no radiation, na denies experiencing pain similar to this before. States passed flatus and BM last night, however admits to bloating today. He states he has never been nausea or had emesis. Denies fever, chills, CP, SOB, nausea, vomiting, diarrhea, constipation, and urinary symptoms.       24 HOUR EVENTS:  -extubted successfully  -potassium trending down  -lactate trending down      SUBJECTIVE/ROS:  [x] A ten-point review of systems was otherwise negative except as noted.  [ ] Due to altered mental status/intubation, subjective information were not able to be obtained from the patient. History was obtained, to the extent possible, from review of the chart and collateral sources of information.      NEURO  Exam: awake, alert, oriented  Meds: acetaminophen  IVPB .. 1000 milliGRAM(s) IV Intermittent once  acetaminophen  IVPB .. 1000 milliGRAM(s) IV Intermittent once  acetaminophen  IVPB .. 1000 milliGRAM(s) IV Intermittent once  acetaminophen  IVPB .. 1000 milliGRAM(s) IV Intermittent once  HYDROmorphone  Injectable 0.25 milliGRAM(s) IV Push every 3 hours PRN Severe Pain (7 - 10)    [x] Adequacy of sedation and pain control has been assessed and adjusted    RESPIRATORY  RR: 21 (08-11-19 @ 01:30) (14 - 28)  SpO2: 93% (08-11-19 @ 01:30) (92% - 100%  Exam: unlabored, clear to auscultation bilaterally  Mechanical Ventilation: Mode: CPAP with PS, RR (patient): 15, FiO2: 30, PEEP: 5, PS: 5, MAP: 11, PIP: 11  ABG - ( 10 Aug 2019 16:22 )  pH: 7.39  /  pCO2: 32    /  pO2: 87    / HCO3: 19    / Base Excess: -4.5  /  SaO2: 94      Lactate: x        CARDIOVASCULAR  HR: 82 (08-11-19 @ 01:30) (67 - 129)  BP: 100/56 (08-10-19 @ 22:45) (81/49 - 168/75)  BP(mean): 76 (08-10-19 @ 22:45) (60 - 108)  ABP: 117/45 (08-11-19 @ 01:30) (86/35 - 169/64)  ABP(mean): 72 (08-11-19 @ 01:30) (52 - 105)  Wt(kg): --  CVP(cm H2O): --  VBG - ( 09 Aug 2019 20:32 )  pH: 7.32  /  pCO2: 36    /  pO2: 22    / HCO3: 18    / Base Excess: -7.1  /  SaO2: 29       Exam: regular rate and rhythm  Cardiac Rhythm: sinus  Perfusion     [x]Adequate   [ ]Inadequate  Mentation   [x]Normal       [ ]Reduced  Extremities  [x]Warm         [ ]Cool  Volume Status [ ]Hypervolemic [x]Euvolemic [ ]Hypovolemic  Meds: phenylephrine    Infusion 1 MICROgram(s)/kG/Min IV Continuous <Continuous>    GI/NUTRITION  Exam: soft, nontender, nondistended, incision C/D/I  Diet: NPO  Meds: pantoprazole  Injectable 40 milliGRAM(s) IV Push daily      GENITOURINARY  I&O's Detail    08-09 @ 07:01  -  08-10 @ 07:00  --------------------------------------------------------  IN:    fentaNYL Infusion.: 9.4 mL    IV PiggyBack: 50 mL    phenylephrine   Infusion: 80.2 mL    sodium bicarbonate  Infusion: 200 mL    sodium chloride 0.9%: 200 mL    sodium chloride 0.9%: 500 mL    Sodium Chloride 0.9% IV Bolus: 1500 mL  Total IN: 2539.6 mL    OUT:    Indwelling Catheter - Urethral: 1200 mL    Nasoenteral Tube: 400 mL    VAC (Vacuum Assisted Closure) System: 100 mL  Total OUT: 1700 mL    Total NET: 839.6 mL      08-10 @ 07:01  -  08-11 @ 01:51  --------------------------------------------------------  IN:    Albumin 5%  - 250 mL: 250 mL    fentaNYL Infusion.: 4.7 mL    IV PiggyBack: 250 mL    phenylephrine   Infusion: 522.4 mL    sodium bicarbonate  Infusion: 1800 mL  Total IN: 2827.1 mL    OUT:    Indwelling Catheter - Urethral: 2290 mL    VAC (Vacuum Assisted Closure) System: 300 mL  Total OUT: 2590 mL    Total NET: 237.1 mL          08-11    136  |  103  |  45<H>  ----------------------------<  121<H>  4.9   |  20<L>  |  1.83<H>    Ca    8.8      11 Aug 2019 00:33  Phos  5.6     08-11  Mg     2.4     08-11    TPro  6.7  /  Alb  2.3<L>  /  TBili  0.7  /  DBili  x   /  AST  7<L>  /  ALT  8<L>  /  AlkPhos  491<H>  08-09    [ ] Magaña catheter, indication: N/A  Meds: sodium bicarbonate  Infusion 0.081 mEq/kG/Hr IV Continuous <Continuous>      HEMATOLOGIC  Meds: enoxaparin Injectable 30 milliGRAM(s) SubCutaneous daily    [x] VTE Prophylaxis                        9.0    11.5  )-----------( 448      ( 11 Aug 2019 00:33 )             28.2     PT/INR - ( 11 Aug 2019 00:33 )   PT: 23.2 sec;   INR: 1.98 ratio         PTT - ( 11 Aug 2019 00:33 )  PTT:35.4 sec  Transfusion     [ ] PRBC   [ ] Platelets   [ ] FFP   [ ] Cryoprecipitate      INFECTIOUS DISEASES  WBC Count: 11.5 K/uL (08-11 @ 00:33)  WBC Count: 11.1 K/uL (08-10 @ 20:51)  WBC Count: 11.0 K/uL (08-10 @ 16:25)  WBC Count: 10.6 K/uL (08-10 @ 12:38)  WBC Count: 11.5 K/uL (08-10 @ 08:11)  WBC Count: 12.7 K/uL (08-10 @ 04:23)    RECENT CULTURES:  Specimen Source: .Blood  Date/Time: 08-09 @ 21:32  Culture Results:   No growth to date.  Gram Stain: --  Organism: --  Specimen Source: .Urine  Date/Time: 08-09 @ 20:11  Culture Results:   No growth  Gram Stain: --  Organism: --    Meds: piperacillin/tazobactam IVPB.. 3.375 Gram(s) IV Intermittent every 8 hours        ENDOCRINE  CAPILLARY BLOOD GLUCOSE        Meds: levothyroxine Injectable 125 MICROGram(s) IV Push at bedtime        ACCESS DEVICES:  [x] Peripheral IV  [ ] Central Venous Line	[ ] R	[ ] L	[ ] IJ	[ ] Fem	[ ] SC	Placed:   [ ] Arterial Line		[ ] R	[ ] L	[ ] Fem	[ ] Rad	[ ] Ax	Placed:   [ ] PICC:					[ ] Mediport  [ ] Urinary Catheter, Date Placed:   [x] Necessity of urinary, arterial, and venous catheters discussed    OTHER MEDICATIONS:  chlorhexidine 2% Cloths 1 Application(s) Topical daily      CODE STATUS: full

## 2019-08-11 NOTE — PRE-ANESTHESIA EVALUATION ADULT - NSANTHOSAYNRD_GEN_A_CORE
No. CRISTÓBAL screening performed.  STOP BANG Legend: 0-2 = LOW Risk; 3-4 = INTERMEDIATE Risk; 5-8 = HIGH Risk
No. CRISTÓBAL screening performed.  STOP BANG Legend: 0-2 = LOW Risk; 3-4 = INTERMEDIATE Risk; 5-8 = HIGH Risk

## 2019-08-11 NOTE — CONSULT NOTE ADULT - ASSESSMENT
Patient is a 78y old  Male who presents with PMHx of duodenal ulcer (questionable duodenal perforation in past in charts however family denies), duodenal stricture requiring EGD and ballooning presenting to the ER with three weeks of decreased PO intake and abdominal pain that was vague and located in the epigastric area, worse with increased intake however not associated with types of foods. He states no radiation, na denies experiencing pain similar to this before. States passed flatus and BM last night, however admits to bloating today. He states he has never been nausea or had emesis. Denies fever, chills, CP, SOB, nausea, vomiting, diarrhea, constipation, and urinary symptoms.

## 2019-08-11 NOTE — BRIEF OPERATIVE NOTE - NSICDXBRIEFPOSTOP_GEN_ALL_CORE_FT
POST-OP DIAGNOSIS:  Small bowel obstruction 10-Aug-2019 04:08:43  Patrica Brown
POST-OP DIAGNOSIS:  Small bowel obstruction 11-Aug-2019 19:10:50  Romie Ingram

## 2019-08-11 NOTE — CONSULT NOTE ADULT - PROBLEM SELECTOR RECOMMENDATION 9
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

## 2019-08-11 NOTE — PROGRESS NOTE ADULT - ASSESSMENT
Neurologic: Postoperative pain.   - Dilaudid, IV tylenol for pain control. Toradol if additional pain needs.     Respiratory:  - saturating 95% on RA    Cardiovascular: hypotension likely 2/2 sepsis, s/p 3L fluid resuscitation in ED, with hyperlactatemia  - Monitor vital signs  - Lactate downtrending.   - BP within normal limits, no pressor requirements.     Gastrointestinal/Nutrition: h/o duodenal ulcer; primary SBO.   - Appreciate Surgical Oncology recs: plan takeback today  - continue NGT/NPO/IVF  - Protonix for h/o duodenal ulcer    Genitourinary/Renal: BRIGID; hyperkalemia to 6.2 s/p insulin, D50, hyponatremia  - Appreciate Nephrology recs: no urgent HD needs.   - Hyperkalemia improved, last at 4.9, okay for lasix if needed.   - BRIGID with downtrending Cr, last 1.83   - Magaña, strict I&Os      Hematologic: no acute issues  - Lovenox 30mg qd for VTE ppx.     Infectious Disease: no acute issues; s/p 1 dose of Zosyn in ED  - Culture if febrile  - No need to continue antibiotics    Endocrine: hypothyroidism  - Continue levothyroxine    DISCO:   SICU.

## 2019-08-11 NOTE — PROGRESS NOTE ADULT - SUBJECTIVE AND OBJECTIVE BOX
ACS Post-op Note  STATUS POST:  ex lap, mesenteric lymph node biopsy    POST OPERATIVE DAY #: 0    pt seen and examined at bedside. pain well controlled. not yet passing flatus. talavera in place. denies dizziness, SOB, CP, or palpitations.     Vital Signs Last 24 Hrs  T(C): 36.9 (12 Aug 2019 03:00), Max: 37.3 (11 Aug 2019 11:00)  T(F): 98.4 (12 Aug 2019 03:00), Max: 99.1 (11 Aug 2019 11:00)  HR: 76 (12 Aug 2019 04:00) (73 - 98)  BP: 147/86 (11 Aug 2019 22:00) (116/56 - 173/80)  BP(mean): 112 (11 Aug 2019 22:00) (80 - 115)  RR: 17 (12 Aug 2019 04:00) (14 - 28)  SpO2: 94% (12 Aug 2019 04:00) (91% - 98%)  I&O's Summary    10 Aug 2019 07:01  -  11 Aug 2019 07:00  --------------------------------------------------------  IN: 3643.3 mL / OUT: 3540 mL / NET: 103.3 mL    11 Aug 2019 07:01  -  12 Aug 2019 04:48  --------------------------------------------------------  IN: 1450 mL / OUT: 2275 mL / NET: -825 mL      I&O's Detail    10 Aug 2019 07:01  -  11 Aug 2019 07:00  --------------------------------------------------------  IN:    Albumin 5%  - 250 mL: 250 mL    fentaNYL Infusion.: 4.7 mL    IV PiggyBack: 400 mL    phenylephrine   Infusion: 588.6 mL    sodium bicarbonate  Infusion: 2400 mL  Total IN: 3643.3 mL    OUT:    Indwelling Catheter - Urethral: 2890 mL    Nasoenteral Tube: 150 mL    VAC (Vacuum Assisted Closure) System: 500 mL  Total OUT: 3540 mL    Total NET: 103.3 mL      11 Aug 2019 07:01  -  12 Aug 2019 04:48  --------------------------------------------------------  IN:    dextrose 5% + lactated ringers.: 500 mL    dextrose 5% + sodium chloride 0.9%.: 350 mL    IV PiggyBack: 400 mL    sodium bicarbonate  Infusion: 200 mL  Total IN: 1450 mL    OUT:    Indwelling Catheter - Urethral: 1725 mL    Nasoenteral Tube: 350 mL    VAC (Vacuum Assisted Closure) System: 200 mL  Total OUT: 2275 mL    Total NET: -825 mL      MEDICATIONS  (STANDING):  chlorhexidine 2% Cloths 1 Application(s) Topical daily  dextrose 5% + sodium chloride 0.9%. 1000 milliLiter(s) (50 mL/Hr) IV Continuous <Continuous>  enoxaparin Injectable 40 milliGRAM(s) SubCutaneous daily  epoetin ava Injectable 44700 Unit(s) SubCutaneous <User Schedule>  levothyroxine Injectable 125 MICROGram(s) IV Push at bedtime  metoprolol tartrate Injectable 5 milliGRAM(s) IV Push every 6 hours  pantoprazole  Injectable 40 milliGRAM(s) IV Push daily  piperacillin/tazobactam IVPB.. 3.375 Gram(s) IV Intermittent every 8 hours    MEDICATIONS  (PRN):  HYDROmorphone  Injectable 0.25 milliGRAM(s) IV Push every 3 hours PRN Severe Pain (7 - 10)      LABS:                        8.6    5.5   )-----------( 391      ( 12 Aug 2019 00:35 )             27.6     08-12    136  |  106  |  36<H>  ----------------------------<  132<H>  5.1   |  20<L>  |  1.67<H>    Ca    8.5      12 Aug 2019 00:35  Phos  4.7     08-12  Mg     2.3     08-12      PT/INR - ( 12 Aug 2019 00:35 )   PT: 13.8 sec;   INR: 1.20 ratio         PTT - ( 12 Aug 2019 00:35 )  PTT:31.0 sec      RADIOLOGY & ADDITIONAL STUDIES:    PHYSICAL EXAM:      Constitutional: NAD, A&O x 3  Respiratory: unlabor breathing   Cardiovascular: RRR  Gastrointestinal: abd soft, ND, NT      A/P: 78y Male s/p ex lap and mesenteric lymph node biopsy POD #0    - Diet: NPO  - Activity: OOB as tolerated  - Labs: f/u AM labs   - Pain medicationL dilaudid prn   - DVT ppx: Lovenox  - Continue care in SICU    Modesta Mercado PA-C

## 2019-08-11 NOTE — PROGRESS NOTE ADULT - SUBJECTIVE AND OBJECTIVE BOX
Trauma Surgery Progress Note    SUBJECTIVE/ROS: Patient examined at bed side. NGT in place, lethargic but arousable.     24hr Events as per SICU :   -extubted successfully  -potassium trending down  -lactate trending down      MEDICATIONS  (STANDING):  acetaminophen  IVPB .. 1000 milliGRAM(s) IV Intermittent once  acetaminophen  IVPB .. 1000 milliGRAM(s) IV Intermittent once  chlorhexidine 2% Cloths 1 Application(s) Topical daily  dextrose 5% + lactated ringers. 1000 milliLiter(s) (50 mL/Hr) IV Continuous <Continuous>  enoxaparin Injectable 40 milliGRAM(s) SubCutaneous daily  epoetin ava Injectable 45534 Unit(s) SubCutaneous <User Schedule>  levothyroxine Injectable 125 MICROGram(s) IV Push at bedtime  pantoprazole  Injectable 40 milliGRAM(s) IV Push daily  piperacillin/tazobactam IVPB.. 3.375 Gram(s) IV Intermittent every 8 hours    MEDICATIONS  (PRN):  HYDROmorphone  Injectable 0.25 milliGRAM(s) IV Push every 3 hours PRN Severe Pain (7 - 10)        Objective:  Vital Signs Last 24 Hrs  T(C): 37.3 (11 Aug 2019 11:00), Max: 37.3 (11 Aug 2019 11:00)  T(F): 99.1 (11 Aug 2019 11:00), Max: 99.1 (11 Aug 2019 11:00)  HR: 78 (11 Aug 2019 13:00) (67 - 92)  BP: 116/56 (11 Aug 2019 07:45) (97/54 - 116/56)  BP(mean): 80 (11 Aug 2019 07:45) (71 - 80)  RR: 15 (11 Aug 2019 13:00) (13 - 25)  SpO2: 93% (11 Aug 2019 13:00) (92% - 98%)    Physical Exam:  Gen: NAD, resting comfortably in bed  Resp: No increased WOB   Abd: nontender, nondistended, soft, compressible  Extr: WWP distally, cap refill <2s    I&O's Summary    10 Aug 2019 07:01  -  11 Aug 2019 07:00  --------------------------------------------------------  IN: 3643.3 mL / OUT: 3540 mL / NET: 103.3 mL    11 Aug 2019 07:01  -  11 Aug 2019 13:59  --------------------------------------------------------  IN: 550 mL / OUT: 530 mL / NET: 20 mL        LABS:                        9.0    11.5  )-----------( 448      ( 11 Aug 2019 00:33 )             28.2     0811    136  |  103  |  45<H>  ----------------------------<  121<H>  4.9   |  20<L>  |  1.83<H>    Ca    8.8      11 Aug 2019 00:33  Phos  5.6       Mg     2.4     11      PT/INR - ( 11 Aug 2019 00:33 )   PT: 23.2 sec;   INR: 1.98 ratio         PTT - ( 11 Aug 2019 00:33 )  PTT:35.4 sec  Urinalysis Basic - ( 09 Aug 2019 17:33 )    Color: Yellow / Appearance: Slightly Turbid / S.023 / pH: x  Gluc: x / Ketone: Negative  / Bili: Small / Urobili: 2 mg/dL   Blood: x / Protein: 30 mg/dL / Nitrite: Negative   Leuk Esterase: Negative / RBC: 4 /hpf / WBC 1 /HPF   Sq Epi: x / Non Sq Epi: 1 /hpf / Bacteria: Negative        EXAM:  CT ANGIO ABD PELV (W)AW IC                            PROCEDURE DATE:  2019            INTERPRETATION:  CLINICAL INFORMATION: Weakness, decreased appetite,   weight loss, abdominal distention. Evaluate for mesenteric ischemia.    COMPARISON: CT abdomen pelvis from 11/10/2016. Correlate with CT abdomen   pelvis obtained on same day, 2019.    PROCEDURE:   CT Angiography of the Abdomen and Pelvis with and without intravenous   contrast.  Noncontrast imaging was performed followed by arterial phase and venous   phases.  Intravenous contrast: 90 ml Omnipaque 350. 10 ml discarded.  Oral contrast: None.  Sagittal and coronal reformats were performed as well as 3D (MIP)   reconstructions.    FINDINGS:    LOWER CHEST: Bilateral lower lung dependent atelectasis. Coronary artery   calcification.    LIVER: Within normal limits.  BILE DUCTS: Normal caliber.  GALLBLADDER: Within normal limits.  SPLEEN: Splenomegaly.  PANCREAS: Atrophic.  ADRENALS: Within normal limits.  KIDNEYS/URETERS: Right middle pole 2.5 cm simple renal cysts..    BLADDER: Within normal limits.  REPRODUCTIVE ORGANS: Prostamegaly    BOWEL: Mild residual oral contrast. No bowel wall thickening. No abnormal   wall enhancement. Redemonstration of mildly dilated, fluid-filled loops   of small bowel in the left hemidiaphragm with a gradual transition to   normal caliber in the mid abdomen (series 3, images 67 through   87.)Diverticulosis involving the descending and sigmoid colon.   PERITONEUM: Trace pelvic ascites. Redemonstration of 4.3 x 3.7 cm   peripherally calcified soft tissue structure in the left upper quadrant,   which is of uncertain etiology and unchanged from 2014. No   pneumoperitoneum.  VESSELS: Aortic atherosclerosis. Mild calcification at the origin of the   JACQUELIN. The celiac artery, SMA, and JACQUELIN remain patent.  RETROPERITONEUM/LYMPH NODES: No lymphadenopathy.    ABDOMINAL WALL: Within normal limits.  BONES: Degenerative spinal disease.    IMPRESSION:     No CT evidence of mesenteric ischemia. Unchanged small bowel ileus versus   partial small bowel obstruction.

## 2019-08-12 LAB
ANION GAP SERPL CALC-SCNC: 10 MMOL/L — SIGNIFICANT CHANGE UP (ref 5–17)
APTT BLD: 31 SEC — SIGNIFICANT CHANGE UP (ref 27.5–36.3)
BUN SERPL-MCNC: 36 MG/DL — HIGH (ref 7–23)
CALCIUM SERPL-MCNC: 8.5 MG/DL — SIGNIFICANT CHANGE UP (ref 8.4–10.5)
CGA FLD-MCNC: 2046 NG/ML — HIGH
CHLORIDE SERPL-SCNC: 106 MMOL/L — SIGNIFICANT CHANGE UP (ref 96–108)
CO2 SERPL-SCNC: 20 MMOL/L — LOW (ref 22–31)
CREAT SERPL-MCNC: 1.67 MG/DL — HIGH (ref 0.5–1.3)
GLUCOSE SERPL-MCNC: 132 MG/DL — HIGH (ref 70–99)
HCT VFR BLD CALC: 27.6 % — LOW (ref 39–50)
HGB BLD-MCNC: 8.6 G/DL — LOW (ref 13–17)
INR BLD: 1.2 RATIO — HIGH (ref 0.88–1.16)
IRON SATN MFR SERPL: 30 % — SIGNIFICANT CHANGE UP (ref 16–55)
IRON SATN MFR SERPL: 38 UG/DL — LOW (ref 45–165)
MAGNESIUM SERPL-MCNC: 2.3 MG/DL — SIGNIFICANT CHANGE UP (ref 1.6–2.6)
MCHC RBC-ENTMCNC: 27.2 PG — SIGNIFICANT CHANGE UP (ref 27–34)
MCHC RBC-ENTMCNC: 30.9 GM/DL — LOW (ref 32–36)
MCV RBC AUTO: 87.8 FL — SIGNIFICANT CHANGE UP (ref 80–100)
PHOSPHATE SERPL-MCNC: 4.7 MG/DL — HIGH (ref 2.5–4.5)
PLATELET # BLD AUTO: 391 K/UL — SIGNIFICANT CHANGE UP (ref 150–400)
POTASSIUM SERPL-MCNC: 5.1 MMOL/L — SIGNIFICANT CHANGE UP (ref 3.5–5.3)
POTASSIUM SERPL-SCNC: 5.1 MMOL/L — SIGNIFICANT CHANGE UP (ref 3.5–5.3)
PROTHROM AB SERPL-ACNC: 13.8 SEC — HIGH (ref 10–12.9)
RBC # BLD: 3.15 M/UL — LOW (ref 4.2–5.8)
RBC # FLD: 13.9 % — SIGNIFICANT CHANGE UP (ref 10.3–14.5)
SODIUM SERPL-SCNC: 136 MMOL/L — SIGNIFICANT CHANGE UP (ref 135–145)
TIBC SERPL-MCNC: 128 UG/DL — LOW (ref 220–430)
UIBC SERPL-MCNC: 90 UG/DL — LOW (ref 110–370)
WBC # BLD: 5.5 K/UL — SIGNIFICANT CHANGE UP (ref 3.8–10.5)
WBC # FLD AUTO: 5.5 K/UL — SIGNIFICANT CHANGE UP (ref 3.8–10.5)

## 2019-08-12 PROCEDURE — 99232 SBSQ HOSP IP/OBS MODERATE 35: CPT

## 2019-08-12 PROCEDURE — 88189 FLOWCYTOMETRY/READ 16 & >: CPT

## 2019-08-12 RX ORDER — ACETAMINOPHEN 500 MG
1000 TABLET ORAL ONCE
Refills: 0 | Status: COMPLETED | OUTPATIENT
Start: 2019-08-13 | End: 2019-08-13

## 2019-08-12 RX ORDER — HYDROMORPHONE HYDROCHLORIDE 2 MG/ML
0.5 INJECTION INTRAMUSCULAR; INTRAVENOUS; SUBCUTANEOUS
Refills: 0 | Status: DISCONTINUED | OUTPATIENT
Start: 2019-08-12 | End: 2019-08-13

## 2019-08-12 RX ORDER — ACETAMINOPHEN 500 MG
1000 TABLET ORAL ONCE
Refills: 0 | Status: COMPLETED | OUTPATIENT
Start: 2019-08-12 | End: 2019-08-12

## 2019-08-12 RX ADMIN — PIPERACILLIN AND TAZOBACTAM 25 GRAM(S): 4; .5 INJECTION, POWDER, LYOPHILIZED, FOR SOLUTION INTRAVENOUS at 05:41

## 2019-08-12 RX ADMIN — Medication 400 MILLIGRAM(S): at 19:24

## 2019-08-12 RX ADMIN — HYDROMORPHONE HYDROCHLORIDE 0.25 MILLIGRAM(S): 2 INJECTION INTRAMUSCULAR; INTRAVENOUS; SUBCUTANEOUS at 04:43

## 2019-08-12 RX ADMIN — HYDROMORPHONE HYDROCHLORIDE 0.25 MILLIGRAM(S): 2 INJECTION INTRAMUSCULAR; INTRAVENOUS; SUBCUTANEOUS at 18:28

## 2019-08-12 RX ADMIN — HYDROMORPHONE HYDROCHLORIDE 0.25 MILLIGRAM(S): 2 INJECTION INTRAMUSCULAR; INTRAVENOUS; SUBCUTANEOUS at 08:15

## 2019-08-12 RX ADMIN — ENOXAPARIN SODIUM 40 MILLIGRAM(S): 100 INJECTION SUBCUTANEOUS at 11:45

## 2019-08-12 RX ADMIN — Medication 5 MILLIGRAM(S): at 00:25

## 2019-08-12 RX ADMIN — HYDROMORPHONE HYDROCHLORIDE 0.25 MILLIGRAM(S): 2 INJECTION INTRAMUSCULAR; INTRAVENOUS; SUBCUTANEOUS at 08:30

## 2019-08-12 RX ADMIN — Medication 125 MICROGRAM(S): at 21:34

## 2019-08-12 RX ADMIN — HYDROMORPHONE HYDROCHLORIDE 0.25 MILLIGRAM(S): 2 INJECTION INTRAMUSCULAR; INTRAVENOUS; SUBCUTANEOUS at 01:15

## 2019-08-12 RX ADMIN — SODIUM CHLORIDE 50 MILLILITER(S): 9 INJECTION, SOLUTION INTRAVENOUS at 17:12

## 2019-08-12 RX ADMIN — Medication 1000 MILLIGRAM(S): at 19:54

## 2019-08-12 RX ADMIN — HYDROMORPHONE HYDROCHLORIDE 0.5 MILLIGRAM(S): 2 INJECTION INTRAMUSCULAR; INTRAVENOUS; SUBCUTANEOUS at 21:34

## 2019-08-12 RX ADMIN — CHLORHEXIDINE GLUCONATE 1 APPLICATION(S): 213 SOLUTION TOPICAL at 05:41

## 2019-08-12 RX ADMIN — HYDROMORPHONE HYDROCHLORIDE 0.25 MILLIGRAM(S): 2 INJECTION INTRAMUSCULAR; INTRAVENOUS; SUBCUTANEOUS at 12:00

## 2019-08-12 RX ADMIN — HYDROMORPHONE HYDROCHLORIDE 0.25 MILLIGRAM(S): 2 INJECTION INTRAMUSCULAR; INTRAVENOUS; SUBCUTANEOUS at 11:45

## 2019-08-12 RX ADMIN — HYDROMORPHONE HYDROCHLORIDE 0.5 MILLIGRAM(S): 2 INJECTION INTRAMUSCULAR; INTRAVENOUS; SUBCUTANEOUS at 21:49

## 2019-08-12 RX ADMIN — HYDROMORPHONE HYDROCHLORIDE 0.25 MILLIGRAM(S): 2 INJECTION INTRAMUSCULAR; INTRAVENOUS; SUBCUTANEOUS at 04:28

## 2019-08-12 RX ADMIN — HYDROMORPHONE HYDROCHLORIDE 0.25 MILLIGRAM(S): 2 INJECTION INTRAMUSCULAR; INTRAVENOUS; SUBCUTANEOUS at 18:43

## 2019-08-12 RX ADMIN — HYDROMORPHONE HYDROCHLORIDE 0.25 MILLIGRAM(S): 2 INJECTION INTRAMUSCULAR; INTRAVENOUS; SUBCUTANEOUS at 01:00

## 2019-08-12 RX ADMIN — Medication 5 MILLIGRAM(S): at 17:12

## 2019-08-12 RX ADMIN — PANTOPRAZOLE SODIUM 40 MILLIGRAM(S): 20 TABLET, DELAYED RELEASE ORAL at 11:45

## 2019-08-12 RX ADMIN — Medication 5 MILLIGRAM(S): at 11:45

## 2019-08-12 RX ADMIN — Medication 5 MILLIGRAM(S): at 05:41

## 2019-08-12 NOTE — PROGRESS NOTE ADULT - SUBJECTIVE AND OBJECTIVE BOX
Trauma Surgery Progress Note    SUBJECTIVE/ROS: Patient examined at bed side. NGT in place, lethargic but arousable.     24hr Events as per SICU :   -extubted successfully  -potassium trending down  -lactate trending down      Physical Exam:  Gen: NAD, resting comfortably in bed  Resp: No increased WOB   Abd: nontender, nondistended, soft, compressible  Extr: WWP distally, cap refill <2s    Trauma Surgery Daily Progress Note     77yo Male with PMHx of duodenal ulcer, duodenal stricture requiring EGD and ballooning who presented with concern for small bowel obstruction s/p emergent exlap. Findings: Diffuse mesenteric lymphadenopathy noted, bowel palpated at transition point, no discrete obstruction felt. Surgical Oncology consulted intra-operatively. Placement of abthera, brought back to ICU intubated.    --------------------------------------------------------------------------------------------------------------------  SUBJECTIVE / 24H EVENTS  Patient seen and examined on morning rounds. No acute events overnight.      OBJECTIVE:    VITAL SIGNS:  T(C): 36.9 (19 @ 11:00), Max: 37.2 (19 @ 15:00)  HR: 73 (19 @ 11:00) (69 - 98)  BP: 136/65 (19 @ 11:00) (130/60 - 173/80)  RR: 17 (19 @ 11:00) (14 - 28)  SpO2: 95% (19 @ 11:00) (91% - 98%)  Daily Height in cm: 185.42 (11 Aug 2019 15:14)    Daily Weight in k.6 (12 Aug 2019 04:00)      PHYSICAL EXAM:  Gen: NAD  LS: Respirations unlabored  GI: Soft. Nontender. Nondistended. BS+.  Ext: Warm, well perfused      19 @ 07:19 @ 07:00  --------------------------------------------------------  IN:    dextrose 5% + lactated ringers.: 500 mL    dextrose 5% + sodium chloride 0.9%.: 400 mL    IV PiggyBack: 400 mL    sodium bicarbonate  Infusion: 200 mL  Total IN: 1500 mL    OUT:    Indwelling Catheter - Urethral: 1925 mL    Nasoenteral Tube: 350 mL    VAC (Vacuum Assisted Closure) System: 200 mL  Total OUT: 2475 mL    Total NET: -975 mL      19 @ 07:  -  19 @ 11:11  --------------------------------------------------------  IN:    dextrose 5% + sodium chloride 0.9%.: 250 mL    IV PiggyBack: 50 mL  Total IN: 300 mL    OUT:  Total OUT: 0 mL    Total NET: 300 mL          LAB VALUES:      136  |  106  |  36<H>  ----------------------------<  132<H>  5.1   |  20<L>  |  1.67<H>    Ca    8.5      12 Aug 2019 00:35  Phos  4.7       Mg     2.3                                      8.6    5.5   )-----------( 391      ( 12 Aug 2019 00:35 )             27.6       PT/INR - ( 12 Aug 2019 00:35 )   PT: 13.8 sec;   INR: 1.20 ratio         PTT - ( 12 Aug 2019 00:35 )  PTT:31.0 sec  ABG - ( 10 Aug 2019 16:22 )  pH, Arterial: 7.39  pH, Blood: x     /  pCO2: 32    /  pO2: 87    / HCO3: 19    / Base Excess: -4.5  /  SaO2: 94                        MICROBIOLOGY:    Culture - Surgical Swab (collected 10 Aug 2019 09:46)  Source: .Surgical Swab  Gram Stain (11 Aug 2019 09:59):    Specimen received on a culturette    Culture - Blood (collected 09 Aug 2019 21:32)  Source: .Blood  Preliminary Report (10 Aug 2019 22:01):    No growth to date.    Culture - Blood (collected 09 Aug 2019 21:32)  Source: .Blood  Preliminary Report (10 Aug 2019 22:01):    No growth to date.    Culture - Urine (collected 09 Aug 2019 20:11)  Source: .Urine  Final Report (10 Aug 2019 22:34):    No growth        RADIOLOGY:  PACS Image: Image(s) Available (19 @ 20:17)        MEDICATIONS  (STANDING):  chlorhexidine 2% Cloths 1 Application(s) Topical daily  dextrose 5% + sodium chloride 0.9%. 1000 milliLiter(s) (50 mL/Hr) IV Continuous <Continuous>  enoxaparin Injectable 40 milliGRAM(s) SubCutaneous daily  levothyroxine Injectable 125 MICROGram(s) IV Push at bedtime  metoprolol tartrate Injectable 5 milliGRAM(s) IV Push every 6 hours  pantoprazole  Injectable 40 milliGRAM(s) IV Push daily    MEDICATIONS  (PRN):  HYDROmorphone  Injectable 0.25 milliGRAM(s) IV Push every 3 hours PRN Severe Pain (7 - 10) Trauma Surgery Daily Progress Note     79 yo Male with PMHx of hypothyroidism, HTN, duodenal ulcer, duodenal stricture requiring EGD and ballooning found to have primary small bowel obstruction with transition point in right lower quadrant. Patient taken to OR on 8/10, no obstruction found but mesenteric lymphadenopathy noted and taken back to OR on  with surg/onc for frozen biopsies, which was preliminarily follicular lymphoma.     --------------------------------------------------------------------------------------------------------------------  SUBJECTIVE / 24H EVENTS  Patient seen and examined on morning rounds. No acute events overnight.  Magaña removed    OBJECTIVE:  VITAL SIGNS:  T(C): 36.9 (19 @ 11:00), Max: 37.2 (19 @ 15:00)  HR: 73 (19 @ 11:00) (69 - 98)  BP: 136/65 (19 @ 11:00) (130/60 - 173/80)  RR: 17 (19 @ 11:00) (14 - 28)  SpO2: 95% (19 @ 11:00) (91% - 98%)  Daily Height in cm: 185.42 (11 Aug 2019 15:14)    Daily Weight in k.6 (12 Aug 2019 04:00)      PHYSICAL EXAM:  Gen: NAD, resting comfortably in bed  Resp: No increased WOB   Abd: (+) appropriately tender, nondistended, soft, compressible  Extr: WWP distally, cap refill <2s      19 @ 07:01  -  19 @ 07:00  --------------------------------------------------------  IN:    dextrose 5% + lactated ringers.: 500 mL    dextrose 5% + sodium chloride 0.9%.: 400 mL    IV PiggyBack: 400 mL    sodium bicarbonate  Infusion: 200 mL  Total IN: 1500 mL    OUT:    Indwelling Catheter - Urethral: 1925 mL    Nasoenteral Tube: 350 mL    VAC (Vacuum Assisted Closure) System: 200 mL  Total OUT: 2475 mL    Total NET: -975 mL      19 @ 07:01  -  19 @ 11:11  --------------------------------------------------------  IN:    dextrose 5% + sodium chloride 0.9%.: 250 mL    IV PiggyBack: 50 mL  Total IN: 300 mL    OUT:  Total OUT: 0 mL    Total NET: 300 mL          LAB VALUES:      136  |  106  |  36<H>  ----------------------------<  132<H>  5.1   |  20<L>  |  1.67<H>    Ca    8.5      12 Aug 2019 00:35  Phos  4.7       Mg     2.3                                      8.6    5.5   )-----------( 391      ( 12 Aug 2019 00:35 )             27.6       PT/INR - ( 12 Aug 2019 00:35 )   PT: 13.8 sec;   INR: 1.20 ratio         PTT - ( 12 Aug 2019 00:35 )  PTT:31.0 sec  ABG - ( 10 Aug 2019 16:22 )  pH, Arterial: 7.39  pH, Blood: x     /  pCO2: 32    /  pO2: 87    / HCO3: 19    / Base Excess: -4.5  /  SaO2: 94                        MICROBIOLOGY:    Culture - Surgical Swab (collected 10 Aug 2019 09:46)  Source: .Surgical Swab  Gram Stain (11 Aug 2019 09:59):    Specimen received on a culturette    Culture - Blood (collected 09 Aug 2019 21:32)  Source: .Blood  Preliminary Report (10 Aug 2019 22:01):    No growth to date.    Culture - Blood (collected 09 Aug 2019 21:32)  Source: .Blood  Preliminary Report (10 Aug 2019 22:01):    No growth to date.    Culture - Urine (collected 09 Aug 2019 20:11)  Source: .Urine  Final Report (10 Aug 2019 22:34):    No growth        RADIOLOGY:  PACS Image: Image(s) Available (19 @ 20:17)        MEDICATIONS  (STANDING):  chlorhexidine 2% Cloths 1 Application(s) Topical daily  dextrose 5% + sodium chloride 0.9%. 1000 milliLiter(s) (50 mL/Hr) IV Continuous <Continuous>  enoxaparin Injectable 40 milliGRAM(s) SubCutaneous daily  levothyroxine Injectable 125 MICROGram(s) IV Push at bedtime  metoprolol tartrate Injectable 5 milliGRAM(s) IV Push every 6 hours  pantoprazole  Injectable 40 milliGRAM(s) IV Push daily    MEDICATIONS  (PRN):  HYDROmorphone  Injectable 0.25 milliGRAM(s) IV Push every 3 hours PRN Severe Pain (7 - 10)

## 2019-08-12 NOTE — PROGRESS NOTE ADULT - SUBJECTIVE AND OBJECTIVE BOX
GENERAL SURGERY DAILY PROGRESS NOTE:    Interval:  No acute events overnight endorsed.    Subjective:  Patient seen and examined this am. Endorses pain. Admits to feeling tired. -Passing flatus. -OOB. Counselled surgery plan bx path    Vital Signs Last 24 Hrs  T(C): 36.9 (12 Aug 2019 03:00), Max: 37.3 (11 Aug 2019 11:00)  T(F): 98.4 (12 Aug 2019 03:00), Max: 99.1 (11 Aug 2019 11:00)  HR: 78 (12 Aug 2019 05:00) (73 - 98)  BP: 147/86 (11 Aug 2019 22:00) (133/45 - 173/80)  BP(mean): 112 (11 Aug 2019 22:00) (97 - 115)  RR: 16 (12 Aug 2019 05:00) (14 - 28)  SpO2: 92% (12 Aug 2019 05:00) (91% - 98%)    Exam:  Gen: NAD, resting in bed, alert and responding appropriately, NGT  Resp: Airway patent, non-labored respirations  Abd: Soft, moderately distended, appropriate TTP, no rebound or guarding. Dressing c/d/i  : Archana  Ext: No edema, WWP  Neuro: AAOx3, no focal deficits    I&O's Detail    11 Aug 2019 07:01  -  12 Aug 2019 07:00  --------------------------------------------------------  IN:    dextrose 5% + lactated ringers.: 500 mL    dextrose 5% + sodium chloride 0.9%.: 400 mL    IV PiggyBack: 400 mL    sodium bicarbonate  Infusion: 200 mL  Total IN: 1500 mL    OUT:    Indwelling Catheter - Urethral: 1925 mL    Nasoenteral Tube: 350 mL    VAC (Vacuum Assisted Closure) System: 200 mL  Total OUT: 2475 mL    Total NET: -975 mL          Daily Height in cm: 185.42 (11 Aug 2019 15:14)    Daily Weight in k.6 (12 Aug 2019 04:00)    MEDICATIONS  (STANDING):  chlorhexidine 2% Cloths 1 Application(s) Topical daily  dextrose 5% + sodium chloride 0.9%. 1000 milliLiter(s) (50 mL/Hr) IV Continuous <Continuous>  enoxaparin Injectable 40 milliGRAM(s) SubCutaneous daily  levothyroxine Injectable 125 MICROGram(s) IV Push at bedtime  metoprolol tartrate Injectable 5 milliGRAM(s) IV Push every 6 hours  pantoprazole  Injectable 40 milliGRAM(s) IV Push daily    MEDICATIONS  (PRN):  HYDROmorphone  Injectable 0.25 milliGRAM(s) IV Push every 3 hours PRN Severe Pain (7 - 10)      LABS:                        8.6    5.5   )-----------( 391      ( 12 Aug 2019 00:35 )             27.6     08-12    136  |  106  |  36<H>  ----------------------------<  132<H>  5.1   |  20<L>  |  1.67<H>    Ca    8.5      12 Aug 2019 00:35  Phos  4.7     08-12  Mg     2.3     08-12      PT/INR - ( 12 Aug 2019 00:35 )   PT: 13.8 sec;   INR: 1.20 ratio         PTT - ( 12 Aug 2019 00:35 )  PTT:31.0 sec      Dispo:     CHAS Brown, PGY-1  Blue Team Surgery  [id number] with any questions

## 2019-08-12 NOTE — PHYSICAL THERAPY INITIAL EVALUATION ADULT - PERTINENT HX OF CURRENT PROBLEM, REHAB EVAL
77 yo M PMHx of duodenal ulcer (questionable duodenal perforation in past in charts however family denies), duodenal stricture requiring EGD and ballooning presenting to the ER decreased PO intake and vague epigastric abd pain x3 weeks, worse with increased intake however not associated with types of foods. He states no radiation, denies experiencing pain similar to this before. States passed flatus and BM last night, however admits to bloating. Found to have SBO, now s/p ex lap on 8/10.

## 2019-08-12 NOTE — PROGRESS NOTE ADULT - ASSESSMENT
78 year old male with primary SBO, concerning for follicular lymphoma, biopsies pending.    PLAN:  Neurologic: Postoperative pain.   - Dilaudid, IV tylenol prn for pain control.     Respiratory: no acute issues  - Incentive spirometry, OOB to prevent atelectasis    Cardiovascular: hypotension resolved  - Monitor vital signs  - Continue IV metoprolol 5mg q 6 hrs     Gastrointestinal/Nutrition: h/o duodenal ulcer; primary SBO possibly 2/2 follicular lymphoma  - Follow up intraoperative frozen biopsies  - NPO with NGT to suction   - Protonix for h/o duodenal ulcer    Genitourinary/Renal: BRIGID resolving  - [place], strict I&Os  - D5 NS @ 50 ml/hr    Hematologic: anemia  - Lovenox 40mg qd for VTE ppx.     Infectious Disease: sepsis, resolving  - Culture if febrile  - Continue Zosyn for 4-7 days total    Endocrine: hypothyroidism  - Continue levothyroxine    DISPO: SICU.     Karolyn Dumont PA-C  [id number] 78 year old male with primary SBO, concerning for follicular lymphoma, biopsies pending.    PLAN:  Neurologic: Postoperative pain.   - Dilaudid, IV tylenol prn for pain control.     Respiratory: no acute issues  - Incentive spirometry, OOB to prevent atelectasis    Cardiovascular: hypotension resolved  - Monitor vital signs  - Continue IV metoprolol 5mg q 6 hrs     Gastrointestinal/Nutrition: h/o duodenal ulcer; primary SBO possibly 2/2 follicular lymphoma  - Follow up intraoperative frozen biopsies  - NPO with NGT to suction   - Protonix for h/o duodenal ulcer    Genitourinary/Renal: BRIGID resolving  - [place], strict I&Os  - D5 NS @ 50 ml/hr    Hematologic: anemia; possible follicular lymphoma  - Lovenox 40mg qd for VTE ppx.   - Heme/onc consult for possible follicular lymphoma    Infectious Disease: sepsis, resolving  - Culture if febrile  - Continue Zosyn for 4-7 days total    Endocrine: hypothyroidism  - Continue levothyroxine    DISPO: SICU.     Karolyn Dumont PA-C  [id number] 78 year old male with primary SBO, concerning for follicular lymphoma, biopsies pending.    PLAN:  Neurologic: Postoperative pain.   - Dilaudid, IV tylenol prn for pain control.     Respiratory: no acute issues  - Incentive spirometry, OOB to prevent atelectasis    Cardiovascular: hypotension resolved  - Monitor vital signs  - Continue IV metoprolol 5mg q 6 hrs     Gastrointestinal/Nutrition: h/o duodenal ulcer; primary SBO possibly 2/2 follicular lymphoma  - Follow up intraoperative frozen biopsies  - NPO with NGT to suction   - Protonix for h/o duodenal ulcer    Genitourinary/Renal: BRIGID resolving  - [place], strict I&Os  - D5 NS @ 50 ml/hr    Hematologic: anemia; possible follicular lymphoma  - Lovenox 40mg qd for VTE ppx.   - Heme/onc consult for possible follicular lymphoma    Infectious Disease: sepsis, resolving  - Culture if febrile  - Discontinue Zosyn     Endocrine: hypothyroidism  - Continue levothyroxine    DISPO: SICU.     Karolyn Dumont PA-C  [id number]

## 2019-08-12 NOTE — PHYSICAL THERAPY INITIAL EVALUATION ADULT - PLANNED THERAPY INTERVENTIONS, PT EVAL
transfer training/balance training/GOAL: Pt will negotiate 10 steps with 1 HR and step to pattern independently in 4 weeks./bed mobility training/gait training

## 2019-08-12 NOTE — PROGRESS NOTE ADULT - SUBJECTIVE AND OBJECTIVE BOX
Patient is a 78y Male whom presented to the hospital with ckd and brigid , hyperkalemia   pt seen and examined in am   PAST MEDICAL & SURGICAL HISTORY:  Hyperparathyroidism  Hypertension  BRIGID (acute kidney injury)  SBO (small bowel obstruction)  Duodenal ulcer disease  No significant past surgical history      MEDICATIONS  (STANDING):  acetaminophen  IVPB .. 1000 milliGRAM(s) IV Intermittent once  acetaminophen  IVPB .. 1000 milliGRAM(s) IV Intermittent once  chlorhexidine 2% Cloths 1 Application(s) Topical daily  dextrose 5% + lactated ringers. 1000 milliLiter(s) (50 mL/Hr) IV Continuous <Continuous>  enoxaparin Injectable 40 milliGRAM(s) SubCutaneous daily  epoetin ava Injectable 52338 Unit(s) SubCutaneous <User Schedule>  levothyroxine Injectable 125 MICROGram(s) IV Push at bedtime  pantoprazole  Injectable 40 milliGRAM(s) IV Push daily  piperacillin/tazobactam IVPB.. 3.375 Gram(s) IV Intermittent every 8 hours      Allergies    Cipro (Rash)  niacin (Flushing; Rash)  Reglan (Anaphylaxis)    Intolerances        SOCIAL HISTORY:  Denies ETOh,Smoking,     FAMILY HISTORY:  Family history of non-Hodgkin's lymphoma      REVIEW OF SYSTEMS:    CONSTITUTIONAL: pos  weakness, no  fevers or chills  RESPIRATORY: No cough, wheezing, hemoptysis; No shortness of breath  CARDIOVASCULAR: No chest pain or palpitations  GASTROINTESTINAL: No abdominal or epigastric pain. No nausea, vomiting,     No diarrhea or constipation. No melena   GENITOURINARY: No dysuria, frequency or hematuria  NEUROLOGICAL: No numbness or weakness  SKIN: dry                          8.6    5.5   )-----------( 391      ( 12 Aug 2019 00:35 )             27.6       CBC Full  -  ( 12 Aug 2019 00:35 )  WBC Count : 5.5 K/uL  RBC Count : 3.15 M/uL  Hemoglobin : 8.6 g/dL  Hematocrit : 27.6 %  Platelet Count - Automated : 391 K/uL  Mean Cell Volume : 87.8 fl  Mean Cell Hemoglobin : 27.2 pg  Mean Cell Hemoglobin Concentration : 30.9 gm/dL  Auto Neutrophil # : x  Auto Lymphocyte # : x  Auto Monocyte # : x  Auto Eosinophil # : x  Auto Basophil # : x  Auto Neutrophil % : x  Auto Lymphocyte % : x  Auto Monocyte % : x  Auto Eosinophil % : x  Auto Basophil % : x      08-12    136  |  106  |  36<H>  ----------------------------<  132<H>  5.1   |  20<L>  |  1.67<H>    Ca    8.5      12 Aug 2019 00:35  Phos  4.7     08-12  Mg     2.3     08-12        CAPILLARY BLOOD GLUCOSE          Vital Signs Last 24 Hrs  T(C): 36.9 (12 Aug 2019 11:00), Max: 37.2 (11 Aug 2019 23:00)  T(F): 98.4 (12 Aug 2019 11:00), Max: 98.9 (11 Aug 2019 23:00)  HR: 73 (12 Aug 2019 14:00) (69 - 98)  BP: 150/64 (12 Aug 2019 14:00) (130/60 - 173/80)  BP(mean): 92 (12 Aug 2019 14:00) (89 - 115)  RR: 18 (12 Aug 2019 14:00) (14 - 28)  SpO2: 94% (12 Aug 2019 14:00) (91% - 98%)        PT/INR - ( 12 Aug 2019 00:35 )   PT: 13.8 sec;   INR: 1.20 ratio         PTT - ( 12 Aug 2019 00:35 )  PTT:31.0 sec      PHYSICAL EXAM:  ng tube is place    Constitutional: NAD  HEENT: conjunctive   clear   Neck:  No JVD  Respiratory: CTAB  Cardiovascular: S1 and S2  Gastrointestinal:  soft, pos distened   Extremities: No peripheral edema  Neurological:  no focal deficits  Psychiatric: Normal mood, normal affect  Skin: dry   Access: Not applicable

## 2019-08-12 NOTE — PROGRESS NOTE ADULT - ASSESSMENT
78M with PMHx of duodenal ulcer, duodenal stricture requiring EGD and ballooning who presented with concern for small bowel obstruction s/p emergent exlap 8/10. Findings: Diffuse mesenteric lymphadenopathy noted, bowel palpated at transition point, no discrete obstruction felt. Surgical Oncology consulted intra-operatively. Placement of abthera, brought back to ICU intubated.  OR Sunday 8/11: small bowel viable frozen sections serosal implant lymphoid aggregate mesenteric lymph node suspicious for follicular lymphoma second sent for cytology    Plan:  - Fu cytology  - Pos transfer to floors under care of ACS

## 2019-08-12 NOTE — PHYSICAL THERAPY INITIAL EVALUATION ADULT - ADDITIONAL COMMENTS
pt lives in private home with spouse, 3 steps to enter +0 HR, 2 flights of stairs +2 HRs. Prior to admission, pt was I with all functional mobility and ADLs without AD. +

## 2019-08-12 NOTE — PROGRESS NOTE ADULT - ATTENDING COMMENTS
Patient seen and examined on AM SICU rounds  Hemodynamically stable.  Respiratory state is stable with SpO2 97% on room air  Essential hypertension- restarted metoprolol. Will continue to monitor and treat with goal SBP < 160 mmHg.   Acute on chronic kidney insufficiency stage 4- continues to improve. Will continue to monitor urine output.  Continue synthroid for hypothyroidism.     I discussed the  plan with the patient and his family. Awaiting final pathology for further management.

## 2019-08-12 NOTE — PROGRESS NOTE ADULT - SUBJECTIVE AND OBJECTIVE BOX
HISTORY  78y old  Male who presents with PMHx of hypothyroidism, HTN, duodenal ulcer, duodenal stricture requiring EGD and ballooning presenting to the ER with three weeks of decreased PO intake and abdominal pain that was vague and located in the epigastric area, worse with increased intake however not associated with types of foods. Found to have primary small bowel obstruction with transition point in right lower quadrant. Patient taken to OR on 8/10, bowel was run and viable, no complete obstruction but bowel noted to be nodular at transition point with mesenteric lymphadenopathy. Pt taken back to OR on 8/11 with surg/onc for frozen biopsies of serosal implant and of mesenteric lymph node, which was preliminarily follicular lymphoma.       24 HOUR EVENTS: - Taken back to OR with surg/onc for biopsies of serosal implant and of mesenteric lymph node, which was preliminarily positive for follicular lymphoma.    SUBJECTIVE/ROS:  [ x] A ten-point review of systems was otherwise negative except as noted.  [ ] Due to altered mental status/intubation, subjective information were not able to be obtained from the patient. History was obtained, to the extent possible, from review of the chart and collateral sources of information.      NEURO  Exam: awake, alert and oriented  Meds: HYDROmorphone  Injectable 0.25 milliGRAM(s) IV Push every 3 hours PRN Severe Pain (7 - 10)    [x] Adequacy of sedation and pain control has been assessed and adjusted      RESPIRATORY  RR: 18 (08-12-19 @ 02:00) (13 - 28)  SpO2: 94% (08-12-19 @ 02:00) (91% - 98%)  Exam: unlabored, clear to auscultation bilaterally  ABG - ( 10 Aug 2019 16:22 )  pH: 7.39  /  pCO2: 32    /  pO2: 87    / HCO3: 19    / Base Excess: -4.5  /  SaO2: 94      Blood Gas Arterial, Lactate: 0.8 mmol/L (08.10.19 @ 16:22)  Meds: x      CARDIOVASCULAR  HR: 77 (08-12-19 @ 02:00) (67 - 98)  BP: 147/86 (08-11-19 @ 22:00) (116/56 - 173/80)  BP(mean): 112 (08-11-19 @ 22:00) (80 - 115)  ABP: 126/47 (08-12-19 @ 02:00) (109/38 - 178/69)  ABP(mean): 78 (08-12-19 @ 02:00) (62 - 113)    Exam: regular rate and rhythm  Cardiac Rhythm: sinus  Perfusion     [ x]Adequate   [ ]Inadequate  Mentation   [ x]Normal       [ ]Reduced  Extremities  [x ]Warm         [ ]Cool  Volume Status [ ]Hypervolemic [x ]Euvolemic [ ]Hypovolemic  Meds: metoprolol tartrate Injectable 5 milliGRAM(s) IV Push every 6 hours        GI/NUTRITION  Exam: softly distended, nontender, incision c/d/i  Diet: NPO  Meds: pantoprazole  Injectable 40 milliGRAM(s) IV Push daily      GENITOURINARY  I&O's Detail    08-10 @ 07:01  -  08-11 @ 07:00  --------------------------------------------------------  IN:    Albumin 5%  - 250 mL: 250 mL    fentaNYL Infusion.: 4.7 mL    IV PiggyBack: 400 mL    phenylephrine   Infusion: 588.6 mL    sodium bicarbonate  Infusion: 2400 mL  Total IN: 3643.3 mL    OUT:    Indwelling Catheter - Urethral: 2890 mL    Nasoenteral Tube: 150 mL    VAC (Vacuum Assisted Closure) System: 500 mL  Total OUT: 3540 mL    Total NET: 103.3 mL      08-11 @ 07:01  -  08-12 @ 02:56  --------------------------------------------------------  IN:    dextrose 5% + lactated ringers.: 500 mL    dextrose 5% + sodium chloride 0.9%.: 200 mL    IV PiggyBack: 400 mL    sodium bicarbonate  Infusion: 200 mL  Total IN: 1300 mL    OUT:    Indwelling Catheter - Urethral: 1575 mL    Nasoenteral Tube: 350 mL    VAC (Vacuum Assisted Closure) System: 200 mL  Total OUT: 2125 mL    Total NET: -825 mL        Weight (kg): 93 (08-11 @ 15:14)  08-12    136  |  106  |  36<H>  ----------------------------<  132<H>  5.1   |  20<L>  |  1.67<H>    Ca    8.5      12 Aug 2019 00:35  Phos  4.7     08-12  Mg     2.3     08-12      [x ] [place] catheter, indication: urine output monitoring  Meds: dextrose 5% + sodium chloride 0.9%. 1000 milliLiter(s) IV Continuous <Continuous>        HEMATOLOGIC  Meds: enoxaparin Injectable 40 milliGRAM(s) SubCutaneous daily    [x] VTE Prophylaxis                        8.6    5.5   )-----------( 391      ( 12 Aug 2019 00:35 )             27.6     PT/INR - ( 12 Aug 2019 00:35 )   PT: 13.8 sec;   INR: 1.20 ratio         PTT - ( 12 Aug 2019 00:35 )  PTT:31.0 sec  Transfusion     [ ] PRBC   [ ] Platelets   [ ] FFP   [ ] Cryoprecipitate      INFECTIOUS DISEASES  T(C): 37.2 (08-11-19 @ 23:00), Max: 37.3 (08-11-19 @ 11:00)  WBC Count: 5.5 K/uL (08-12 @ 00:35)  WBC Count: 7.2 K/uL (08-11 @ 21:06)    Recent Cultures:  Specimen Source: .Surgical Swab, 08-10 @ 09:46; Results --; Gram Stain:   Specimen received on a culturette; Organism: --  Specimen Source: .Blood, 08-09 @ 21:32; Results   No growth to date.; Gram Stain: --; Organism: --  Specimen Source: .Urine, 08-09 @ 20:11; Results   No growth; Gram Stain: --; Organism: --    Meds: epoetin ava Injectable 68126 Unit(s) SubCutaneous <User Schedule>  piperacillin/tazobactam IVPB.. 3.375 Gram(s) IV Intermittent every 8 hours        ENDOCRINE  Meds: levothyroxine Injectable 125 MICROGram(s) IV Push at bedtime        ACCESS DEVICES:  [x ] Peripheral IV  [ ] Central Venous Line	[ ] R	[ ] L	[ ] IJ	[ ] Fem	[ ] SC	Placed:   [x ] Arterial Line		[ ] R	[x ] L	[ ] Fem	[x ] Rad	[ ] Ax	Placed: 8/10  [ ] PICC:					[ ] Mediport  [x ] Urinary Catheter, Date Placed: 8/9  [x ] Necessity of urinary, arterial, and venous catheters discussed    OTHER MEDICATIONS:  chlorhexidine 2% Cloths 1 Application(s) Topical daily      CODE STATUS: full code    IMAGING: x

## 2019-08-12 NOTE — PHYSICAL THERAPY INITIAL EVALUATION ADULT - PRECAUTIONS/LIMITATIONS, REHAB EVAL
CONT:  Bowel was run and viable, no complete obstruction but bowel noted to be nodular at transition point with mesenteric lymphadenopathy. Surgical Oncology consulted intra-operatively, ab thera placed. RTOR 8/11 for mesenteric lymph node biopsy, which was preliminarily positive for follicular lymphoma. CT Angio Abd 8/9: No CT evidence of mesenteric ischemia. Unchanged small bowel ileus versus partial small bowel obstruction. CONT:  Bowel was run and viable, no complete obstruction but bowel noted to be nodular at transition point with mesenteric lymphadenopathy. Surgical Oncology consulted intra-operatively, ab thera placed. RTOR 8/11 for mesenteric lymph node biopsy, which was preliminarily positive for follicular lymphoma. CT Angio Abd 8/9: No CT evidence of mesenteric ischemia. Unchanged small bowel ileus versus partial small bowel obstruction./surgical precautions

## 2019-08-12 NOTE — PROGRESS NOTE ADULT - ASSESSMENT
A/P: 79 yo Male with PMHx of duodenal ulcer, duodenal stricture requiring EGD and ballooning who presented with concern for small bowel obstruction s/p emergent exlap. Findings: Diffuse mesenteric lymphadenopathy noted, bowel palpated at transition point, no discrete obstruction felt. Surgical Oncology consulted intra-operatively. Placement of abthera, brought back to ICU intubated.    - RTOR today: re-evaluate small bowel for viability and obtain frozen sections for pathology  - Continue NGT, NPO, IVF  - Lovenox 30mg qd for VTE ppx.   - Care per SICU    ACS [id number] A/P: 77 yo Male with PMHx of hypothyroidism, HTN, duodenal ulcer, duodenal stricture requiring EGD and ballooning found to have primary small bowel obstruction with transition point in right lower quadrant. Patient taken to OR on 8/10, no obstruction found but mesenteric lymphadenopathy noted and taken back to OR on 8/11 with surg/onc for frozen biopsies, which was preliminarily follicular lymphoma.     - f/u biopsy  - Incentive spirometry, OOB  - Discontinuing Zosyn   - Lovenox 40mg  - Care per SICU  - PT: TBD pending gait assessment    ACS [id number]

## 2019-08-13 DIAGNOSIS — D64.9 ANEMIA, UNSPECIFIED: ICD-10-CM

## 2019-08-13 LAB
ANION GAP SERPL CALC-SCNC: 11 MMOL/L — SIGNIFICANT CHANGE UP (ref 5–17)
BUN SERPL-MCNC: 26 MG/DL — HIGH (ref 7–23)
CALCIUM SERPL-MCNC: 8.7 MG/DL — SIGNIFICANT CHANGE UP (ref 8.4–10.5)
CHLORIDE SERPL-SCNC: 108 MMOL/L — SIGNIFICANT CHANGE UP (ref 96–108)
CO2 SERPL-SCNC: 21 MMOL/L — LOW (ref 22–31)
CREAT SERPL-MCNC: 1.47 MG/DL — HIGH (ref 0.5–1.3)
GLUCOSE SERPL-MCNC: 111 MG/DL — HIGH (ref 70–99)
HCT VFR BLD CALC: 27.2 % — LOW (ref 39–50)
HGB BLD-MCNC: 8.2 G/DL — LOW (ref 13–17)
LDH SERPL L TO P-CCNC: 115 U/L — SIGNIFICANT CHANGE UP (ref 50–242)
MAGNESIUM SERPL-MCNC: 2 MG/DL — SIGNIFICANT CHANGE UP (ref 1.6–2.6)
MCHC RBC-ENTMCNC: 26.1 PG — LOW (ref 27–34)
MCHC RBC-ENTMCNC: 30 GM/DL — LOW (ref 32–36)
MCV RBC AUTO: 87.1 FL — SIGNIFICANT CHANGE UP (ref 80–100)
PHOSPHATE SERPL-MCNC: 3.1 MG/DL — SIGNIFICANT CHANGE UP (ref 2.5–4.5)
PLATELET # BLD AUTO: 387 K/UL — SIGNIFICANT CHANGE UP (ref 150–400)
POTASSIUM SERPL-MCNC: 4.5 MMOL/L — SIGNIFICANT CHANGE UP (ref 3.5–5.3)
POTASSIUM SERPL-SCNC: 4.5 MMOL/L — SIGNIFICANT CHANGE UP (ref 3.5–5.3)
RBC # BLD: 3.12 M/UL — LOW (ref 4.2–5.8)
RBC # FLD: 13.9 % — SIGNIFICANT CHANGE UP (ref 10.3–14.5)
SODIUM SERPL-SCNC: 140 MMOL/L — SIGNIFICANT CHANGE UP (ref 135–145)
TM INTERPRETATION: SIGNIFICANT CHANGE UP
URATE SERPL-MCNC: 5 MG/DL — SIGNIFICANT CHANGE UP (ref 3.4–8.8)
WBC # BLD: 6.4 K/UL — SIGNIFICANT CHANGE UP (ref 3.8–10.5)
WBC # FLD AUTO: 6.4 K/UL — SIGNIFICANT CHANGE UP (ref 3.8–10.5)

## 2019-08-13 PROCEDURE — 71045 X-RAY EXAM CHEST 1 VIEW: CPT | Mod: 26

## 2019-08-13 PROCEDURE — 99232 SBSQ HOSP IP/OBS MODERATE 35: CPT

## 2019-08-13 PROCEDURE — 99223 1ST HOSP IP/OBS HIGH 75: CPT | Mod: GC

## 2019-08-13 RX ORDER — ACETAMINOPHEN 500 MG
1000 TABLET ORAL ONCE
Refills: 0 | Status: COMPLETED | OUTPATIENT
Start: 2019-08-13 | End: 2019-08-13

## 2019-08-13 RX ORDER — HYDROMORPHONE HYDROCHLORIDE 2 MG/ML
0.25 INJECTION INTRAMUSCULAR; INTRAVENOUS; SUBCUTANEOUS
Refills: 0 | Status: DISCONTINUED | OUTPATIENT
Start: 2019-08-13 | End: 2019-08-16

## 2019-08-13 RX ADMIN — CHLORHEXIDINE GLUCONATE 1 APPLICATION(S): 213 SOLUTION TOPICAL at 05:56

## 2019-08-13 RX ADMIN — HYDROMORPHONE HYDROCHLORIDE 0.25 MILLIGRAM(S): 2 INJECTION INTRAMUSCULAR; INTRAVENOUS; SUBCUTANEOUS at 18:15

## 2019-08-13 RX ADMIN — ENOXAPARIN SODIUM 40 MILLIGRAM(S): 100 INJECTION SUBCUTANEOUS at 12:16

## 2019-08-13 RX ADMIN — Medication 1000 MILLIGRAM(S): at 07:45

## 2019-08-13 RX ADMIN — Medication 125 MICROGRAM(S): at 22:09

## 2019-08-13 RX ADMIN — HYDROMORPHONE HYDROCHLORIDE 0.5 MILLIGRAM(S): 2 INJECTION INTRAMUSCULAR; INTRAVENOUS; SUBCUTANEOUS at 05:57

## 2019-08-13 RX ADMIN — Medication 1000 MILLIGRAM(S): at 21:04

## 2019-08-13 RX ADMIN — Medication 400 MILLIGRAM(S): at 20:34

## 2019-08-13 RX ADMIN — SODIUM CHLORIDE 50 MILLILITER(S): 9 INJECTION, SOLUTION INTRAVENOUS at 22:07

## 2019-08-13 RX ADMIN — Medication 400 MILLIGRAM(S): at 12:16

## 2019-08-13 RX ADMIN — HYDROMORPHONE HYDROCHLORIDE 0.25 MILLIGRAM(S): 2 INJECTION INTRAMUSCULAR; INTRAVENOUS; SUBCUTANEOUS at 14:35

## 2019-08-13 RX ADMIN — Medication 5 MILLIGRAM(S): at 12:16

## 2019-08-13 RX ADMIN — SODIUM CHLORIDE 50 MILLILITER(S): 9 INJECTION, SOLUTION INTRAVENOUS at 17:49

## 2019-08-13 RX ADMIN — Medication 5 MILLIGRAM(S): at 05:55

## 2019-08-13 RX ADMIN — HYDROMORPHONE HYDROCHLORIDE 0.25 MILLIGRAM(S): 2 INJECTION INTRAMUSCULAR; INTRAVENOUS; SUBCUTANEOUS at 14:20

## 2019-08-13 RX ADMIN — Medication 1000 MILLIGRAM(S): at 01:02

## 2019-08-13 RX ADMIN — Medication 400 MILLIGRAM(S): at 00:32

## 2019-08-13 RX ADMIN — Medication 5 MILLIGRAM(S): at 00:32

## 2019-08-13 RX ADMIN — Medication 5 MILLIGRAM(S): at 17:49

## 2019-08-13 RX ADMIN — HYDROMORPHONE HYDROCHLORIDE 0.25 MILLIGRAM(S): 2 INJECTION INTRAMUSCULAR; INTRAVENOUS; SUBCUTANEOUS at 18:00

## 2019-08-13 RX ADMIN — Medication 400 MILLIGRAM(S): at 07:30

## 2019-08-13 RX ADMIN — Medication 1000 MILLIGRAM(S): at 12:31

## 2019-08-13 RX ADMIN — PANTOPRAZOLE SODIUM 40 MILLIGRAM(S): 20 TABLET, DELAYED RELEASE ORAL at 12:16

## 2019-08-13 RX ADMIN — HYDROMORPHONE HYDROCHLORIDE 0.25 MILLIGRAM(S): 2 INJECTION INTRAMUSCULAR; INTRAVENOUS; SUBCUTANEOUS at 22:09

## 2019-08-13 RX ADMIN — HYDROMORPHONE HYDROCHLORIDE 0.5 MILLIGRAM(S): 2 INJECTION INTRAMUSCULAR; INTRAVENOUS; SUBCUTANEOUS at 06:12

## 2019-08-13 RX ADMIN — HYDROMORPHONE HYDROCHLORIDE 0.25 MILLIGRAM(S): 2 INJECTION INTRAMUSCULAR; INTRAVENOUS; SUBCUTANEOUS at 22:39

## 2019-08-13 NOTE — CONSULT NOTE ADULT - SUBJECTIVE AND OBJECTIVE BOX
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 Patient was in [place] for vacation and attributes his symptoms to dieting. During the stay in [place], patient began to feel tired, dizzy, and weak, which prompted hospital visit. He denies radiation or experiencing similar pain. He also notes decreased urine output.     Upon admission, CT showed transition point in RLQ suspicious for SBO. Elevated lactate, persistent hypotension despite fluid resuscitation, and hyperkalemia prompted transition to SICU with urgent ex-lap to r/o bowel ischemia (8/10). Intra-op, mesenteric lymphadenopathy was noted, with large calcified mass at root of mesentery. Terminal ileum was collapsed, but true obstruction was not appreciated. Patient RTOR (8/11) for re-ex-lap with surg/onc where frozen sections were taken of mesenteric lymph nodes and of serosal implants. Prelim concern for follicular lymphoma.     This morning (8/13), patient endorses feeling tired, and pain in his abdomen where he had surgery. He denies fever, chills, CP, SOB, nausea, vomiting, diarrhea, constipation, and urinary symptoms.        PAST MEDICAL & SURGICAL HISTORY:  Hyperparathyroidism  Hypertension  BRIGID (acute kidney injury)  SBO (small bowel obstruction)  Duodenal ulcer disease  No significant past surgical history      FAMILY HISTORY:  Family history of non-Hodgkin's lymphoma      MEDICATIONS  (STANDING):  acetaminophen  IVPB .. 1000 milliGRAM(s) IV Intermittent once  chlorhexidine 2% Cloths 1 Application(s) Topical daily  dextrose 5% + sodium chloride 0.9%. 1000 milliLiter(s) (50 mL/Hr) IV Continuous <Continuous>  enoxaparin Injectable 40 milliGRAM(s) SubCutaneous daily  levothyroxine Injectable 125 MICROGram(s) IV Push at bedtime  metoprolol tartrate Injectable 5 milliGRAM(s) IV Push every 6 hours  pantoprazole  Injectable 40 milliGRAM(s) IV Push daily    MEDICATIONS  (PRN):  HYDROmorphone  Injectable 0.5 milliGRAM(s) IV Push every 3 hours PRN Severe Pain (7 - 10)      ALLERGIES:  Cipro (Rash)  Niacin (Flushing; Rash)  Reglan (Anaphylaxis)    SOCIAL HISTORY: No EtOH, no tobacco    REVIEW OF SYSTEMS:    GEN: Feeling tired and weak  EYES/ENT: No visual changes;  No vertigo or throat pain   NECK: No pain or stiffness  RESPIRATORY: No cough, wheezing, hemoptysis; No shortness of breath  CARDIOVASCULAR: No chest pain or palpitations  GASTROINTESTINAL: Abdominal pain in surgical site. No nausea, vomiting, or hematemesis; No diarrhea or constipation. No melena or hematochezia.  GENITOURINARY: No dysuria, frequency or hematuria  NEUROLOGICAL: No numbness or weakness  SKIN: No itching, burning, rashes, or lesions   All other review of systems is negative unless indicated above.        T(F): 97.3 (08-13-19 @ 07:00), Max: 98.6 (08-12-19 @ 23:00)  HR: 67 (08-13-19 @ 08:00)  BP: 137/68 (08-13-19 @ 08:00)  RR: 19 (08-13-19 @ 08:00)  SpO2: 97% (08-13-19 @ 08:00)  Wt(kg): 93    GENERAL: Appears tired but cooperative  HEAD:  Atraumatic  EYES: EOMI, PERRLA, conjunctiva and sclera clear  NECK: Supple, No JVD  CHEST/LUNG: Clear to auscultation bilaterally; No wheeze  HEART: Regular rate and rhythm; No murmurs, rubs, or gallops  ABDOMEN: Appropriately tender, soft  EXTREMITIES:  2+ Peripheral Pulses, No clubbing, cyanosis, or edema  NEUROLOGY: non-focal  PSYCH: AOx3  SKIN: No rashes or lesions                          8.2    6.4   )-----------( 387      ( 13 Aug 2019 00:57 )             27.2       08-13    140  |  108  |  26<H>  ----------------------------<  111<H>  4.5   |  21<L>  |  1.47<H>    Ca    8.7      13 Aug 2019 00:57  Phos  3.1     08-13  Mg     2.0     08-13        Magnesium, Serum: 2.0 mg/dL (08-13 @ 00:57)  Phosphorus Level, Serum: 3.1 mg/dL (08-13 @ 00:57)  Uric Acid, Serum: 5.0 mg/dL (08-13 @ 00:57)  Lactate Dehydrogenase, Serum: 115 U/L (08-13 @ 00:57) Hematology Consult Note    HPI:  Patient is a 78y old male with duodenal ulcer and stricture requiring EGD and ballooning, presents to the ER with three weeks of decreased PO intake and vague abdominal pain in the epigastric area. Patient was in [place] for vacation and attributes his symptoms to dieting. During the stay in [place], patient began to feel tired, dizzy, and weak, which prompted hospital visit. He denies radiation or experiencing similar pain. He also notes decreased urine output.     Upon admission, CT showed transition point in RLQ suspicious for SBO. Elevated lactate, persistent hypotension despite fluid resuscitation, and hyperkalemia prompted transition to SICU with urgent ex-lap to r/o bowel ischemia (8/10). Intra-op, mesenteric lymphadenopathy was noted, with large calcified mass at root of mesentery. Terminal ileum was collapsed, but true obstruction was not appreciated. Patient RTOR (8/11) for re-ex-lap with surg/onc where frozen sections were taken of mesenteric lymph nodes and of serosal implants. Prelim concern for follicular lymphoma.     This morning (8/13), patient endorses feeling tired, and pain in his abdomen where he had surgery. He denies fever, chills, CP, SOB, nausea, vomiting, diarrhea, constipation, and urinary symptoms.        PAST MEDICAL & SURGICAL HISTORY:  Hyperparathyroidism  Hypertension  BRIGID (acute kidney injury)  SBO (small bowel obstruction)  Duodenal ulcer disease  No significant past surgical history      FAMILY HISTORY:  Family history of non-Hodgkin's lymphoma      MEDICATIONS  (STANDING):  acetaminophen  IVPB .. 1000 milliGRAM(s) IV Intermittent once  chlorhexidine 2% Cloths 1 Application(s) Topical daily  dextrose 5% + sodium chloride 0.9%. 1000 milliLiter(s) (50 mL/Hr) IV Continuous <Continuous>  enoxaparin Injectable 40 milliGRAM(s) SubCutaneous daily  levothyroxine Injectable 125 MICROGram(s) IV Push at bedtime  metoprolol tartrate Injectable 5 milliGRAM(s) IV Push every 6 hours  pantoprazole  Injectable 40 milliGRAM(s) IV Push daily    MEDICATIONS  (PRN):  HYDROmorphone  Injectable 0.5 milliGRAM(s) IV Push every 3 hours PRN Severe Pain (7 - 10)      ALLERGIES:  Cipro (Rash)  Niacin (Flushing; Rash)  Reglan (Anaphylaxis)    SOCIAL HISTORY: No EtOH, no tobacco    REVIEW OF SYSTEMS:    GEN: Feeling tired and weak  EYES/ENT: No visual changes;  No vertigo or throat pain   NECK: No pain or stiffness  RESPIRATORY: No cough, wheezing, hemoptysis; No shortness of breath  CARDIOVASCULAR: No chest pain or palpitations  GASTROINTESTINAL: Abdominal pain in surgical site. No nausea, vomiting, or hematemesis; No diarrhea or constipation. No melena or hematochezia.  GENITOURINARY: No dysuria, frequency or hematuria  NEUROLOGICAL: No numbness or weakness  SKIN: No itching, burning, rashes, or lesions   All other review of systems is negative unless indicated above.        T(F): 97.3 (08-13-19 @ 07:00), Max: 98.6 (08-12-19 @ 23:00)  HR: 67 (08-13-19 @ 08:00)  BP: 137/68 (08-13-19 @ 08:00)  RR: 19 (08-13-19 @ 08:00)  SpO2: 97% (08-13-19 @ 08:00)  Wt(kg): 93    GENERAL: Appears tired but cooperative  HEAD:  Atraumatic  EYES: EOMI, PERRLA, conjunctiva and sclera clear  NECK: Supple, No JVD  CHEST/LUNG: Clear to auscultation bilaterally; No wheeze  HEART: Regular rate and rhythm; No murmurs, rubs, or gallops  ABDOMEN: Appropriately tender, soft  EXTREMITIES:  2+ Peripheral Pulses, No clubbing, cyanosis, or edema  NEUROLOGY: non-focal  PSYCH: AOx3  SKIN: No rashes or lesions                          8.2    6.4   )-----------( 387      ( 13 Aug 2019 00:57 )             27.2       08-13    140  |  108  |  26<H>  ----------------------------<  111<H>  4.5   |  21<L>  |  1.47<H>    Ca    8.7      13 Aug 2019 00:57  Phos  3.1     08-13  Mg     2.0     08-13        Magnesium, Serum: 2.0 mg/dL (08-13 @ 00:57)  Phosphorus Level, Serum: 3.1 mg/dL (08-13 @ 00:57)  Uric Acid, Serum: 5.0 mg/dL (08-13 @ 00:57)  Lactate Dehydrogenase, Serum: 115 U/L (08-13 @ 00:57) Hematology Consult Note    HPI:  Patient is a 78y old male with duodenal ulcer and stricture requiring EGD and ballooning, presents to the ER with three weeks of decreased PO intake and vague abdominal pain in the epigastric area. Patient was in [place] for vacation and attributes his symptoms to dieting. During the stay in [place], patient began to feel tired, dizzy, and weak, which prompted hospital visit. He denies radiation or experiencing similar pain. He also notes decreased urine output.     Upon admission, CT showed transition point in RLQ suspicious for SBO. Elevated lactate, persistent hypotension despite fluid resuscitation, and hyperkalemia prompted transition to SICU with urgent ex-lap to r/o bowel ischemia (8/10). Intra-op, mesenteric lymphadenopathy was noted, with large calcified mass at root of mesentery. Terminal ileum was collapsed, but true obstruction was not appreciated. Patient RTOR (8/11) for re-ex-lap with surg/onc where frozen sections were taken of mesenteric lymph nodes and of serosal implants. Prelim concern for follicular lymphoma.     This morning (8/13), patient endorses feeling tired, and pain in his abdomen where he had surgery. He denies fever, chills, CP, SOB, nausea, vomiting, diarrhea, constipation, and urinary symptoms.        PAST MEDICAL & SURGICAL HISTORY:  Hyperparathyroidism  Hypertension  BRIGID (acute kidney injury)  SBO (small bowel obstruction)  Duodenal ulcer disease  No significant past surgical history      FAMILY HISTORY:  Family history of non-Hodgkin's lymphoma      MEDICATIONS  (STANDING):  acetaminophen  IVPB .. 1000 milliGRAM(s) IV Intermittent once  chlorhexidine 2% Cloths 1 Application(s) Topical daily  dextrose 5% + sodium chloride 0.9%. 1000 milliLiter(s) (50 mL/Hr) IV Continuous <Continuous>  enoxaparin Injectable 40 milliGRAM(s) SubCutaneous daily  levothyroxine Injectable 125 MICROGram(s) IV Push at bedtime  metoprolol tartrate Injectable 5 milliGRAM(s) IV Push every 6 hours  pantoprazole  Injectable 40 milliGRAM(s) IV Push daily    MEDICATIONS  (PRN):  HYDROmorphone  Injectable 0.5 milliGRAM(s) IV Push every 3 hours PRN Severe Pain (7 - 10)      ALLERGIES:  Cipro (Rash)  Niacin (Flushing; Rash)  Reglan (Anaphylaxis)    SOCIAL HISTORY: No EtOH, no tobacco    REVIEW OF SYSTEMS:    GEN: Feeling tired and weak  EYES/ENT: No visual changes;  No vertigo or throat pain   NECK: No pain or stiffness  RESPIRATORY: No cough, wheezing, hemoptysis; No shortness of breath  CARDIOVASCULAR: No chest pain or palpitations  GASTROINTESTINAL: Abdominal pain in surgical site. No nausea, vomiting, or hematemesis; No diarrhea or constipation. No melena or hematochezia.  GENITOURINARY: No dysuria, frequency or hematuria  NEUROLOGICAL: No numbness or weakness  SKIN: No itching, burning, rashes, or lesions   All other review of systems is negative unless indicated above.        T(F): 97.3 (08-13-19 @ 07:00), Max: 98.6 (08-12-19 @ 23:00)  HR: 67 (08-13-19 @ 08:00)  BP: 137/68 (08-13-19 @ 08:00)  RR: 19 (08-13-19 @ 08:00)  SpO2: 97% (08-13-19 @ 08:00)  Wt(kg): 93    GENERAL: Appears tired but cooperative  HEAD:  Atraumatic  EYES: EOMI, PERRLA, conjunctiva and sclera clear  NECK: Supple, No JVD  CHEST/LUNG: Clear to auscultation bilaterally; No wheeze  HEART: Regular rate and rhythm; No murmurs, rubs, or gallops  ABDOMEN: Appropriately tender, soft  EXTREMITIES:  2+ Peripheral Pulses, No clubbing, cyanosis, or edema  NEUROLOGY: non-focal  PSYCH: AOx3  SKIN: No rashes or lesions                          8.2    6.4   )-----------( 387      ( 13 Aug 2019 00:57 )             27.2       08-13    140  |  108  |  26<H>  ----------------------------<  111<H>  4.5   |  21<L>  |  1.47<H>    Ca    8.7      13 Aug 2019 00:57  Phos  3.1     08-13  Mg     2.0     08-13        Magnesium, Serum: 2.0 mg/dL (08-13 @ 00:57)  Phosphorus Level, Serum: 3.1 mg/dL (08-13 @ 00:57)  Uric Acid, Serum: 5.0 mg/dL (08-13 @ 00:57)  Lactate Dehydrogenase, Serum: 115 U/L (08-13 @ 00:57)    Mesenteric Lymph node:  DIAGNOSIS:  Mesenteric Lymph Node:       - The lymphocyte immunophenotypic findings show no diagnostic abnormalities.  Please see interpretation.    INTERPRETATION:  MORPHOLOGY:  CYTOSPIN: Heterogeneous population of lymphocytes.    IMMUNOPHENOTYPE: Lymphocytes (85% of cells): Heterogeneous population of T-cells (with normal CD4  to CD8 ratio), and polytypic B-cells.    CT A/P:  FINDINGS:    LOWER CHEST: Subsegmental atelectasis. Coronary calcifications.     LIVER: Hepatomegaly.  BILE DUCTS: Normal caliber.  GALLBLADDER: Within normal limits.  SPLEEN: Splenomegaly. Mild calcification at the periphery of the spleen   likely related to prior trauma.  PANCREAS: Within normal limits.  ADRENALS: Within normal limits.  KIDNEYS/URETERS: Right renal cyst.    BLADDER: Within normal limits.  REPRODUCTIVE ORGANS: Prostate is enlarged.    BOWEL: Small bowel obstruction with transition in the right hemiabdomen.   Colonic diverticulosis.  PERITONEUM: Trace ascites. A 4.3 x 3.5 cm peripherally calcified   structure within the mesentery of the left upper quadrant, etiology   unclear and without change from prior imaging dating to 2014.  VESSELS: Atherosclerotic changes.  RETROPERITONEUM/LYMPH NODES: No lymphadenopathy.    ABDOMINAL WALL: Within normal limits.  BONES: Degenerative changes. Transitional L5 vertebral body.    IMPRESSION:     Small bowel obstruction. 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 Patient was in [place] for vacation and attributes his symptoms to dieting. During the stay in [place], patient began to feel tired, dizzy, and weak, which prompted hospital visit. He denies radiation or experiencing similar pain. He also notes decreased urine output.     Upon admission, CT showed transition point in RLQ suspicious for SBO. Elevated lactate, persistent hypotension despite fluid resuscitation, and hyperkalemia prompted transition to SICU with urgent ex-lap to r/o bowel ischemia (8/10). Intra-op, mesenteric lymphadenopathy was noted, with large calcified mass at root of mesentery. Terminal ileum was collapsed, but true obstruction was not appreciated. Patient RTOR (8/11) for re-ex-lap with surg/onc where frozen sections were taken of mesenteric lymph nodes and of serosal implants. Prelim concern for follicular lymphoma.     This morning (8/13), patient endorses feeling tired, and pain in his abdomen where he had surgery. He denies fever, chills, CP, SOB, nausea, vomiting, diarrhea, constipation, and urinary symptoms.        PAST MEDICAL & SURGICAL HISTORY:  Hyperparathyroidism  Hypertension  BRIGID (acute kidney injury)  SBO (small bowel obstruction)  Duodenal ulcer disease  No significant past surgical history      FAMILY HISTORY:  Family history of non-Hodgkin's lymphoma      MEDICATIONS  (STANDING):  acetaminophen  IVPB .. 1000 milliGRAM(s) IV Intermittent once  chlorhexidine 2% Cloths 1 Application(s) Topical daily  dextrose 5% + sodium chloride 0.9%. 1000 milliLiter(s) (50 mL/Hr) IV Continuous <Continuous>  enoxaparin Injectable 40 milliGRAM(s) SubCutaneous daily  levothyroxine Injectable 125 MICROGram(s) IV Push at bedtime  metoprolol tartrate Injectable 5 milliGRAM(s) IV Push every 6 hours  pantoprazole  Injectable 40 milliGRAM(s) IV Push daily    MEDICATIONS  (PRN):  HYDROmorphone  Injectable 0.5 milliGRAM(s) IV Push every 3 hours PRN Severe Pain (7 - 10)      ALLERGIES:  Cipro (Rash)  Niacin (Flushing; Rash)  Reglan (Anaphylaxis)    SOCIAL HISTORY: No EtOH, no tobacco    REVIEW OF SYSTEMS:    GEN: Feeling tired and weak  EYES/ENT: No visual changes;  No vertigo or throat pain   NECK: No pain or stiffness  RESPIRATORY: No cough, wheezing, hemoptysis; No shortness of breath  CARDIOVASCULAR: No chest pain or palpitations  GASTROINTESTINAL: Abdominal pain in surgical site. No nausea, vomiting, or hematemesis; No diarrhea or constipation. No melena or hematochezia.  GENITOURINARY: No dysuria, frequency or hematuria  NEUROLOGICAL: No numbness or weakness  SKIN: No itching, burning, rashes, or lesions   All other review of systems is negative unless indicated above.        T(F): 97.3 (08-13-19 @ 07:00), Max: 98.6 (08-12-19 @ 23:00)  HR: 67 (08-13-19 @ 08:00)  BP: 137/68 (08-13-19 @ 08:00)  RR: 19 (08-13-19 @ 08:00)  SpO2: 97% (08-13-19 @ 08:00)  Wt(kg): 93    GENERAL: Appears tired but cooperative  HEAD:  Atraumatic  EYES: EOMI, PERRLA, conjunctiva and sclera clear  NECK: Supple, No JVD  CHEST/LUNG: Clear to auscultation bilaterally; No wheeze  HEART: Regular rate and rhythm; No murmurs, rubs, or gallops  ABDOMEN: Appropriately tender, soft  EXTREMITIES:  2+ Peripheral Pulses, No clubbing, cyanosis, or edema  NEUROLOGY: non-focal  PSYCH: AOx3  SKIN: No rashes or lesions                          8.2    6.4   )-----------( 387      ( 13 Aug 2019 00:57 )             27.2       08-13    140  |  108  |  26<H>  ----------------------------<  111<H>  4.5   |  21<L>  |  1.47<H>    Ca    8.7      13 Aug 2019 00:57  Phos  3.1     08-13  Mg     2.0     08-13        Magnesium, Serum: 2.0 mg/dL (08-13 @ 00:57)  Phosphorus Level, Serum: 3.1 mg/dL (08-13 @ 00:57)  Uric Acid, Serum: 5.0 mg/dL (08-13 @ 00:57)  Lactate Dehydrogenase, Serum: 115 U/L (08-13 @ 00:57)    Mesenteric Lymph node:  DIAGNOSIS:  Mesenteric Lymph Node:       - The lymphocyte immunophenotypic findings show no diagnostic abnormalities.  Please see interpretation.    INTERPRETATION:  MORPHOLOGY:  CYTOSPIN: Heterogeneous population of lymphocytes.    IMMUNOPHENOTYPE: Lymphocytes (85% of cells): Heterogeneous population of T-cells (with normal CD4  to CD8 ratio), and polytypic B-cells.    CT A/P:  FINDINGS:    LOWER CHEST: Subsegmental atelectasis. Coronary calcifications.     LIVER: Hepatomegaly.  BILE DUCTS: Normal caliber.  GALLBLADDER: Within normal limits.  SPLEEN: Splenomegaly. Mild calcification at the periphery of the spleen   likely related to prior trauma.  PANCREAS: Within normal limits.  ADRENALS: Within normal limits.  KIDNEYS/URETERS: Right renal cyst.    BLADDER: Within normal limits.  REPRODUCTIVE ORGANS: Prostate is enlarged.    BOWEL: Small bowel obstruction with transition in the right hemiabdomen.   Colonic diverticulosis.  PERITONEUM: Trace ascites. A 4.3 x 3.5 cm peripherally calcified   structure within the mesentery of the left upper quadrant, etiology   unclear and without change from prior imaging dating to 2014.  VESSELS: Atherosclerotic changes.  RETROPERITONEUM/LYMPH NODES: No lymphadenopathy.    ABDOMINAL WALL: Within normal limits.  BONES: Degenerative changes. Transitional L5 vertebral body.    IMPRESSION:     Small bowel obstruction.

## 2019-08-13 NOTE — CONSULT NOTE ADULT - PROBLEM SELECTOR RECOMMENDATION 9
- Surgical specimen results pending - Pt. with partial SBO found to have enlarged mesenteric lymph nodes and serosal implant in terminal ileum  - Surgical specimen results pending - Pt. with partial SBO found to have enlarged mesenteric lymph nodes and serosal implant in terminal ileum. Infection unlikely in context of afebrile status, normal WBC. R/o Immunoproliferative small intestinal disease lymphoma enteropathy-associated T cell lymphoma; less likely with pt.'s negative history of IBD and celiac. Eval for diffuse large B cell, mantle cell, Burkitt, follicular lymphoma.   - Surgical specimen results pending - Pt. with partial SBO found to have enlarged mesenteric lymph nodes and serosal implant in terminal ileum. Infection unlikely in context of afebrile status, normal WBC. R/o Immunoproliferative small intestinal disease lymphoma and enteropathy-associated T cell lymphoma; less likely with pt.'s negative history of IBD and celiac. Eval for diffuse large B cell, mantle cell, Burkitt, follicular lymphoma.   - Surgical specimen results pending - Pt. with partial SBO found to have enlarged mesenteric lymph nodes and serosal implant in terminal ileum. Infection unlikely in context of afebrile status, normal WBC. R/o Immunoproliferative small intestinal disease lymphoma and enteropathy-associated T cell lymphoma; less likely with pt.'s negative history of IBD/celiac, malabsorption, steatorrhea, or weight loss. Eval for diffuse large B cell, mantle cell, Burkitt, follicular lymphoma.   - Surgical specimen results pending

## 2019-08-13 NOTE — PROGRESS NOTE ADULT - ASSESSMENT
78 year old male with primary SBO, concerning for follicular lymphoma, biopsies pending.    PLAN:  Neurologic: Postoperative pain.   - Dilaudid, IV tylenol prn for pain control.     Respiratory: no acute issues  - Incentive spirometry, OOB to prevent atelectasis    Cardiovascular: hypotension resolved  - Monitor vital signs  - Continue IV metoprolol 5mg q 6 hrs     Gastrointestinal/Nutrition: h/o duodenal ulcer; primary SBO possibly 2/2 follicular lymphoma  - Follow up intraoperative frozen biopsies  - NPO with NGT to suction   - Protonix for h/o duodenal ulcer    Genitourinary/Renal: BRIGID resolving  - [place], strict I&Os  - D5 NS @ 50 ml/hr    Hematologic: anemia; possible follicular lymphoma  - Lovenox 40mg qd for VTE ppx.   - Heme/onc consult for possible follicular lymphoma    Infectious Disease: sepsis, resolving  - Culture if febrile  - Discontinue Zosyn     Endocrine: hypothyroidism  - Continue levothyroxine    DISPO: SICU.

## 2019-08-13 NOTE — PROGRESS NOTE ADULT - SUBJECTIVE AND OBJECTIVE BOX
SURGICAL INTENSIVE CARE UNIT DAILY PROGRESS NOTE    HPI:  78y old  Male who presents with PMHx of hypothyroidism, HTN, duodenal ulcer, duodenal stricture requiring EGD and ballooning presenting to the ER with three weeks of decreased PO intake and abdominal pain that was vague and located in the epigastric area, worse with increased intake however not associated with types of foods. Found to have primary small bowel obstruction with transition point in right lower quadrant. Patient taken to OR on 8/10, bowel was run and viable, no complete obstruction but bowel noted to be nodular at transition point with mesenteric lymphadenopathy. Pt taken back to OR on 8/11 with surg/onc for frozen biopsies of serosal implant and of mesenteric lymph node, which was preliminarily follicular lymphoma.     24 HOUR EVENTS:  Magaña removed, passed TOV.   Zosyn discontinued.   No acute events overnight.   Listed for floor.     SUBJECTIVE/ROS:  [x ] A ten-point review of systems was otherwise negative except as noted.  [ ] Due to altered mental status/intubation, subjective information were not able to be obtained from the patient. History was obtained, to the extent possible, from review of the chart and collateral sources of information.      NEURO  Exam: awake, alert, oriented  Meds: acetaminophen  IVPB .. 1000 milliGRAM(s) IV Intermittent once  acetaminophen  IVPB .. 1000 milliGRAM(s) IV Intermittent once  HYDROmorphone  Injectable 0.5 milliGRAM(s) IV Push every 3 hours PRN Severe Pain (7 - 10)    [x] Adequacy of sedation and pain control has been assessed and adjusted      RESPIRATORY  RR: 16 (08-12-19 @ 23:00) (15 - 24)  SpO2: 97% (08-12-19 @ 23:00) (92% - 97%)  Exam: unlabored, clear to auscultation bilaterally      CARDIOVASCULAR  HR: 77 (08-12-19 @ 23:00) (69 - 83)  BP: 155/71 (08-12-19 @ 23:00) (130/60 - 167/73)  BP(mean): 102 (08-12-19 @ 23:00) (89 - 111)  ABP: 154/60 (08-12-19 @ 06:00) (133/49 - 154/60)  ABP(mean): 99 (08-12-19 @ 06:00) (83 - 99)      Exam: regular rate and rhythm  Cardiac Rhythm: sinus  Perfusion     [x]Adequate   [ ]Inadequate  Mentation   [x]Normal       [ ]Reduced  Extremities  [x]Warm         [ ]Cool  Volume Status [ ]Hypervolemic [x]Euvolemic [ ]Hypovolemic  Meds: metoprolol tartrate Injectable 5 milliGRAM(s) IV Push every 6 hours        GI/NUTRITION  Exam: soft, nontender, nondistended, incision c/d/i.   Diet: NPO  Meds: pantoprazole  Injectable 40 milliGRAM(s) IV Push daily      GENITOURINARY  I&O's Detail    08-11 @ 07:01  -  08-12 @ 07:00  --------------------------------------------------------  IN:    dextrose 5% + lactated ringers.: 500 mL    dextrose 5% + sodium chloride 0.9%.: 400 mL    IV PiggyBack: 400 mL    sodium bicarbonate  Infusion: 200 mL  Total IN: 1500 mL    OUT:    Indwelling Catheter - Urethral: 1925 mL    Nasoenteral Tube: 350 mL    VAC (Vacuum Assisted Closure) System: 200 mL  Total OUT: 2475 mL    Total NET: -975 mL      08-12 @ 07:01 - 08-13 @ 02:28  --------------------------------------------------------  IN:    dextrose 5% + sodium chloride 0.9%.: 750 mL    IV PiggyBack: 50 mL  Total IN: 800 mL    OUT:    Nasoenteral Tube: 200 mL    Voided: 1275 mL  Total OUT: 1475 mL    Total NET: -675 mL          08-13    140  |  108  |  26<H>  ----------------------------<  111<H>  4.5   |  21<L>  |  1.47<H>    Ca    8.7      13 Aug 2019 00:57  Phos  3.1     08-13  Mg     2.0     08-13      Meds: dextrose 5% + sodium chloride 0.9%. 1000 milliLiter(s) IV Continuous <Continuous>        HEMATOLOGIC  Meds: enoxaparin Injectable 40 milliGRAM(s) SubCutaneous daily    [x] VTE Prophylaxis                        8.2    6.4   )-----------( 387      ( 13 Aug 2019 00:57 )             27.2     PT/INR - ( 12 Aug 2019 00:35 )   PT: 13.8 sec;   INR: 1.20 ratio         PTT - ( 12 Aug 2019 00:35 )  PTT:31.0 sec  Transfusion     [ ] PRBC   [ ] Platelets   [ ] FFP   [ ] Cryoprecipitate      INFECTIOUS DISEASES  WBC Count: 6.4 K/uL (08-13 @ 00:57)    RECENT CULTURES:  Specimen Source: .Surgical Swab  Date/Time: 08-10 @ 09:46  Culture Results: --  Gram Stain:   Specimen received on a culturette  Organism: --  Specimen Source: .Blood  Date/Time: 08-09 @ 21:32  Culture Results:   No growth to date.  Gram Stain: --  Organism: --  Specimen Source: .Urine  Date/Time: 08-09 @ 20:11  Culture Results:   No growth  Gram Stain: --  Organism: --    Meds:       ENDOCRINE  CAPILLARY BLOOD GLUCOSE        Meds: levothyroxine Injectable 125 MICROGram(s) IV Push at bedtime      OTHER MEDICATIONS:  chlorhexidine 2% Cloths 1 Application(s) Topical daily

## 2019-08-13 NOTE — CONSULT NOTE ADULT - ASSESSMENT
78M with family history of non-Hodgkin lymphoma and personal history of duodenal ulcers and stricture s/p EGD ballooning presents with suspected SBO (8/9), found to have mesenteric lymphadenopathy and calcified mass concerning for follicular lymphoma, HD4/POD1. 78M with family history of non-Hodgkin lymphoma and personal history of duodenal ulcers and stricture s/p EGD ballooning presents with suspected SBO (8/9), found to have mesenteric lymphadenopathy and calcified mass concerning for follicular lymphoma, HD4/POD1.    #mesenteric lymphadenopathy  -Pt. admitted with SBO and at time of surgery found to have enlarged mesenteric lymph nodes and serosal implant in terminal ileum. Differential includes lymphoproliferative diseases (diffuse large B cell, mantle cell, Burkitt, follicular lymphoma), infection, inflammation. Infection less likely in context of afebrile status, normal WBC.  -awaiting final pathology from lymph node biopsy. flow has returned negative.  -CT A/P with splenomegaly. no RP adenopathy  -check LDH, Uric Acid  -check CT chest to evaluate extent of adenopathy.     #anemia  - H/H stable at 8.2/27.2 in the post-operative setting  - Transfuse if Hb < 7.0      Temi Dahl MD  Hematology/Oncology Fellow  Pager: [phone] 78M with family history of non-Hodgkin lymphoma and personal history of duodenal ulcers and stricture s/p EGD ballooning presents with suspected SBO (8/9), found to have mesenteric lymphadenopathy and calcified mass concerning for follicular lymphoma, HD4/POD1.    #mesenteric lymphadenopathy  -Pt. admitted with SBO and at time of surgery found to have enlarged mesenteric lymph nodes and serosal implant in terminal ileum. Differential includes lymphoproliferative diseases (diffuse large B cell, mantle cell, Burkitt, follicular lymphoma), infection, inflammation. Infection less likely in context of afebrile status, normal WBC.  -awaiting final pathology from lymph node biopsy. flow has returned negative.  -CT A/P with splenomegaly. no RP adenopathy  -check LDH    #anemia  - H/H stable at 8.2/27.2 in the post-operative setting  - Transfuse if Hb < 7.0      Temi Dahl MD  Hematology/Oncology Fellow  Pager: [phone] 78M with history of duodenal ulcers and stricture s/p EGD ballooning presents with SBO (8/9), found to have mesenteric lymphadenopathy and calcified mass concerning for possible follicular lymphoma.    #mesenteric lymphadenopathy  -Pt. admitted with SBO and at time of surgery found to have enlarged mesenteric lymph nodes and serosal implant in terminal ileum. Differential includes lymphoproliferative diseases (diffuse large B cell, mantle cell, Burkitt, follicular lymphoma), infection, inflammation. Infection less likely in context of afebrile status, normal WBC.  -awaiting final pathology from lymph node biopsy. flow has returned negative.  -CT A/P with splenomegaly. no RP adenopathy  -check LDH    #anemia  - H/H stable at 8.2/27.2 in the post-operative setting  - Transfuse if Hb < 7.0      Temi Dahl MD  Hematology/Oncology Fellow  Pager: [phone]

## 2019-08-13 NOTE — PROGRESS NOTE ADULT - SUBJECTIVE AND OBJECTIVE BOX
ACS Surgery Progress Note     Subjective/24hour Events: No acute events overnight.    MEDICATIONS  (STANDING):  chlorhexidine 2% Cloths 1 Application(s) Topical daily  dextrose 5% + sodium chloride 0.9%. 1000 milliLiter(s) (50 mL/Hr) IV Continuous <Continuous>  enoxaparin Injectable 40 milliGRAM(s) SubCutaneous daily  levothyroxine Injectable 125 MICROGram(s) IV Push at bedtime  metoprolol tartrate Injectable 5 milliGRAM(s) IV Push every 6 hours  pantoprazole  Injectable 40 milliGRAM(s) IV Push daily    MEDICATIONS  (PRN):  HYDROmorphone  Injectable 0.5 milliGRAM(s) IV Push every 3 hours PRN Severe Pain (7 - 10)      Vital Signs:  Vital Signs Last 24 Hrs  T(C): 36.4 (13 Aug 2019 11:00), Max: 37 (12 Aug 2019 23:00)  T(F): 97.6 (13 Aug 2019 11:00), Max: 98.6 (12 Aug 2019 23:00)  HR: 67 (13 Aug 2019 11:25) (59 - 83)  BP: 173/76 (13 Aug 2019 11:25) (133/64 - 173/76)  BP(mean): 109 (13 Aug 2019 11:25) (90 - 111)  RR: 18 (13 Aug 2019 11:25) (11 - 21)  SpO2: 98% (13 Aug 2019 11:25) (94% - 100%)    CAPILLARY BLOOD GLUCOSE          I&O's Detail    12 Aug 2019 07:01  -  13 Aug 2019 07:00  --------------------------------------------------------  IN:    dextrose 5% + sodium chloride 0.9%.: 1200 mL    IV PiggyBack: 50 mL  Total IN: 1250 mL    OUT:    Nasoenteral Tube: 600 mL    Voided: 1575 mL  Total OUT: 2175 mL    Total NET: -925 mL      13 Aug 2019 07:01  -  13 Aug 2019 12:18  --------------------------------------------------------  IN:    dextrose 5% + sodium chloride 0.9%.: 250 mL  Total IN: 250 mL    OUT:    Voided: 300 mL  Total OUT: 300 mL    Total NET: -50 mL        Physical Exam:  General: NAD, Pleasant  Neurology: Patient is AA&Ox4, follows commands, and speech fluent. EOMI intact, PERRLA  Neck: Neck supple, trachea midline, No JVD  Respiratory: CTA B/L  CV: S1S2, r/r/r  Abdomen: distended, appropriately tender, soft  Extremities: 2+ peripheral pulses bilat throughout; (-)edema appreciated  Skin: No Rashes, Hematoma, Ecchymosis    Labs:    08-13    140  |  108  |  26<H>  ----------------------------<  111<H>  4.5   |  21<L>  |  1.47<H>    Ca    8.7      13 Aug 2019 00:57  Phos  3.1     08-13  Mg     2.0     08-13                              8.2    6.4   )-----------( 387      ( 13 Aug 2019 00:57 )             27.2     PT/INR - ( 12 Aug 2019 00:35 )   PT: 13.8 sec;   INR: 1.20 ratio         PTT - ( 12 Aug 2019 00:35 )  PTT:31.0 sec          Imaging:      EXAM:  XR CHEST PORTABLE URGENT 1V                          PROCEDURE DATE:  08/13/2019        INTERPRETATION:  A single chest x-ray was obtained on August 13, 2019.    Indication: NG tube position.    Impression:    The heart is normal in size. The left costophrenic angle is not included   in this study. Clearing pulmonary vascular congestion.. An NG tube was   placed however the tip is in the midesophagus and should be advanced.      MIGUEL DUNCAN M.D., ATTENDING RADIOLOGIST  This document has been electronically signed. Aug 13 2019 10:01AM

## 2019-08-13 NOTE — PROGRESS NOTE ADULT - ATTENDING COMMENTS
Patient seen and examined on AM SICU rounds  Patient continues to have no bowel function. Will continue NPO with iVF.   Hemodynamically stable.  Respiratory state is stable  on room air  Essential hypertension- continue metoprolol. Will continue to monitor and treat with goal SBP < 160 mmHg.   Acute on chronic kidney insufficiency stage 4- continues to improve. Will continue to monitor urine output.  Continue synthroid for hypothyroidism.     I discussed the  plan with the patient and his family. Awaiting final pathology for further management.  He is stable for transfer to floor.

## 2019-08-13 NOTE — PROGRESS NOTE ADULT - ASSESSMENT
77 yo Male with PMHx of hypothyroidism, HTN, duodenal ulcer, duodenal stricture requiring EGD and ballooning found to have primary small bowel obstruction with transition point in right lower quadrant. Patient taken to OR on 8/10, no obstruction found but mesenteric lymphadenopathy noted and taken back to OR on 8/11 with surg/onc for frozen biopsies, which was preliminarily follicular lymphoma.     - Heme/Onc Consulted, appreciate recs  -- f/u Pathology  - Incentive spirometry, OOB  - PT evaluation today  - Lovenox 40mg  - Care per SICU      ACS [id number]

## 2019-08-13 NOTE — PROGRESS NOTE ADULT - SUBJECTIVE AND OBJECTIVE BOX
Patient is a 78y Male whom presented to the hospital with ckd and brigid , hyperkalemia   pt seen and examined in am   PAST MEDICAL & SURGICAL HISTORY:  Hyperparathyroidism  Hypertension  BRIGID (acute kidney injury)  SBO (small bowel obstruction)  Duodenal ulcer disease  No significant past surgical history      MEDICATIONS  (STANDING):  acetaminophen  IVPB .. 1000 milliGRAM(s) IV Intermittent once  acetaminophen  IVPB .. 1000 milliGRAM(s) IV Intermittent once  chlorhexidine 2% Cloths 1 Application(s) Topical daily  dextrose 5% + lactated ringers. 1000 milliLiter(s) (50 mL/Hr) IV Continuous <Continuous>  enoxaparin Injectable 40 milliGRAM(s) SubCutaneous daily  epoetin ava Injectable 33895 Unit(s) SubCutaneous <User Schedule>  levothyroxine Injectable 125 MICROGram(s) IV Push at bedtime  pantoprazole  Injectable 40 milliGRAM(s) IV Push daily  piperacillin/tazobactam IVPB.. 3.375 Gram(s) IV Intermittent every 8 hours      Allergies    Cipro (Rash)  niacin (Flushing; Rash)  Reglan (Anaphylaxis)    Intolerances        SOCIAL HISTORY:  Denies ETOh,Smoking,     FAMILY HISTORY:  Family history of non-Hodgkin's lymphoma      REVIEW OF SYSTEMS:    CONSTITUTIONAL: pos  weakness, no  fevers or chills  RESPIRATORY: No cough, wheezing, hemoptysis; No shortness of breath  CARDIOVASCULAR: No chest pain or palpitations  GASTROINTESTINAL: No abdominal or epigastric pain. No nausea, vomiting,     No diarrhea or constipation. No melena   GENITOURINARY: No dysuria, frequency or hematuria  NEUROLOGICAL: No numbness or weakness  SKIN: dry                                8.2    6.4   )-----------( 387      ( 13 Aug 2019 00:57 )             27.2       CBC Full  -  ( 13 Aug 2019 00:57 )  WBC Count : 6.4 K/uL  RBC Count : 3.12 M/uL  Hemoglobin : 8.2 g/dL  Hematocrit : 27.2 %  Platelet Count - Automated : 387 K/uL  Mean Cell Volume : 87.1 fl  Mean Cell Hemoglobin : 26.1 pg  Mean Cell Hemoglobin Concentration : 30.0 gm/dL  Auto Neutrophil # : x  Auto Lymphocyte # : x  Auto Monocyte # : x  Auto Eosinophil # : x  Auto Basophil # : x  Auto Neutrophil % : x  Auto Lymphocyte % : x  Auto Monocyte % : x  Auto Eosinophil % : x  Auto Basophil % : x      08-13    140  |  108  |  26<H>  ----------------------------<  111<H>  4.5   |  21<L>  |  1.47<H>    Ca    8.7      13 Aug 2019 00:57  Phos  3.1     08-13  Mg     2.0     08-13        CAPILLARY BLOOD GLUCOSE          Vital Signs Last 24 Hrs  T(C): 36.3 (13 Aug 2019 16:00), Max: 37 (12 Aug 2019 23:00)  T(F): 97.4 (13 Aug 2019 16:00), Max: 98.6 (12 Aug 2019 23:00)  HR: 79 (13 Aug 2019 16:00) (59 - 83)  BP: 146/67 (13 Aug 2019 16:00) (137/63 - 173/76)  BP(mean): 96 (13 Aug 2019 16:00) (90 - 112)  RR: 17 (13 Aug 2019 16:00) (11 - 21)  SpO2: 97% (13 Aug 2019 16:00) (96% - 100%)        PT/INR - ( 12 Aug 2019 00:35 )   PT: 13.8 sec;   INR: 1.20 ratio         PTT - ( 12 Aug 2019 00:35 )  PTT:31.0 sec  PHYSICAL EXAM:  ng tube is place    Constitutional: NAD  HEENT: conjunctive   clear   Neck:  No JVD  Respiratory: CTAB  Cardiovascular: S1 and S2  Gastrointestinal:  soft, pos distened   Extremities: No peripheral edema  Neurological:  no focal deficits  Psychiatric: Normal mood, normal affect  Skin: dry   Access: Not applicable

## 2019-08-13 NOTE — PROGRESS NOTE ADULT - SUBJECTIVE AND OBJECTIVE BOX
ACS FLOOR TRANSFER NOTE    78y Male transferred to floor from SICU    SUBJECTIVE: c/o slightly distended abd. Pain is well controlled. not yet passing flatus. NPO. NGT in place. ambulating. denies dizziness, SOB, CP or palpitations. denies f/c/n/v.     OBJECTIVE:    T(C): 36.8 (08-13-19 @ 19:43), Max: 37 (08-12-19 @ 23:00)  HR: 78 (08-13-19 @ 19:43) (59 - 79)  BP: 166/70 (08-13-19 @ 19:43) (137/63 - 173/76)  RR: 17 (08-13-19 @ 19:43) (11 - 20)  SpO2: 98% (08-13-19 @ 19:43) (96% - 100%)  Wt(kg): --      I&O's Summary    12 Aug 2019 07:01  -  13 Aug 2019 07:00  --------------------------------------------------------  IN: 1250 mL / OUT: 2175 mL / NET: -925 mL    13 Aug 2019 07:01  -  13 Aug 2019 21:12  --------------------------------------------------------  IN: 800 mL / OUT: 1200 mL / NET: -400 mL                              8.2    6.4   )-----------( 387      ( 13 Aug 2019 00:57 )             27.2       08-13    140  |  108  |  26<H>  ----------------------------<  111<H>  4.5   |  21<L>  |  1.47<H>    Ca    8.7      13 Aug 2019 00:57  Phos  3.1     08-13  Mg     2.0     08-13        MEDICATIONS  (STANDING):  chlorhexidine 2% Cloths 1 Application(s) Topical daily  dextrose 5% + sodium chloride 0.9%. 1000 milliLiter(s) (50 mL/Hr) IV Continuous <Continuous>  enoxaparin Injectable 40 milliGRAM(s) SubCutaneous daily  levothyroxine Injectable 125 MICROGram(s) IV Push at bedtime  metoprolol tartrate Injectable 5 milliGRAM(s) IV Push every 6 hours  pantoprazole  Injectable 40 milliGRAM(s) IV Push daily    MEDICATIONS  (PRN):  HYDROmorphone  Injectable 0.25 milliGRAM(s) IV Push every 3 hours PRN Severe Pain (7 - 10)        PHYSICAL EXAM:  Gen: NAD, A&O x 3  Lungs: CTA b/l  Heart: RRR  Abd: soft, slightly distended, NT, incision with dressing in place c/d/i.   Ext: no calf tenderness or swelling     78 year old male with primary SBO, concerning for follicular lymphoma, biopsies pending.    - transfer to floor   - NPO/IVF/NGT, awaiting for GI function   - Pain control  - encourage IS/Ambulation   - f/u AM labs  - DVT prophylaxis: Lovebradley Mercado PA-C

## 2019-08-14 LAB
ANION GAP SERPL CALC-SCNC: 13 MMOL/L — SIGNIFICANT CHANGE UP (ref 5–17)
BUN SERPL-MCNC: 19 MG/DL — SIGNIFICANT CHANGE UP (ref 7–23)
CALCIUM SERPL-MCNC: 9.2 MG/DL — SIGNIFICANT CHANGE UP (ref 8.4–10.5)
CHLORIDE SERPL-SCNC: 110 MMOL/L — HIGH (ref 96–108)
CO2 SERPL-SCNC: 23 MMOL/L — SIGNIFICANT CHANGE UP (ref 22–31)
CREAT SERPL-MCNC: 1.36 MG/DL — HIGH (ref 0.5–1.3)
CULTURE RESULTS: SIGNIFICANT CHANGE UP
CULTURE RESULTS: SIGNIFICANT CHANGE UP
GLUCOSE SERPL-MCNC: 103 MG/DL — HIGH (ref 70–99)
HCT VFR BLD CALC: 27.4 % — LOW (ref 39–50)
HGB BLD-MCNC: 8.6 G/DL — LOW (ref 13–17)
MAGNESIUM SERPL-MCNC: 1.9 MG/DL — SIGNIFICANT CHANGE UP (ref 1.6–2.6)
MCHC RBC-ENTMCNC: 27.4 PG — SIGNIFICANT CHANGE UP (ref 27–34)
MCHC RBC-ENTMCNC: 31.3 GM/DL — LOW (ref 32–36)
MCV RBC AUTO: 87.6 FL — SIGNIFICANT CHANGE UP (ref 80–100)
PHOSPHATE SERPL-MCNC: 2.5 MG/DL — SIGNIFICANT CHANGE UP (ref 2.5–4.5)
PLATELET # BLD AUTO: 342 K/UL — SIGNIFICANT CHANGE UP (ref 150–400)
POTASSIUM SERPL-MCNC: 5.1 MMOL/L — SIGNIFICANT CHANGE UP (ref 3.5–5.3)
POTASSIUM SERPL-SCNC: 5.1 MMOL/L — SIGNIFICANT CHANGE UP (ref 3.5–5.3)
RBC # BLD: 3.12 M/UL — LOW (ref 4.2–5.8)
RBC # FLD: 13.9 % — SIGNIFICANT CHANGE UP (ref 10.3–14.5)
SODIUM SERPL-SCNC: 146 MMOL/L — HIGH (ref 135–145)
SPECIMEN SOURCE: SIGNIFICANT CHANGE UP
SPECIMEN SOURCE: SIGNIFICANT CHANGE UP
WBC # BLD: 7.5 K/UL — SIGNIFICANT CHANGE UP (ref 3.8–10.5)
WBC # FLD AUTO: 7.5 K/UL — SIGNIFICANT CHANGE UP (ref 3.8–10.5)

## 2019-08-14 RX ORDER — ACETAMINOPHEN 500 MG
1000 TABLET ORAL EVERY 6 HOURS
Refills: 0 | Status: COMPLETED | OUTPATIENT
Start: 2019-08-14 | End: 2019-08-15

## 2019-08-14 RX ORDER — SODIUM CHLORIDE 9 MG/ML
1000 INJECTION, SOLUTION INTRAVENOUS
Refills: 0 | Status: DISCONTINUED | OUTPATIENT
Start: 2019-08-14 | End: 2019-08-15

## 2019-08-14 RX ADMIN — Medication 125 MICROGRAM(S): at 22:38

## 2019-08-14 RX ADMIN — Medication 400 MILLIGRAM(S): at 12:28

## 2019-08-14 RX ADMIN — HYDROMORPHONE HYDROCHLORIDE 0.25 MILLIGRAM(S): 2 INJECTION INTRAMUSCULAR; INTRAVENOUS; SUBCUTANEOUS at 01:11

## 2019-08-14 RX ADMIN — Medication 1000 MILLIGRAM(S): at 12:58

## 2019-08-14 RX ADMIN — HYDROMORPHONE HYDROCHLORIDE 0.25 MILLIGRAM(S): 2 INJECTION INTRAMUSCULAR; INTRAVENOUS; SUBCUTANEOUS at 01:41

## 2019-08-14 RX ADMIN — Medication 5 MILLIGRAM(S): at 00:59

## 2019-08-14 RX ADMIN — PANTOPRAZOLE SODIUM 40 MILLIGRAM(S): 20 TABLET, DELAYED RELEASE ORAL at 12:29

## 2019-08-14 RX ADMIN — HYDROMORPHONE HYDROCHLORIDE 0.25 MILLIGRAM(S): 2 INJECTION INTRAMUSCULAR; INTRAVENOUS; SUBCUTANEOUS at 06:00

## 2019-08-14 RX ADMIN — Medication 1000 MILLIGRAM(S): at 06:00

## 2019-08-14 RX ADMIN — SODIUM CHLORIDE 50 MILLILITER(S): 9 INJECTION, SOLUTION INTRAVENOUS at 01:11

## 2019-08-14 RX ADMIN — Medication 5 MILLIGRAM(S): at 12:28

## 2019-08-14 RX ADMIN — Medication 5 MILLIGRAM(S): at 17:52

## 2019-08-14 RX ADMIN — ENOXAPARIN SODIUM 40 MILLIGRAM(S): 100 INJECTION SUBCUTANEOUS at 12:28

## 2019-08-14 RX ADMIN — Medication 400 MILLIGRAM(S): at 17:51

## 2019-08-14 RX ADMIN — Medication 5 MILLIGRAM(S): at 05:35

## 2019-08-14 RX ADMIN — HYDROMORPHONE HYDROCHLORIDE 0.25 MILLIGRAM(S): 2 INJECTION INTRAMUSCULAR; INTRAVENOUS; SUBCUTANEOUS at 05:30

## 2019-08-14 RX ADMIN — Medication 400 MILLIGRAM(S): at 05:31

## 2019-08-14 NOTE — PROGRESS NOTE ADULT - SUBJECTIVE AND OBJECTIVE BOX
Trauma Surgery Daily Progress Note     79 yo Male with PMHx of duodenal ulcer, duodenal stricture requiring EGD and ballooning, now with SBO s/p Ex-lap and mesenteric biopsy, preliminary follicular lymphoma  POD #3,4    --------------------------------------------------------------------------------------------------------------------  SUBJECTIVE / 24H EVENTS  Patient seen and examined on morning rounds. No acute events overnight.  Pt downgraded to floor.  NGT in place (300cc/24H)  No N/V  No flatus    OBJECTIVE:  VITAL SIGNS:  T(C): 37 (08-14-19 @ 05:37), Max: 37 (08-14-19 @ 05:37)  HR: 74 (08-14-19 @ 05:37) (67 - 79)  BP: 159/66 (08-14-19 @ 05:37) (137/68 - 173/76)  RR: 17 (08-14-19 @ 05:37) (11 - 20)  SpO2: 95% (08-14-19 @ 05:37) (95% - 98%)  Daily     Daily       PHYSICAL EXAM:  Gen: NAD, A&O x 3  Lungs: CTA b/l  Abd: soft, slightly distended, NT, incision with dressing in place c/d/i.   Ext: no calf tenderness or swelling       08-13-19 @ 07:01  -  08-14-19 @ 07:00  --------------------------------------------------------  IN:    dextrose 5% + sodium chloride 0.9%.: 1200 mL    IV PiggyBack: 400 mL  Total IN: 1600 mL    OUT:    Nasoenteral Tube: 300 mL    Voided: 1750 mL  Total OUT: 2050 mL    Total NET: -450 mL          LAB VALUES:  08-13    140  |  108  |  26<H>  ----------------------------<  111<H>  4.5   |  21<L>  |  1.47<H>    Ca    8.7      13 Aug 2019 00:57  Phos  3.1     08-13  Mg     2.0     08-13                                 8.2    6.4   )-----------( 387      ( 13 Aug 2019 00:57 )             27.2                   MICROBIOLOGY:      RADIOLOGY:        MEDICATIONS  (STANDING):  acetaminophen  IVPB .. 1000 milliGRAM(s) IV Intermittent every 6 hours  chlorhexidine 2% Cloths 1 Application(s) Topical daily  dextrose 5% + sodium chloride 0.9%. 1000 milliLiter(s) (50 mL/Hr) IV Continuous <Continuous>  enoxaparin Injectable 40 milliGRAM(s) SubCutaneous daily  levothyroxine Injectable 125 MICROGram(s) IV Push at bedtime  metoprolol tartrate Injectable 5 milliGRAM(s) IV Push every 6 hours  pantoprazole  Injectable 40 milliGRAM(s) IV Push daily    MEDICATIONS  (PRN):  HYDROmorphone  Injectable 0.25 milliGRAM(s) IV Push every 3 hours PRN Severe Pain (7 - 10)

## 2019-08-14 NOTE — PROGRESS NOTE ADULT - SUBJECTIVE AND OBJECTIVE BOX
Patient is a 78y Male whom presented to the hospital with ckd and brigid , hyperkalemia   pt seen and examined in am transfer to floor   PAST MEDICAL & SURGICAL HISTORY:  Hyperparathyroidism  Hypertension  BRIGID (acute kidney injury)  SBO (small bowel obstruction)  Duodenal ulcer disease  No significant past surgical history      MEDICATIONS  (STANDING):  acetaminophen  IVPB .. 1000 milliGRAM(s) IV Intermittent once  acetaminophen  IVPB .. 1000 milliGRAM(s) IV Intermittent once  chlorhexidine 2% Cloths 1 Application(s) Topical daily  dextrose 5% + lactated ringers. 1000 milliLiter(s) (50 mL/Hr) IV Continuous <Continuous>  enoxaparin Injectable 40 milliGRAM(s) SubCutaneous daily  epoetin ava Injectable 14206 Unit(s) SubCutaneous <User Schedule>  levothyroxine Injectable 125 MICROGram(s) IV Push at bedtime  pantoprazole  Injectable 40 milliGRAM(s) IV Push daily  piperacillin/tazobactam IVPB.. 3.375 Gram(s) IV Intermittent every 8 hours      Allergies    Cipro (Rash)  niacin (Flushing; Rash)  Reglan (Anaphylaxis)    Intolerances        SOCIAL HISTORY:  Denies ETOh,Smoking,     FAMILY HISTORY:  Family history of non-Hodgkin's lymphoma      REVIEW OF SYSTEMS:    CONSTITUTIONAL: pos  weakness, no  fevers or chills  GENITOURINARY: No dysuria, frequency or hematuria  NEUROLOGICAL: No numbness or weakness  SKIN: dry                          8.6    7.5   )-----------( 342      ( 14 Aug 2019 10:30 )             27.4       CBC Full  -  ( 14 Aug 2019 10:30 )  WBC Count : 7.5 K/uL  RBC Count : 3.12 M/uL  Hemoglobin : 8.6 g/dL  Hematocrit : 27.4 %  Platelet Count - Automated : 342 K/uL  Mean Cell Volume : 87.6 fl  Mean Cell Hemoglobin : 27.4 pg  Mean Cell Hemoglobin Concentration : 31.3 gm/dL  Auto Neutrophil # : x  Auto Lymphocyte # : x  Auto Monocyte # : x  Auto Eosinophil # : x  Auto Basophil # : x  Auto Neutrophil % : x  Auto Lymphocyte % : x  Auto Monocyte % : x  Auto Eosinophil % : x  Auto Basophil % : x      08-14    146<H>  |  110<H>  |  19  ----------------------------<  103<H>  5.1   |  23  |  1.36<H>    Ca    9.2      14 Aug 2019 10:30  Phos  2.5     08-14  Mg     1.9     08-14        CAPILLARY BLOOD GLUCOSE          Vital Signs Last 24 Hrs  T(C): 36.8 (14 Aug 2019 17:40), Max: 37 (14 Aug 2019 05:37)  T(F): 98.3 (14 Aug 2019 17:40), Max: 98.6 (14 Aug 2019 05:37)  HR: 71 (14 Aug 2019 17:40) (61 - 74)  BP: 152/82 (14 Aug 2019 17:40) (138/67 - 159/66)  BP(mean): --  RR: 18 (14 Aug 2019 17:40) (17 - 18)  SpO2: 98% (14 Aug 2019 17:40) (95% - 98%)            PHYSICAL EXAM:  ng tube is place    Constitutional: NAD  HEENT: conjunctive   clear   Neck:  No JVD  Respiratory: CTAB  Cardiovascular: S1 and S2  Gastrointestinal:  soft, pos distened   Extremities: No peripheral edema  Neurological:  no focal deficits  Psychiatric: Normal mood, normal affect  Skin: dry   Access: Not applicable

## 2019-08-14 NOTE — PROGRESS NOTE ADULT - ATTENDING COMMENTS
Pt seen and examined, agree with above. Pt feeling well, had a BM and flatus today. NGT removed, ice chips only for now. Awaiting path results.

## 2019-08-14 NOTE — CHART NOTE - NSCHARTNOTEFT_GEN_A_CORE
Reviewed cytology results. Plan per ACS, f/u heme/onc. Reach out to surg onc with any questions. Reviewed cytology results. Plan per ACS, f/u heme/onc. Reach out to surg onc with any questions. Can f/u with Dr. Boyd  [phone].

## 2019-08-14 NOTE — PROGRESS NOTE ADULT - ASSESSMENT
A/P: 79 yo Male with PMHx of duodenal ulcer, duodenal stricture requiring EGD and ballooning, now with SBO s/p Ex-lap and mesenteric biopsy, preliminary follicular lymphoma - POD #3,4    - Appreciate Heme/Onc consult: awaiting final pathology. check LDH, uric acid, CBC  - ID: Discontinue Zosyn. Culture if febrile   - Dilaudid, IV tylenol prn for pain control.   - Continue IV metoprolol 5mg q 6 hrs   - NPO with NGT to suction  - Monitor GI function  - Lovenox 40mg    Trauma Surgery, [id number]

## 2019-08-15 ENCOUNTER — TRANSCRIPTION ENCOUNTER (OUTPATIENT)
Age: 79
End: 2019-08-15

## 2019-08-15 LAB
ANION GAP SERPL CALC-SCNC: 10 MMOL/L — SIGNIFICANT CHANGE UP (ref 5–17)
BUN SERPL-MCNC: 16 MG/DL — SIGNIFICANT CHANGE UP (ref 7–23)
CALCIUM SERPL-MCNC: 9 MG/DL — SIGNIFICANT CHANGE UP (ref 8.4–10.5)
CHLORIDE SERPL-SCNC: 105 MMOL/L — SIGNIFICANT CHANGE UP (ref 96–108)
CO2 SERPL-SCNC: 23 MMOL/L — SIGNIFICANT CHANGE UP (ref 22–31)
CREAT SERPL-MCNC: 1.29 MG/DL — SIGNIFICANT CHANGE UP (ref 0.5–1.3)
CULTURE RESULTS: SIGNIFICANT CHANGE UP
GLUCOSE SERPL-MCNC: 99 MG/DL — SIGNIFICANT CHANGE UP (ref 70–99)
MAGNESIUM SERPL-MCNC: 1.7 MG/DL — SIGNIFICANT CHANGE UP (ref 1.6–2.6)
PHOSPHATE SERPL-MCNC: 2.8 MG/DL — SIGNIFICANT CHANGE UP (ref 2.5–4.5)
POTASSIUM SERPL-MCNC: 4.2 MMOL/L — SIGNIFICANT CHANGE UP (ref 3.5–5.3)
POTASSIUM SERPL-SCNC: 4.2 MMOL/L — SIGNIFICANT CHANGE UP (ref 3.5–5.3)
SODIUM SERPL-SCNC: 138 MMOL/L — SIGNIFICANT CHANGE UP (ref 135–145)

## 2019-08-15 PROCEDURE — 74018 RADEX ABDOMEN 1 VIEW: CPT | Mod: 26

## 2019-08-15 RX ORDER — SODIUM CHLORIDE 9 MG/ML
1000 INJECTION, SOLUTION INTRAVENOUS
Refills: 0 | Status: DISCONTINUED | OUTPATIENT
Start: 2019-08-15 | End: 2019-08-20

## 2019-08-15 RX ORDER — SODIUM CHLORIDE 9 MG/ML
750 INJECTION, SOLUTION INTRAVENOUS ONCE
Refills: 0 | Status: DISCONTINUED | OUTPATIENT
Start: 2019-08-15 | End: 2019-08-15

## 2019-08-15 RX ORDER — ONDANSETRON 8 MG/1
4 TABLET, FILM COATED ORAL ONCE
Refills: 0 | Status: COMPLETED | OUTPATIENT
Start: 2019-08-15 | End: 2019-08-15

## 2019-08-15 RX ORDER — PANTOPRAZOLE SODIUM 20 MG/1
40 TABLET, DELAYED RELEASE ORAL
Refills: 0 | Status: DISCONTINUED | OUTPATIENT
Start: 2019-08-15 | End: 2019-08-15

## 2019-08-15 RX ORDER — SODIUM CHLORIDE 9 MG/ML
500 INJECTION, SOLUTION INTRAVENOUS ONCE
Refills: 0 | Status: COMPLETED | OUTPATIENT
Start: 2019-08-15 | End: 2019-08-15

## 2019-08-15 RX ORDER — ACETAMINOPHEN 500 MG
1000 TABLET ORAL ONCE
Refills: 0 | Status: COMPLETED | OUTPATIENT
Start: 2019-08-15 | End: 2019-08-15

## 2019-08-15 RX ORDER — PANTOPRAZOLE SODIUM 20 MG/1
40 TABLET, DELAYED RELEASE ORAL DAILY
Refills: 0 | Status: DISCONTINUED | OUTPATIENT
Start: 2019-08-15 | End: 2019-08-20

## 2019-08-15 RX ADMIN — Medication 400 MILLIGRAM(S): at 19:26

## 2019-08-15 RX ADMIN — ONDANSETRON 4 MILLIGRAM(S): 8 TABLET, FILM COATED ORAL at 12:54

## 2019-08-15 RX ADMIN — Medication 1000 MILLIGRAM(S): at 01:23

## 2019-08-15 RX ADMIN — Medication 1000 MILLIGRAM(S): at 19:56

## 2019-08-15 RX ADMIN — SODIUM CHLORIDE 75 MILLILITER(S): 9 INJECTION, SOLUTION INTRAVENOUS at 18:15

## 2019-08-15 RX ADMIN — Medication 125 MICROGRAM(S): at 21:56

## 2019-08-15 RX ADMIN — SODIUM CHLORIDE 500 MILLILITER(S): 9 INJECTION, SOLUTION INTRAVENOUS at 21:56

## 2019-08-15 RX ADMIN — ENOXAPARIN SODIUM 40 MILLIGRAM(S): 100 INJECTION SUBCUTANEOUS at 18:16

## 2019-08-15 RX ADMIN — Medication 5 MILLIGRAM(S): at 05:57

## 2019-08-15 RX ADMIN — Medication 5 MILLIGRAM(S): at 18:16

## 2019-08-15 RX ADMIN — Medication 400 MILLIGRAM(S): at 00:53

## 2019-08-15 RX ADMIN — Medication 5 MILLIGRAM(S): at 00:54

## 2019-08-15 RX ADMIN — PANTOPRAZOLE SODIUM 40 MILLIGRAM(S): 20 TABLET, DELAYED RELEASE ORAL at 18:16

## 2019-08-15 NOTE — DISCHARGE NOTE PROVIDER - CARE PROVIDERS DIRECT ADDRESSES
[email or web address]

## 2019-08-15 NOTE — DISCHARGE NOTE PROVIDER - HOSPITAL COURSE
Patient is a 78y old  Male who presents with PMHx of duodenal ulcer (questionable duodenal perforation in past in charts however family denies), duodenal stricture requiring EGD and ballooning presenting to the ER with three weeks of decreased PO intake and abdominal pain that was vague and located in the epigastric area, worse with increased intake however not associated with types of foods. He states no radiation, na denies experiencing pain similar to this before. States passed flatus and BM last night, however admits to bloating today.         GI was consulted: The lymphadenopathy has been present since previous CT from 2014. The pathologic specimen bring up the possibility of Castleman's disease, given other signs such as thrombcytosis and hypoalbuminemia on admission. Differential also includes undiagnosed IBD, lymphoma less likely given negative pathology for this. Would check serologies as above and obtain rheumatology evaluation. Once patient's bowel function returns and recovers from acute surgery, would consider small bowel imaging with enterography to evaluate for IBD, as the etiology of SBO is still unclear.         Heme Oncology: The mass has actually been documented in the past and is unchanged since 2014.  He had a CT scan with IV contrast which reported no lymphadenopathy, but on Ex- Laparotomy for SBO mesenteric lymphadenopathy and serosal implant in terminal ileum was noted and LN have been biopsied to rule out lymphoma.  Flow came back today and is negative.  Await final pathology report. If negative for lymphoma would attribute enlarged LN to inflammation. Would check LDH, uric acid, and continue to monitor CBC. Will follow. Patient is a 78y old  Male who presents with PMHx of duodenal ulcer (questionable duodenal perforation in past in charts however family denies), duodenal stricture requiring EGD and ballooning presenting to the ER with three weeks of decreased PO intake and abdominal pain that was vague and located in the epigastric area, worse with increased intake however not associated with types of foods. He states no radiation, denies experiencing pain similar to this before. he was found to have closed loop SBO w/ BRIGID and hyperkalemia. He was taken to the OR for exploratory laparotomy with Lysis of adhesions and found to have extensive mesenteric lymphadenopathy with was biopsied. His abdomen was left open and he was closed the following day. Heme Onc was consulted however final pathology came back negative and heme onc signed off. His BRIGID and hyperkalemia resolved with IVF. Patient had NGT removed and had interval replacement for increased abdominal distension, however he progressed appropriately again and NGT was removed and he tolerated CLD and was advanced to regular diet. He was evaluated by GI, and their recommendations are outlined below. He was deemed stable and appropriate for discharge 8/21 with outpatient follow up with rheumatology and GI.         GI was consulted: The lymphadenopathy has been present since previous CT from 2014. The pathologic specimen bring up the possibility of Castleman's disease, given other signs such as thrombcytosis and hypoalbuminemia on admission. Differential also includes undiagnosed IBD, lymphoma less likely given negative pathology for this. Serologies were checked, and patient should follow up with rheumatology for evaluation as outpatient. We will consider small bowel imaging with enterography to evaluate for IBD as an outpatient after acute inflammation has resolved, as the etiology of SBO is still unclear.         Heme Oncology: The mass has actually been documented in the past and is unchanged since 2014.  He had a CT scan with IV contrast which reported no lymphadenopathy, but on Ex- Laparotomy for SBO mesenteric lymphadenopathy and serosal implant in terminal ileum was noted and LN have been biopsied to rule out lymphoma.  Flow came back today and is negative.  Final pathology negative for lymphoma, attribute enlarged LN to inflammation. Heme Onc signed off.

## 2019-08-15 NOTE — PROGRESS NOTE ADULT - ASSESSMENT
A/P: 77 yo Male with PMHx of duodenal ulcer, duodenal stricture requiring EGD and ballooning, now with SBO s/p Ex-lap and mesenteric biopsy, preliminary follicular lymphoma - POD #3,4    - Appreciate Heme/Onc consult: awaiting final pathology.   - Nephrology following: BRIGID resolving. Cr 1.36  - ID: Discontinue Zosyn. Culture if febrile   - Adv diet as tolerated  - Lovenox 40mg    Trauma Surgery, [id number] A/P: 79 yo Male with PMHx of duodenal ulcer, duodenal stricture requiring EGD and ballooning, now with SBO s/p Ex-lap and mesenteric biopsy, preliminary follicular lymphoma.    - Appreciate Heme/Onc consult: awaiting final pathology.   - Nephrology following: BRIGID resolving. Cr normalized to 1.29  - Not tolerating clears, will keep NPO for now, IVF  - Lovenox 40mg    Trauma Surgery, [id number]

## 2019-08-15 NOTE — PROGRESS NOTE ADULT - SUBJECTIVE AND OBJECTIVE BOX
Patient is a 78y Male whom presented to the hospital with ckd and brigid , hyperkalemia   pt seen and examined in am transfer to floor   PAST MEDICAL & SURGICAL HISTORY:  Hyperparathyroidism  Hypertension  BRIGID (acute kidney injury)  SBO (small bowel obstruction)  Duodenal ulcer disease  No significant past surgical history      MEDICATIONS  (STANDING):  acetaminophen  IVPB .. 1000 milliGRAM(s) IV Intermittent once  acetaminophen  IVPB .. 1000 milliGRAM(s) IV Intermittent once  chlorhexidine 2% Cloths 1 Application(s) Topical daily  dextrose 5% + lactated ringers. 1000 milliLiter(s) (50 mL/Hr) IV Continuous <Continuous>  enoxaparin Injectable 40 milliGRAM(s) SubCutaneous daily  epoetin ava Injectable 10121 Unit(s) SubCutaneous <User Schedule>  levothyroxine Injectable 125 MICROGram(s) IV Push at bedtime  pantoprazole  Injectable 40 milliGRAM(s) IV Push daily  piperacillin/tazobactam IVPB.. 3.375 Gram(s) IV Intermittent every 8 hours      Allergies    Cipro (Rash)  niacin (Flushing; Rash)  Reglan (Anaphylaxis)    Intolerances        SOCIAL HISTORY:  Denies ETOh,Smoking,     FAMILY HISTORY:  Family history of non-Hodgkin's lymphoma      REVIEW OF SYSTEMS:    CONSTITUTIONAL: pos  weakness, no  fevers or chills  GENITOURINARY: No dysuria, frequency or hematuria  NEUROLOGICAL: No numbness or weakness  SKIN: dry                                           8.6    7.5   )-----------( 342      ( 14 Aug 2019 10:30 )             27.4       CBC Full  -  ( 14 Aug 2019 10:30 )  WBC Count : 7.5 K/uL  RBC Count : 3.12 M/uL  Hemoglobin : 8.6 g/dL  Hematocrit : 27.4 %  Platelet Count - Automated : 342 K/uL  Mean Cell Volume : 87.6 fl  Mean Cell Hemoglobin : 27.4 pg  Mean Cell Hemoglobin Concentration : 31.3 gm/dL  Auto Neutrophil # : x  Auto Lymphocyte # : x  Auto Monocyte # : x  Auto Eosinophil # : x  Auto Basophil # : x  Auto Neutrophil % : x  Auto Lymphocyte % : x  Auto Monocyte % : x  Auto Eosinophil % : x  Auto Basophil % : x      08-15    138  |  105  |  16  ----------------------------<  99  4.2   |  23  |  1.29    Ca    9.0      15 Aug 2019 07:25  Phos  2.8     08-15  Mg     1.7     08-15        CAPILLARY BLOOD GLUCOSE          Vital Signs Last 24 Hrs  T(C): 36.7 (15 Aug 2019 12:44), Max: 36.9 (14 Aug 2019 22:07)  T(F): 98 (15 Aug 2019 12:44), Max: 98.5 (14 Aug 2019 22:07)  HR: 65 (15 Aug 2019 12:44) (65 - 75)  BP: 165/69 (15 Aug 2019 12:44) (152/82 - 166/75)  BP(mean): --  RR: 18 (15 Aug 2019 12:44) (18 - 18)  SpO2: 95% (15 Aug 2019 12:44) (95% - 98%)              PHYSICAL EXAM:  ng tube is place    Constitutional: NAD  HEENT: conjunctive   clear   Neck:  No JVD  Respiratory: CTAB  Cardiovascular: S1 and S2  Gastrointestinal:  soft, pos abd  distened   Extremities: No peripheral edema  Neurological:  no focal deficits  Psychiatric: Normal mood, normal affect  Skin: dry   Access: Not applicable

## 2019-08-15 NOTE — DISCHARGE NOTE PROVIDER - CARE PROVIDER_API CALL
Gomez Manjarrez)  Gastroenterology; Internal Medicine  [address] [place] [address]  Phone: [phone]  Fax: [phone]  Follow Up Time: 1 week    Lizett Swenson)  Medical Oncology  [address] [place] [address]  Phone: [phone]  Fax: [phone]  Follow Up Time: 1 week    Shon Byrd)  Surgery; Surgical Critical Care  [address] [place] [address]  Phone: [phone]  Fax: [phone]  Follow Up Time: 1 week

## 2019-08-15 NOTE — DIETITIAN INITIAL EVALUATION ADULT. - ADD RECOMMEND
1) Medical team to advance diet when medically feasible via tolerated route. Consider advancing to clear liquid, followed by low fiber diet as tolerated. If NPO status > 7 days, consider alternate means of nutrition if within pt/family GOC. 2) RD to follow-up with diet education as appropriate 3) Malnutrition alert placed in EMR, discussed with team.

## 2019-08-15 NOTE — CHART NOTE - NSCHARTNOTEFT_GEN_A_CORE
Upon Nutritional Assessment by the Registered Dietitian your patient was determined to meet criteria / has evidence of the following diagnosis/diagnoses:          [ ]  Mild Protein Calorie Malnutrition        [ ]  Moderate Protein Calorie Malnutrition        [x ] Severe Protein Calorie Malnutrition        [ ] Unspecified Protein Calorie Malnutrition        [ ] Underweight / BMI <19        [ ] Morbid Obesity / BMI > 40      Findings as based on:  [ ] Comprehensive nutrition assessment   [ ] Nutrition Focused Physical Exam  [x ] Other:  pt with 10% weight loss x 1 month, meeting </=50% of estimated needs >/= 5 days.         Nutrition Plan/Recommendations:      see initial nutrition assessment in documents for recommendations    RD to remain available and follow-up as medically appropriate.     PROVIDER Section:     By signing this assessment you are acknowledging and agree with the diagnosis/diagnoses assigned by the Registered Dietitian    Comments:

## 2019-08-15 NOTE — DISCHARGE NOTE PROVIDER - PROVIDER TOKENS
PROVIDER:[TOKEN:[[id number]],FOLLOWUP:[1 week]],PROVIDER:[TOKEN:[[id number]],FOLLOWUP:[1 week]],PROVIDER:[TOKEN:[[id number]],FOLLOWUP:[1 week]]

## 2019-08-15 NOTE — PROGRESS NOTE ADULT - SUBJECTIVE AND OBJECTIVE BOX
Trauma Surgery Daily Progress Note     77 yo Male with PMHx of duodenal ulcer, duodenal stricture requiring EGD and ballooning, now with SBO s/p Ex-lap and mesenteric biopsy, preliminary follicular lymphoma  POD #4,5    --------------------------------------------------------------------------------------------------------------------  SUBJECTIVE / 24H EVENTS  Patient seen and examined on morning rounds. No acute events overnight.  NGT removed yesterday  Pt denies N/V  Tolerating sips, chips  Had BM overnight, passing flatus       OBJECTIVE:  Vital Signs Last 24 Hrs  T(C): 36.7 (15 Aug 2019 00:26), Max: 37 (14 Aug 2019 05:37)  T(F): 98 (15 Aug 2019 00:26), Max: 98.6 (14 Aug 2019 05:37)  HR: 71 (15 Aug 2019 00:26) (61 - 75)  BP: 163/74 (15 Aug 2019 00:26) (138/67 - 163/74)  BP(mean): --  RR: 18 (15 Aug 2019 00:26) (17 - 18)  SpO2: 97% (15 Aug 2019 00:26) (95% - 98%)      PHYSICAL EXAM:  Gen: NAD, A&O x 3  Lungs: CTA b/l  Abd: soft, ND, NT, incision with dressing in place c/d/i.   Ext: no calf tenderness or swelling     ** I&O's **    13 Aug 2019 07:01  -  14 Aug 2019 07:00  --------------------------------------------------------  IN:    dextrose 5% + sodium chloride 0.9%: 1200 mL    IV PiggyBack: 400 mL  Total IN: 1600 mL    OUT:    Nasoenteral Tube: 300 mL    Voided: 1750 mL  Total OUT: 2050 mL    Total NET: -450 mL      14 Aug 2019 07:01  -  15 Aug 2019 04:00  --------------------------------------------------------  IN:    dextrose 5% + sodium chloride 0.45%.: 900 mL    IV PiggyBack: 100 mL  Total IN: 1000 mL    OUT:    Nasoenteral Tube: 250 mL    Stool: 1 mL    Voided: 825 mL  Total OUT: 1076 mL    Total NET: -76 mL          ** LABS **                        8.6    7.5   )-----------( 342      ( 14 Aug 2019 10:30 )             27.4     14 Aug 2019 10:30    146    |  110    |  19     ----------------------------<  103    5.1     |  23     |  1.36     Ca    9.2        14 Aug 2019 10:30  Phos  2.5       14 Aug 2019 10:30  Mg     1.9       14 Aug 2019 10:30        CAPILLARY BLOOD GLUCOSE                    MEDICATIONS  (STANDING):  dextrose 5% + sodium chloride 0.45%. 1000 milliLiter(s) (75 mL/Hr) IV Continuous <Continuous>  enoxaparin Injectable 40 milliGRAM(s) SubCutaneous daily  levothyroxine Injectable 125 MICROGram(s) IV Push at bedtime  metoprolol tartrate Injectable 5 milliGRAM(s) IV Push every 6 hours  pantoprazole  Injectable 40 milliGRAM(s) IV Push daily    MEDICATIONS  (PRN):  HYDROmorphone  Injectable 0.25 milliGRAM(s) IV Push every 3 hours PRN Severe Pain (7 - 10) Trauma Surgery Daily Progress Note     77 yo Male with PMHx of duodenal ulcer, duodenal stricture requiring EGD and ballooning, now with SBO s/p Ex-lap and mesenteric biopsy, preliminary follicular lymphoma  POD #4,5    --------------------------------------------------------------------------------------------------------------------  SUBJECTIVE / 24H EVENTS    NGT removed yesterday. Patient seen and examined on morning rounds. Had 3 watery bowel movements with flatus overnight. Comfortable.      Patient was advanced to clears earlier this AM. On return to room patient reported nausea, no vomiting. Feels more distended than previous since clears.       OBJECTIVE:  Vital Signs Last 24 Hrs  T(C): 36.8 (15 Aug 2019 09:27), Max: 36.9 (14 Aug 2019 22:07)  T(F): 98.3 (15 Aug 2019 09:27), Max: 98.5 (14 Aug 2019 22:07)  HR: 73 (15 Aug 2019 09:27) (66 - 75)  BP: 166/75 (15 Aug 2019 09:27) (152/71 - 166/75)  BP(mean): --  RR: 18 (15 Aug 2019 09:27) (18 - 18)  SpO2: 95% (15 Aug 2019 09:27) (95% - 98%)      PHYSICAL EXAM:  Gen: NAD, A&O x 3  Lungs: RRR, normal WOB  Abd: soft, significantly distended, NT, incision with staples without surrounding erythema  Ext: no calf tenderness or swelling       I&O's Detail    14 Aug 2019 07:01  -  15 Aug 2019 07:00  --------------------------------------------------------  IN:    dextrose 5% + sodium chloride 0.45%.: 900 mL    IV PiggyBack: 100 mL  Total IN: 1000 mL    OUT:    Nasoenteral Tube: 250 mL    Stool: 1 mL    Voided: 825 mL  Total OUT: 1076 mL    Total NET: -76 mL      15 Aug 2019 07:01  -  15 Aug 2019 11:31  --------------------------------------------------------  IN:    Oral Fluid: 240 mL  Total IN: 240 mL    OUT:  Total OUT: 0 mL    Total NET: 240 mL            ** LABS **         08-15    138  |  105  |  16  ----------------------------<  99  4.2   |  23  |  1.29    Ca    9.0      15 Aug 2019 07:25  Phos  2.8     08-15  Mg     1.7     08-15                            8.6    7.5   )-----------( 342      ( 14 Aug 2019 10:30 )             27.4                   MEDICATIONS  (STANDING):  dextrose 5% + sodium chloride 0.45%. 1000 milliLiter(s) (75 mL/Hr) IV Continuous <Continuous>  enoxaparin Injectable 40 milliGRAM(s) SubCutaneous daily  levothyroxine Injectable 125 MICROGram(s) IV Push at bedtime  metoprolol tartrate Injectable 5 milliGRAM(s) IV Push every 6 hours  pantoprazole  Injectable 40 milliGRAM(s) IV Push daily    MEDICATIONS  (PRN):  HYDROmorphone  Injectable 0.25 milliGRAM(s) IV Push every 3 hours PRN Severe Pain (7 - 10)

## 2019-08-15 NOTE — DISCHARGE NOTE PROVIDER - NSDCACTIVITY_GEN_ALL_CORE
Do not make important decisions/Showering allowed/Do not drive or operate machinery No heavy lifting/straining/Showering allowed/Do not make important decisions/Do not drive or operate machinery

## 2019-08-15 NOTE — DIETITIAN INITIAL EVALUATION ADULT. - PHYSICAL APPEARANCE
other (specify)/overweight Ht: 73inches, Wt: 205lbs, BMI: 27.1kg/m2, IBW: 184lbs +/- 10%, %IBW: 111%  Edema: +1 left/right ankle, Skin: free of pressure injuries per nursing flow sheets     Nutrition focused physical exam deferred as pt sleeping during time of visit, daughter asked to not disturb patient.

## 2019-08-15 NOTE — DISCHARGE NOTE PROVIDER - CONDITIONS AT DISCHARGE
Seen and examined by Attending Physician Dr. Rogers who deemed, and cleared patient stable for discharge.

## 2019-08-15 NOTE — DIETITIAN INITIAL EVALUATION ADULT. - ENERGY NEEDS
Pertinent Information: This is a " 77 yo Male with PMHx of duodenal ulcer, duodenal stricture requiring EGD and ballooning, now with SBO s/p Ex-lap and mesenteric biopsy, preliminary follicular lymphoma."

## 2019-08-15 NOTE — PROVIDER CONTACT NOTE (OTHER) - ASSESSMENT
Pt resting in bed, visibly in discomfort. Daughter @ bedside. MD @ bedside assessing pt. Ordered for urgent abd XR.

## 2019-08-15 NOTE — DISCHARGE NOTE PROVIDER - NSDCCPCAREPLAN_GEN_ALL_CORE_FT
PRINCIPAL DISCHARGE DIAGNOSIS  Diagnosis: Small bowel obstruction  Assessment and Plan of Treatment: S/p operative intervention with Ex-Lap and biopsy      SECONDARY DISCHARGE DIAGNOSES  Diagnosis: Hyperkalemia  Assessment and Plan of Treatment: Hyponatremia, replaced and resolved prior to discharge    Diagnosis: GERD (gastroesophageal reflux disease)  Assessment and Plan of Treatment: GERD (gastroesophageal reflux disease)    Diagnosis: Hyponatremia  Assessment and Plan of Treatment: Hyponatremia, replaced and resolved prior to discharge    Diagnosis: Hypothyroidism  Assessment and Plan of Treatment: Hypothyroidism    Diagnosis: SBO (small bowel obstruction)  Assessment and Plan of Treatment: SBO (small bowel obstruction) PRINCIPAL DISCHARGE DIAGNOSIS  Diagnosis: Small bowel obstruction  Assessment and Plan of Treatment: S/p operative intervention with Ex-Lap and biopsy. Please follow up with your surgeon 1-2 weeks after discharge. Additionally please follow up with GI and Rheumatology for further work up of lymphadenopathy      SECONDARY DISCHARGE DIAGNOSES  Diagnosis: GERD (gastroesophageal reflux disease)  Assessment and Plan of Treatment: GERD (gastroesophageal reflux disease)    Diagnosis: Hypothyroidism  Assessment and Plan of Treatment: Hypothyroidism    Diagnosis: Hyperkalemia  Assessment and Plan of Treatment: Hyponatremia, replaced and resolved prior to discharge    Diagnosis: Hyponatremia  Assessment and Plan of Treatment: Hyponatremia, replaced and resolved prior to discharge    Diagnosis: SBO (small bowel obstruction)  Assessment and Plan of Treatment: SBO (small bowel obstruction)

## 2019-08-15 NOTE — PROGRESS NOTE ADULT - ATTENDING COMMENTS
Pt seen and examined, agree with above. Pt not feeling well today, with abdominal distension, nausea. Had loose BMs overnight. Abd soft but markedly distended. During conversation, pt also started burping and was complaining of acid reflux, so NGT placed, with 1.5L bilious output. Pt felt much better after tube placement. Abd xray reviewed, with dilated loops of small bowel and air in colon. Will plan for CT on Sunday if no improvement, and will also consider TPN after discussion with patient and family tomorrow given that the patient has not had substantial nutrition since admission (6 days). Pt seen and examined, agree with above. Pt not feeling well today, with abdominal distension, nausea. Had loose BMs overnight. Abd soft but markedly distended. During conversation, pt also started burping and was complaining of acid reflux, so NGT placed, with 1.5L bilious output. Pt felt much better after tube placement. Abd xray reviewed, with dilated loops of small bowel and air in colon. Will plan for CT on Sunday if no improvement, and will also consider TPN after discussion with patient and family tomorrow given that the patient has not had substantial nutrition since admission (6 days). BRIGID stage 2 nearly resolved. Continue IVF.

## 2019-08-15 NOTE — DISCHARGE NOTE PROVIDER - NSDCFUADDAPPT_GEN_ALL_CORE_FT
Your PMD--Please call for a follow up appointment upon discharge regarding your recent hospitalization. Your PMD--Please call for a follow up appointment upon discharge regarding your recent hospitalization.  GI recommendations: Once you have fully recovered from surgery, we can consider MR enterography imaging to further evaluate the bowel. In addition we recommend colonscopy as an outpatient when your other active issues have resolved. Finally we recommend follow up with rheumatology to discuss potential etiologies of lymphadenopathy. Labs were obtained to assess potential causes of decreased immunity to see if this may be the cause. Results pending.

## 2019-08-15 NOTE — DISCHARGE NOTE PROVIDER - NSDCFUADDINST_GEN_ALL_CORE_FT
WOUND CARE: Place clean dry gauze on your incision, and change daily. Your bandage may have small noted areas of blood on your dressing this is normal. If your dressing continues to ooze and saturate please notify your surgeon.   BATHING: Please do not submerge wound underwater. You may shower and/or sponge bathe.  ACTIVITY: Do not lift anything heavier than 10 lbs, which is about a gallon of milk. No Strenuous activity for about 6 weeks, and until cleared by your surgeon. Strenuous activity includes yoga, weight lifting, and cycling. Stairs and walking are ok. Otherwise, you may return to your usual level of physical activity. If you are taking narcotic pain medication (such as Percocet), do NOT drive a car, operate machinery or make important decisions.  DIET: Return to your usual diet.  NOTIFY YOUR SURGEON IF: You have any bleeding that does not stop, any pus draining from your wound, any fever (over 100.4 F) or chills, persistent nausea/vomiting, persistent diarrhea, or if your pain is not controlled on your discharge pain medications.  FOLLOW-UP:  1. Please call to make a follow-up appointment within one week of discharge with Dr. Byrd.   2. Please follow up with your primary care physician in one week regarding your hospitalization.  3. Please follow up with your gastroenterology physician within one week of discharge from the hospital. Contact: [phone] WOUND CARE: Place clean dry gauze on your incision, and change daily. Your bandage may have small noted areas of blood on your dressing this is normal. If your dressing continues to ooze and saturate please notify your surgeon.   BATHING: Please do not submerge wound underwater. You may shower and/or sponge bathe.  ACTIVITY: Do not lift anything heavier than 10 lbs, which is about a gallon of milk. No Strenuous activity for about 6 weeks, and until cleared by your surgeon. Strenuous activity includes yoga, weight lifting, and cycling. Stairs and walking are ok. Otherwise, you may return to your usual level of physical activity. If you are taking narcotic pain medication (such as Percocet), do NOT drive a car, operate machinery or make important decisions.    NOTIFY YOUR SURGEON IF: You have any bleeding that does not stop, any pus draining from your wound, any fever (over 100.4 F) or chills, persistent nausea/vomiting, persistent diarrhea, or if your pain is not controlled on your discharge pain medications.  FOLLOW-UP:  1. Please call to make a follow-up appointment within one week of discharge with Dr. Byrd.   2. Please follow up with your primary care physician in one week regarding your hospitalization.  3. Please follow up with your gastroenterology physician within one week of discharge from the hospital. Contact: [phone]

## 2019-08-15 NOTE — DIETITIAN INITIAL EVALUATION ADULT. - OTHER INFO
Patient sleeping during time of visit, daughter at bedside asked to not disturb patient, information obtain from daughter. Daughter reports pt with decreased PO intake and appetite x 2-3 weeks. Daughter reports pt was initially told by MD to lose weight so patient began skipping lunch and including Benefiber in the diet. Daughter reports pt however started to lose his appetite and was eating very little for ~ 2 weeks. Reports at baseline pt usually is a good eater. Confirms NKFA. Denies pt with chewing/swallowing difficulty, nausea, vomiting, diarrhea, constipation. Pt takes folic acid, vitamin D and magnesium supplements.       Weights: Reports pt's UBW as ~ 230lbs, endorses large wt loss over the last few weeks, reports pt appears thinner in the face, arms. Current dosing weight is 205lbs (8/11). Weight per previous RD note noted as 231lbs (11/2016).     Since admission pt has been NPO x ~6 days, briefly placed on clear liquid diet which patient did not tolerate. Pt now NPO with NGT for suction. Output of NGT: (8/14): 250ml, (/15) 1000ml so far. Last BM 8/14. Discussed diet progression with daughter. Patient with no nutrition-related questions at this time. Made aware RD remains available as needed. Will follow-up with diet education as appropriate.

## 2019-08-15 NOTE — DISCHARGE NOTE PROVIDER - NSDCHHNEEDSERVICE_GEN_ALL_CORE
Observation and assessment/Wound care and assessment/Other, specify.../Teaching and training/Rehabilitation services

## 2019-08-15 NOTE — DISCHARGE NOTE PROVIDER - NSDCCPTREATMENT_GEN_ALL_CORE_FT
PRINCIPAL PROCEDURE  Procedure: Exploratory laparotomy  Findings and Treatment: s/p return to the OR with biopsy      SECONDARY PROCEDURE  Procedure: Biopsy, lymph node, mesenteric  Findings and Treatment: Pathology results discussed during hospital course PRINCIPAL PROCEDURE  Procedure: Exploratory laparotomy  Findings and Treatment: s/p return to the OR with biopsy. Healing well. Staples to be removed at first follow up visit in surgical outpatient office.      SECONDARY PROCEDURE  Procedure: Biopsy, lymph node, mesenteric  Findings and Treatment: Pathology results discussed during hospital course

## 2019-08-16 LAB
ALBUMIN SERPL ELPH-MCNC: 2.6 G/DL — LOW (ref 3.3–5)
ANION GAP SERPL CALC-SCNC: 12 MMOL/L — SIGNIFICANT CHANGE UP (ref 5–17)
APTT BLD: 30 SEC — SIGNIFICANT CHANGE UP (ref 27.5–36.3)
BUN SERPL-MCNC: 14 MG/DL — SIGNIFICANT CHANGE UP (ref 7–23)
CA-I BLD-SCNC: 1.27 MMOL/L — SIGNIFICANT CHANGE UP (ref 1.12–1.3)
CALCIUM SERPL-MCNC: 9.1 MG/DL — SIGNIFICANT CHANGE UP (ref 8.4–10.5)
CHLORIDE SERPL-SCNC: 104 MMOL/L — SIGNIFICANT CHANGE UP (ref 96–108)
CO2 SERPL-SCNC: 22 MMOL/L — SIGNIFICANT CHANGE UP (ref 22–31)
CREAT SERPL-MCNC: 1.33 MG/DL — HIGH (ref 0.5–1.3)
GLUCOSE SERPL-MCNC: 93 MG/DL — SIGNIFICANT CHANGE UP (ref 70–99)
HCT VFR BLD CALC: 31.5 % — LOW (ref 39–50)
HGB BLD-MCNC: 8.9 G/DL — LOW (ref 13–17)
INR BLD: 1.13 RATIO — SIGNIFICANT CHANGE UP (ref 0.88–1.16)
MAGNESIUM SERPL-MCNC: 1.7 MG/DL — SIGNIFICANT CHANGE UP (ref 1.6–2.6)
MCHC RBC-ENTMCNC: 26 PG — LOW (ref 27–34)
MCHC RBC-ENTMCNC: 28.3 GM/DL — LOW (ref 32–36)
MCV RBC AUTO: 92.1 FL — SIGNIFICANT CHANGE UP (ref 80–100)
PHOSPHATE SERPL-MCNC: 2.8 MG/DL — SIGNIFICANT CHANGE UP (ref 2.5–4.5)
PLATELET # BLD AUTO: 293 K/UL — SIGNIFICANT CHANGE UP (ref 150–400)
POTASSIUM SERPL-MCNC: 4.1 MMOL/L — SIGNIFICANT CHANGE UP (ref 3.5–5.3)
POTASSIUM SERPL-SCNC: 4.1 MMOL/L — SIGNIFICANT CHANGE UP (ref 3.5–5.3)
PREALB SERPL-MCNC: 24 MG/DL — SIGNIFICANT CHANGE UP (ref 20–40)
PROTHROM AB SERPL-ACNC: 13 SEC — SIGNIFICANT CHANGE UP (ref 10–13.1)
RBC # BLD: 3.42 M/UL — LOW (ref 4.2–5.8)
RBC # FLD: 15.1 % — HIGH (ref 10.3–14.5)
SODIUM SERPL-SCNC: 138 MMOL/L — SIGNIFICANT CHANGE UP (ref 135–145)
SURGICAL PATHOLOGY STUDY: SIGNIFICANT CHANGE UP
TRIGL SERPL-MCNC: 260 MG/DL — HIGH (ref 10–149)
WBC # BLD: 6.61 K/UL — SIGNIFICANT CHANGE UP (ref 3.8–10.5)
WBC # FLD AUTO: 6.61 K/UL — SIGNIFICANT CHANGE UP (ref 3.8–10.5)

## 2019-08-16 RX ORDER — SODIUM CHLORIDE 9 MG/ML
1000 INJECTION, SOLUTION INTRAVENOUS
Refills: 0 | Status: DISCONTINUED | OUTPATIENT
Start: 2019-08-16 | End: 2019-08-17

## 2019-08-16 RX ORDER — ACETAMINOPHEN 500 MG
1000 TABLET ORAL ONCE
Refills: 0 | Status: COMPLETED | OUTPATIENT
Start: 2019-08-16 | End: 2019-08-16

## 2019-08-16 RX ORDER — MAGNESIUM SULFATE 500 MG/ML
2 VIAL (ML) INJECTION ONCE
Refills: 0 | Status: COMPLETED | OUTPATIENT
Start: 2019-08-16 | End: 2019-08-16

## 2019-08-16 RX ADMIN — Medication 1000 MILLIGRAM(S): at 12:35

## 2019-08-16 RX ADMIN — Medication 5 MILLIGRAM(S): at 04:52

## 2019-08-16 RX ADMIN — Medication 50 GRAM(S): at 13:32

## 2019-08-16 RX ADMIN — ENOXAPARIN SODIUM 40 MILLIGRAM(S): 100 INJECTION SUBCUTANEOUS at 13:21

## 2019-08-16 RX ADMIN — SODIUM CHLORIDE 75 MILLILITER(S): 9 INJECTION, SOLUTION INTRAVENOUS at 22:06

## 2019-08-16 RX ADMIN — Medication 5 MILLIGRAM(S): at 17:42

## 2019-08-16 RX ADMIN — Medication 5 MILLIGRAM(S): at 01:34

## 2019-08-16 RX ADMIN — Medication 400 MILLIGRAM(S): at 12:20

## 2019-08-16 RX ADMIN — Medication 125 MICROGRAM(S): at 22:06

## 2019-08-16 RX ADMIN — PANTOPRAZOLE SODIUM 40 MILLIGRAM(S): 20 TABLET, DELAYED RELEASE ORAL at 13:21

## 2019-08-16 RX ADMIN — Medication 5 MILLIGRAM(S): at 13:21

## 2019-08-16 RX ADMIN — SODIUM CHLORIDE 75 MILLILITER(S): 9 INJECTION, SOLUTION INTRAVENOUS at 13:32

## 2019-08-16 NOTE — PROGRESS NOTE ADULT - SUBJECTIVE AND OBJECTIVE BOX
Patient is a 78y Male whom presented to the hospital with ckd and brigid , hyperkalemia   pt seen and examined in am transfer to floor   PAST MEDICAL & SURGICAL HISTORY:  Hyperparathyroidism  Hypertension  BRIGID (acute kidney injury)  SBO (small bowel obstruction)  Duodenal ulcer disease  No significant past surgical history      MEDICATIONS  (STANDING):  acetaminophen  IVPB .. 1000 milliGRAM(s) IV Intermittent once  acetaminophen  IVPB .. 1000 milliGRAM(s) IV Intermittent once  chlorhexidine 2% Cloths 1 Application(s) Topical daily  dextrose 5% + lactated ringers. 1000 milliLiter(s) (50 mL/Hr) IV Continuous <Continuous>  enoxaparin Injectable 40 milliGRAM(s) SubCutaneous daily  epoetin ava Injectable 58014 Unit(s) SubCutaneous <User Schedule>  levothyroxine Injectable 125 MICROGram(s) IV Push at bedtime  pantoprazole  Injectable 40 milliGRAM(s) IV Push daily  piperacillin/tazobactam IVPB.. 3.375 Gram(s) IV Intermittent every 8 hours      Allergies    Cipro (Rash)  niacin (Flushing; Rash)  Reglan (Anaphylaxis)    Intolerances        SOCIAL HISTORY:  Denies ETOh,Smoking,     FAMILY HISTORY:  Family history of non-Hodgkin's lymphoma      REVIEW OF SYSTEMS:    CONSTITUTIONAL: pos  weakness, no  fevers or chills  GENITOURINARY: No dysuria, frequency or hematuria  NEUROLOGICAL: No numbness or weakness  SKIN: dry                                                             8.9    6.61  )-----------( 293      ( 16 Aug 2019 10:04 )             31.5       CBC Full  -  ( 16 Aug 2019 10:04 )  WBC Count : 6.61 K/uL  RBC Count : 3.42 M/uL  Hemoglobin : 8.9 g/dL  Hematocrit : 31.5 %  Platelet Count - Automated : 293 K/uL  Mean Cell Volume : 92.1 fl  Mean Cell Hemoglobin : 26.0 pg  Mean Cell Hemoglobin Concentration : 28.3 gm/dL  Auto Neutrophil # : x  Auto Lymphocyte # : x  Auto Monocyte # : x  Auto Eosinophil # : x  Auto Basophil # : x  Auto Neutrophil % : x  Auto Lymphocyte % : x  Auto Monocyte % : x  Auto Eosinophil % : x  Auto Basophil % : x      08-16    138  |  104  |  14  ----------------------------<  93  4.1   |  22  |  1.33<H>    Ca    9.1      16 Aug 2019 07:13  Phos  2.8     08-16  Mg     1.7     08-16    TPro  x   /  Alb  2.6<L>  /  TBili  x   /  DBili  x   /  AST  x   /  ALT  x   /  AlkPhos  x   08-16      CAPILLARY BLOOD GLUCOSE          Vital Signs Last 24 Hrs  T(C): 36.7 (16 Aug 2019 17:19), Max: 37 (15 Aug 2019 22:52)  T(F): 98 (16 Aug 2019 17:19), Max: 98.6 (15 Aug 2019 22:52)  HR: 68 (16 Aug 2019 17:19) (63 - 78)  BP: 130/66 (16 Aug 2019 17:19) (130/66 - 166/73)  BP(mean): --  RR: 18 (16 Aug 2019 17:19) (18 - 18)  SpO2: 96% (16 Aug 2019 17:19) (94% - 97%)        PT/INR - ( 16 Aug 2019 09:51 )   PT: 13.0 sec;   INR: 1.13 ratio         PTT - ( 16 Aug 2019 09:51 )  PTT:30.0 sec          PHYSICAL EXAM:  ng tube is place    Constitutional: NAD  HEENT: conjunctive   clear   Neck:  No JVD  Respiratory: CTAB  Cardiovascular: S1 and S2  Gastrointestinal:  soft, pos abd  distened   Extremities: No peripheral edema  Neurological:  no focal deficits  Psychiatric: Normal mood, normal affect  Skin: dry   Access: Not applicable

## 2019-08-16 NOTE — CHART NOTE - NSCHARTNOTEFT_GEN_A_CORE
Nutrition Follow Up Note  Patient seen for: initial malnutrition follow-up     Chart reviewed, events noted. This is a 77 yo Male with PMHx of duodenal ulcer, duodenal stricture requiring EGD and ballooning, now with SBO s/p Ex-lap and mesenteric biopsy, preliminary follicular lymphoma POD #5,6. NGT placed yesterday for abdominal distension.    Source: patient, medical record     Diet : NPO     Patient observed lying in bed with NGT in place. Pt reports nausea has improved with NGT, states he was unable to tolerated clear liquids when he was advanced the other day. NGT output x 24hrs: (8/15); 2600ml. Last BM .      PO intake : 0%, pt NPO     Source for PO intake: per nursing flow sheets      Enteral /Parenteral Nutrition: N/A    Weight:   Daily Weight in k.9 (-15), Weight in k.6 (-), Weight in k.2 ()  % Weight Change    Pertinent Medications: MEDICATIONS  (STANDING):  dextrose 5% + sodium chloride 0.45%. 1000 milliLiter(s) (75 mL/Hr) IV Continuous <Continuous>  enoxaparin Injectable 40 milliGRAM(s) SubCutaneous daily  lactated ringers. 1000 milliLiter(s) (100 mL/Hr) IV Continuous <Continuous>  levothyroxine Injectable 125 MICROGram(s) IV Push at bedtime  metoprolol tartrate Injectable 5 milliGRAM(s) IV Push every 6 hours  pantoprazole  Injectable 40 milliGRAM(s) IV Push daily    MEDICATIONS  (PRN):  HYDROmorphone  Injectable 0.25 milliGRAM(s) IV Push every 3 hours PRN Severe Pain (7 - 10)    Pertinent Labs:  @ 07:13: Na 138, BUN 14, Cr 1.33<H>, BG 93, K+ 4.1, Phos 2.8, Mg 1.7, Alk Phos --, ALT/SGPT --, AST/SGOT --, HbA1c --      Skin per nursing documentation: free of pressure injuries   Edema: +1 left/right ankle     Estimated Needs:   [x ] no change since previous assessment  [ ] recalculated:     Previous Nutrition Diagnosis: malnutrition (severe)   Nutrition Diagnosis is: on going     New Nutrition Diagnosis: N/A     Interventions:   Recommend  1) Medical team to advance diet when medically feasible via tolerated route. Consider advancing to clear liquid, followed by low fiber diet as tolerated. If NPO status > 7 days, consider alternate means of nutrition if within pt/family GOC.   2) If diet is advanced to Clear Liquid diet, recommend addition of Ensure Clear TID.   3) RD to remain available and follow-up as medically appropriate.     Monitoring and Evaluation:     Continue to monitor Nutritional intake, Tolerance to diet prescription, weights, labs, skin integrity    RD remains available upon request and will follow up per protocol  Aida Matson RD, CDN, Pager # [phone]

## 2019-08-16 NOTE — CHART NOTE - NSCHARTNOTEFT_GEN_A_CORE
Initially consulted after frozen pathology of a mesenteric lymph node was concerning for lymphoma. Final pathology from biopsies is negative for lymphoma. Documenting reactive disease. Will sing off at this time. Please page with any further questions or concern.      Temi Dahl MD  Hematology/Oncology Fellow  Pager: [phone]

## 2019-08-16 NOTE — PROGRESS NOTE ADULT - ASSESSMENT
A/P: 77 yo Male with PMHx of duodenal ulcer, duodenal stricture requiring EGD and ballooning, now with SBO s/p Ex-lap and mesenteric biopsy, preliminary follicular lymphoma.    - Appreciate Dietitian recs: advance diet when medically feasible  - Appreciate Heme/Onc consult: awaiting final pathology.   - Nephrology following: BRIGID resolving. Cr normalized to 1.29  - NGT 2.5L output, IVF resuscitation  - Monitor GI function  - PT eval pending  - Lovenox 40mg    Trauma Surgery, [id number] A/P: 79 yo Male with PMHx of duodenal ulcer, duodenal stricture requiring EGD and ballooning, now with SBO s/p Ex-lap and mesenteric biopsy, preliminary follicular lymphoma.    - Appreciate Dietitian recs: advance diet when medically feasible  - Appreciate Heme/Onc consult: awaiting final pathology.   - Nephrology following: BRIGID resolving. Cr normalized to 1.29  - NGT 2.5L output, IVF resuscitation 1:1  - Monitor GI function  - PT eval pending  - Lovenox 40mg    Trauma Surgery, [id number] A/P: 77 yo Male with PMHx of duodenal ulcer, duodenal stricture requiring EGD and ballooning, now with SBO s/p Ex-lap and mesenteric biopsy, preliminary follicular lymphoma.    - Appreciate Dietitian recs: advance diet when medically feasible  - Final pathology from biopsies is negative for lymphoma  - Nephrology following: BRIGID resolving. Cr normalized to 1.29  - NGT 2.5L output, IVF resuscitation 1:1  - Monitor GI function  - PT eval pending  - Lovenox 40mg    Trauma Surgery, [id number]

## 2019-08-16 NOTE — PROGRESS NOTE ADULT - SUBJECTIVE AND OBJECTIVE BOX
Patient is a 78y Male whom presented to the hospital with ckd and brigid , hyperkalemia   pt seen and examined in am nad no fever   PAST MEDICAL & SURGICAL HISTORY:  Hyperparathyroidism  Hypertension  BRIGID (acute kidney injury)  SBO (small bowel obstruction)  Duodenal ulcer disease  No significant past surgical history      MEDICATIONS  (STANDING):  acetaminophen  IVPB .. 1000 milliGRAM(s) IV Intermittent once  acetaminophen  IVPB .. 1000 milliGRAM(s) IV Intermittent once  chlorhexidine 2% Cloths 1 Application(s) Topical daily  dextrose 5% + lactated ringers. 1000 milliLiter(s) (50 mL/Hr) IV Continuous <Continuous>  enoxaparin Injectable 40 milliGRAM(s) SubCutaneous daily  epoetin ava Injectable 97930 Unit(s) SubCutaneous <User Schedule>  levothyroxine Injectable 125 MICROGram(s) IV Push at bedtime  pantoprazole  Injectable 40 milliGRAM(s) IV Push daily  piperacillin/tazobactam IVPB.. 3.375 Gram(s) IV Intermittent every 8 hours      Allergies    Cipro (Rash)  niacin (Flushing; Rash)  Reglan (Anaphylaxis)    Intolerances        SOCIAL HISTORY:  Denies ETOh,Smoking,     FAMILY HISTORY:  Family history of non-Hodgkin's lymphoma      REVIEW OF SYSTEMS:    CONSTITUTIONAL: pos  weakness, no  fevers or chills  GENITOURINARY: No dysuria, frequency or hematuria  NEUROLOGICAL: No numbness or weakness  SKIN: dry                                                           8.9    6.61  )-----------( 293      ( 16 Aug 2019 10:04 )             31.5       CBC Full  -  ( 16 Aug 2019 10:04 )  WBC Count : 6.61 K/uL  RBC Count : 3.42 M/uL  Hemoglobin : 8.9 g/dL  Hematocrit : 31.5 %  Platelet Count - Automated : 293 K/uL  Mean Cell Volume : 92.1 fl  Mean Cell Hemoglobin : 26.0 pg  Mean Cell Hemoglobin Concentration : 28.3 gm/dL  Auto Neutrophil # : x  Auto Lymphocyte # : x  Auto Monocyte # : x  Auto Eosinophil # : x  Auto Basophil # : x  Auto Neutrophil % : x  Auto Lymphocyte % : x  Auto Monocyte % : x  Auto Eosinophil % : x  Auto Basophil % : x      08-16    138  |  104  |  14  ----------------------------<  93  4.1   |  22  |  1.33<H>    Ca    9.1      16 Aug 2019 07:13  Phos  2.8     08-16  Mg     1.7     08-16    TPro  x   /  Alb  2.6<L>  /  TBili  x   /  DBili  x   /  AST  x   /  ALT  x   /  AlkPhos  x   08-16      CAPILLARY BLOOD GLUCOSE          Vital Signs Last 24 Hrs  T(C): 36.6 (16 Aug 2019 13:11), Max: 37 (15 Aug 2019 22:52)  T(F): 97.9 (16 Aug 2019 13:11), Max: 98.6 (15 Aug 2019 22:52)  HR: 67 (16 Aug 2019 13:11) (63 - 78)  BP: 139/69 (16 Aug 2019 13:11) (136/59 - 166/73)  BP(mean): --  RR: 18 (16 Aug 2019 13:11) (18 - 18)  SpO2: 97% (16 Aug 2019 13:11) (94% - 97%)        PT/INR - ( 16 Aug 2019 09:51 )   PT: 13.0 sec;   INR: 1.13 ratio         PTT - ( 16 Aug 2019 09:51 )  PTT:30.0 sec        PHYSICAL EXAM:  ng tube is place    Constitutional: NAD  HEENT: conjunctive   clear   Neck:  No JVD  Respiratory: CTAB  Cardiovascular: S1 and S2  Gastrointestinal:  soft, pos abd  distened   Extremities: No peripheral edema  Neurological:  no focal deficits  Psychiatric: Normal mood, normal affect  Skin: dry   Access: Not applicable

## 2019-08-16 NOTE — PROGRESS NOTE ADULT - SUBJECTIVE AND OBJECTIVE BOX
Trauma Surgery Daily Progress Note     77 yo Male with PMHx of duodenal ulcer, duodenal stricture requiring EGD and ballooning, now with SBO s/p Ex-lap and mesenteric biopsy, preliminary follicular lymphoma  POD #5,6  --------------------------------------------------------------------------------------------------------------------  SUBJECTIVE / 24H EVENTS  Pt seen on AM rounds. RASHMI overnight  NGT replaced yesterday due to AB pain and distension  NGT output 2.5L / 24H  Pt had BM overnight. Denies N/V      OBJECTIVE:  Vital Signs Last 24 Hrs  T(C): 37 (16 Aug 2019 04:18), Max: 37 (15 Aug 2019 22:52)  T(F): 98.6 (16 Aug 2019 04:18), Max: 98.6 (15 Aug 2019 22:52)  HR: 63 (16 Aug 2019 04:18) (63 - 78)  BP: 140/73 (16 Aug 2019 04:18) (140/73 - 166/75)  BP(mean): --  RR: 18 (16 Aug 2019 04:18) (18 - 18)  SpO2: 95% (16 Aug 2019 04:18) (94% - 95%)      PHYSICAL EXAM:  Gen: NAD, A&O x 3  Lungs: RRR, normal WOB  Abd: soft, significantly distended, NT, incision with staples without surrounding erythema  Ext: no calf tenderness or swelling     ** I&O's **    15 Aug 2019 07:01  -  16 Aug 2019 07:00  --------------------------------------------------------  IN:    dextrose 5% + sodium chloride 0.45%.: 1800 mL    Lactated Ringers IV Bolus: 500 mL    Oral Fluid: 240 mL  Total IN: 2540 mL    OUT:    Nasoenteral Tube: 2600 mL    Stool: 1 mL    Voided: 725 mL  Total OUT: 3326 mL    Total NET: -786 mL          ** LABS **                        8.6    7.5   )-----------( 342      ( 14 Aug 2019 10:30 )             27.4     15 Aug 2019 07:25    138    |  105    |  16     ----------------------------<  99     4.2     |  23     |  1.29     Ca    9.0        15 Aug 2019 07:25  Phos  2.8       15 Aug 2019 07:25  Mg     1.7       15 Aug 2019 07:25        CAPILLARY BLOOD GLUCOSE                    MEDICATIONS  (STANDING):  dextrose 5% + sodium chloride 0.45%. 1000 milliLiter(s) (75 mL/Hr) IV Continuous <Continuous>  enoxaparin Injectable 40 milliGRAM(s) SubCutaneous daily  lactated ringers. 1000 milliLiter(s) (100 mL/Hr) IV Continuous <Continuous>  levothyroxine Injectable 125 MICROGram(s) IV Push at bedtime  metoprolol tartrate Injectable 5 milliGRAM(s) IV Push every 6 hours  pantoprazole  Injectable 40 milliGRAM(s) IV Push daily    MEDICATIONS  (PRN):  HYDROmorphone  Injectable 0.25 milliGRAM(s) IV Push every 3 hours PRN Severe Pain (7 - 10)

## 2019-08-17 LAB
ANION GAP SERPL CALC-SCNC: 9 MMOL/L — SIGNIFICANT CHANGE UP (ref 5–17)
BUN SERPL-MCNC: 13 MG/DL — SIGNIFICANT CHANGE UP (ref 7–23)
CALCIUM SERPL-MCNC: 8.6 MG/DL — SIGNIFICANT CHANGE UP (ref 8.4–10.5)
CHLORIDE SERPL-SCNC: 104 MMOL/L — SIGNIFICANT CHANGE UP (ref 96–108)
CO2 SERPL-SCNC: 24 MMOL/L — SIGNIFICANT CHANGE UP (ref 22–31)
CREAT SERPL-MCNC: 1.25 MG/DL — SIGNIFICANT CHANGE UP (ref 0.5–1.3)
GLUCOSE SERPL-MCNC: 98 MG/DL — SIGNIFICANT CHANGE UP (ref 70–99)
HCT VFR BLD CALC: 28 % — LOW (ref 39–50)
HGB BLD-MCNC: 8.2 G/DL — LOW (ref 13–17)
MAGNESIUM SERPL-MCNC: 1.8 MG/DL — SIGNIFICANT CHANGE UP (ref 1.6–2.6)
MCHC RBC-ENTMCNC: 27.1 PG — SIGNIFICANT CHANGE UP (ref 27–34)
MCHC RBC-ENTMCNC: 29.3 GM/DL — LOW (ref 32–36)
MCV RBC AUTO: 92.4 FL — SIGNIFICANT CHANGE UP (ref 80–100)
PHOSPHATE SERPL-MCNC: 2.5 MG/DL — SIGNIFICANT CHANGE UP (ref 2.5–4.5)
PLATELET # BLD AUTO: 278 K/UL — SIGNIFICANT CHANGE UP (ref 150–400)
POTASSIUM SERPL-MCNC: 4.1 MMOL/L — SIGNIFICANT CHANGE UP (ref 3.5–5.3)
POTASSIUM SERPL-SCNC: 4.1 MMOL/L — SIGNIFICANT CHANGE UP (ref 3.5–5.3)
RBC # BLD: 3.03 M/UL — LOW (ref 4.2–5.8)
RBC # FLD: 15.4 % — HIGH (ref 10.3–14.5)
SODIUM SERPL-SCNC: 137 MMOL/L — SIGNIFICANT CHANGE UP (ref 135–145)
WBC # BLD: 5.79 K/UL — SIGNIFICANT CHANGE UP (ref 3.8–10.5)
WBC # FLD AUTO: 5.79 K/UL — SIGNIFICANT CHANGE UP (ref 3.8–10.5)

## 2019-08-17 RX ADMIN — ENOXAPARIN SODIUM 40 MILLIGRAM(S): 100 INJECTION SUBCUTANEOUS at 11:55

## 2019-08-17 RX ADMIN — Medication 5 MILLIGRAM(S): at 17:43

## 2019-08-17 RX ADMIN — SODIUM CHLORIDE 75 MILLILITER(S): 9 INJECTION, SOLUTION INTRAVENOUS at 11:56

## 2019-08-17 RX ADMIN — Medication 5 MILLIGRAM(S): at 11:55

## 2019-08-17 RX ADMIN — Medication 5 MILLIGRAM(S): at 01:05

## 2019-08-17 RX ADMIN — SODIUM CHLORIDE 100 MILLILITER(S): 9 INJECTION, SOLUTION INTRAVENOUS at 06:04

## 2019-08-17 RX ADMIN — Medication 125 MICROGRAM(S): at 22:08

## 2019-08-17 RX ADMIN — SODIUM CHLORIDE 75 MILLILITER(S): 9 INJECTION, SOLUTION INTRAVENOUS at 06:05

## 2019-08-17 RX ADMIN — Medication 5 MILLIGRAM(S): at 06:04

## 2019-08-17 RX ADMIN — PANTOPRAZOLE SODIUM 40 MILLIGRAM(S): 20 TABLET, DELAYED RELEASE ORAL at 11:55

## 2019-08-17 NOTE — PROGRESS NOTE ADULT - ASSESSMENT
A/P: 79 yo Male with PMHx of duodenal ulcer, duodenal stricture requiring EGD and ballooning, now with SBO s/p Ex-lap and mesenteric biopsy, preliminary follicular lymphoma.    - Clamp trial  -- Advance to CLD with Ensure Clear TID  -- Maintain NGT, of suction unless nauseous or unable to tolerate  - Final pathology from biopsies is negative for lymphoma  - Nephrology following: BRIGID resolving. Cr normalized to 1.25  - Monitor GI function  - PT eval pending  - Lovenox 40mg    Trauma Surgery, [id number]

## 2019-08-17 NOTE — PROGRESS NOTE ADULT - SUBJECTIVE AND OBJECTIVE BOX
Patient is a 78y Male whom presented to the hospital with ckd and brigid , hyperkalemia   pt seen and examined in am nad no fever    PAST MEDICAL & SURGICAL HISTORY:  Hyperparathyroidism  Hypertension  BRIGID (acute kidney injury)  SBO (small bowel obstruction)  Duodenal ulcer disease  No significant past surgical history      MEDICATIONS  (STANDING):  acetaminophen  IVPB .. 1000 milliGRAM(s) IV Intermittent once  acetaminophen  IVPB .. 1000 milliGRAM(s) IV Intermittent once  chlorhexidine 2% Cloths 1 Application(s) Topical daily  dextrose 5% + lactated ringers. 1000 milliLiter(s) (50 mL/Hr) IV Continuous <Continuous>  enoxaparin Injectable 40 milliGRAM(s) SubCutaneous daily  epoetin ava Injectable 83467 Unit(s) SubCutaneous <User Schedule>  levothyroxine Injectable 125 MICROGram(s) IV Push at bedtime  pantoprazole  Injectable 40 milliGRAM(s) IV Push daily  piperacillin/tazobactam IVPB.. 3.375 Gram(s) IV Intermittent every 8 hours      Allergies    Cipro (Rash)  niacin (Flushing; Rash)  Reglan (Anaphylaxis)    Intolerances        SOCIAL HISTORY:  Denies ETOh,Smoking,     FAMILY HISTORY:  Family history of non-Hodgkin's lymphoma      REVIEW OF SYSTEMS:    CONSTITUTIONAL: pos  weakness, no  fevers or chills  GENITOURINARY: No dysuria, frequency or hematuria  NEUROLOGICAL: No numbness or weakness  SKIN: dry                                                                                  8.2    5.79  )-----------( 278      ( 17 Aug 2019 10:36 )             28.0       CBC Full  -  ( 17 Aug 2019 10:36 )  WBC Count : 5.79 K/uL  RBC Count : 3.03 M/uL  Hemoglobin : 8.2 g/dL  Hematocrit : 28.0 %  Platelet Count - Automated : 278 K/uL  Mean Cell Volume : 92.4 fl  Mean Cell Hemoglobin : 27.1 pg  Mean Cell Hemoglobin Concentration : 29.3 gm/dL  Auto Neutrophil # : x  Auto Lymphocyte # : x  Auto Monocyte # : x  Auto Eosinophil # : x  Auto Basophil # : x  Auto Neutrophil % : x  Auto Lymphocyte % : x  Auto Monocyte % : x  Auto Eosinophil % : x  Auto Basophil % : x      08-17    137  |  104  |  13  ----------------------------<  98  4.1   |  24  |  1.25    Ca    8.6      17 Aug 2019 07:05  Phos  2.5     08-17  Mg     1.8     08-17    TPro  x   /  Alb  2.6<L>  /  TBili  x   /  DBili  x   /  AST  x   /  ALT  x   /  AlkPhos  x   08-16      CAPILLARY BLOOD GLUCOSE          Vital Signs Last 24 Hrs  T(C): 36.7 (17 Aug 2019 17:36), Max: 37.2 (17 Aug 2019 06:05)  T(F): 98 (17 Aug 2019 17:36), Max: 98.9 (17 Aug 2019 06:05)  HR: 72 (17 Aug 2019 17:36) (61 - 78)  BP: 133/65 (17 Aug 2019 17:36) (120/69 - 162/73)  BP(mean): --  RR: 18 (17 Aug 2019 17:36) (18 - 18)  SpO2: 97% (17 Aug 2019 17:36) (95% - 98%)        PT/INR - ( 16 Aug 2019 09:51 )   PT: 13.0 sec;   INR: 1.13 ratio         PTT - ( 16 Aug 2019 09:51 )  PTT:30.0 sec        PHYSICAL EXAM:  ng tube is place    Constitutional: NAD  HEENT: conjunctive   clear   Neck:  No JVD  Respiratory: CTAB  Cardiovascular: S1 and S2  Gastrointestinal:  soft, pos abd  distened   Extremities: No peripheral edema  Neurological:  no focal deficits  Psychiatric: Normal mood, normal affect  Skin: dry   Access: Not applicable

## 2019-08-17 NOTE — PROGRESS NOTE ADULT - ATTENDING COMMENTS
Pt seen and examined on 8/17, agree with above. Pt feeling much better today, having flatus and BMs. NGT with 550cc/ 24 hours. Urine output 1.4L. BRIGID continues to improve. 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     SBO/ ileus seems to be improving, although pt is very nervous about removing NGT. He asked the team if he can start CLD while NGT remains in place, just in case he feels nauseous. I explained that leaving the ngt in and off suction while eating can increase risk of aspiration, but as long as he understands this and will remain upright while drinking and call if he feels nauseous so the tube can be reconnected, we can try this.

## 2019-08-17 NOTE — PROGRESS NOTE ADULT - SUBJECTIVE AND OBJECTIVE BOX
ACS Surgery Progress Note     77 yo Male with PMHx of duodenal ulcer, duodenal stricture requiring EGD and ballooning, now with SBO s/p Ex-lap and mesenteric biopsy, preliminary follicular lymphoma  POD #6,7  --------------------------------------------------------------------------------------------------------------------  SUBJECTIVE / 24H EVENTS  Pt seen on AM rounds. RASHMI overnight. NGT 200mL output over the last shift. Pt had BM overnight. Denies N/V    MEDICATIONS  (STANDING):  dextrose 5% + sodium chloride 0.45%. 1000 milliLiter(s) (75 mL/Hr) IV Continuous <Continuous>  enoxaparin Injectable 40 milliGRAM(s) SubCutaneous daily  levothyroxine Injectable 125 MICROGram(s) IV Push at bedtime  metoprolol tartrate Injectable 5 milliGRAM(s) IV Push every 6 hours  pantoprazole  Injectable 40 milliGRAM(s) IV Push daily      Vital Signs:  Vital Signs Last 24 Hrs  T(C): 36.9 (17 Aug 2019 09:18), Max: 37.2 (17 Aug 2019 06:05)  T(F): 98.5 (17 Aug 2019 09:18), Max: 98.9 (17 Aug 2019 06:05)  HR: 62 (17 Aug 2019 11:45) (61 - 74)  BP: 120/69 (17 Aug 2019 11:45) (120/69 - 162/73)  BP(mean): --  RR: 18 (17 Aug 2019 11:45) (18 - 18)  SpO2: 96% (17 Aug 2019 11:45) (95% - 98%)      I&O's Detail    16 Aug 2019 07:01  -  17 Aug 2019 07:00  --------------------------------------------------------  IN:    dextrose 5% + sodium chloride 0.45%.: 1650 mL    lactated ringers.: 100 mL    Solution: 50 mL    Solution: 100 mL  Total IN: 1900 mL    OUT:    Nasoenteral Tube: 650 mL    Voided: 1350 mL  Total OUT: 2000 mL    Total NET: -100 mL      17 Aug 2019 07:01  -  17 Aug 2019 13:07  --------------------------------------------------------  IN:  Total IN: 0 mL    OUT:    Voided: 550 mL  Total OUT: 550 mL    Total NET: -550 mL        PHYSICAL EXAM:  Gen: NAD, A&O x 3  Lungs: RRR, normal WOB  Abd: soft, significantly distended, NT, incision with staples without surrounding erythema  Ext: no calf tenderness or swelling       Labs:    08-17    137  |  104  |  13  ----------------------------<  98  4.1   |  24  |  1.25    Ca    8.6      17 Aug 2019 07:05  Phos  2.5     08-17  Mg     1.8     08-17    TPro  x   /  Alb  2.6<L>  /  TBili  x   /  DBili  x   /  AST  x   /  ALT  x   /  AlkPhos  x   08-16    LIVER FUNCTIONS - ( 16 Aug 2019 07:13 )  Alb: 2.6 g/dL / Pro: x     / ALK PHOS: x     / ALT: x     / AST: x     / GGT: x                                 8.2    5.79  )-----------( 278      ( 17 Aug 2019 10:36 )             28.0     PT/INR - ( 16 Aug 2019 09:51 )   PT: 13.0 sec;   INR: 1.13 ratio         PTT - ( 16 Aug 2019 09:51 )  PTT:30.0 sec

## 2019-08-18 LAB
ANION GAP SERPL CALC-SCNC: 10 MMOL/L — SIGNIFICANT CHANGE UP (ref 5–17)
BUN SERPL-MCNC: 10 MG/DL — SIGNIFICANT CHANGE UP (ref 7–23)
CALCIUM SERPL-MCNC: 8.5 MG/DL — SIGNIFICANT CHANGE UP (ref 8.4–10.5)
CHLORIDE SERPL-SCNC: 103 MMOL/L — SIGNIFICANT CHANGE UP (ref 96–108)
CO2 SERPL-SCNC: 23 MMOL/L — SIGNIFICANT CHANGE UP (ref 22–31)
CREAT SERPL-MCNC: 1.14 MG/DL — SIGNIFICANT CHANGE UP (ref 0.5–1.3)
GLUCOSE SERPL-MCNC: 103 MG/DL — HIGH (ref 70–99)
HCT VFR BLD CALC: 27.7 % — LOW (ref 39–50)
HGB BLD-MCNC: 8.3 G/DL — LOW (ref 13–17)
MAGNESIUM SERPL-MCNC: 1.6 MG/DL — SIGNIFICANT CHANGE UP (ref 1.6–2.6)
MCHC RBC-ENTMCNC: 27.2 PG — SIGNIFICANT CHANGE UP (ref 27–34)
MCHC RBC-ENTMCNC: 30 GM/DL — LOW (ref 32–36)
MCV RBC AUTO: 90.8 FL — SIGNIFICANT CHANGE UP (ref 80–100)
PHOSPHATE SERPL-MCNC: 2.3 MG/DL — LOW (ref 2.5–4.5)
PLATELET # BLD AUTO: 254 K/UL — SIGNIFICANT CHANGE UP (ref 150–400)
POTASSIUM SERPL-MCNC: 3.9 MMOL/L — SIGNIFICANT CHANGE UP (ref 3.5–5.3)
POTASSIUM SERPL-SCNC: 3.9 MMOL/L — SIGNIFICANT CHANGE UP (ref 3.5–5.3)
RBC # BLD: 3.05 M/UL — LOW (ref 4.2–5.8)
RBC # FLD: 15.6 % — HIGH (ref 10.3–14.5)
SODIUM SERPL-SCNC: 136 MMOL/L — SIGNIFICANT CHANGE UP (ref 135–145)
WBC # BLD: 6.69 K/UL — SIGNIFICANT CHANGE UP (ref 3.8–10.5)
WBC # FLD AUTO: 6.69 K/UL — SIGNIFICANT CHANGE UP (ref 3.8–10.5)

## 2019-08-18 PROCEDURE — 93010 ELECTROCARDIOGRAM REPORT: CPT

## 2019-08-18 RX ORDER — POTASSIUM PHOSPHATE, MONOBASIC POTASSIUM PHOSPHATE, DIBASIC 236; 224 MG/ML; MG/ML
15 INJECTION, SOLUTION INTRAVENOUS ONCE
Refills: 0 | Status: COMPLETED | OUTPATIENT
Start: 2019-08-18 | End: 2019-08-18

## 2019-08-18 RX ORDER — CALCIUM CARBONATE 500(1250)
1 TABLET ORAL THREE TIMES A DAY
Refills: 0 | Status: DISCONTINUED | OUTPATIENT
Start: 2019-08-18 | End: 2019-08-22

## 2019-08-18 RX ORDER — MAGNESIUM SULFATE 500 MG/ML
2 VIAL (ML) INJECTION ONCE
Refills: 0 | Status: COMPLETED | OUTPATIENT
Start: 2019-08-18 | End: 2019-08-18

## 2019-08-18 RX ORDER — CALCIUM CARBONATE 500(1250)
1 TABLET ORAL ONCE
Refills: 0 | Status: COMPLETED | OUTPATIENT
Start: 2019-08-18 | End: 2019-08-18

## 2019-08-18 RX ADMIN — Medication 30 MILLILITER(S): at 18:05

## 2019-08-18 RX ADMIN — POTASSIUM PHOSPHATE, MONOBASIC POTASSIUM PHOSPHATE, DIBASIC 62.5 MILLIMOLE(S): 236; 224 INJECTION, SOLUTION INTRAVENOUS at 11:09

## 2019-08-18 RX ADMIN — Medication 50 GRAM(S): at 11:09

## 2019-08-18 RX ADMIN — Medication 5 MILLIGRAM(S): at 05:16

## 2019-08-18 RX ADMIN — Medication 1 TABLET(S): at 22:15

## 2019-08-18 RX ADMIN — ENOXAPARIN SODIUM 40 MILLIGRAM(S): 100 INJECTION SUBCUTANEOUS at 11:10

## 2019-08-18 RX ADMIN — Medication 1 TABLET(S): at 09:05

## 2019-08-18 RX ADMIN — Medication 5 MILLIGRAM(S): at 11:11

## 2019-08-18 RX ADMIN — PANTOPRAZOLE SODIUM 40 MILLIGRAM(S): 20 TABLET, DELAYED RELEASE ORAL at 08:19

## 2019-08-18 RX ADMIN — Medication 5 MILLIGRAM(S): at 17:42

## 2019-08-18 RX ADMIN — Medication 1 TABLET(S): at 16:36

## 2019-08-18 RX ADMIN — SODIUM CHLORIDE 75 MILLILITER(S): 9 INJECTION, SOLUTION INTRAVENOUS at 21:16

## 2019-08-18 RX ADMIN — Medication 125 MICROGRAM(S): at 21:16

## 2019-08-18 RX ADMIN — SODIUM CHLORIDE 75 MILLILITER(S): 9 INJECTION, SOLUTION INTRAVENOUS at 11:11

## 2019-08-18 RX ADMIN — Medication 5 MILLIGRAM(S): at 00:36

## 2019-08-18 NOTE — PROVIDER CONTACT NOTE (OTHER) - RECOMMENDATIONS
pt with NGT on clears, pt educated on sitting upright while having liquids and frequent ambulation. protonix administered early for relief. pt unhappy that tums cannot be administered on prompt
replace ngt?

## 2019-08-18 NOTE — PROVIDER CONTACT NOTE (OTHER) - SITUATION
pt requesting frequent doses of tums for recurrent heart burn. pt educated that medications needed to be ordered by an MD before RN can administer medications,

## 2019-08-18 NOTE — PROGRESS NOTE ADULT - ASSESSMENT
A/P: 77 yo Male with PMHx of duodenal ulcer, duodenal stricture requiring EGD and ballooning, now with SBO s/p Ex-lap and mesenteric biopsy, preliminary follicular lymphoma, found to be negative on final path. Awaiting return of bowel function    - Clamp trial - redo and if <250cc/4hr will remove tube and continue CLD  - Nephrology following: BRIGID resolving. Cr normalized   - Monitor GI function  - PT eval pending  - Lovenox 40mg    Trauma Surgery, [id number]

## 2019-08-18 NOTE — PROGRESS NOTE ADULT - SUBJECTIVE AND OBJECTIVE BOX
ACS Surgery Progress Note     77 yo Male with PMHx of duodenal ulcer, duodenal stricture requiring EGD and ballooning, now with SBO s/p Ex-lap and mesenteric biopsy, preliminary follicular lymphoma  POD #6,7  --------------------------------------------------------------------------------------------------------------------  SUBJECTIVE / 24H EVENTS  Pt seen on AM rounds. Overnight had episode of incontinence with diarrhea as nursing was slow to help him get up for the bathroom. Otherwise no acute events. This NGT clamped and still in place, pt says his belly feels big but no n/v.       MEDICATIONS  (STANDING):  dextrose 5% + sodium chloride 0.45%. 1000 milliLiter(s) (75 mL/Hr) IV Continuous <Continuous>  enoxaparin Injectable 40 milliGRAM(s) SubCutaneous daily  levothyroxine Injectable 125 MICROGram(s) IV Push at bedtime  metoprolol tartrate Injectable 5 milliGRAM(s) IV Push every 6 hours  pantoprazole  Injectable 40 milliGRAM(s) IV Push daily      Vital Signs Last 24 Hrs  T(C): 36.8 (18 Aug 2019 09:19), Max: 37.2 (17 Aug 2019 21:48)  T(F): 98.2 (18 Aug 2019 09:19), Max: 98.9 (17 Aug 2019 21:48)  HR: 69 (18 Aug 2019 09:19) (62 - 78)  BP: 126/66 (18 Aug 2019 09:19) (120/69 - 162/74)  BP(mean): --  RR: 18 (18 Aug 2019 09:19) (17 - 18)  SpO2: 96% (18 Aug 2019 09:19) (96% - 97%)      PHYSICAL EXAM:  Gen: NAD, A&O x 3  Lungs: RRR, normal WOB  Abd: soft, distended, NT, incision with staples without surrounding erythema  Ext: no calf tenderness or swelling       I&O's Detail    17 Aug 2019 07:01  -  18 Aug 2019 07:00  --------------------------------------------------------  IN:    dextrose 5% + sodium chloride 0.45%.: 1800 mL    Oral Fluid: 180 mL  Total IN: 1980 mL    OUT:    Voided: 1550 mL  Total OUT: 1550 mL    Total NET: 430 mL      18 Aug 2019 07:01  -  18 Aug 2019 10:08  --------------------------------------------------------  IN:    Oral Fluid: 90 mL  Total IN: 90 mL    OUT:  Total OUT: 0 mL    Total NET: 90 mL        Labs:  08-18    136  |  103  |  10  ----------------------------<  103<H>  3.9   |  23  |  1.14    Ca    8.5      18 Aug 2019 07:28  Phos  2.3     08-18  Mg     1.6     08-18                          8.2    5.79  )-----------( 278      ( 17 Aug 2019 10:36 )             28.0

## 2019-08-18 NOTE — PROVIDER CONTACT NOTE (OTHER) - ACTION/TREATMENT ORDERED:
MD cuauhtemoc mendez at bedside to discuss with patient. will continue to monitor.
MD Garcia notified and aware, @ bedside w/pt and daughter. Will f/u with abd XR.

## 2019-08-18 NOTE — PROGRESS NOTE ADULT - SUBJECTIVE AND OBJECTIVE BOX
Patient is a 78y Male whom presented to the hospital with ckd and brigid , hyperkalemia   pt seen and examined in am nad no fever    PAST MEDICAL & SURGICAL HISTORY:  Hyperparathyroidism  Hypertension  BRIGID (acute kidney injury)  SBO (small bowel obstruction)  Duodenal ulcer disease  No significant past surgical history      MEDICATIONS  (STANDING):  acetaminophen  IVPB .. 1000 milliGRAM(s) IV Intermittent once  acetaminophen  IVPB .. 1000 milliGRAM(s) IV Intermittent once  chlorhexidine 2% Cloths 1 Application(s) Topical daily  dextrose 5% + lactated ringers. 1000 milliLiter(s) (50 mL/Hr) IV Continuous <Continuous>  enoxaparin Injectable 40 milliGRAM(s) SubCutaneous daily  epoetin ava Injectable 99288 Unit(s) SubCutaneous <User Schedule>  levothyroxine Injectable 125 MICROGram(s) IV Push at bedtime  pantoprazole  Injectable 40 milliGRAM(s) IV Push daily  piperacillin/tazobactam IVPB.. 3.375 Gram(s) IV Intermittent every 8 hours      Allergies    Cipro (Rash)  niacin (Flushing; Rash)  Reglan (Anaphylaxis)    Intolerances        SOCIAL HISTORY:  Denies ETOh,Smoking,     FAMILY HISTORY:  Family history of non-Hodgkin's lymphoma      REVIEW OF SYSTEMS:    CONSTITUTIONAL: pos  weakness, no  fevers or chills  GENITOURINARY: No dysuria, frequency or hematuria  NEUROLOGICAL: No numbness or weakness  SKIN: dry                                                                                                        8.3    6.69  )-----------( 254      ( 18 Aug 2019 10:33 )             27.7       CBC Full  -  ( 18 Aug 2019 10:33 )  WBC Count : 6.69 K/uL  RBC Count : 3.05 M/uL  Hemoglobin : 8.3 g/dL  Hematocrit : 27.7 %  Platelet Count - Automated : 254 K/uL  Mean Cell Volume : 90.8 fl  Mean Cell Hemoglobin : 27.2 pg  Mean Cell Hemoglobin Concentration : 30.0 gm/dL  Auto Neutrophil # : x  Auto Lymphocyte # : x  Auto Monocyte # : x  Auto Eosinophil # : x  Auto Basophil # : x  Auto Neutrophil % : x  Auto Lymphocyte % : x  Auto Monocyte % : x  Auto Eosinophil % : x  Auto Basophil % : x      08-18    136  |  103  |  10  ----------------------------<  103<H>  3.9   |  23  |  1.14    Ca    8.5      18 Aug 2019 07:28  Phos  2.3     08-18  Mg     1.6     08-18        CAPILLARY BLOOD GLUCOSE          Vital Signs Last 24 Hrs  T(C): 36.8 (18 Aug 2019 09:19), Max: 37.2 (17 Aug 2019 21:48)  T(F): 98.2 (18 Aug 2019 09:19), Max: 98.9 (17 Aug 2019 21:48)  HR: 74 (18 Aug 2019 11:11) (68 - 78)  BP: 146/73 (18 Aug 2019 11:11) (126/66 - 162/74)  BP(mean): --  RR: 18 (18 Aug 2019 09:19) (17 - 18)  SpO2: 96% (18 Aug 2019 09:19) (96% - 97%)            PHYSICAL EXAM:  ng tube is place    Constitutional: NAD  HEENT: conjunctive   clear   Neck:  No JVD  Respiratory: CTAB  Cardiovascular: S1 and S2  Gastrointestinal:  soft, pos abd  distened   Extremities: No peripheral edema  Neurological:  no focal deficits  Psychiatric: Normal mood, normal affect  Skin: dry   Access: Not applicable

## 2019-08-19 DIAGNOSIS — K21.9 GASTRO-ESOPHAGEAL REFLUX DISEASE WITHOUT ESOPHAGITIS: ICD-10-CM

## 2019-08-19 DIAGNOSIS — R59.1 GENERALIZED ENLARGED LYMPH NODES: ICD-10-CM

## 2019-08-19 DIAGNOSIS — I10 ESSENTIAL (PRIMARY) HYPERTENSION: ICD-10-CM

## 2019-08-19 DIAGNOSIS — E03.9 HYPOTHYROIDISM, UNSPECIFIED: ICD-10-CM

## 2019-08-19 LAB
ANION GAP SERPL CALC-SCNC: 9 MMOL/L — SIGNIFICANT CHANGE UP (ref 5–17)
BUN SERPL-MCNC: 8 MG/DL — SIGNIFICANT CHANGE UP (ref 7–23)
CALCIUM SERPL-MCNC: 8.9 MG/DL — SIGNIFICANT CHANGE UP (ref 8.4–10.5)
CHLORIDE SERPL-SCNC: 99 MMOL/L — SIGNIFICANT CHANGE UP (ref 96–108)
CO2 SERPL-SCNC: 24 MMOL/L — SIGNIFICANT CHANGE UP (ref 22–31)
CREAT SERPL-MCNC: 1.22 MG/DL — SIGNIFICANT CHANGE UP (ref 0.5–1.3)
GLUCOSE SERPL-MCNC: 102 MG/DL — HIGH (ref 70–99)
HCT VFR BLD CALC: 28.8 % — LOW (ref 39–50)
HGB BLD-MCNC: 8.6 G/DL — LOW (ref 13–17)
MAGNESIUM SERPL-MCNC: 1.9 MG/DL — SIGNIFICANT CHANGE UP (ref 1.6–2.6)
MCHC RBC-ENTMCNC: 27.4 PG — SIGNIFICANT CHANGE UP (ref 27–34)
MCHC RBC-ENTMCNC: 29.9 GM/DL — LOW (ref 32–36)
MCV RBC AUTO: 91.7 FL — SIGNIFICANT CHANGE UP (ref 80–100)
PHOSPHATE SERPL-MCNC: 2.6 MG/DL — SIGNIFICANT CHANGE UP (ref 2.5–4.5)
PLATELET # BLD AUTO: 255 K/UL — SIGNIFICANT CHANGE UP (ref 150–400)
POTASSIUM SERPL-MCNC: 3.9 MMOL/L — SIGNIFICANT CHANGE UP (ref 3.5–5.3)
POTASSIUM SERPL-SCNC: 3.9 MMOL/L — SIGNIFICANT CHANGE UP (ref 3.5–5.3)
RBC # BLD: 3.14 M/UL — LOW (ref 4.2–5.8)
RBC # FLD: 16.3 % — HIGH (ref 10.3–14.5)
SODIUM SERPL-SCNC: 132 MMOL/L — LOW (ref 135–145)
WBC # BLD: 5.78 K/UL — SIGNIFICANT CHANGE UP (ref 3.8–10.5)
WBC # FLD AUTO: 5.78 K/UL — SIGNIFICANT CHANGE UP (ref 3.8–10.5)

## 2019-08-19 PROCEDURE — 99223 1ST HOSP IP/OBS HIGH 75: CPT | Mod: GC

## 2019-08-19 PROCEDURE — 99223 1ST HOSP IP/OBS HIGH 75: CPT

## 2019-08-19 RX ORDER — SUCRALFATE 1 G
1 TABLET ORAL ONCE
Refills: 0 | Status: COMPLETED | OUTPATIENT
Start: 2019-08-19 | End: 2019-08-19

## 2019-08-19 RX ORDER — SUCRALFATE 1 G
1 TABLET ORAL
Refills: 0 | Status: DISCONTINUED | OUTPATIENT
Start: 2019-08-19 | End: 2019-08-22

## 2019-08-19 RX ADMIN — Medication 30 MILLILITER(S): at 12:31

## 2019-08-19 RX ADMIN — ENOXAPARIN SODIUM 40 MILLIGRAM(S): 100 INJECTION SUBCUTANEOUS at 12:30

## 2019-08-19 RX ADMIN — Medication 62.5 MILLIMOLE(S): at 15:05

## 2019-08-19 RX ADMIN — Medication 5 MILLIGRAM(S): at 00:42

## 2019-08-19 RX ADMIN — Medication 125 MICROGRAM(S): at 22:18

## 2019-08-19 RX ADMIN — PANTOPRAZOLE SODIUM 40 MILLIGRAM(S): 20 TABLET, DELAYED RELEASE ORAL at 12:30

## 2019-08-19 RX ADMIN — SODIUM CHLORIDE 50 MILLILITER(S): 9 INJECTION, SOLUTION INTRAVENOUS at 12:31

## 2019-08-19 RX ADMIN — Medication 1 GRAM(S): at 12:30

## 2019-08-19 RX ADMIN — Medication 5 MILLIGRAM(S): at 17:42

## 2019-08-19 RX ADMIN — Medication 5 MILLIGRAM(S): at 12:31

## 2019-08-19 RX ADMIN — Medication 1 GRAM(S): at 17:42

## 2019-08-19 RX ADMIN — Medication 1 TABLET(S): at 12:31

## 2019-08-19 RX ADMIN — SODIUM CHLORIDE 75 MILLILITER(S): 9 INJECTION, SOLUTION INTRAVENOUS at 05:56

## 2019-08-19 RX ADMIN — Medication 30 MILLILITER(S): at 02:48

## 2019-08-19 RX ADMIN — Medication 5 MILLIGRAM(S): at 05:56

## 2019-08-19 NOTE — CHART NOTE - NSCHARTNOTEFT_GEN_A_CORE
Nutrition Follow Up Note  Patient seen for: malnutrition follow-up     Chart reviewed, events noted. This is a "79 yo Male with PMHx of duodenal ulcer, duodenal stricture requiring EGD and ballooning, now with SBO s/p Ex-lap and mesenteric biopsy, preliminary follicular lymphoma, found to be negative on final path. Awaiting return of bowel function."    Source: patient, medical record     Diet : Clear Liquid diet + Ensure Clear TID ()    Patient advanced to clear liquid diet on , NGT discontinued. Pt reports he was only able to tolerate a few sips of clear liquids yesterday. Reports having some tea and a few sips of Ensure Clear this morning, complaining of GERD. No nausea, abdominal pain noted. Pt receiving Protonix, Maalox and Calcium carbonate. Pt encouraged to consume Ensure Clear as tolerated. Last BM  (2x), noted to be loose, plan c-diff test. Patient with no nutrition-related questions at this time. Made aware RD remains available as needed, will follow-up with diet education pending diet advancement.      PO intake : <25% of meals      Source for PO intake: pt report     Enteral /Parenteral Nutrition: N/A    Weight: down-trending, will continue to monitor  Daily Weight in k.2 (), Weight in k.9 (08-15)      Pertinent Medications: MEDICATIONS  (STANDING):  dextrose 5% + sodium chloride 0.45%. 1000 milliLiter(s) (50 mL/Hr) IV Continuous <Continuous>  enoxaparin Injectable 40 milliGRAM(s) SubCutaneous daily  levothyroxine Injectable 125 MICROGram(s) IV Push at bedtime  metoprolol tartrate Injectable 5 milliGRAM(s) IV Push every 6 hours  pantoprazole  Injectable 40 milliGRAM(s) IV Push daily    MEDICATIONS  (PRN):  aluminum hydroxide/magnesium hydroxide/simethicone Suspension 30 milliLiter(s) Oral every 4 hours PRN Heartburn  calcium carbonate    500 mG (Tums) Chewable 1 Tablet(s) Chew three times a day PRN Heartburn    Pertinent Labs:  @ 08:45: Na 132<L>, BUN 8, Cr 1.22, <H>, K+ 3.9, Phos 2.6, Mg 1.9, Alk Phos --, ALT/SGPT --, AST/SGOT --, HbA1c --      Skin per nursing documentation: free of pressure injuries per nursing flow sheets   Edema: none    Estimated Needs:   [ x] no change since previous assessment  [ ] recalculated:     Previous Nutrition Diagnosis: malnutrition (severe)   Nutrition Diagnosis is: on going, being addressed with nutrition supplements      New Nutrition Diagnosis: N/A     Interventions:   Recommend  1) Medical team to advance diet when medically feasible via tolerated route. Consider advancing to low fiber diet as tolerated.   2) Continue Ensure Clear BID while on Clear Liquid diet.   3) RD to remain available and follow-up as medically appropriate.   4) Provide diet education as appropriate.     Monitoring and Evaluation:     Continue to monitor Nutritional intake, Tolerance to diet prescription, weights, labs, skin integrity    RD remains available upon request and will follow up per protocol  Aida Matson RD, CDN, Pager # [phone]

## 2019-08-19 NOTE — CONSULT NOTE ADULT - PROBLEM SELECTOR RECOMMENDATION 4
BP at goal at present. Goal BP <150. On Norvasc, Losartan and Metoprolol as outpatient. Oral antihypertensives held while NPO  - c/w Metoprolol IVP while NPO   - Monitor BP. Would continue to hold Norvasc and Losartan at present given BP at goal
f/u with surgery
likely 2/2 acute on CKD   - on admission SCr 2.74, pH 7.32, HCO3 17    PLAN:  - trend HCO3, if decrease consider starting on NaHCO3 ggt

## 2019-08-19 NOTE — CONSULT NOTE ADULT - SUBJECTIVE AND OBJECTIVE BOX
Chief Complaint:  Patient is a 78y old  Male who presents with a chief complaint of SBO (18 Aug 2019 13:58)      HPI:  78 year old male with HTN, hyperparathyroidism, history of duodenal ulcer (complicated by duodenal stricture requiring dilation) presented to the emergency room on 8/9/2019 with decreased PO intake and abdominal pain for 3 weeks. He was found to have an SBO and underwent an exploratory laparotomy on 8/10/2019. Transition point was at the terminal ileum and he was found to have mesenteric lymphadenopathy. He underwent a repeat ex-lap on 8/11/2019 with mesenteric LN biopsy. Biopsies were reactive in nature, not consistent with malignancy.    GI consulted for evaluation of lymphadenopathy.   Currently, he denies nausea, vomiting, abdominal pain. He has small amounts of BM yesterday but denies passing gas or stool today. He denies fever, chills.  His last colonoscopy was 3 years ago: had small benign polyps, hemorrhoids, diverticulosis.   Prior to this episode, he reports having 1 soft brown BM per day.       Allergies:  Cipro (Rash)  niacin (Flushing; Rash)  Reglan (Anaphylaxis)      Home Medications:   Patient Currently Takes Medications as of 18-Nov-2016 17:21 documented in Structured Notes  · 	amLODIPine 10 mg oral tablet: 1 tab(s) orally once a day  · 	ALPRAZolam 0.25 mg oral tablet: 1 tab(s) orally 2 times a day MDD:2  · 	levothyroxine 175 mcg (0.175 mg) oral tablet: 1 tab(s) orally once a day  · 	Protonix 40 mg oral delayed release tablet: 1 tab(s) orally once a day  · 	metoprolol tartrate 50 mg oral tablet: 1 tab(s) orally 2 times a day  · 	Diovan 160 mg oral tablet: 1 tab(s) orally once a day  · 	sucralfate 1 g oral tablet:  orally once a day  · 	simvastatin 40 mg oral tablet: 1 tab(s) orally once a day (at bedtime)    Hospital Medications:  aluminum hydroxide/magnesium hydroxide/simethicone Suspension 30 milliLiter(s) Oral every 4 hours PRN  calcium carbonate    500 mG (Tums) Chewable 1 Tablet(s) Chew three times a day PRN  dextrose 5% + sodium chloride 0.45%. 1000 milliLiter(s) IV Continuous <Continuous>  enoxaparin Injectable 40 milliGRAM(s) SubCutaneous daily  levothyroxine Injectable 125 MICROGram(s) IV Push at bedtime  metoprolol tartrate Injectable 5 milliGRAM(s) IV Push every 6 hours  pantoprazole  Injectable 40 milliGRAM(s) IV Push daily      PMHX/PSHX:  Hyperparathyroidism  Hypertension  BRIGID (acute kidney injury)  SBO (small bowel obstruction)  Duodenal ulcer disease  No significant past surgical history      Family history:  Family history of non-Hodgkin's lymphoma  Father: Crohn's disease     Social History:   Denies toxic habits    ROS:     General:  No wt loss, fevers, chills, night sweats, fatigue,   Eyes:  Good vision, no reported pain  ENT:  No sore throat, pain, runny nose, dysphagia  CV:  No pain, palpitations, hypo/hypertension  Resp:  No dyspnea, cough, tachypnea, wheezing  GI:  See HPI  :  No pain, bleeding, incontinence, nocturia  Muscle:  No pain, weakness  Neuro:  No weakness, tingling, memory problems  Psych:  No fatigue, insomnia, mood problems, depression  Endocrine:  No polyuria, polydipsia, cold/heat intolerance  Heme:  No petechiae, ecchymosis, easy bruisability  Skin:  No rash, edema      PHYSICAL EXAM:     GENERAL:  Appears stated age  HEENT:  NC/AT  CHEST:  Full & symmetric excursion  HEART:  Regular rhythm, S1, S2  ABDOMEN:  Soft, non-tender, soft, distended, NT, incision with staples without surrounding erythema  EXTREMITIES:  no edema  SKIN:  No rash  NEURO:  Alert, oriented    Vital Signs:  Vital Signs Last 24 Hrs  T(C): 36.9 (19 Aug 2019 05:08), Max: 37.4 (18 Aug 2019 20:56)  T(F): 98.4 (19 Aug 2019 05:08), Max: 99.4 (18 Aug 2019 20:56)  HR: 65 (19 Aug 2019 05:08) (61 - 74)  BP: 119/65 (19 Aug 2019 05:08) (119/65 - 159/76)  BP(mean): --  RR: 18 (19 Aug 2019 05:08) (17 - 18)  SpO2: 95% (19 Aug 2019 05:08) (95% - 97%)  Daily     Daily     LABS:                        8.3    6.69  )-----------( 254      ( 18 Aug 2019 10:33 )             27.7     08-19    132<L>  |  99  |  8   ----------------------------<  102<H>  3.9   |  24  |  1.22    Ca    8.9      19 Aug 2019 08:45  Phos  2.6     08-19  Mg     1.9     08-19      Imaging:    < from: CT Abdomen and Pelvis w/ Oral Cont (08.09.19 @ 13:47) >  FINDINGS:  LOWER CHEST: Subsegmental atelectasis. Coronary calcifications.     LIVER: Hepatomegaly.  BILE DUCTS: Normal caliber.  GALLBLADDER: Within normal limits.  SPLEEN: Splenomegaly. Mild calcification at the periphery of the spleen likely related to prior trauma.  PANCREAS: Within normal limits.  ADRENALS: Withinnormal limits.  KIDNEYS/URETERS: Right renal cyst.  BLADDER: Within normal limits.  REPRODUCTIVE ORGANS: Prostate is enlarged.    BOWEL: Small bowel obstruction with transition in the right hemiabdomen. Colonic diverticulosis.  PERITONEUM: Trace ascites. A 4.3 x 3.5 cm peripherally calcified structure within the mesentery of the left upper quadrant, etiology unclear and without change from prior imaging dating to 2014.  VESSELS: Atherosclerotic changes.  RETROPERITONEUM/LYMPH NODES: No lymphadenopathy.    ABDOMINAL WALL: Within normal limits.  BONES: Degenerative changes. Transitional L5 vertebral body.    IMPRESSION:   Small bowel obstruction.  < end of copied text >      < from: CT Angio Abdomen and Pelvis w/ IV Cont (08.09.19 @ 21:29) >  BOWEL: Mild residual oral contrast. No bowel wall thickening. No abnormal wall enhancement. Redemonstration of mildly dilated, fluid-filled loops of small bowel in the left hemidiaphragm with a gradual transition to normal caliber in the mid abdomen (series 3, images 67 through 87.)Diverticulosis involving the descending and sigmoid colon.   PERITONEUM: Trace pelvic ascites. Redemonstration of 4.3 x 3.7 cm peripherally calcified soft tissue structure in the left upper quadrant, which is of uncertain etiology and unchanged from 2014. No pneumoperitoneum.  VESSELS: Aortic atherosclerosis. Mild calcification at the origin of the JACQUELIN. The celiac artery, SMA, and JACQUELIN remain patent.  RETROPERITONEUM/LYMPH NODES: No lymphadenopathy.      < end of copied text >        < from: Xray Abdomen 1 View PORTABLE -Urgent (08.15.19 @ 14:18) >  Impression: Interval decrease in small bowel distention since preoperative radiograph. Findings may reflect presence of postoperative ileus.  < end of copied text >    < from: CT Angio Abdomen and Pelvis w/ IV Cont (08.09.19 @ 21:29) >  IMPRESSION:   No CT evidence of mesenteric ischemia. Unchanged small bowel ileus versus partial small bowel obstruction.  < end of copied text >    < from: Upper Endoscopy (11.14.16 @ 12:51) >  Impression:          - Normal esophagus.                       - Z-line regular, 40 cm from the incisors.                       - A single papule (nodule) found in the stomach. Biopsied.                       - Gastritis. Biopsied.                  - Duodenal deformity.                       - Duodenal mucosal atrophy. Biopsied.  Recommendation:      - Return patient to hospital [facility] for possible discharge same day.                       - Resume previous diet.    < end of copied text >      Surgical Pathology Report (08.11.19 @ 17:15)    Surgical Pathology Report:   ACCESSION No:  [id number]    CATRINA PRESCOTT                   3  Surgical Final Report    Final Diagnosis  1, 2, 3. 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 Hyalinization and concentric rings of small lymphocytes ("onion-skinning" appearance) in the mantle zone and 'twinning' are noted in some follicles.The interfollicular areas show slightly increased vascularity, dilated sinuses, and polytypic plasmacytosis.  There are no abnormalities by flow cytometry and no aberrancy on immunohistochemical staining. These findings are compatible with reactive lymph node showing Castleman-like morphology.  Suggest correlation with clinical and radiologic findings, and follow-up  if clinically indicated. Pending immunohistochemical stain for HHV-8 will be reported in an addendum, to follow.    Microscopic description: Histologic sections show marked follicular hyperplasia with somewhat atrophic-appearing  follicles, dilated sinuses, marked plasmacytosis, and slightly increased vascular proliferation (some appear hyalinized).    Immunohistochemical stains for CD3, CD5, CD20, PAX5, CD10, BCL6, BCL2, CyclinD1, Ki67, CD23, CD21, CD43, CD79a, MUM1, AE1/3,   and kappa/lambda in situ hybridization and HHV-8 were performed on block 3B; show numerous follicles positive for CD20, CD79a,  Petoskey-5. Few have intact germinal centers positive for Bcl-6 and CD10; negative for Bcl-2. Cyclin D1 is negative. CD21 and CD23  highlight intact follicular dendritic meshwork. Interfollicular T-cells are positive for CD3, CD5, Bcl-2, CD43. AE1/3 is  negative. Mum-1 and  highlight increased interfollicular plasma cells that are polytypic (kappa and lambda positive plasma  cells present in normal ratio).    Flow cytometry: Lymphocytes (85% of cells): Heterogeneous population of T-cells (with normalCD4 to CD8 ratio), and  polytypic B-cells. The lymphocyte immunophenotypic findings show no diagnostic abnormalities.    Slide(s) with built in immunohistochemical study control(s) and negative control associated with this case has/have been verified  by the sign out pathologist.  For slide(s) without built in controls positive control slides has/have been reviewed and approved by immunohistochemistry lab These immunohistochemical/ in-situ hybridization tests have been developed and their performance characteristics    _______________________________________________________    Intraoperative Consultation  1. serosal implant ileum  -  3 Fibroadipose tissue with lymphoid aggregate  By Dr. TASHA Juarez    2. mesenteric lymph node  -  atypical lymphoid liberation, lymphoma studies in progress  By Dr. TASHA Juarez    Frozen Section Performed at [facility],  Department of Pathology, [address] [place] [address].

## 2019-08-19 NOTE — CONSULT NOTE ADULT - SUBJECTIVE AND OBJECTIVE BOX
TRAUMA COMANAGEMENT MEDICINE ATTENDING INITIAL CONSULT NOTE    HPI:  This is a 78 year old male with HTN, hyperparathyroidism, history of duodenal ulcer (complicated by duodenal stricture requiring dilation) who presented with decreased PO intake and abdominal pain for 3 weeks. He was found to have an SBO and underwent an exploratory laparotomy on 8/10/2019. Transition point was at the terminal ileum and he was found to have mesenteric lymphadenopathy. He underwent a repeat ex-lap on 8/11/2019 with mesenteric LN biopsy. Biopsies were reactive in nature, not consistent with malignancy.      SUBJECTIVE: Seen at bedside. Complaining of GERD type discomfort    Home Medications:  allopurinol 100 mg oral tablet: 1 tab(s) orally once a day (10 Aug 2019 02:14)  ALPRAZolam 0.25 mg oral tablet: 1 tab(s) orally once a day (at bedtime) (10 Aug 2019 02:15)  atorvastatin 20 mg oral tablet: 0.5 tab(s) orally once a day (10 Aug 2019 02:16)  Colcrys 0.6 mg oral tablet: 1 tab(s) orally once a day, As Needed (10 Aug 2019 02:18)  Fish Oil 1000 mg oral capsule: 2 cap(s) orally once a day (10 Aug 2019 02:14)  indomethacin 75 mg oral capsule, extended release: 1 cap(s) orally 2 times a day, As Needed (10 Aug 2019 02:18)  levothyroxine 125 mcg (0.125 mg) oral tablet: 2 tab(s) orally once a day (10 Aug 2019 02:13)  losartan 50 mg oral tablet: 1 tab(s) orally once a day (10 Aug 2019 02:14)  magnesium oxide 250 mg oral tablet: 1 tab(s) orally once a day (10 Aug 2019 02:16)  metoprolol tartrate 25 mg oral tablet: 0.5 tab(s) orally once a day (10 Aug 2019 02:16)  oxybutynin 10 mg/24 hr oral tablet, extended release: 1 tab(s) orally once a day (10 Aug 2019 02:15)  Protonix 40 mg oral delayed release tablet: 1 tab(s) orally once a day (10 Aug 2019 02:13)  sucralfate 1 g oral tablet: 1 tab(s) orally once a day (10 Aug 2019 02:15)  testosterone cypionate 200 mg/mL intramuscular solution: intramuscular every 4 weeks (10 Aug 2019 02:17)  Tylenol 500 mg oral tablet: 2 tab(s) orally every 6 hours, As Needed (10 Aug 2019 02:18)  Zovirax 5% topical ointment: Apply topically to affected area every 3 hours, As Needed (10 Aug 2019 02:19)  ZyrTEC-D 5 mg-120 mg oral tablet, extended release: 1 tab(s) orally once a day, As Needed (10 Aug 2019 02:19)      PAST MEDICAL & SURGICAL HISTORY:  Hyperparathyroidism  Hypertension  BRIGID (acute kidney injury)  SBO (small bowel obstruction)  Duodenal ulcer disease  No significant past surgical history      Review of Systems:   CONSTITUTIONAL: No fever, weight loss, or fatigue  EYES: No eye pain, visual disturbances, or discharge  ENMT:  No difficulty hearing, tinnitus, vertigo; No sinus or throat pain  NECK: No pain or stiffness  RESPIRATORY: No cough, wheezing, chills or hemoptysis; No shortness of breath  CARDIOVASCULAR: No chest pain, palpitations, dizziness, or leg swelling  GASTROINTESTINAL: No abdominal or epigastric pain. No nausea, vomiting, or hematemesis; No diarrhea or constipation. No melena or hematochezia.  GENITOURINARY: No dysuria, frequency, hematuria, or incontinence  NEUROLOGICAL: No headaches, memory loss, loss of strength, numbness, or tremors  SKIN: No itching, burning, rashes, or lesions   ENDOCRINE: No heat or cold intolerance; No hair loss  MUSCULOSKELETAL: No joint pain or swelling; No muscle, back, or extremity pain  PSYCHIATRIC: No depression, anxiety, mood swings, or difficulty sleeping  HEME/LYMPH: No easy bruising, or bleeding gums    Allergies    Cipro (Rash)  niacin (Flushing; Rash)  Reglan (Anaphylaxis)    Intolerances        Social History: Nonsmoker    FAMILY HISTORY:  Family history of non-Hodgkin's lymphoma   Noncontributory    Vital Signs Last 24 Hrs  T(C): 36.6 (19 Aug 2019 12:28), Max: 37.4 (18 Aug 2019 20:56)  T(F): 97.8 (19 Aug 2019 12:28), Max: 99.4 (18 Aug 2019 20:56)  HR: 66 (19 Aug 2019 12:28) (61 - 68)  BP: 132/73 (19 Aug 2019 12:28) (119/65 - 159/76)  BP(mean): --  RR: 16 (19 Aug 2019 12:28) (16 - 18)  SpO2: 99% (19 Aug 2019 12:28) (95% - 99%)  CAPILLARY BLOOD GLUCOSE          PHYSICAL EXAM:  GENERAL: NAD, well-developed  HEAD:  NCAT  EYES: EOMI  NECK: Supple, No JVD  CHEST/LUNG: Clear to auscultation bilaterally; No wheeze  HEART: Reg rate. No M/R/G.  ABDOMEN: Soft, NT, ND, midline incision with staples, healing well.  EXTREMITIES:  2+ Peripheral Pulses, No clubbing, cyanosis, or edema  PSYCH: AAOx3  NEUROLOGY: non-focal  SKIN: No rashes or lesions    LABS:                        8.6    5.78  )-----------( 255      ( 19 Aug 2019 11:38 )             28.8     08-19    132<L>  |  99  |  8   ----------------------------<  102<H>  3.9   |  24  |  1.22    Ca    8.9      19 Aug 2019 08:45  Phos  2.6     08-19  Mg     1.9     08-19                  MEDICATIONS  (STANDING):  dextrose 5% + sodium chloride 0.45%. 1000 milliLiter(s) (50 mL/Hr) IV Continuous <Continuous>  enoxaparin Injectable 40 milliGRAM(s) SubCutaneous daily  levothyroxine Injectable 125 MICROGram(s) IV Push at bedtime  metoprolol tartrate Injectable 5 milliGRAM(s) IV Push every 6 hours  pantoprazole  Injectable 40 milliGRAM(s) IV Push daily  sodium phosphate IVPB 15 milliMole(s) IV Intermittent once    MEDICATIONS  (PRN):  aluminum hydroxide/magnesium hydroxide/simethicone Suspension 30 milliLiter(s) Oral every 4 hours PRN Heartburn  calcium carbonate    500 mG (Tums) Chewable 1 Tablet(s) Chew three times a day PRN Heartburn

## 2019-08-19 NOTE — PROGRESS NOTE ADULT - SUBJECTIVE AND OBJECTIVE BOX
Patient is a 78y Male whom presented to the hospital with ckd and brigid , hyperkalemia   pt seen and examined in am nad no fever    PAST MEDICAL & SURGICAL HISTORY:  Hyperparathyroidism  Hypertension  BRIGID (acute kidney injury)  SBO (small bowel obstruction)  Duodenal ulcer disease  No significant past surgical history      MEDICATIONS  (STANDING):  acetaminophen  IVPB .. 1000 milliGRAM(s) IV Intermittent once  acetaminophen  IVPB .. 1000 milliGRAM(s) IV Intermittent once  chlorhexidine 2% Cloths 1 Application(s) Topical daily  dextrose 5% + lactated ringers. 1000 milliLiter(s) (50 mL/Hr) IV Continuous <Continuous>  enoxaparin Injectable 40 milliGRAM(s) SubCutaneous daily  epoetin ava Injectable 22181 Unit(s) SubCutaneous <User Schedule>  levothyroxine Injectable 125 MICROGram(s) IV Push at bedtime  pantoprazole  Injectable 40 milliGRAM(s) IV Push daily  piperacillin/tazobactam IVPB.. 3.375 Gram(s) IV Intermittent every 8 hours      Allergies    Cipro (Rash)  niacin (Flushing; Rash)  Reglan (Anaphylaxis)    Intolerances        SOCIAL HISTORY:  Denies ETOh,Smoking,     FAMILY HISTORY:  Family history of non-Hodgkin's lymphoma      REVIEW OF SYSTEMS:    CONSTITUTIONAL: pos  weakness, no  fevers or chills  GENITOURINARY: No dysuria, frequency or hematuria  NEUROLOGICAL: No numbness or weakness  SKIN: dry                                                                                                8.9    4.88  )-----------( 311      ( 20 Aug 2019 11:03 )             30.5       CBC Full  -  ( 20 Aug 2019 11:03 )  WBC Count : 4.88 K/uL  RBC Count : 3.38 M/uL  Hemoglobin : 8.9 g/dL  Hematocrit : 30.5 %  Platelet Count - Automated : 311 K/uL  Mean Cell Volume : 90.2 fl  Mean Cell Hemoglobin : 26.3 pg  Mean Cell Hemoglobin Concentration : 29.2 gm/dL  Auto Neutrophil # : x  Auto Lymphocyte # : x  Auto Monocyte # : x  Auto Eosinophil # : x  Auto Basophil # : x  Auto Neutrophil % : x  Auto Lymphocyte % : x  Auto Monocyte % : x  Auto Eosinophil % : x  Auto Basophil % : x      08-20    132<L>  |  99  |  6<L>  ----------------------------<  97  3.4<L>   |  24  |  1.19    Ca    8.9      20 Aug 2019 09:02  Phos  2.4     08-20  Mg     1.8     08-20    TPro  5.3<L>  /  Alb  x   /  TBili  x   /  DBili  x   /  AST  x   /  ALT  x   /  AlkPhos  x   08-20      CAPILLARY BLOOD GLUCOSE          Vital Signs Last 24 Hrs  T(C): 36.9 (20 Aug 2019 09:41), Max: 37.7 (19 Aug 2019 21:11)  T(F): 98.4 (20 Aug 2019 09:41), Max: 99.8 (19 Aug 2019 21:11)  HR: 71 (20 Aug 2019 09:41) (63 - 71)  BP: 130/68 (20 Aug 2019 09:41) (130/65 - 146/69)  BP(mean): --  RR: 18 (20 Aug 2019 09:41) (18 - 18)  SpO2: 98% (20 Aug 2019 09:41) (96% - 98%)              PHYSICAL EXAM:  ng tube is place    Constitutional: NAD  HEENT: conjunctive   clear   Neck:  No JVD  Respiratory: CTAB  Cardiovascular: S1 and S2  Gastrointestinal:  soft, pos abd  distened   Extremities: No peripheral edema  Neurological:  no focal deficits  Psychiatric: Normal mood, normal affect  Skin: dry   Access: Not applicable

## 2019-08-19 NOTE — CONSULT NOTE ADULT - ASSESSMENT
78 year old male with HTN, hyperparathyroidism, history of duodenal ulcer (complicated by duodenal stricture requiring dilation) presented to the emergency room on 8/9/2019 with decreased PO intake and abdominal pain for 3 weeks. He was found to have an SBO and underwent an exploratory laparotomy on 8/10/2019. Transition point was at the terminal ileum and he was found to have mesenteric lymphadenopathy. He underwent a repeat ex-lap on 8/11/2019 with mesenteric LN biopsy. Biopsies were reactive in nature, not consistent with malignancy.    IMPRESSION  - SBO with mesenteric lymphadenopathy, s/p ex-lap and LN biopsy Ddx: lymphoma vs IBD    RECOMMENDATION    - trend CBC, CMP  - recommend colonoscopy once all active issues have resolved  - supportive care as per primary team    Noam Sinclair, PGY-6  GI fellow  B- [phone]/ [phone]  Please call GI fellow on call after 5pm and on weekends 78 year old male with HTN, hyperparathyroidism, history of duodenal ulcer (complicated by duodenal stricture requiring dilation) presented to the emergency room on 8/9/2019 with decreased PO intake and abdominal pain for 3 weeks. He was found to have an SBO and underwent an exploratory laparotomy on 8/10/2019. Transition point was at the terminal ileum and he was found to have mesenteric lymphadenopathy. He underwent a repeat ex-lap on 8/11/2019 with mesenteric LN biopsy. Biopsies were reactive in nature, not consistent with malignancy.    IMPRESSION  - SBO with mesenteric lymphadenopathy, s/p ex-lap and LN biopsy Ddx: lymphoma vs IBD    RECOMMENDATION    - trend CBC, CMP  - consider IR guided biopsy of the mesenteric LN to evaluate for malignancy   - consider MR enterography to evaluate the bowel  - recommend colonoscopy with TI intubation as an outpatient, when all active issues have resolved   - supportive care as per primary team    Noam Sinclair, PGY-6  GI fellow  B- [phone]/ [phone]  Please call GI fellow on call after 5pm and on weekends 78 year old male with HTN, hyperparathyroidism, history of duodenal ulcer (complicated by duodenal stricture requiring dilation) presented to the emergency room on 8/9/2019 with decreased PO intake and abdominal pain for 3 weeks. He was found to have an SBO and underwent an exploratory laparotomy on 8/10/2019. Transition point was at the terminal ileum and he was found to have mesenteric lymphadenopathy. He underwent a repeat ex-lap on 8/11/2019 with mesenteric LN biopsy. Biopsies were reactive in nature, not consistent with malignancy.    IMPRESSION  - SBO with mesenteric lymphadenopathy, s/p ex-lap and LN biopsy Ddx: lymphoma, IBD, vs autoimmune conditions such as castleman's disease    RECOMMENDATION  - trend CBC, CMP  - Check hepatitis B surface antibody and antigen, hepatitis B core total antibody, HHV-8, HIV, SPEP, free light chains, ESR, CRP, ferritin.  - Consider rheumatology evaluation  - Once patient has recovered from his surgery, can consider MR enterography to evaluate the bowel  - recommend colonoscopy with TI intubation as an outpatient, when all active issues have resolved   - supportive care as per primary team    Noam Sinclair, PGY-6  GI fellow  B- [id number]/ [phone]  Please call GI fellow on call after 5pm and on weekends

## 2019-08-19 NOTE — PROGRESS NOTE ADULT - SUBJECTIVE AND OBJECTIVE BOX
GENERAL SURGERY DAILY PROGRESS NOTE:     Subjective: Patient seen and examined. Patient stable with no overnight events. Patient denies CP/ SOB/ Palpitations Patient denies N/V. Reports flatus and loose liquid BMs.     MEDICATIONS  (STANDING):  dextrose 5% + sodium chloride 0.45%. 1000 milliLiter(s) (50 mL/Hr) IV Continuous <Continuous>  enoxaparin Injectable 40 milliGRAM(s) SubCutaneous daily  levothyroxine Injectable 125 MICROGram(s) IV Push at bedtime  metoprolol tartrate Injectable 5 milliGRAM(s) IV Push every 6 hours  pantoprazole  Injectable 40 milliGRAM(s) IV Push daily    MEDICATIONS  (PRN):  aluminum hydroxide/magnesium hydroxide/simethicone Suspension 30 milliLiter(s) Oral every 4 hours PRN Heartburn  calcium carbonate    500 mG (Tums) Chewable 1 Tablet(s) Chew three times a day PRN Heartburn      Vital Signs Last 24 Hrs  T(C): 37.3 (19 Aug 2019 09:51), Max: 37.4 (18 Aug 2019 20:56)  T(F): 99.2 (19 Aug 2019 09:51), Max: 99.4 (18 Aug 2019 20:56)  HR: 64 (19 Aug 2019 09:51) (61 - 74)  BP: 126/72 (19 Aug 2019 09:51) (119/65 - 159/76)  RR: 18 (19 Aug 2019 09:51) (17 - 18)  SpO2: 97% (19 Aug 2019 09:51) (95% - 97%)    I&O's Detail:   18 Aug 2019 07:01  -  19 Aug 2019 07:00  --------------------------------------------------------  IN:    dextrose 5% + sodium chloride 0.45%.: 1800 mL    Oral Fluid: 90 mL    Solution: 250 mL  Total IN: 2140 mL    OUT:    Nasoenteral Tube: 130 mL    Voided: 1050 mL  Total OUT: 1180 mL    Total NET: 960 mL      19 Aug 2019 07:01  -  19 Aug 2019 10:34  --------------------------------------------------------  IN:  Total IN: 0 mL    OUT:    Voided: 400 mL  Total OUT: 400 mL  Total NET: -400 mL    LABS:                        8.3    6.69  )-----------( 254      ( 18 Aug 2019 10:33 )             27.7     08-19    132<L>  |  99  |  8   ----------------------------<  102<H>  3.9   |  24  |  1.22    Ca    8.9      19 Aug 2019 08:45  Phos  2.6     08-19  Mg     1.9     08-19    Physical Exam:   Gen: NAD, AAOx3  Abdomen: Soft, Non-tender, nondistended     Assessment:    79 yo Male with PMHx of duodenal ulcer, duodenal stricture requiring EGD and ballooning, now with SBO s/p Ex-lap and mesenteric biopsy, preliminary follicular lymphoma, found to be negative on final path. Awaiting return of bowel function    Plan:  -DVT PPX  -Pain control   -OOB as tolerated with assistance   -Advanced to clear diet as tolerated, if tolerates may advance to LRD  -Due to loose stool will perform C. Diff.   -Follow up GI as per outpatient recommendation for management     Liz Thomson   #[id number] 08-19-19 @ 10:34

## 2019-08-19 NOTE — CONSULT NOTE ADULT - ATTENDING COMMENTS
Patient seen and examined. Agree with above. The lymphadenopathy has been present since previous CT from 2014. The pathologic specimen bring up the possibility of Castleman's disease, given other signs such as thrombcytosis and hypoalbuminemia on admission. Differential also includes undiagnosed IBD, lymphoma less likely given negative pathology for this. Would check serologies as above and obtain rheumatology evaluation. Once patient's bowel function returns and recovers from acute surgery, would consider small bowel imaging with enterography to evaluate for IBD, as the etiology of SBO is still unclear.
77 yo M with PMH of duodenal ulcers and stricture s/p EGD ballooning now presents with SBO (8/9), found to have mesenteric lymphadenopathy and calcified mass concerning for possible follicular lymphoma. The mass has actually been documented in the past and is unchanged since 2014.  He had a CT scan with IV contrast which reported no lymphadenopathy, but on exlap for SBO mesenteric lymphadenopathy and serosal implant in terminal ileum was noted and LN have been biopsied to rule out lymphoma.  Flow came back today and is negative.  Await final pathology report. If negative for lymphoma would attribute enlarged LN to inflammation. Would check LDH, uric acid, and continue to monitor CBC. Will follow.

## 2019-08-19 NOTE — CONSULT NOTE ADULT - PROBLEM SELECTOR RECOMMENDATION 2
With Acute on chronic kidney injury. Pt with baseline CKDIII. Injury likely pre renal volume depletion in setting decrease PO intake. Appears back at baseline Cr  - Monitor Cr  - Nephro following

## 2019-08-19 NOTE — CONSULT NOTE ADULT - PROBLEM SELECTOR PROBLEM 1
R/O Lymphoma of small intestine
Acute kidney injury superimposed on CKD
BRIGID (acute kidney injury)
SBO (small bowel obstruction)

## 2019-08-19 NOTE — CONSULT NOTE ADULT - ASSESSMENT
This is a 78 year old male with HTN, hyperparathyroidism, history of duodenal ulcer (complicated by duodenal stricture requiring dilation) who presented with SBO with mesenteric lymphadenopathy, s/p ex-lap and LN biopsy

## 2019-08-19 NOTE — CONSULT NOTE ADULT - CONSULT REQUESTED DATE/TIME
13-Aug-2019
09-Aug-2019 20:45
09-Aug-2019 21:00
11-Aug-2019 13:26
19-Aug-2019 14:29
19-Aug-2019 09:37

## 2019-08-19 NOTE — PROGRESS NOTE ADULT - ATTENDING COMMENTS
Patient seen and examined on AM rounds  Doing well  Pain with good control  Advance diet as tolerated

## 2019-08-19 NOTE — CONSULT NOTE ADULT - PROBLEM SELECTOR RECOMMENDATION 3
Underwent biopsy of mesenteric lymphadenopathy negative for lymphoma. Is consistent with reactive disease. 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   - f/u hepatitis B surface antibody and antigen, hepatitis B core total antibody, HHV-8, HIV, SPEP, free light chains, ESR, CRP, ferritin as recommended by GI

## 2019-08-19 NOTE — CONSULT NOTE ADULT - PROBLEM SELECTOR RECOMMENDATION 9
Pt found to have SBO now s/p exploratory laparotomy on 8/10/2019. Intra-op was found to have mesenteric lymphadenopathy, biopsies were taken. NGT removed this am  - Advance diet as tolerated as per surgery  - pain control   - OOB

## 2019-08-20 ENCOUNTER — TRANSCRIPTION ENCOUNTER (OUTPATIENT)
Age: 79
End: 2019-08-20

## 2019-08-20 DIAGNOSIS — Z29.9 ENCOUNTER FOR PROPHYLACTIC MEASURES, UNSPECIFIED: ICD-10-CM

## 2019-08-20 LAB
% ALBUMIN: 44.7 % — SIGNIFICANT CHANGE UP
% ALPHA 1: 8 % — SIGNIFICANT CHANGE UP
% ALPHA 2: 16 % — SIGNIFICANT CHANGE UP
% BETA: 11.3 % — SIGNIFICANT CHANGE UP
% GAMMA: 20 % — SIGNIFICANT CHANGE UP
ALBUMIN SERPL ELPH-MCNC: 2.4 G/DL — LOW (ref 3.6–5.5)
ALBUMIN/GLOB SERPL ELPH: 0.8 RATIO — SIGNIFICANT CHANGE UP
ALPHA1 GLOB SERPL ELPH-MCNC: 0.4 G/DL — SIGNIFICANT CHANGE UP (ref 0.1–0.4)
ALPHA2 GLOB SERPL ELPH-MCNC: 0.8 G/DL — SIGNIFICANT CHANGE UP (ref 0.5–1)
ANION GAP SERPL CALC-SCNC: 9 MMOL/L — SIGNIFICANT CHANGE UP (ref 5–17)
B-GLOBULIN SERPL ELPH-MCNC: 0.6 G/DL — SIGNIFICANT CHANGE UP (ref 0.5–1)
BUN SERPL-MCNC: 6 MG/DL — LOW (ref 7–23)
CALCIUM SERPL-MCNC: 8.9 MG/DL — SIGNIFICANT CHANGE UP (ref 8.4–10.5)
CHLORIDE SERPL-SCNC: 99 MMOL/L — SIGNIFICANT CHANGE UP (ref 96–108)
CO2 SERPL-SCNC: 24 MMOL/L — SIGNIFICANT CHANGE UP (ref 22–31)
CREAT SERPL-MCNC: 1.19 MG/DL — SIGNIFICANT CHANGE UP (ref 0.5–1.3)
CRP SERPL-MCNC: 0.52 MG/DL — HIGH (ref 0–0.4)
ERYTHROCYTE [SEDIMENTATION RATE] IN BLOOD: 80 MM/HR — HIGH (ref 0–20)
FERRITIN SERPL-MCNC: 389 NG/ML — SIGNIFICANT CHANGE UP (ref 30–400)
GAMMA GLOBULIN: 1.1 G/DL — SIGNIFICANT CHANGE UP (ref 0.6–1.6)
GLUCOSE SERPL-MCNC: 97 MG/DL — SIGNIFICANT CHANGE UP (ref 70–99)
HBV CORE AB SER-ACNC: SIGNIFICANT CHANGE UP
HBV SURFACE AB SER-ACNC: SIGNIFICANT CHANGE UP
HBV SURFACE AG SER-ACNC: SIGNIFICANT CHANGE UP
HCT VFR BLD CALC: 30.5 % — LOW (ref 39–50)
HGB BLD-MCNC: 8.9 G/DL — LOW (ref 13–17)
HIV 1+2 AB+HIV1 P24 AG SERPL QL IA: SIGNIFICANT CHANGE UP
KAPPA LC SER QL IFE: 6.01 MG/DL — HIGH (ref 0.33–1.94)
KAPPA/LAMBDA FREE LIGHT CHAIN RATIO, SERUM: 1 RATIO — SIGNIFICANT CHANGE UP (ref 0.26–1.65)
LAMBDA LC SER QL IFE: 6 MG/DL — HIGH (ref 0.57–2.63)
MAGNESIUM SERPL-MCNC: 1.8 MG/DL — SIGNIFICANT CHANGE UP (ref 1.6–2.6)
MCHC RBC-ENTMCNC: 26.3 PG — LOW (ref 27–34)
MCHC RBC-ENTMCNC: 29.2 GM/DL — LOW (ref 32–36)
MCV RBC AUTO: 90.2 FL — SIGNIFICANT CHANGE UP (ref 80–100)
PHOSPHATE SERPL-MCNC: 2.4 MG/DL — LOW (ref 2.5–4.5)
PLATELET # BLD AUTO: 311 K/UL — SIGNIFICANT CHANGE UP (ref 150–400)
POTASSIUM SERPL-MCNC: 3.4 MMOL/L — LOW (ref 3.5–5.3)
POTASSIUM SERPL-SCNC: 3.4 MMOL/L — LOW (ref 3.5–5.3)
PROT PATTERN SERPL ELPH-IMP: SIGNIFICANT CHANGE UP
PROT SERPL-MCNC: 5.3 G/DL — LOW (ref 6–8.3)
PROT SERPL-MCNC: 5.3 G/DL — LOW (ref 6–8.3)
RBC # BLD: 3.38 M/UL — LOW (ref 4.2–5.8)
RBC # FLD: 16.1 % — HIGH (ref 10.3–14.5)
SODIUM SERPL-SCNC: 132 MMOL/L — LOW (ref 135–145)
WBC # BLD: 4.88 K/UL — SIGNIFICANT CHANGE UP (ref 3.8–10.5)
WBC # FLD AUTO: 4.88 K/UL — SIGNIFICANT CHANGE UP (ref 3.8–10.5)

## 2019-08-20 PROCEDURE — 99232 SBSQ HOSP IP/OBS MODERATE 35: CPT

## 2019-08-20 RX ORDER — PANTOPRAZOLE SODIUM 20 MG/1
40 TABLET, DELAYED RELEASE ORAL
Refills: 0 | Status: DISCONTINUED | OUTPATIENT
Start: 2019-08-21 | End: 2019-08-22

## 2019-08-20 RX ORDER — POTASSIUM CHLORIDE 20 MEQ
40 PACKET (EA) ORAL ONCE
Refills: 0 | Status: COMPLETED | OUTPATIENT
Start: 2019-08-20 | End: 2019-08-20

## 2019-08-20 RX ORDER — LEVOTHYROXINE SODIUM 125 MCG
175 TABLET ORAL DAILY
Refills: 0 | Status: DISCONTINUED | OUTPATIENT
Start: 2019-08-20 | End: 2019-08-22

## 2019-08-20 RX ADMIN — PANTOPRAZOLE SODIUM 40 MILLIGRAM(S): 20 TABLET, DELAYED RELEASE ORAL at 12:01

## 2019-08-20 RX ADMIN — Medication 1 GRAM(S): at 18:59

## 2019-08-20 RX ADMIN — Medication 5 MILLIGRAM(S): at 04:40

## 2019-08-20 RX ADMIN — Medication 1 GRAM(S): at 04:39

## 2019-08-20 RX ADMIN — ENOXAPARIN SODIUM 40 MILLIGRAM(S): 100 INJECTION SUBCUTANEOUS at 12:02

## 2019-08-20 RX ADMIN — Medication 1 TABLET(S): at 18:59

## 2019-08-20 RX ADMIN — Medication 5 MILLIGRAM(S): at 00:51

## 2019-08-20 RX ADMIN — Medication 5 MILLIGRAM(S): at 18:59

## 2019-08-20 RX ADMIN — Medication 5 MILLIGRAM(S): at 12:01

## 2019-08-20 RX ADMIN — Medication 1 TABLET(S): at 04:39

## 2019-08-20 RX ADMIN — Medication 62.5 MILLIMOLE(S): at 14:14

## 2019-08-20 RX ADMIN — Medication 30 MILLILITER(S): at 19:15

## 2019-08-20 RX ADMIN — Medication 40 MILLIEQUIVALENT(S): at 12:02

## 2019-08-20 RX ADMIN — Medication 5 MILLIGRAM(S): at 23:11

## 2019-08-20 NOTE — PROGRESS NOTE ADULT - SUBJECTIVE AND OBJECTIVE BOX
Chapin Collier MD  Division of Hospital Medicine  Pager [phone]  If no response or off hours page: [phone]  ---------------------------------------------------------    CATRINA PRESCOTT  78y  Male      Patient is a 78y old  Male who presents with a chief complaint of SBO (20 Aug 2019 14:22)      INTERVAL HPI/OVERNIGHT EVENTS:  Feeling better today. Passing gas, tolerating diet      REVIEW OF SYSTEMS: 14 point ROS negative unless listed above    T(C): 37.1 (08-20-19 @ 14:48), Max: 37.7 (08-19-19 @ 21:11)  HR: 72 (08-20-19 @ 14:48) (63 - 72)  BP: 139/70 (08-20-19 @ 14:48) (130/65 - 146/69)  RR: 18 (08-20-19 @ 14:48) (18 - 18)  SpO2: 98% (08-20-19 @ 14:48) (96% - 98%)  Wt(kg): --Vital Signs Last 24 Hrs  T(C): 37.1 (20 Aug 2019 14:48), Max: 37.7 (19 Aug 2019 21:11)  T(F): 98.7 (20 Aug 2019 14:48), Max: 99.8 (19 Aug 2019 21:11)  HR: 72 (20 Aug 2019 14:48) (63 - 72)  BP: 139/70 (20 Aug 2019 14:48) (130/65 - 146/69)  BP(mean): --  RR: 18 (20 Aug 2019 14:48) (18 - 18)  SpO2: 98% (20 Aug 2019 14:48) (96% - 98%)    PHYSICAL EXAM:  GENERAL: NAD, well-developed  HEAD:  NCAT  EYES: EOMI  CHEST/LUNG: Clear to auscultation bilaterally; No wheeze  HEART: Reg rate. No M/R/G.  ABDOMEN: Soft, NT, ND, midline incision with staples, healing well.  EXTREMITIES:  2+ Peripheral Pulses, No clubbing, cyanosis, or edema  PSYCH: AAOx3  NEUROLOGY: non-focal  SKIN: No rashes or lesions    Consultant(s) Notes Reviewed:  [x ] YES  [ ] NO  Care Discussed with Consultants/Other Providers [ x] YES  [ ] NO    LABS:                        8.9    4.88  )-----------( 311      ( 20 Aug 2019 11:03 )             30.5     08-20    132<L>  |  99  |  6<L>  ----------------------------<  97  3.4<L>   |  24  |  1.19    Ca    8.9      20 Aug 2019 09:02  Phos  2.4     08-20  Mg     1.8     08-20    TPro  5.3<L>  /  Alb  x   /  TBili  x   /  DBili  x   /  AST  x   /  ALT  x   /  AlkPhos  x   08-20        CAPILLARY BLOOD GLUCOSE                RADIOLOGY & ADDITIONAL TESTS:    Imaging Personally Reviewed:  [x ] YES  [ ] NO

## 2019-08-20 NOTE — PROGRESS NOTE ADULT - SUBJECTIVE AND OBJECTIVE BOX
ACS Surgery Progress Note     Subjective/24hour Events: No acute events overnight. He reports feeling well this am. Denies n&v, abdominal pain. Reports a lot of flatus and is amenable to trying regular diet.     MEDICATIONS  (STANDING):  enoxaparin Injectable 40 milliGRAM(s) SubCutaneous daily  levothyroxine Injectable 125 MICROGram(s) IV Push at bedtime  metoprolol tartrate Injectable 5 milliGRAM(s) IV Push every 6 hours  pantoprazole  Injectable 40 milliGRAM(s) IV Push daily  sucralfate 1 Gram(s) Oral two times a day    MEDICATIONS  (PRN):  aluminum hydroxide/magnesium hydroxide/simethicone Suspension 30 milliLiter(s) Oral every 4 hours PRN Heartburn  calcium carbonate    500 mG (Tums) Chewable 1 Tablet(s) Chew three times a day PRN Heartburn      Vital Signs:  Vital Signs Last 24 Hrs  T(C): 36.9 (20 Aug 2019 09:41), Max: 37.7 (19 Aug 2019 21:11)  T(F): 98.4 (20 Aug 2019 09:41), Max: 99.8 (19 Aug 2019 21:11)  HR: 71 (20 Aug 2019 09:41) (63 - 71)  BP: 130/68 (20 Aug 2019 09:41) (130/65 - 146/69)  BP(mean): --  RR: 18 (20 Aug 2019 09:41) (16 - 18)  SpO2: 98% (20 Aug 2019 09:41) (96% - 99%)      I&O's Detail    19 Aug 2019 07:01  -  20 Aug 2019 07:00  --------------------------------------------------------  IN:    dextrose 5% + sodium chloride 0.45%.: 1200 mL    Oral Fluid: 120 mL    Solution: 250 mL  Total IN: 1570 mL    OUT:    Voided: 2000 mL  Total OUT: 2000 mL    Total NET: -430 mL      20 Aug 2019 07:01  -  20 Aug 2019 10:42  --------------------------------------------------------  IN:    Oral Fluid: 450 mL  Total IN: 450 mL    OUT:    Voided: 800 mL  Total OUT: 800 mL    Total NET: -350 mL          Physical Exam:  General: NAD, Pleasant, awakening from sleep  Neurology: Patient is AA&Ox4, follows commands, and speech fluent.   Neck: Neck supple, trachea midline, No JVD  Respiratory: CTA B/L, (-)rales, rhonchi  CV: S1S2, r/r/r, (-)m/r/g  Abdomen: S, mildly distended, non tender to palpation  Extremities: 2+ peripheral pulses bilat throughout; (-)edema appreciated  Skin: No Rashes, Hematoma, Ecchymosis    Labs:    08-20    132<L>  |  99  |  6<L>  ----------------------------<  97  3.4<L>   |  24  |  1.19    Ca    8.9      20 Aug 2019 09:02  Phos  2.4     08-20  Mg     1.8     08-20                              8.6    5.78  )-----------( 255      ( 19 Aug 2019 11:38 )             28.8

## 2019-08-20 NOTE — PROGRESS NOTE ADULT - SUBJECTIVE AND OBJECTIVE BOX
Patient is a 78y Male whom presented to the hospital with ckd and brigid , hyperkalemia   pt seen and examined in am nad no fever    PAST MEDICAL & SURGICAL HISTORY:  Hyperparathyroidism  Hypertension  BRIGID (acute kidney injury)  SBO (small bowel obstruction)  Duodenal ulcer disease  No significant past surgical history      MEDICATIONS  (STANDING):  acetaminophen  IVPB .. 1000 milliGRAM(s) IV Intermittent once  acetaminophen  IVPB .. 1000 milliGRAM(s) IV Intermittent once  chlorhexidine 2% Cloths 1 Application(s) Topical daily  dextrose 5% + lactated ringers. 1000 milliLiter(s) (50 mL/Hr) IV Continuous <Continuous>  enoxaparin Injectable 40 milliGRAM(s) SubCutaneous daily  epoetin ava Injectable 96411 Unit(s) SubCutaneous <User Schedule>  levothyroxine Injectable 125 MICROGram(s) IV Push at bedtime  pantoprazole  Injectable 40 milliGRAM(s) IV Push daily  piperacillin/tazobactam IVPB.. 3.375 Gram(s) IV Intermittent every 8 hours      Allergies    Cipro (Rash)  niacin (Flushing; Rash)  Reglan (Anaphylaxis)    Intolerances        SOCIAL HISTORY:  Denies ETOh,Smoking,     FAMILY HISTORY:  Family history of non-Hodgkin's lymphoma      REVIEW OF SYSTEMS:    CONSTITUTIONAL: pos  weakness, no  fevers or chills  GENITOURINARY: No dysuria, frequency or hematuria  NEUROLOGICAL: No numbness or weakness  SKIN: dry                                                                                                8.9    4.88  )-----------( 311      ( 20 Aug 2019 11:03 )             30.5       CBC Full  -  ( 20 Aug 2019 11:03 )  WBC Count : 4.88 K/uL  RBC Count : 3.38 M/uL  Hemoglobin : 8.9 g/dL  Hematocrit : 30.5 %  Platelet Count - Automated : 311 K/uL  Mean Cell Volume : 90.2 fl  Mean Cell Hemoglobin : 26.3 pg  Mean Cell Hemoglobin Concentration : 29.2 gm/dL  Auto Neutrophil # : x  Auto Lymphocyte # : x  Auto Monocyte # : x  Auto Eosinophil # : x  Auto Basophil # : x  Auto Neutrophil % : x  Auto Lymphocyte % : x  Auto Monocyte % : x  Auto Eosinophil % : x  Auto Basophil % : x      08-20    132<L>  |  99  |  6<L>  ----------------------------<  97  3.4<L>   |  24  |  1.19    Ca    8.9      20 Aug 2019 09:02  Phos  2.4     08-20  Mg     1.8     08-20    TPro  5.3<L>  /  Alb  x   /  TBili  x   /  DBili  x   /  AST  x   /  ALT  x   /  AlkPhos  x   08-20      CAPILLARY BLOOD GLUCOSE          Vital Signs Last 24 Hrs  T(C): 36.9 (20 Aug 2019 09:41), Max: 37.7 (19 Aug 2019 21:11)  T(F): 98.4 (20 Aug 2019 09:41), Max: 99.8 (19 Aug 2019 21:11)  HR: 71 (20 Aug 2019 09:41) (63 - 71)  BP: 130/68 (20 Aug 2019 09:41) (130/65 - 146/69)  BP(mean): --  RR: 18 (20 Aug 2019 09:41) (18 - 18)  SpO2: 98% (20 Aug 2019 09:41) (96% - 98%)              PHYSICAL EXAM:  ng tube is place    Constitutional: NAD  HEENT: conjunctive   clear   Neck:  No JVD  Respiratory: CTAB  Cardiovascular: S1 and S2  Gastrointestinal:  soft, pos abd  distened   Extremities: No peripheral edema  Neurological:  no focal deficits  Psychiatric: Normal mood, normal affect  Skin: dry   Access: Not applicable

## 2019-08-20 NOTE — DISCHARGE NOTE NURSING/CASE MANAGEMENT/SOCIAL WORK - NSDCPNINST_GEN_ALL_CORE
Instructed to call the MD with surgical; site redness, swelling, pain is not relieved with pain medication, nausea, vomiting, fever , chills and inability to tolerate diet

## 2019-08-20 NOTE — DISCHARGE NOTE NURSING/CASE MANAGEMENT/SOCIAL WORK - NSDCFUADDAPPT_GEN_ALL_CORE_FT
Your PMD--Please call for a follow up appointment upon discharge regarding your recent hospitalization.

## 2019-08-20 NOTE — DISCHARGE NOTE NURSING/CASE MANAGEMENT/SOCIAL WORK - NSSCTYPOFSERV_GEN_ALL_CORE
[place] for start of care  8/22/19  Skilled RN vicky,  PT vicky, [place] for start of care  08/23/2019  Skilled RN vicky,  PT vicky,

## 2019-08-20 NOTE — PROGRESS NOTE ADULT - ASSESSMENT
79 yo Male with PMHx of duodenal ulcer, duodenal stricture requiring EGD and ballooning, now with SBO  POD 10/9 s/p Ex-lap and mesenteric biopsy, preliminary follicular lymphoma, found to be negative on final path. Awaiting return of bowel function    Plan:  -DVT PPX  -Pain control   -OOB as tolerated with assistance   -Advanced to regular diet as tolerated  -Follow up GI as per outpatient recommendation for management     - Check hepatitis B surface antibody and antigen, hepatitis B core total antibody, HHV-8, HIV, SPEP, free light chains, ESR, CRP, ferritin.    - Once patient has recovered from his surgery, can consider MR enterography to evaluate the bowel    - recommend colonoscopy with TI intubation as an outpatient, when all active issues have resolved     - supportive care as per primary team    ACS Surgery [phone]

## 2019-08-20 NOTE — DISCHARGE NOTE NURSING/CASE MANAGEMENT/SOCIAL WORK - NSDCDMETYPESERV_GEN_ALL_CORE_FT
Rolling  walker  delivered to bedside and spouse reports  took  home. [place]  walker  delivered to bedside and spouse reports  took  home.  Shower chair  scheduled for home  delivery  8/22/19. Rolling  walker  delivered to bedside and spouse reports  she RW home  Shower chair  scheduled for home  delivery  8/22/19.

## 2019-08-20 NOTE — DISCHARGE NOTE NURSING/CASE MANAGEMENT/SOCIAL WORK - NSDCDPATPORTLINK_GEN_ALL_CORE
You can access the [facility], offered by Health system, by registering with the following website: [email or web address]

## 2019-08-21 LAB
ANION GAP SERPL CALC-SCNC: 7 MMOL/L — SIGNIFICANT CHANGE UP (ref 5–17)
BUN SERPL-MCNC: 8 MG/DL — SIGNIFICANT CHANGE UP (ref 7–23)
CALCIUM SERPL-MCNC: 8.4 MG/DL — SIGNIFICANT CHANGE UP (ref 8.4–10.5)
CHLORIDE SERPL-SCNC: 101 MMOL/L — SIGNIFICANT CHANGE UP (ref 96–108)
CO2 SERPL-SCNC: 26 MMOL/L — SIGNIFICANT CHANGE UP (ref 22–31)
CREAT SERPL-MCNC: 1.28 MG/DL — SIGNIFICANT CHANGE UP (ref 0.5–1.3)
GLUCOSE SERPL-MCNC: 89 MG/DL — SIGNIFICANT CHANGE UP (ref 70–99)
HCT VFR BLD CALC: 29.9 % — LOW (ref 39–50)
HGB BLD-MCNC: 8.9 G/DL — LOW (ref 13–17)
MAGNESIUM SERPL-MCNC: 1.8 MG/DL — SIGNIFICANT CHANGE UP (ref 1.6–2.6)
MCHC RBC-ENTMCNC: 27.3 PG — SIGNIFICANT CHANGE UP (ref 27–34)
MCHC RBC-ENTMCNC: 29.8 GM/DL — LOW (ref 32–36)
MCV RBC AUTO: 91.7 FL — SIGNIFICANT CHANGE UP (ref 80–100)
PHOSPHATE SERPL-MCNC: 2.4 MG/DL — LOW (ref 2.5–4.5)
PLATELET # BLD AUTO: 285 K/UL — SIGNIFICANT CHANGE UP (ref 150–400)
POTASSIUM SERPL-MCNC: 4 MMOL/L — SIGNIFICANT CHANGE UP (ref 3.5–5.3)
POTASSIUM SERPL-SCNC: 4 MMOL/L — SIGNIFICANT CHANGE UP (ref 3.5–5.3)
RBC # BLD: 3.26 M/UL — LOW (ref 4.2–5.8)
RBC # FLD: 16.4 % — HIGH (ref 10.3–14.5)
SODIUM SERPL-SCNC: 134 MMOL/L — LOW (ref 135–145)
WBC # BLD: 5.47 K/UL — SIGNIFICANT CHANGE UP (ref 3.8–10.5)
WBC # FLD AUTO: 5.47 K/UL — SIGNIFICANT CHANGE UP (ref 3.8–10.5)

## 2019-08-21 RX ORDER — POTASSIUM PHOSPHATE, MONOBASIC POTASSIUM PHOSPHATE, DIBASIC 236; 224 MG/ML; MG/ML
15 INJECTION, SOLUTION INTRAVENOUS ONCE
Refills: 0 | Status: COMPLETED | OUTPATIENT
Start: 2019-08-21 | End: 2019-08-21

## 2019-08-21 RX ORDER — METOPROLOL TARTRATE 50 MG
50 TABLET ORAL
Refills: 0 | Status: DISCONTINUED | OUTPATIENT
Start: 2019-08-21 | End: 2019-08-22

## 2019-08-21 RX ORDER — METOPROLOL TARTRATE 50 MG
0.5 TABLET ORAL
Qty: 0 | Refills: 0 | DISCHARGE
Start: 2019-08-21

## 2019-08-21 RX ORDER — METOPROLOL TARTRATE 50 MG
0.5 TABLET ORAL
Qty: 0 | Refills: 0 | DISCHARGE

## 2019-08-21 RX ORDER — LOSARTAN POTASSIUM 100 MG/1
1 TABLET, FILM COATED ORAL
Qty: 0 | Refills: 0 | DISCHARGE

## 2019-08-21 RX ORDER — METOPROLOL TARTRATE 50 MG
1 TABLET ORAL
Qty: 0 | Refills: 0 | DISCHARGE
Start: 2019-08-21

## 2019-08-21 RX ORDER — CALCIUM CARBONATE 500(1250)
1 TABLET ORAL
Qty: 0 | Refills: 0 | DISCHARGE
Start: 2019-08-21

## 2019-08-21 RX ADMIN — Medication 5 MILLIGRAM(S): at 05:29

## 2019-08-21 RX ADMIN — ENOXAPARIN SODIUM 40 MILLIGRAM(S): 100 INJECTION SUBCUTANEOUS at 11:15

## 2019-08-21 RX ADMIN — Medication 5 MILLIGRAM(S): at 11:15

## 2019-08-21 RX ADMIN — POTASSIUM PHOSPHATE, MONOBASIC POTASSIUM PHOSPHATE, DIBASIC 62.5 MILLIMOLE(S): 236; 224 INJECTION, SOLUTION INTRAVENOUS at 11:11

## 2019-08-21 RX ADMIN — PANTOPRAZOLE SODIUM 40 MILLIGRAM(S): 20 TABLET, DELAYED RELEASE ORAL at 05:29

## 2019-08-21 RX ADMIN — Medication 1 GRAM(S): at 17:56

## 2019-08-21 RX ADMIN — Medication 175 MICROGRAM(S): at 05:29

## 2019-08-21 RX ADMIN — Medication 50 MILLIGRAM(S): at 17:56

## 2019-08-21 RX ADMIN — Medication 1 GRAM(S): at 05:29

## 2019-08-21 NOTE — PROGRESS NOTE ADULT - SUBJECTIVE AND OBJECTIVE BOX
Patient is a 78y Male whom presented to the hospital with ckd and brigid , hyperkalemia   pt seen and examined in am nad no fever    PAST MEDICAL & SURGICAL HISTORY:  Hyperparathyroidism  Hypertension  BRIGID (acute kidney injury)  SBO (small bowel obstruction)  Duodenal ulcer disease  No significant past surgical history      MEDICATIONS  (STANDING):  acetaminophen  IVPB .. 1000 milliGRAM(s) IV Intermittent once  acetaminophen  IVPB .. 1000 milliGRAM(s) IV Intermittent once  chlorhexidine 2% Cloths 1 Application(s) Topical daily  dextrose 5% + lactated ringers. 1000 milliLiter(s) (50 mL/Hr) IV Continuous <Continuous>  enoxaparin Injectable 40 milliGRAM(s) SubCutaneous daily  epoetin ava Injectable 76789 Unit(s) SubCutaneous <User Schedule>  levothyroxine Injectable 125 MICROGram(s) IV Push at bedtime  pantoprazole  Injectable 40 milliGRAM(s) IV Push daily  piperacillin/tazobactam IVPB.. 3.375 Gram(s) IV Intermittent every 8 hours      Allergies    Cipro (Rash)  niacin (Flushing; Rash)  Reglan (Anaphylaxis)    Intolerances        SOCIAL HISTORY:  Denies ETOh,Smoking,     FAMILY HISTORY:  Family history of non-Hodgkin's lymphoma      REVIEW OF SYSTEMS:    CONSTITUTIONAL: pos  weakness, no  fevers or chills  GENITOURINARY: No dysuria, frequency or hematuria  NEUROLOGICAL: No numbness or weakness  SKIN: dry                                                                                                  8.9    5.47  )-----------( 285      ( 21 Aug 2019 09:46 )             29.9       CBC Full  -  ( 21 Aug 2019 09:46 )  WBC Count : 5.47 K/uL  RBC Count : 3.26 M/uL  Hemoglobin : 8.9 g/dL  Hematocrit : 29.9 %  Platelet Count - Automated : 285 K/uL  Mean Cell Volume : 91.7 fl  Mean Cell Hemoglobin : 27.3 pg  Mean Cell Hemoglobin Concentration : 29.8 gm/dL  Auto Neutrophil # : x  Auto Lymphocyte # : x  Auto Monocyte # : x  Auto Eosinophil # : x  Auto Basophil # : x  Auto Neutrophil % : x  Auto Lymphocyte % : x  Auto Monocyte % : x  Auto Eosinophil % : x  Auto Basophil % : x      08-21    134<L>  |  101  |  8   ----------------------------<  89  4.0   |  26  |  1.28    Ca    8.4      21 Aug 2019 06:28  Phos  2.4     08-21  Mg     1.8     08-21    TPro  5.3<L>  /  Alb  2.4<L>  /  TBili  x   /  DBili  x   /  AST  x   /  ALT  x   /  AlkPhos  x   08-20      CAPILLARY BLOOD GLUCOSE          Vital Signs Last 24 Hrs  T(C): 37.1 (21 Aug 2019 17:51), Max: 37.1 (20 Aug 2019 22:31)  T(F): 98.8 (21 Aug 2019 17:51), Max: 98.8 (20 Aug 2019 22:31)  HR: 79 (21 Aug 2019 17:51) (68 - 79)  BP: 128/79 (21 Aug 2019 17:51) (128/79 - 139/71)  BP(mean): --  RR: 16 (21 Aug 2019 17:51) (16 - 18)  SpO2: 97% (21 Aug 2019 17:51) (88% - 97%)          PHYSICAL EXAM:  ng tube is place    Constitutional: NAD  HEENT: conjunctive   clear   Neck:  No JVD  Respiratory: CTAB  Cardiovascular: S1 and S2  Gastrointestinal:  soft, pos abd  distened   Extremities: No peripheral edema  Neurological:  no focal deficits  Psychiatric: Normal mood, normal affect  Skin: dry   Access: Not applicable

## 2019-08-21 NOTE — PROGRESS NOTE ADULT - ASSESSMENT
79 yo Male with PMHx of duodenal ulcer, duodenal stricture requiring EGD and ballooning, now with SBO  POD 10/9 s/p Ex-lap and mesenteric biopsy, preliminary follicular lymphoma, found to be negative on final path.     Plan:  -Pain control   -OOB as tolerated with assistance   -LRD as tolerated  -Follow up GI as per outpatient recommendation for management     - Check hepatitis B surface antibody and antigen, hepatitis B core total antibody, HHV-8, HIV, SPEP, free light chains, ESR, CRP, ferritin.    - Once patient has recovered from his surgery, can consider MR enterography to evaluate the bowel    - recommend colonoscopy with TI intubation as an outpatient, when all active issues have resolved     - supportive care as per primary team  - Hyperkalemia resolved  - Dispo: Home with home PT    ACS Surgery [phone]

## 2019-08-21 NOTE — PROGRESS NOTE ADULT - ATTENDING COMMENTS
Patient seen and examined on 8/21/19 AM rounds  Doing well  Tolerating diet  No new complaints  ok for discharge from surgical standpoint  Care discussed with patient and wife at bedside on 8/21/19

## 2019-08-21 NOTE — PROGRESS NOTE ADULT - SUBJECTIVE AND OBJECTIVE BOX
ACS SURGERY DAILY PROGRESS NOTE:       SUBJECTIVE/ROS: Patient seen and examined at bedside.  No acute overnight events.  He reports feeling well this am. Denies n&v, abdominal pain. He is tolerating LRD and is having GI function.         MEDICATIONS  (STANDING):  enoxaparin Injectable 40 milliGRAM(s) SubCutaneous daily  levothyroxine 175 MICROGram(s) Oral daily  metoprolol tartrate Injectable 5 milliGRAM(s) IV Push every 6 hours  pantoprazole    Tablet 40 milliGRAM(s) Oral before breakfast  sucralfate 1 Gram(s) Oral two times a day    MEDICATIONS  (PRN):  aluminum hydroxide/magnesium hydroxide/simethicone Suspension 30 milliLiter(s) Oral every 4 hours PRN Heartburn  calcium carbonate    500 mG (Tums) Chewable 1 Tablet(s) Chew three times a day PRN Heartburn      OBJECTIVE:    Vital Signs Last 24 Hrs  T(C): 36.7 (21 Aug 2019 04:40), Max: 37.1 (20 Aug 2019 14:48)  T(F): 98.1 (21 Aug 2019 04:40), Max: 98.8 (20 Aug 2019 22:31)  HR: 69 (21 Aug 2019 04:40) (68 - 74)  BP: 134/70 (21 Aug 2019 04:40) (119/65 - 139/71)  BP(mean): --  RR: 18 (21 Aug 2019 04:40) (18 - 18)  SpO2: 96% (21 Aug 2019 04:40) (88% - 98%)        I&O's Detail    20 Aug 2019 07:01  -  21 Aug 2019 07:00  --------------------------------------------------------  IN:    Oral Fluid: 1430 mL    Solution: 250 mL  Total IN: 1680 mL    OUT:    Voided: 2200 mL  Total OUT: 2200 mL    Total NET: -520 mL          Daily     Daily     LABS:                        8.9    5.47  )-----------( 285      ( 21 Aug 2019 09:46 )             29.9     08-21    134<L>  |  101  |  8   ----------------------------<  89  4.0   |  26  |  1.28    Ca    8.4      21 Aug 2019 06:28  Phos  2.4     08-21  Mg     1.8     08-21    TPro  5.3<L>  /  Alb  2.4<L>  /  TBili  x   /  DBili  x   /  AST  x   /  ALT  x   /  AlkPhos  x   08-20                  PHYSICAL EXAM:  General: NAD, Pleasant, awakening from sleep  Neurology: Patient is AA&Ox4, follows commands, and speech fluent.   Respiratory: No increased WOB, saturating well on RA  Abdomen: Soft, mildly distended, no tenderness to palpation.  Staples in place c/d/i.  Psych: A&Ox3

## 2019-08-22 VITALS
DIASTOLIC BLOOD PRESSURE: 75 MMHG | SYSTOLIC BLOOD PRESSURE: 124 MMHG | OXYGEN SATURATION: 96 % | RESPIRATION RATE: 18 BRPM | TEMPERATURE: 97 F | HEART RATE: 78 BPM

## 2019-08-22 LAB
ANION GAP SERPL CALC-SCNC: 9 MMOL/L — SIGNIFICANT CHANGE UP (ref 5–17)
BUN SERPL-MCNC: 8 MG/DL — SIGNIFICANT CHANGE UP (ref 7–23)
CALCIUM SERPL-MCNC: 8.6 MG/DL — SIGNIFICANT CHANGE UP (ref 8.4–10.5)
CHLORIDE SERPL-SCNC: 102 MMOL/L — SIGNIFICANT CHANGE UP (ref 96–108)
CO2 SERPL-SCNC: 25 MMOL/L — SIGNIFICANT CHANGE UP (ref 22–31)
CREAT SERPL-MCNC: 1.32 MG/DL — HIGH (ref 0.5–1.3)
GLUCOSE SERPL-MCNC: 92 MG/DL — SIGNIFICANT CHANGE UP (ref 70–99)
HCT VFR BLD CALC: 29.4 % — LOW (ref 39–50)
HGB BLD-MCNC: 8.9 G/DL — LOW (ref 13–17)
MAGNESIUM SERPL-MCNC: 1.8 MG/DL — SIGNIFICANT CHANGE UP (ref 1.6–2.6)
MCHC RBC-ENTMCNC: 27.4 PG — SIGNIFICANT CHANGE UP (ref 27–34)
MCHC RBC-ENTMCNC: 30.3 GM/DL — LOW (ref 32–36)
MCV RBC AUTO: 90.5 FL — SIGNIFICANT CHANGE UP (ref 80–100)
PHOSPHATE SERPL-MCNC: 2.7 MG/DL — SIGNIFICANT CHANGE UP (ref 2.5–4.5)
PLATELET # BLD AUTO: 282 K/UL — SIGNIFICANT CHANGE UP (ref 150–400)
POTASSIUM SERPL-MCNC: 4.4 MMOL/L — SIGNIFICANT CHANGE UP (ref 3.5–5.3)
POTASSIUM SERPL-SCNC: 4.4 MMOL/L — SIGNIFICANT CHANGE UP (ref 3.5–5.3)
RBC # BLD: 3.25 M/UL — LOW (ref 4.2–5.8)
RBC # FLD: 16.5 % — HIGH (ref 10.3–14.5)
SODIUM SERPL-SCNC: 136 MMOL/L — SIGNIFICANT CHANGE UP (ref 135–145)
WBC # BLD: 5.48 K/UL — SIGNIFICANT CHANGE UP (ref 3.8–10.5)
WBC # FLD AUTO: 5.48 K/UL — SIGNIFICANT CHANGE UP (ref 3.8–10.5)

## 2019-08-22 PROCEDURE — 86704 HEP B CORE ANTIBODY TOTAL: CPT

## 2019-08-22 PROCEDURE — 87205 SMEAR GRAM STAIN: CPT

## 2019-08-22 PROCEDURE — 97116 GAIT TRAINING THERAPY: CPT

## 2019-08-22 PROCEDURE — 83935 ASSAY OF URINE OSMOLALITY: CPT

## 2019-08-22 PROCEDURE — 88237 TISSUE CULTURE BONE MARROW: CPT

## 2019-08-22 PROCEDURE — 87040 BLOOD CULTURE FOR BACTERIA: CPT

## 2019-08-22 PROCEDURE — 84100 ASSAY OF PHOSPHORUS: CPT

## 2019-08-22 PROCEDURE — 86140 C-REACTIVE PROTEIN: CPT

## 2019-08-22 PROCEDURE — 84300 ASSAY OF URINE SODIUM: CPT

## 2019-08-22 PROCEDURE — 74018 RADEX ABDOMEN 1 VIEW: CPT

## 2019-08-22 PROCEDURE — 88185 FLOWCYTOMETRY/TC ADD-ON: CPT

## 2019-08-22 PROCEDURE — 82330 ASSAY OF CALCIUM: CPT

## 2019-08-22 PROCEDURE — 93005 ELECTROCARDIOGRAM TRACING: CPT

## 2019-08-22 PROCEDURE — 84133 ASSAY OF URINE POTASSIUM: CPT

## 2019-08-22 PROCEDURE — 82947 ASSAY GLUCOSE BLOOD QUANT: CPT

## 2019-08-22 PROCEDURE — 84478 ASSAY OF TRIGLYCERIDES: CPT

## 2019-08-22 PROCEDURE — 82378 CARCINOEMBRYONIC ANTIGEN: CPT

## 2019-08-22 PROCEDURE — 97110 THERAPEUTIC EXERCISES: CPT

## 2019-08-22 PROCEDURE — 87070 CULTURE OTHR SPECIMN AEROBIC: CPT

## 2019-08-22 PROCEDURE — 84295 ASSAY OF SERUM SODIUM: CPT

## 2019-08-22 PROCEDURE — 99232 SBSQ HOSP IP/OBS MODERATE 35: CPT

## 2019-08-22 PROCEDURE — 84540 ASSAY OF URINE/UREA-N: CPT

## 2019-08-22 PROCEDURE — 94002 VENT MGMT INPAT INIT DAY: CPT

## 2019-08-22 PROCEDURE — 82962 GLUCOSE BLOOD TEST: CPT

## 2019-08-22 PROCEDURE — 83550 IRON BINDING TEST: CPT

## 2019-08-22 PROCEDURE — 74174 CTA ABD&PLVS W/CONTRAST: CPT

## 2019-08-22 PROCEDURE — P9045: CPT

## 2019-08-22 PROCEDURE — 83540 ASSAY OF IRON: CPT

## 2019-08-22 PROCEDURE — 86706 HEP B SURFACE ANTIBODY: CPT

## 2019-08-22 PROCEDURE — 80048 BASIC METABOLIC PNL TOTAL CA: CPT

## 2019-08-22 PROCEDURE — 82565 ASSAY OF CREATININE: CPT

## 2019-08-22 PROCEDURE — 71045 X-RAY EXAM CHEST 1 VIEW: CPT

## 2019-08-22 PROCEDURE — 97530 THERAPEUTIC ACTIVITIES: CPT

## 2019-08-22 PROCEDURE — 81001 URINALYSIS AUTO W/SCOPE: CPT

## 2019-08-22 PROCEDURE — 88285 CHROMOSOME COUNT ADDITIONAL: CPT

## 2019-08-22 PROCEDURE — 85730 THROMBOPLASTIN TIME PARTIAL: CPT

## 2019-08-22 PROCEDURE — 85610 PROTHROMBIN TIME: CPT

## 2019-08-22 PROCEDURE — 82728 ASSAY OF FERRITIN: CPT

## 2019-08-22 PROCEDURE — 97161 PT EVAL LOW COMPLEX 20 MIN: CPT

## 2019-08-22 PROCEDURE — 88264 CHROMOSOME ANALYSIS 20-25: CPT

## 2019-08-22 PROCEDURE — 96375 TX/PRO/DX INJ NEW DRUG ADDON: CPT

## 2019-08-22 PROCEDURE — 99285 EMERGENCY DEPT VISIT HI MDM: CPT | Mod: 25

## 2019-08-22 PROCEDURE — 82040 ASSAY OF SERUM ALBUMIN: CPT

## 2019-08-22 PROCEDURE — 86316 IMMUNOASSAY TUMOR OTHER: CPT

## 2019-08-22 PROCEDURE — 82435 ASSAY OF BLOOD CHLORIDE: CPT

## 2019-08-22 PROCEDURE — 88280 CHROMOSOME KARYOTYPE STUDY: CPT

## 2019-08-22 PROCEDURE — 85014 HEMATOCRIT: CPT

## 2019-08-22 PROCEDURE — 86900 BLOOD TYPING SEROLOGIC ABO: CPT

## 2019-08-22 PROCEDURE — 96374 THER/PROPH/DIAG INJ IV PUSH: CPT

## 2019-08-22 PROCEDURE — 88184 FLOWCYTOMETRY/ TC 1 MARKER: CPT

## 2019-08-22 PROCEDURE — 84550 ASSAY OF BLOOD/URIC ACID: CPT

## 2019-08-22 PROCEDURE — 74176 CT ABD & PELVIS W/O CONTRAST: CPT

## 2019-08-22 PROCEDURE — 86901 BLOOD TYPING SEROLOGIC RH(D): CPT

## 2019-08-22 PROCEDURE — 85652 RBC SED RATE AUTOMATED: CPT

## 2019-08-22 PROCEDURE — 99238 HOSP IP/OBS DSCHRG MGMT 30/<: CPT

## 2019-08-22 PROCEDURE — 82570 ASSAY OF URINE CREATININE: CPT

## 2019-08-22 PROCEDURE — 83605 ASSAY OF LACTIC ACID: CPT

## 2019-08-22 PROCEDURE — 83521 IG LIGHT CHAINS FREE EACH: CPT

## 2019-08-22 PROCEDURE — 87086 URINE CULTURE/COLONY COUNT: CPT

## 2019-08-22 PROCEDURE — 80053 COMPREHEN METABOLIC PANEL: CPT

## 2019-08-22 PROCEDURE — 87389 HIV-1 AG W/HIV-1&-2 AB AG IA: CPT

## 2019-08-22 PROCEDURE — 85027 COMPLETE CBC AUTOMATED: CPT

## 2019-08-22 PROCEDURE — 84134 ASSAY OF PREALBUMIN: CPT

## 2019-08-22 PROCEDURE — 82803 BLOOD GASES ANY COMBINATION: CPT

## 2019-08-22 PROCEDURE — 84155 ASSAY OF PROTEIN SERUM: CPT

## 2019-08-22 PROCEDURE — 84165 PROTEIN E-PHORESIS SERUM: CPT

## 2019-08-22 PROCEDURE — 83735 ASSAY OF MAGNESIUM: CPT

## 2019-08-22 PROCEDURE — 84132 ASSAY OF SERUM POTASSIUM: CPT

## 2019-08-22 PROCEDURE — 87340 HEPATITIS B SURFACE AG IA: CPT

## 2019-08-22 PROCEDURE — 86850 RBC ANTIBODY SCREEN: CPT

## 2019-08-22 PROCEDURE — 94640 AIRWAY INHALATION TREATMENT: CPT

## 2019-08-22 PROCEDURE — 83615 LACTATE (LD) (LDH) ENZYME: CPT

## 2019-08-22 PROCEDURE — 83930 ASSAY OF BLOOD OSMOLALITY: CPT

## 2019-08-22 RX ADMIN — Medication 50 MILLIGRAM(S): at 06:33

## 2019-08-22 RX ADMIN — Medication 1 GRAM(S): at 06:33

## 2019-08-22 RX ADMIN — ENOXAPARIN SODIUM 40 MILLIGRAM(S): 100 INJECTION SUBCUTANEOUS at 12:48

## 2019-08-22 RX ADMIN — PANTOPRAZOLE SODIUM 40 MILLIGRAM(S): 20 TABLET, DELAYED RELEASE ORAL at 06:33

## 2019-08-22 RX ADMIN — Medication 175 MICROGRAM(S): at 06:33

## 2019-08-22 NOTE — PROGRESS NOTE ADULT - SUBJECTIVE AND OBJECTIVE BOX
Chapin Collier MD  Division of Hospital Medicine  Pager [phone]  If no response or off hours page: [phone]  ---------------------------------------------------------    CATRINA PRESCOTT  78y  Male      Patient is a 78y old  Male who presents with a chief complaint of SBO (22 Aug 2019 10:44)      INTERVAL HPI/OVERNIGHT EVENTS:  Feeling okay. no complaints      REVIEW OF SYSTEMS: 10 point ROS negative unless listed above    T(C): 36.3 (08-22-19 @ 13:52), Max: 37.1 (08-21-19 @ 17:51)  HR: 78 (08-22-19 @ 13:52) (65 - 79)  BP: 124/75 (08-22-19 @ 13:52) (116/68 - 134/71)  RR: 18 (08-22-19 @ 13:52) (16 - 18)  SpO2: 96% (08-22-19 @ 13:52) (96% - 98%)  Wt(kg): --Vital Signs Last 24 Hrs  T(C): 36.3 (22 Aug 2019 13:52), Max: 37.1 (21 Aug 2019 17:51)  T(F): 97.4 (22 Aug 2019 13:52), Max: 98.8 (21 Aug 2019 17:51)  HR: 78 (22 Aug 2019 13:52) (65 - 79)  BP: 124/75 (22 Aug 2019 13:52) (116/68 - 134/71)  BP(mean): --  RR: 18 (22 Aug 2019 13:52) (16 - 18)  SpO2: 96% (22 Aug 2019 13:52) (96% - 98%)    PHYSICAL EXAM:  GENERAL: NAD, well-developed  HEAD:  NCAT  CHEST/LUNG: Clear to auscultation bilaterally; No wheeze  HEART: Reg rate. No M/R/G.  ABDOMEN: Soft, NT, ND, midline incision with staples, healing well.  EXTREMITIES:  2+ Peripheral Pulses, No clubbing, cyanosis, or edema  PSYCH: AAOx3  NEUROLOGY: non-focal  SKIN: No rashes or lesions    Consultant(s) Notes Reviewed:  [x ] YES  [ ] NO  Care Discussed with Consultants/Other Providers [ x] YES  [ ] NO    LABS:                        8.9    5.48  )-----------( 282      ( 22 Aug 2019 09:39 )             29.4     08-22    136  |  102  |  8   ----------------------------<  92  4.4   |  25  |  1.32<H>    Ca    8.6      22 Aug 2019 07:11  Phos  2.7     08-22  Mg     1.8     08-22          CAPILLARY BLOOD GLUCOSE                RADIOLOGY & ADDITIONAL TESTS:    Imaging Personally Reviewed:  [ x] YES  [ ] NO

## 2019-08-22 NOTE — PROGRESS NOTE ADULT - SUBJECTIVE AND OBJECTIVE BOX
Patient is a 78y Male whom presented to the hospital with ckd and brigid , hyperkalemia   pt seen and examined in am nad no fever    PAST MEDICAL & SURGICAL HISTORY:  Hyperparathyroidism  Hypertension  BRIGID (acute kidney injury)  SBO (small bowel obstruction)  Duodenal ulcer disease  No significant past surgical history      MEDICATIONS  (STANDING):  acetaminophen  IVPB .. 1000 milliGRAM(s) IV Intermittent once  acetaminophen  IVPB .. 1000 milliGRAM(s) IV Intermittent once  chlorhexidine 2% Cloths 1 Application(s) Topical daily  dextrose 5% + lactated ringers. 1000 milliLiter(s) (50 mL/Hr) IV Continuous <Continuous>  enoxaparin Injectable 40 milliGRAM(s) SubCutaneous daily  epoetin ava Injectable 08881 Unit(s) SubCutaneous <User Schedule>  levothyroxine Injectable 125 MICROGram(s) IV Push at bedtime  pantoprazole  Injectable 40 milliGRAM(s) IV Push daily  piperacillin/tazobactam IVPB.. 3.375 Gram(s) IV Intermittent every 8 hours      Allergies    Cipro (Rash)  niacin (Flushing; Rash)  Reglan (Anaphylaxis)    Intolerances        SOCIAL HISTORY:  Denies ETOh,Smoking,     FAMILY HISTORY:  Family history of non-Hodgkin's lymphoma      REVIEW OF SYSTEMS:    CONSTITUTIONAL: pos  weakness, no  fevers or chills  GENITOURINARY: No dysuria, frequency or hematuria  NEUROLOGICAL: No numbness or weakness  SKIN: dry                                                                                                                   8.9    5.48  )-----------( 282      ( 22 Aug 2019 09:39 )             29.4       CBC Full  -  ( 22 Aug 2019 09:39 )  WBC Count : 5.48 K/uL  RBC Count : 3.25 M/uL  Hemoglobin : 8.9 g/dL  Hematocrit : 29.4 %  Platelet Count - Automated : 282 K/uL  Mean Cell Volume : 90.5 fl  Mean Cell Hemoglobin : 27.4 pg  Mean Cell Hemoglobin Concentration : 30.3 gm/dL  Auto Neutrophil # : x  Auto Lymphocyte # : x  Auto Monocyte # : x  Auto Eosinophil # : x  Auto Basophil # : x  Auto Neutrophil % : x  Auto Lymphocyte % : x  Auto Monocyte % : x  Auto Eosinophil % : x  Auto Basophil % : x      08-22    136  |  102  |  8   ----------------------------<  92  4.4   |  25  |  1.32<H>    Ca    8.6      22 Aug 2019 07:11  Phos  2.7     08-22  Mg     1.8     08-22        CAPILLARY BLOOD GLUCOSE          Vital Signs Last 24 Hrs  T(C): 36.3 (22 Aug 2019 13:52), Max: 36.9 (21 Aug 2019 23:52)  T(F): 97.4 (22 Aug 2019 13:52), Max: 98.5 (21 Aug 2019 23:52)  HR: 78 (22 Aug 2019 13:52) (65 - 78)  BP: 124/75 (22 Aug 2019 13:52) (116/68 - 134/71)  BP(mean): --  RR: 18 (22 Aug 2019 13:52) (18 - 18)  SpO2: 96% (22 Aug 2019 13:52) (96% - 98%)                  PHYSICAL EXAM:  ng tube is place    Constitutional: NAD  HEENT: conjunctive   clear   Neck:  No JVD  Respiratory: CTAB  Cardiovascular: S1 and S2  Gastrointestinal:  soft, pos abd  distened   Extremities: No peripheral edema  Neurological:  no focal deficits  Psychiatric: Normal mood, normal affect  Skin: dry   Access: Not applicable

## 2019-08-22 NOTE — PROGRESS NOTE ADULT - PROBLEM SELECTOR PLAN 4
f/u with surgery.
[facility]eron 2/2 acute on CKD   - on admission SCr 2.74, pH 7.32, HCO3 17  - Now pH 7.39, HCO3- 17  - trend HCO3, would d/c bicarb gtt for now unless HCO3 downtrending
BP at goal at present. Goal BP <150. On Norvasc, Losartan and Metoprolol as outpatient.   - Tolerating diet. Would transition IV Metoprolol to 50mg BID  -  Would continue to hold Norvasc and Losartan at present given BP at goal.
abd ditension f/u with surgery.
BP at goal at present. Goal BP <150. On Norvasc, Losartan and Metoprolol as outpatient.   - Tolerating diet. c/w Metoprolol to 50mg BID  -  Would continue to hold Norvasc and Losartan at present given BP at goal.
abd ditension f/u with surgery.
f/u with surgery.
f/u with surgery.
abd ditension f/u with surgery.
abd ditension f/u with surgery.

## 2019-08-22 NOTE — PROGRESS NOTE ADULT - ATTENDING COMMENTS
Patient seen and examined this AM  Tolerating diet  vitals stable  abdominal exam benign    - I have discussed need for follow up with Dr. Byrd for wound check after discharge  - I have discussed need for follow up with patient's previous cardiologist for continued hypertensive management and medication adjustments after discharge.  - The patient had hypotension secondary to non-infectious SIRS on admission

## 2019-08-22 NOTE — PROGRESS NOTE ADULT - PROBLEM SELECTOR PROBLEM 2
Hyperkalemia
BRIGID (acute kidney injury)
Sepsis, due to unspecified organism
BRIGID (acute kidney injury)
Sepsis, due to unspecified organism

## 2019-08-22 NOTE — PROGRESS NOTE ADULT - PROBLEM SELECTOR PROBLEM 4
Metabolic acidosis
Essential hypertension
SBO (small bowel obstruction)
Essential hypertension
SBO (small bowel obstruction)

## 2019-08-22 NOTE — PROGRESS NOTE ADULT - SUBJECTIVE AND OBJECTIVE BOX
ACS SURGERY DAILY PROGRESS NOTE:       SUBJECTIVE/ROS: Patient seen and examined at bedside.  No acute overnight events. Says he is feeling ok this morning, no nausea/vomiting. No abdominal pain. He is tolerating LRD and is having GI function.          MEDICATIONS  (STANDING):  enoxaparin Injectable 40 milliGRAM(s) SubCutaneous daily  levothyroxine 175 MICROGram(s) Oral daily  metoprolol tartrate Injectable 5 milliGRAM(s) IV Push every 6 hours  pantoprazole    Tablet 40 milliGRAM(s) Oral before breakfast  sucralfate 1 Gram(s) Oral two times a day    MEDICATIONS  (PRN):  aluminum hydroxide/magnesium hydroxide/simethicone Suspension 30 milliLiter(s) Oral every 4 hours PRN Heartburn  calcium carbonate    500 mG (Tums) Chewable 1 Tablet(s) Chew three times a day PRN Heartburn      OBJECTIVE:  Vital Signs Last 24 Hrs  T(C): 36.7 (22 Aug 2019 09:07), Max: 37.1 (21 Aug 2019 17:51)  T(F): 98 (22 Aug 2019 09:07), Max: 98.8 (21 Aug 2019 17:51)  HR: 76 (22 Aug 2019 09:07) (65 - 79)  BP: 122/69 (22 Aug 2019 09:07) (116/68 - 134/71)  RR: 18 (22 Aug 2019 09:07) (16 - 18)  SpO2: 98% (22 Aug 2019 09:07) (97% - 98%)        PHYSICAL EXAM:  General: NAD, Pleasant, awakening from sleep  Neurology: Patient is AA&Ox4, follows commands, and speech fluent.   Respiratory: No increased WOB, saturating well on RA  Abdomen: Soft, mildly distended, no tenderness to palpation.  Staples in place c/d/i.  Psych: A&Ox3        I&O's Detail    21 Aug 2019 07:01  -  22 Aug 2019 07:00  --------------------------------------------------------  IN:    Oral Fluid: 880 mL    Solution: 250 mL  Total IN: 1130 mL    OUT:    Stool: 1 mL    Voided: 1550 mL  Total OUT: 1551 mL    Total NET: -421 mL      22 Aug 2019 07:01  -  22 Aug 2019 10:51  --------------------------------------------------------  IN:    Oral Fluid: 240 mL  Total IN: 240 mL    OUT:    Voided: 250 mL  Total OUT: 250 mL    Total NET: -10 mL            LABS:  08-22    136  |  102  |  8   ----------------------------<  92  4.4   |  25  |  1.32<H>    Ca    8.6      22 Aug 2019 07:11  Phos  2.7     08-22  Mg     1.8     08-22    TPro  5.3<L>  /  Alb  2.4<L>  /  TBili  x   /  DBili  x   /  AST  x   /  ALT  x   /  AlkPhos  x   08-20                            8.9    5.48  )-----------( 282      ( 22 Aug 2019 09:39 )             29.4

## 2019-08-22 NOTE — PROGRESS NOTE ADULT - PROBLEM SELECTOR PROBLEM 3
Hyponatremia
Hyperkalemia
Lymphadenopathy
Hyperkalemia
Lymphadenopathy
Hyperkalemia

## 2019-08-22 NOTE — CHART NOTE - NSCHARTNOTEFT_GEN_A_CORE
Nutrition Follow Up Note  Patient seen for: malnutrition follow-up     Chart reviewed, events noted. This is a 79 yo Male with PMHx of duodenal ulcer, duodenal stricture requiring EGD and ballooning, now with SBO  POD 10 s/p Ex-lap and mesenteric biopsy, preliminary follicular lymphoma, found to be negative on final path. Diet advanced to regular on . Plan for discharge today.     Source: patient, medical record     Diet : Regular    Patient reports good PO intake and appetite today, consumed 100% of breakfast with no acute GI distress noted. Reports BM today. Pt amendable to diet education prior to discharge. Provided low-fiber nutrition therapy including importance of avoiding  fiber rich foods, fresh fruits/vegetables, whole grains, and added fiber in processed foods. Discussed chewing foods well and adequate hydration. Pt verbalized understanding and accepted written handout. Patient with no nutrition-related questions at this time. Made aware RD remains available as needed.      PO intake : 100% of meals      Source for PO intake: pt report      Enteral /Parenteral Nutrition: N/A    No new wts to assess  Daily Weight in k.2 (-), Weight in k.9 (-15)  % Weight Change    Pertinent Medications: MEDICATIONS  (STANDING):  enoxaparin Injectable 40 milliGRAM(s) SubCutaneous daily  levothyroxine 175 MICROGram(s) Oral daily  metoprolol tartrate 50 milliGRAM(s) Oral two times a day  pantoprazole    Tablet 40 milliGRAM(s) Oral before breakfast  sucralfate 1 Gram(s) Oral two times a day    MEDICATIONS  (PRN):  aluminum hydroxide/magnesium hydroxide/simethicone Suspension 30 milliLiter(s) Oral every 4 hours PRN Heartburn  calcium carbonate    500 mG (Tums) Chewable 1 Tablet(s) Chew three times a day PRN Heartburn    Pertinent Labs:  @ 07:11: Na 136, BUN 8, Cr 1.32<H>, BG 92, K+ 4.4, Phos 2.7, Mg 1.8, Alk Phos --, ALT/SGPT --, AST/SGOT --, HbA1c --    Skin per nursing documentation: no pressure injuries noted   Edema: +1 left/right ankle     Estimated Needs:   [ x] no change since previous assessment  [ ] recalculated:     Previous Nutrition Diagnosis: Severe malnutrition   Nutrition Diagnosis is: is improving with good PO intake, tolerance to diet     New Nutrition Diagnosis: N/A       Interventions:   Recommend  1) Consider change to Low Fiber diet however pt appears to be tolerating Regular diet well currently.  2) Provided diet education, reinforce as needed.   3) RD to remain available and follow-up as medically appropriate.     Monitoring and Evaluation:     Continue to monitor Nutritional intake, Tolerance to diet prescription, weights, labs, skin integrity    RD remains available upon request and will follow up per protocol  Aida Matson RD, CDN, Pager # [phone]

## 2019-08-22 NOTE — PROGRESS NOTE ADULT - PROBLEM SELECTOR PLAN 1
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
Pt found to have SBO now s/p exploratory laparotomy on 8/10/2019. Intra-op was found to have mesenteric lymphadenopathy, biopsies were taken, negative for lymphoma LN's were reactive in nature. NGT removed yesterday  - Advance diet as tolerated as per surgery  - pain control   - OOB.
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
Pt found to have SBO now s/p exploratory laparotomy on 8/10/2019. Intra-op was found to have mesenteric lymphadenopathy, biopsies were taken, negative for lymphoma, LN's were reactive in nature.   - Tolerating diet  - pain control   - OOB.
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) downtrend SCr 2.39 post IVF, UA specific gravity 1.023  - s/p ED 2L LR and 1L NS   - Scr stable at 2.17 today. Per [facility] pt's Scr in 6/18/19 was 1.84, In 2018 Scr fluctuated btw 1.5-1.8  - c/w talavera catheter  - avoid nephrotoxic agents.  Renally dose medications  - Trend SCr, monitor UOP, would c/w IVF-LR ~75cc/hr  - no absolute indication for hemodialysis.

## 2019-08-22 NOTE — PROGRESS NOTE ADULT - REASON FOR ADMISSION
SBO

## 2019-08-22 NOTE — PROGRESS NOTE ADULT - PROBLEM SELECTOR PLAN 2
Hyperkalemia likely 2/2 Acute on CKD  - on admission K 6.2, downtrend to 5.6, s/p ED tx albuterol/insulin/d50/IVF  - Potassium this AM 5.2  - trend K, if repeat K elevated can give lasix IV 20 for urinary potassium excretion  - no indication for HD.
With Acute on chronic kidney injury. Pt with baseline CKD III. Injury likely pre renal volume depletion in setting decrease PO intake. Appears back at baseline Cr  - Monitor Cr  - Nephro following.
f/u  blood and urine cx,serial lactate levels,monitor vitals closley,ivfs hydration,monitor urine output and renal profile,iv abx as per id cons.
With Acute on chronic kidney injury. Pt with baseline CKD III. Injury likely pre renal volume depletion in setting decrease PO intake. Appears back at baseline Cr  - Monitor Cr  - Nephro following.
f/u  blood and urine cx,serial lactate levels,monitor vitals closley,ivfs hydration,monitor urine output and renal profile,iv abx as per id cons.
send blood and urine cx,serial lactate levels,monitor vitals closley,ivfs hydration,monitor urine output and renal profile,iv abx as per id cons.
f/u  blood and urine cx,serial lactate levels,monitor vitals closley,ivfs hydration,monitor urine output and renal profile,iv abx as per id cons.
f/u  blood and urine cx,serial lactate levels,monitor vitals closley,ivfs hydration,monitor urine output and renal profile,iv abx as per id cons.
send blood and urine cx,serial lactate levels,monitor vitals closley,ivfs hydration,monitor urine output and renal profile,iv abx as per id cons.

## 2019-08-22 NOTE — PROGRESS NOTE ADULT - PROBLEM SELECTOR PLAN 7
Lovenix for DVT ppx  Advance diet as per surgery
Lovenox for DVT ppx  Plan for d/c today    Spoke with PCP Dr. Silveira and updated on hospitalization and course. Advised him that we are discharging off Norvasc and Losartan and that he will need Rheum and onc f/u for lymphadenopathy

## 2019-08-22 NOTE — PROGRESS NOTE ADULT - PROBLEM/PLAN-2
Md at bedside.  
DISPLAY PLAN FREE TEXT

## 2019-08-22 NOTE — PROGRESS NOTE ADULT - ASSESSMENT
77 yo Male with PMHx of duodenal ulcer, duodenal stricture requiring EGD and ballooning, now with SBO  POD 10/9 s/p Ex-lap and mesenteric biopsy, preliminary follicular lymphoma, found to be negative on final path.     Plan:  -Pain control   -OOB as tolerated with assistance   -LRD as tolerated  -Follow up GI as per outpatient recommendation for management     - Check hepatitis B surface antibody and antigen, hepatitis B core total antibody, HHV-8, HIV, SPEP, free light chains, ESR, CRP, ferritin.    - Once patient has recovered from his surgery, can consider MR enterography to evaluate the bowel as OP with GI    - recommend colonoscopy with TI intubation as an outpatient, when all active issues have resolved   - Hyperkalemia resolved  - Dispo: Home with home PT, plan for today with OP follow up with Dr. Byrd, PCP and GI    ACS Surgery [phone]

## 2019-08-23 ENCOUNTER — INBOUND DOCUMENT (OUTPATIENT)
Age: 79
End: 2019-08-23

## 2019-08-23 ENCOUNTER — EMERGENCY (EMERGENCY)
Facility: HOSPITAL | Age: 79
LOS: 1 days | Discharge: HOME | End: 2019-08-23
Attending: EMERGENCY MEDICINE
Payer: COMMERCIAL

## 2019-08-23 VITALS
SYSTOLIC BLOOD PRESSURE: 141 MMHG | DIASTOLIC BLOOD PRESSURE: 78 MMHG | HEART RATE: 71 BPM | OXYGEN SATURATION: 100 % | RESPIRATION RATE: 16 BRPM | TEMPERATURE: 99 F

## 2019-08-23 VITALS
DIASTOLIC BLOOD PRESSURE: 67 MMHG | TEMPERATURE: 99 F | SYSTOLIC BLOOD PRESSURE: 107 MMHG | OXYGEN SATURATION: 96 % | RESPIRATION RATE: 20 BRPM | HEART RATE: 89 BPM

## 2019-08-23 LAB
ALBUMIN SERPL ELPH-MCNC: 3 G/DL — LOW (ref 3.3–5)
ALP SERPL-CCNC: 164 U/L — HIGH (ref 40–120)
ALT FLD-CCNC: 6 U/L — LOW (ref 10–45)
ANION GAP SERPL CALC-SCNC: 13 MMOL/L — SIGNIFICANT CHANGE UP (ref 5–17)
APPEARANCE UR: CLEAR — SIGNIFICANT CHANGE UP
AST SERPL-CCNC: 12 U/L — SIGNIFICANT CHANGE UP (ref 10–40)
BACTERIA # UR AUTO: NEGATIVE — SIGNIFICANT CHANGE UP
BASE EXCESS BLDV CALC-SCNC: 1.5 MMOL/L — SIGNIFICANT CHANGE UP (ref -2–2)
BASOPHILS # BLD AUTO: 0 K/UL — SIGNIFICANT CHANGE UP (ref 0–0.2)
BASOPHILS NFR BLD AUTO: 0.8 % — SIGNIFICANT CHANGE UP (ref 0–2)
BILIRUB SERPL-MCNC: 0.3 MG/DL — SIGNIFICANT CHANGE UP (ref 0.2–1.2)
BILIRUB UR-MCNC: NEGATIVE — SIGNIFICANT CHANGE UP
BUN SERPL-MCNC: 11 MG/DL — SIGNIFICANT CHANGE UP (ref 7–23)
CA-I SERPL-SCNC: 1.23 MMOL/L — SIGNIFICANT CHANGE UP (ref 1.12–1.3)
CALCIUM SERPL-MCNC: 8.9 MG/DL — SIGNIFICANT CHANGE UP (ref 8.4–10.5)
CHLORIDE BLDV-SCNC: 102 MMOL/L — SIGNIFICANT CHANGE UP (ref 96–108)
CHLORIDE SERPL-SCNC: 100 MMOL/L — SIGNIFICANT CHANGE UP (ref 96–108)
CO2 BLDV-SCNC: 27 MMOL/L — SIGNIFICANT CHANGE UP (ref 22–30)
CO2 SERPL-SCNC: 21 MMOL/L — LOW (ref 22–31)
COLOR SPEC: SIGNIFICANT CHANGE UP
CREAT SERPL-MCNC: 1.29 MG/DL — SIGNIFICANT CHANGE UP (ref 0.5–1.3)
DIFF PNL FLD: NEGATIVE — SIGNIFICANT CHANGE UP
EOSINOPHIL # BLD AUTO: 0.1 K/UL — SIGNIFICANT CHANGE UP (ref 0–0.5)
EOSINOPHIL NFR BLD AUTO: 1.2 % — SIGNIFICANT CHANGE UP (ref 0–6)
EPI CELLS # UR: 1 /HPF — SIGNIFICANT CHANGE UP
GAS PNL BLDV: 131 MMOL/L — LOW (ref 135–145)
GAS PNL BLDV: SIGNIFICANT CHANGE UP
GLUCOSE BLDV-MCNC: 92 MG/DL — SIGNIFICANT CHANGE UP (ref 70–99)
GLUCOSE SERPL-MCNC: 92 MG/DL — SIGNIFICANT CHANGE UP (ref 70–99)
GLUCOSE UR QL: NEGATIVE — SIGNIFICANT CHANGE UP
HCO3 BLDV-SCNC: 26 MMOL/L — SIGNIFICANT CHANGE UP (ref 21–29)
HCT VFR BLD CALC: 30.8 % — LOW (ref 39–50)
HCT VFR BLDA CALC: 31 % — LOW (ref 39–50)
HGB BLD CALC-MCNC: 9.9 G/DL — LOW (ref 13–17)
HGB BLD-MCNC: 10 G/DL — LOW (ref 13–17)
HYALINE CASTS # UR AUTO: 1 /LPF — SIGNIFICANT CHANGE UP (ref 0–2)
KETONES UR-MCNC: NEGATIVE — SIGNIFICANT CHANGE UP
LACTATE BLDV-MCNC: 1.9 MMOL/L — SIGNIFICANT CHANGE UP (ref 0.7–2)
LEUKOCYTE ESTERASE UR-ACNC: NEGATIVE — SIGNIFICANT CHANGE UP
LIDOCAIN IGE QN: 25 U/L — SIGNIFICANT CHANGE UP (ref 7–60)
LYMPHOCYTES # BLD AUTO: 3 K/UL — SIGNIFICANT CHANGE UP (ref 1–3.3)
LYMPHOCYTES # BLD AUTO: 46.4 % — HIGH (ref 13–44)
MCHC RBC-ENTMCNC: 28.5 PG — SIGNIFICANT CHANGE UP (ref 27–34)
MCHC RBC-ENTMCNC: 32.5 GM/DL — SIGNIFICANT CHANGE UP (ref 32–36)
MCV RBC AUTO: 87.6 FL — SIGNIFICANT CHANGE UP (ref 80–100)
MONOCYTES # BLD AUTO: 0.8 K/UL — SIGNIFICANT CHANGE UP (ref 0–0.9)
MONOCYTES NFR BLD AUTO: 12.8 % — SIGNIFICANT CHANGE UP (ref 2–14)
NEUTROPHILS # BLD AUTO: 2.5 K/UL — SIGNIFICANT CHANGE UP (ref 1.8–7.4)
NEUTROPHILS NFR BLD AUTO: 38.8 % — LOW (ref 43–77)
NITRITE UR-MCNC: NEGATIVE — SIGNIFICANT CHANGE UP
PCO2 BLDV: 40 MMHG — SIGNIFICANT CHANGE UP (ref 35–50)
PH BLDV: 7.42 — SIGNIFICANT CHANGE UP (ref 7.35–7.45)
PH UR: 7 — SIGNIFICANT CHANGE UP (ref 5–8)
PLATELET # BLD AUTO: 303 K/UL — SIGNIFICANT CHANGE UP (ref 150–400)
PO2 BLDV: 22 MMHG — LOW (ref 25–45)
POTASSIUM BLDV-SCNC: 3.8 MMOL/L — SIGNIFICANT CHANGE UP (ref 3.5–5.3)
POTASSIUM SERPL-MCNC: 3.9 MMOL/L — SIGNIFICANT CHANGE UP (ref 3.5–5.3)
POTASSIUM SERPL-SCNC: 3.9 MMOL/L — SIGNIFICANT CHANGE UP (ref 3.5–5.3)
PROT SERPL-MCNC: 6.4 G/DL — SIGNIFICANT CHANGE UP (ref 6–8.3)
PROT UR-MCNC: ABNORMAL
RBC # BLD: 3.52 M/UL — LOW (ref 4.2–5.8)
RBC # FLD: 15.8 % — HIGH (ref 10.3–14.5)
RBC CASTS # UR COMP ASSIST: 0 /HPF — SIGNIFICANT CHANGE UP (ref 0–4)
SAO2 % BLDV: 35 % — LOW (ref 67–88)
SODIUM SERPL-SCNC: 134 MMOL/L — LOW (ref 135–145)
SP GR SPEC: 1.01 — SIGNIFICANT CHANGE UP (ref 1.01–1.02)
T3 SERPL-MCNC: 74 NG/DL — LOW (ref 80–200)
T4 AB SER-ACNC: 8.8 UG/DL — SIGNIFICANT CHANGE UP (ref 4.6–12)
TROPONIN T, HIGH SENSITIVITY RESULT: 23 NG/L — SIGNIFICANT CHANGE UP (ref 0–51)
TROPONIN T, HIGH SENSITIVITY RESULT: 25 NG/L — SIGNIFICANT CHANGE UP (ref 0–51)
TSH SERPL-MCNC: 9.85 UIU/ML — HIGH (ref 0.27–4.2)
UROBILINOGEN FLD QL: NEGATIVE — SIGNIFICANT CHANGE UP
WBC # BLD: 6.5 K/UL — SIGNIFICANT CHANGE UP (ref 3.8–10.5)
WBC # FLD AUTO: 6.5 K/UL — SIGNIFICANT CHANGE UP (ref 3.8–10.5)
WBC UR QL: 1 /HPF — SIGNIFICANT CHANGE UP (ref 0–5)

## 2019-08-23 PROCEDURE — 82947 ASSAY GLUCOSE BLOOD QUANT: CPT

## 2019-08-23 PROCEDURE — 84484 ASSAY OF TROPONIN QUANT: CPT

## 2019-08-23 PROCEDURE — 85014 HEMATOCRIT: CPT

## 2019-08-23 PROCEDURE — 81001 URINALYSIS AUTO W/SCOPE: CPT

## 2019-08-23 PROCEDURE — 84443 ASSAY THYROID STIM HORMONE: CPT

## 2019-08-23 PROCEDURE — 70450 CT HEAD/BRAIN W/O DYE: CPT | Mod: 26

## 2019-08-23 PROCEDURE — 82962 GLUCOSE BLOOD TEST: CPT

## 2019-08-23 PROCEDURE — 83605 ASSAY OF LACTIC ACID: CPT

## 2019-08-23 PROCEDURE — 84295 ASSAY OF SERUM SODIUM: CPT

## 2019-08-23 PROCEDURE — 71045 X-RAY EXAM CHEST 1 VIEW: CPT | Mod: 26

## 2019-08-23 PROCEDURE — 82435 ASSAY OF BLOOD CHLORIDE: CPT

## 2019-08-23 PROCEDURE — 99284 EMERGENCY DEPT VISIT MOD MDM: CPT | Mod: 25

## 2019-08-23 PROCEDURE — 85730 THROMBOPLASTIN TIME PARTIAL: CPT

## 2019-08-23 PROCEDURE — 85610 PROTHROMBIN TIME: CPT

## 2019-08-23 PROCEDURE — 82330 ASSAY OF CALCIUM: CPT

## 2019-08-23 PROCEDURE — 99284 EMERGENCY DEPT VISIT MOD MDM: CPT

## 2019-08-23 PROCEDURE — 84436 ASSAY OF TOTAL THYROXINE: CPT

## 2019-08-23 PROCEDURE — 70450 CT HEAD/BRAIN W/O DYE: CPT

## 2019-08-23 PROCEDURE — 80053 COMPREHEN METABOLIC PANEL: CPT

## 2019-08-23 PROCEDURE — 74018 RADEX ABDOMEN 1 VIEW: CPT | Mod: 26,59

## 2019-08-23 PROCEDURE — 84132 ASSAY OF SERUM POTASSIUM: CPT

## 2019-08-23 PROCEDURE — 82803 BLOOD GASES ANY COMBINATION: CPT

## 2019-08-23 PROCEDURE — 96360 HYDRATION IV INFUSION INIT: CPT

## 2019-08-23 PROCEDURE — 74018 RADEX ABDOMEN 1 VIEW: CPT

## 2019-08-23 PROCEDURE — 83880 ASSAY OF NATRIURETIC PEPTIDE: CPT

## 2019-08-23 PROCEDURE — 93005 ELECTROCARDIOGRAM TRACING: CPT

## 2019-08-23 PROCEDURE — 71045 X-RAY EXAM CHEST 1 VIEW: CPT

## 2019-08-23 PROCEDURE — 84480 ASSAY TRIIODOTHYRONINE (T3): CPT

## 2019-08-23 PROCEDURE — 83690 ASSAY OF LIPASE: CPT

## 2019-08-23 PROCEDURE — 96361 HYDRATE IV INFUSION ADD-ON: CPT

## 2019-08-23 PROCEDURE — 85027 COMPLETE CBC AUTOMATED: CPT

## 2019-08-23 RX ORDER — SODIUM CHLORIDE 9 MG/ML
1000 INJECTION INTRAMUSCULAR; INTRAVENOUS; SUBCUTANEOUS ONCE
Refills: 0 | Status: COMPLETED | OUTPATIENT
Start: 2019-08-23 | End: 2019-08-23

## 2019-08-23 RX ADMIN — SODIUM CHLORIDE 1000 MILLILITER(S): 9 INJECTION INTRAMUSCULAR; INTRAVENOUS; SUBCUTANEOUS at 13:39

## 2019-08-23 RX ADMIN — SODIUM CHLORIDE 1000 MILLILITER(S): 9 INJECTION INTRAMUSCULAR; INTRAVENOUS; SUBCUTANEOUS at 15:28

## 2019-08-23 NOTE — ED PROVIDER NOTE - PHYSICAL EXAMINATION
VITALS: reviewed  GEN: NAD, A & O x 4  HEAD/EYES: NCAT, EOMI, anicteric sclerae  ENT: mucus membranes moist, oropharynx WNL, trachea midline  RESP: lungs CTA with equal breath sounds bilaterally, chest wall nontender and atraumatic  CV: heart with reg rhythm S1, S2, no murmur; distal pulses intact and symmetric bilaterally  ABDOMEN: normoactive bowel sounds, soft, nondistended, midline vertical incision cdi with erythema, no palpable masses  : no CVAT  MSK: extremities atraumatic and nontender, 1+ edema to ankles b/l, no asymmetry.   SKIN: warm, dry, no rash, no bruising, no cyanosis. color appropriate for ethnicity  NEURO: alert, mentating appropriately, no facial asymmetry. gross sensation, motor, coordination are intact, no pronator drift, FTN normal  PSYCH: Affect appropriate

## 2019-08-23 NOTE — ED PROVIDER NOTE - NSFOLLOWUPINSTRUCTIONS_ED_ALL_ED_FT
You were seen in the ER for nausea.  GI was seen.  Please see Dr. Almaguer in his office.  Follow up with your doctor.  Return to ER for life threatening emergencies.

## 2019-08-23 NOTE — CONSULT NOTE ADULT - SUBJECTIVE AND OBJECTIVE BOX
Patient is a 78y old  Male who presents with a chief complaint of vertigo    HPI: 78M history of duodenal strictures 2/2 H pylori related ulceration, serial dilation and regular colonoscopy with Dr Whitehead q3 years, paternal history of Crohn's disease, recently admitted with small bowel obstruction post exlap with suspicious lymph node biopsy, which was benign.  He presents with acute vertiginous episode with sweating which has since resolved. CT Head negative. Cardiac w/u to date is benign.      PAST MEDICAL & SURGICAL HISTORY:  Hyperparathyroidism  Hypertension  BRIGID (acute kidney injury)  SBO (small bowel obstruction)  Duodenal ulcer disease  No significant past surgical history      MEDICATIONS  (STANDING):    MEDICATIONS  (PRN):      Allergies    Cipro (Rash)  niacin (Flushing; Rash)  Reglan (Anaphylaxis)    Intolerances        Review of Systems:    General:  No wt loss, fevers, chills, night sweats,fatigue,   CV:  No pain, palpitations, hypo/hypertension  Resp:  No dyspnea, cough, tachypnea, wheezing  GI:  see HPI  :  No pain, bleeding, incontinence, nocturia  Muscle:  No pain, weakness  Neuro: see HPI  Psych:  No fatigue, insomnia, mood problems, depression  Endocrine:  No polyuria, polydypsia, cold/heat intolerance  Heme:  No petechiae, ecchymosis, easy bruisability  Skin:  No rash, tattoos, scars, edema      Vital Signs Last 24 Hrs  T(C): 36.8 (23 Aug 2019 13:26), Max: 37.2 (23 Aug 2019 12:44)  T(F): 98.2 (23 Aug 2019 13:26), Max: 98.9 (23 Aug 2019 12:44)  HR: 62 (23 Aug 2019 15:29) (62 - 89)  BP: 124/64 (23 Aug 2019 15:29) (107/67 - 126/62)  BP(mean): --  RR: 12 (23 Aug 2019 15:29) (12 - 20)  SpO2: 100% (23 Aug 2019 15:29) (96% - 100%)    PHYSICAL EXAM:    Constitutional: NAD, well-developed  Neck: No LAD, supple  Respiratory: CTA and P  Cardiovascular: S1 and S2, RRR, no M  Gastrointestinal: BS+, soft, NT/ND, neg HSM, healing midline surgical incision.  Extremities: No peripheral edema, neg clubbing, cyanosis  Vascular: 2+ peripheral pulses  Neurological: A/O x 3, no focal deficits  Psychiatric: Normal mood, normal affect  Skin: No rashes      LABS:                        10.0   6.5   )-----------( 303      ( 23 Aug 2019 13:07 )             30.8                         8.9    5.48  )-----------( 282      ( 22 Aug 2019 09:39 )             29.4                         8.9    5.47  )-----------( 285      ( 21 Aug 2019 09:46 )             29.9     08    134<L>  |  100  |  11  ----------------------------<  92  3.9   |  21<L>  |  1.29    Ca    8.9      23 Aug 2019 13:07  Phos  2.7       Mg     1.8         TPro  6.4  /  Alb  3.0<L>  /  TBili  0.3  /  DBili  x   /  AST  12  /  ALT  6<L>  /  AlkPhos  164<H>      PT/INR - ( 23 Aug 2019 13:27 )   PT: 12.9 sec;   INR: 1.13 ratio         PTT - ( 23 Aug 2019 13:27 )  PTT:31.9 sec  Urinalysis Basic - ( 23 Aug 2019 17:14 )    Color: Light Yellow / Appearance: Clear / S.014 / pH: x  Gluc: x / Ketone: Negative  / Bili: Negative / Urobili: Negative   Blood: x / Protein: 30 mg/dL / Nitrite: Negative   Leuk Esterase: Negative / RBC: 0 /hpf / WBC 1 /HPF   Sq Epi: x / Non Sq Epi: 1 /hpf / Bacteria: Negative      LIVER FUNCTIONS - ( 23 Aug 2019 13:07 )  Alb: 3.0 g/dL / Pro: 6.4 g/dL / ALK PHOS: 164 U/L / ALT: 6 U/L / AST: 12 U/L / GGT: x           Lipase, Serum: 25 U/L (19 @ 13:07)        RADIOLOGY & ADDITIONAL TESTS:

## 2019-08-23 NOTE — ED PROVIDER NOTE - OBJECTIVE STATEMENT
77 yo M hx HTN, hypothyroidism, hyperparathyroidism, SBO, presenting one day after discharge from surgical service for SBO after an episode of dizziness with associate nausea, retching, and bilateral arm tingling. Patient states he was sitting at the table after having breakfast, making phone calls to coordinate follow up appointments when symptoms started, he immediately called for his wife who had him take deep breaths, and symptoms improved over 10 minutes. Denies associated chest pain or shortness of breath. No associated abdominal pain. Patient with formed BM this morning. Denies persistent dizziness or 77 yo M hx HTN, hypothyroidism, hyperparathyroidism, SBO, presenting one day after discharge from surgical service for SBO after an episode of dizziness with associate nausea, retching, and bilateral arm tingling. Patient states he was sitting at the table after having breakfast, making phone calls to coordinate follow up appointments when symptoms started, he immediately called for his wife who had him take deep breaths, and symptoms improved over 10 minutes. Denies associated chest pain or shortness of breath. No associated abdominal pain. Patient with formed BM this morning. Denies persistent dizziness

## 2019-08-23 NOTE — ED ADULT NURSE NOTE - CHPI ED NUR SYMPTOMS NEG
no fever/no vomiting/no blurred vision/no confusion/no numbness/no loss of consciousness/no change in level of consciousness

## 2019-08-23 NOTE — ED PROVIDER NOTE - NSFOLLOWUPCLINICS_GEN_ALL_ED_FT
[facility]  Gastroenterology  [address] [place] [address] [address]  [place] [address]  Phone: [phone]  Fax:

## 2019-08-23 NOTE — ED PROVIDER NOTE - PROGRESS NOTE DETAILS
ED Sign Out, eval nausea / dizzy, pending abd imaging / lab w/u, likely readmit for continued close monitoring, advance diet, additional w/u -- Brian Mc MD Pt stable, spoke with hospitalist who spoke with Gen surg and also GI. GI attending Rosina evaluated patient, will follow up patient in his office. OK to DC. Vital stable upon discharge

## 2019-08-23 NOTE — CONSULT NOTE ADULT - SUBJECTIVE AND OBJECTIVE BOX
GENERAL SURGERY CONSULT NOTE  --------------------------------------------------------------------------------------------    HPI: 79 yo M hx HTN, hypothyroidism, hyperparathyroidism, SBO, presenting one day after discharge from surgical service for SBO after an episode of dizziness with dry heaves, and also bilateral arm tingling. Patient states he was sitting at the table after having breakfast, making phone calls to coordinate follow up appointments when symptoms started, he immediately called for his wife who had him take deep breaths, and symptoms improved over 10 minutes. Denies associated chest pain or shortness of breath. No associated abdominal pain. Patient claims passing gas and having formed BM this morning. He came to the ER for assurance that this was not another SBO again. Patient denies fevers/chills, denies lightheadedness/dizziness, denies SOB/chest pain, denies constipation/diarrhea.     ROS: 10-system review is otherwise negative except HPI above.      PAST MEDICAL & SURGICAL HISTORY:  Hyperparathyroidism  Hypertension  BRIGID (acute kidney injury)  SBO (small bowel obstruction)  Duodenal ulcer disease  No significant past surgical history    FAMILY HISTORY:  Family history of non-Hodgkin's lymphoma  [] Family history not pertinent as reviewed with the patient and family    ALLERGIES: Cipro (Rash)  niacin (Flushing; Rash)  Reglan (Anaphylaxis)    CURRENT MEDICATIONS  MEDICATIONS (STANDING):   MEDICATIONS (PRN):  --------------------------------------------------------------------------------------------    Vitals:   T(C): 36.8 (08-23-19 @ 13:26), Max: 37.2 (08-23-19 @ 12:44)  HR: 65 (08-23-19 @ 13:26) (65 - 89)  BP: 126/62 (08-23-19 @ 13:26) (107/67 - 126/62)  RR: 13 (08-23-19 @ 13:26) (13 - 20)  SpO2: 100% (08-23-19 @ 13:26) (96% - 100%)  CAPILLARY BLOOD GLUCOSE      POCT Blood Glucose.: 98 mg/dL (23 Aug 2019 12:44)          PHYSICAL EXAM:   General: NAD, Lying in bed comfortably  Neuro: A+Ox3  HEENT: NC/AT, EOMI  Neck: Soft, supple  Cardio: RRR, nml S1/S2  Resp: Good effort, CTA b/l  Thorax: No chest wall tenderness  GI/Abd: Soft, NT/ND, no rebound/guarding, no masses palpated. Incision well healing, C/D/I.  Vascular: All 4 extremities warm.  Skin: Intact, no breakdown  Musculoskeletal: All 4 extremities moving spontaneously, no limitations  --------------------------------------------------------------------------------------------    LABS  CBC (08-23 @ 13:07)                              10.0<L>                         6.5     )----------------(  303        38.8<L>% Neutrophils, 46.4<H>% Lymphocytes, ANC: 2.5                                 30.8<L>  CBC (08-22 @ 09:39)                              8.9<L>                         5.48    )----------------(  282        --    % Neutrophils, --    % Lymphocytes, ANC: --                                  29.4<L>    BMP (08-23 @ 13:07)             134<L>  |  100     |  11    		Ca++ --      Ca 8.9                ---------------------------------( 92    		Mg --                 3.9     |  21<L>   |  1.29  			Ph --      BMP (08-22 @ 07:11)             136     |  102     |  8     		Ca++ --      Ca 8.6                ---------------------------------( 92    		Mg 1.8                4.4     |  25      |  1.32<H>			Ph 2.7       LFTs (08-23 @ 13:07)      TPro 6.4 / Alb 3.0<L> / TBili 0.3 / DBili -- / AST 12 / ALT 6<L> / AlkPhos 164<H>          VBG (08-23 @ 13:07)     7.42 / 40 / 22<L> / 26 / 1.5 / 35<L>%     Lactate: 1.9    --------------------------------------------------------------------------------------------    MICROBIOLOGY      --------------------------------------------------------------------------------------------    IMAGING    --------------------------------------------------------------------------------------------    ASSESSMENT: Patient is a 78y old m with recent discharge from ex lap and SB resection now with bilateral arm tingling and dry heaves he was concerned was another SBO, here for evaluation. He is without leukocytosis or worsening renal function and clinically his abdomen is benign. Doubt SBO, patient reassured.    PLAN:   - Bowel regimen  - Follow up with scheduled appointment with Dr. Byrd    -   - Plan to be discussed with Attending, Dr. Byrd GENERAL SURGERY CONSULT NOTE  --------------------------------------------------------------------------------------------    HPI: 77 yo M hx HTN, hypothyroidism, hyperparathyroidism, SBO, presenting one day after discharge from surgical service for SBO after an episode of dizziness with dry heaves, and also bilateral arm tingling. Patient states he was sitting at the table after having breakfast, making phone calls to coordinate follow up appointments when symptoms started, he immediately called for his wife who had him take deep breaths, and symptoms improved over 10 minutes. Denies associated chest pain or shortness of breath. No associated abdominal pain. Patient claims passing gas and having formed BM this morning. He came to the ER for assurance that this was not another SBO again. Patient denies fevers/chills, denies lightheadedness/dizziness, denies SOB/chest pain, denies constipation/diarrhea.     ROS: 10-system review is otherwise negative except HPI above.      PAST MEDICAL & SURGICAL HISTORY:  Hyperparathyroidism  Hypertension  BRIGID (acute kidney injury)  SBO (small bowel obstruction)  Duodenal ulcer disease  No significant past surgical history    FAMILY HISTORY:  Family history of non-Hodgkin's lymphoma  [] Family history not pertinent as reviewed with the patient and family    ALLERGIES: Cipro (Rash)  niacin (Flushing; Rash)  Reglan (Anaphylaxis)    CURRENT MEDICATIONS  MEDICATIONS (STANDING):   MEDICATIONS (PRN):  --------------------------------------------------------------------------------------------    Vitals:   T(C): 36.8 (08-23-19 @ 13:26), Max: 37.2 (08-23-19 @ 12:44)  HR: 65 (08-23-19 @ 13:26) (65 - 89)  BP: 126/62 (08-23-19 @ 13:26) (107/67 - 126/62)  RR: 13 (08-23-19 @ 13:26) (13 - 20)  SpO2: 100% (08-23-19 @ 13:26) (96% - 100%)  CAPILLARY BLOOD GLUCOSE      POCT Blood Glucose.: 98 mg/dL (23 Aug 2019 12:44)          PHYSICAL EXAM:   General: NAD, Lying in bed comfortably  Neuro: A+Ox3  HEENT: NC/AT, EOMI  Neck: Soft, supple  Cardio: RRR, nml S1/S2  Resp: Good effort, CTA b/l  Thorax: No chest wall tenderness  GI/Abd: Soft, NT/ND, no rebound/guarding, no masses palpated. Incision well healing, C/D/I.  Vascular: All 4 extremities warm.  Skin: Intact, no breakdown  Musculoskeletal: All 4 extremities moving spontaneously, no limitations  --------------------------------------------------------------------------------------------    LABS  CBC (08-23 @ 13:07)                              10.0<L>                         6.5     )----------------(  303        38.8<L>% Neutrophils, 46.4<H>% Lymphocytes, ANC: 2.5                                 30.8<L>  CBC (08-22 @ 09:39)                              8.9<L>                         5.48    )----------------(  282        --    % Neutrophils, --    % Lymphocytes, ANC: --                                  29.4<L>    BMP (08-23 @ 13:07)             134<L>  |  100     |  11    		Ca++ --      Ca 8.9                ---------------------------------( 92    		Mg --                 3.9     |  21<L>   |  1.29  			Ph --      BMP (08-22 @ 07:11)             136     |  102     |  8     		Ca++ --      Ca 8.6                ---------------------------------( 92    		Mg 1.8                4.4     |  25      |  1.32<H>			Ph 2.7       LFTs (08-23 @ 13:07)      TPro 6.4 / Alb 3.0<L> / TBili 0.3 / DBili -- / AST 12 / ALT 6<L> / AlkPhos 164<H>          VBG (08-23 @ 13:07)     7.42 / 40 / 22<L> / 26 / 1.5 / 35<L>%     Lactate: 1.9    --------------------------------------------------------------------------------------------    MICROBIOLOGY      --------------------------------------------------------------------------------------------    IMAGING    --------------------------------------------------------------------------------------------    ASSESSMENT: Patient is a 78y old m with recent discharge from ex lap and SB resection now with bilateral arm tingling and dry heaves he was concerned was another SBO, here for evaluation. He is without leukocytosis or worsening renal function and clinically his abdomen is benign. Doubt SBO, patient reassured.    PLAN:   - Medicine for further workup of bilateral tingling and diaphoresis.  - Recommend GI consult for further evaluation.  - Follow up with scheduled appointment with Dr. Byrd    - No acute surgical intervention - no CT abd/p warranted at this time.  - Plan discussed with Attending, Dr. Byrd

## 2019-08-23 NOTE — ED PROVIDER NOTE - CLINICAL SUMMARY MEDICAL DECISION MAKING FREE TEXT BOX
ATTG: : dizziness with concern for but not limited to cardiac / Neuro / infections / post surg complications / medications - check labs, check urine check ct head, check ekg, check cardiac work up, check surg eval. ivf, re eval for dispo

## 2019-08-23 NOTE — ED ADULT NURSE NOTE - OBJECTIVE STATEMENT
78 yrs old male with s/p abd surgery 1 week ago was brought in by EMS for dizziness and weakness. As per pt he was at home sitting when he state that he felt as if he was spinning similar to "[place] in [facility]. " Pt also reported that he was  feeling hot, weak and diaphoretic. As per wife pt appeared pale at the time of his complaints hence the paramedics were called. Pt is AOX 3 and state is  no longer experiencing dizziness and weakness. Denies fever and chills. Op site dry and intact. No drainage noted from the op site. pt has full ROM in all extremities. No facial droop noted or slurring of speech.

## 2019-08-23 NOTE — CONSULT NOTE ADULT - ASSESSMENT
Impression  His symptoms sounds like acute hypotensive (vagal/orthostatic) event, which has since resolved.  I believe he needs small bowel evaluation as outpatient given unexplained bowel obstruction and family history of Crohns.  I would not offer endoscopic evaluation at this time with recent abdominal surgery and no acute GI symptoms.  Rec outpatient MR enterography and possible patency capsule with subsequent video capsule study as indicated.    Surgery has cleared the patient from abdominal standpoint.  He can follow with me or my office for outpatient w/u. No GI cotnraindication to discharge    [phone]. [address]  [place] [address].

## 2019-08-23 NOTE — ED ADULT NURSE REASSESSMENT NOTE - NS ED NURSE REASSESS COMMENT FT1
Report received from Johny PEACOCK. Patient is a/ox4, reports improvement in symptoms. Patient discharged from ED. IV removed, discharge paperwork provided. Verbalized understanding of teaching. Vital signs stable, afebrile. Patient leaving unit by wheelchair, free from harm.

## 2019-08-23 NOTE — ED PROVIDER NOTE - ATTENDING CONTRIBUTION TO CARE
77 y/o m with pmhx Duodenal ulcer (h/o perf), HTN, hyperparathyroid, s/p expl lap and small bowel resection discharged on Aug 22nd for sbo presents by EMS from home with wife and daughter for episode of dizziness that occurred this afternoon. patient was home with [facility] and felt sudden onset of dizziness that he describes as not room spinning but he was moving around. not like intoxication or lightheaded. no vomiting but retching and nausea. no fever or chills. no abd pain. had bm soft but more formed. no urinary complaints.  symptoms improved shortly after arrival.  Gen.  no acute distress  HEENT:  tms clear. pupils 3 mm reactive to light  Lungs:  ctb/al  CVS: S1S2   Abd;  soft non tender, wound site staples intact, dry with min erythema at the base.   Ext: no pitting edema or erythema  Neuro: clear speech, aaox3, normal finger to nose.   MSK: moving all ext spon.

## 2019-08-26 LAB — CHROM ANALY OVERALL INTERP SPEC-IMP: SIGNIFICANT CHANGE UP

## 2019-08-29 ENCOUNTER — APPOINTMENT (OUTPATIENT)
Dept: TRAUMA SURGERY | Facility: CLINIC | Age: 79
End: 2019-08-29

## 2019-08-29 VITALS
HEIGHT: 73 IN | DIASTOLIC BLOOD PRESSURE: 67 MMHG | WEIGHT: 190 LBS | BODY MASS INDEX: 25.18 KG/M2 | SYSTOLIC BLOOD PRESSURE: 119 MMHG | TEMPERATURE: 97.7 F | HEART RATE: 79 BPM

## 2019-10-10 ENCOUNTER — INBOUND DOCUMENT (OUTPATIENT)
Age: 79
End: 2019-10-10

## 2019-11-06 ENCOUNTER — MEDICATION RENEWAL (OUTPATIENT)
Age: 79
End: 2019-11-06

## 2020-04-22 ENCOUNTER — APPOINTMENT (OUTPATIENT)
Dept: UROLOGY | Facility: CLINIC | Age: 80
End: 2020-04-22

## 2020-06-04 ENCOUNTER — RESULT REVIEW (OUTPATIENT)
Age: 80
End: 2020-06-04

## 2020-07-01 ENCOUNTER — RESULT REVIEW (OUTPATIENT)
Age: 80
End: 2020-07-01

## 2020-07-14 ENCOUNTER — TRANSCRIPTION ENCOUNTER (OUTPATIENT)
Age: 80
End: 2020-07-14

## 2020-07-14 ENCOUNTER — APPOINTMENT (OUTPATIENT)
Dept: UROLOGY | Facility: CLINIC | Age: 80
End: 2020-07-14
Payer: MEDICARE

## 2020-07-14 VITALS — TEMPERATURE: 97.3 F

## 2020-07-14 PROCEDURE — 99213 OFFICE O/P EST LOW 20 MIN: CPT

## 2020-07-14 RX ORDER — SUCRALFATE 1 G/1
1 TABLET ORAL
Refills: 0 | Status: ACTIVE | COMMUNITY

## 2020-07-14 RX ORDER — AMLODIPINE BESYLATE 2.5 MG/1
2.5 TABLET ORAL
Refills: 0 | Status: ACTIVE | COMMUNITY

## 2020-07-14 RX ORDER — METOPROLOL TARTRATE 25 MG/1
25 TABLET, FILM COATED ORAL
Refills: 0 | Status: ACTIVE | COMMUNITY

## 2020-07-14 NOTE — PHYSICAL EXAM
[General Appearance - Well Developed] : well developed [General Appearance - Well Nourished] : well nourished [Normal Appearance] : normal appearance [Well Groomed] : well groomed [Abdomen Soft] : soft [General Appearance - In No Acute Distress] : no acute distress [Abdomen Tenderness] : non-tender [Costovertebral Angle Tenderness] : no ~M costovertebral angle tenderness [Urinary Bladder Findings] : the bladder was normal on palpation [Urethral Meatus] : meatus normal [Testes Mass (___cm)] : there were no testicular masses [Scrotum] : the scrotum was normal [No Prostate Nodules] : no prostate nodules [Edema] : no peripheral edema [] : no respiratory distress [Exaggerated Use Of Accessory Muscles For Inspiration] : no accessory muscle use [Respiration, Rhythm And Depth] : normal respiratory rhythm and effort [Oriented To Time, Place, And Person] : oriented to person, place, and time [Affect] : the affect was normal [Mood] : the mood was normal [Not Anxious] : not anxious [Normal Station and Gait] : the gait and station were normal for the patient's age [No Palpable Adenopathy] : no palpable adenopathy [No Focal Deficits] : no focal deficits

## 2020-07-23 NOTE — HISTORY OF PRESENT ILLNESS
[FreeTextEntry1] : Currently on Oxybutynin Reports that urinary stream is good. Patient going frequently to bathroom during the day secondary to increased fluid intake. Denies urgency. Nocturia 1x \par Denies any hematuria, dysuria or incontinence.\par \par \par Recently diagnosed with follicular lymphoma. Will be meeting with Dr. Elzbieta Taylor at [facility] to discuss options.

## 2020-11-04 NOTE — ED ADULT TRIAGE NOTE - PAIN: PRESENCE, MLM
Dapsone Pregnancy And Lactation Text: This medication is Pregnancy Category C and is not considered safe during pregnancy or breast feeding. denies pain/discomfort

## 2021-04-28 ENCOUNTER — APPOINTMENT (OUTPATIENT)
Dept: UROLOGY | Facility: CLINIC | Age: 81
End: 2021-04-28
Payer: MEDICARE

## 2021-04-28 PROCEDURE — 99213 OFFICE O/P EST LOW 20 MIN: CPT

## 2021-04-28 PROCEDURE — 99072 ADDL SUPL MATRL&STAF TM PHE: CPT

## 2021-04-28 RX ORDER — CHOLECALCIFEROL (VITAMIN D3) 125 MCG
TABLET ORAL
Refills: 0 | Status: ACTIVE | COMMUNITY

## 2021-04-28 RX ORDER — ALPRAZOLAM 0.25 MG/1
0.25 TABLET ORAL
Refills: 0 | Status: COMPLETED | COMMUNITY
End: 2021-04-28

## 2021-04-28 RX ORDER — GLUCOSAMINE/MSM/CHONDROIT SULF 500-166.6
500 TABLET ORAL
Refills: 0 | Status: COMPLETED | COMMUNITY
End: 2021-04-28

## 2021-04-28 RX ORDER — VALSARTAN 160 MG
160 CAPSULE ORAL
Refills: 0 | Status: COMPLETED | COMMUNITY
End: 2021-04-28

## 2021-04-28 RX ORDER — INDOMETHACIN 25 MG/1
25 CAPSULE ORAL
Refills: 0 | Status: COMPLETED | COMMUNITY
End: 2021-04-28

## 2021-04-28 RX ORDER — MAGNESIUM OXIDE/MAG AA CHELATE 300 MG
CAPSULE ORAL
Refills: 0 | Status: ACTIVE | COMMUNITY

## 2021-05-06 NOTE — ASSESSMENT
[FreeTextEntry1] : Doing well on current medication.\par Oxybutynin ER 10 mg renewed.\par Follow up in one year.

## 2021-05-06 NOTE — HISTORY OF PRESENT ILLNESS
[FreeTextEntry1] : Patient is a 80 year old man. History of LUTS. Currently on Oxybutynin. \par Reports urinary stream is good. Denies any increased urgency or frequency. Denies any leakage. Nocturia x 1\par \par Completed chemotherapy mid December at [facility] for follicular lymphoma. \par \par Good flow, complete emptying.\par No hematuria, dysuria.\par No abdominal or flank pain.

## 2021-07-20 ENCOUNTER — APPOINTMENT (OUTPATIENT)
Dept: UROLOGY | Facility: CLINIC | Age: 81
End: 2021-07-20

## 2021-07-23 ENCOUNTER — NON-APPOINTMENT (OUTPATIENT)
Age: 81
End: 2021-07-23

## 2021-07-29 ENCOUNTER — APPOINTMENT (OUTPATIENT)
Dept: UROLOGY | Facility: CLINIC | Age: 81
End: 2021-07-29
Payer: MEDICARE

## 2021-07-29 VITALS
DIASTOLIC BLOOD PRESSURE: 80 MMHG | HEART RATE: 67 BPM | RESPIRATION RATE: 16 BRPM | TEMPERATURE: 98.5 F | SYSTOLIC BLOOD PRESSURE: 168 MMHG

## 2021-07-29 PROCEDURE — 99213 OFFICE O/P EST LOW 20 MIN: CPT

## 2021-07-30 NOTE — PHYSICAL EXAM
[General Appearance - Well Developed] : well developed [General Appearance - Well Nourished] : well nourished [Normal Appearance] : normal appearance [Well Groomed] : well groomed [General Appearance - In No Acute Distress] : no acute distress [Abdomen Soft] : soft [Abdomen Tenderness] : non-tender [Costovertebral Angle Tenderness] : no ~M costovertebral angle tenderness [No Prostate Nodules] : no prostate nodules [Prostate Size ___ gm] : prostate size [unfilled] gm [Heart Rate And Rhythm] : Heart rate and rhythm were normal [Edema] : no peripheral edema [] : no respiratory distress [Respiration, Rhythm And Depth] : normal respiratory rhythm and effort [Exaggerated Use Of Accessory Muscles For Inspiration] : no accessory muscle use

## 2021-07-30 NOTE — HISTORY OF PRESENT ILLNESS
[FreeTextEntry1] : Here for follow up.\par Remains on oxybutynin ER 10 mg once daily for urinary frequency.\par Returns because of recent PSA which was elevated to 6.0 ng/mL, 7/7/2021\par Prior PSA:\par 6/15/2020 2.55\par 12/7/2019 2.2\par 9/4/2019 2.33\par 6/18/2019 8.11\par 9/6/2018 4.64\par 4/26/2017 3.63\par \par No other new symptoms.\par Has been on T replacement therapy with q2 weeks injection.\par Given recent rise in PSA, T has been on hold.\par

## 2021-07-30 NOTE — ASSESSMENT
[FreeTextEntry1] : PSA results reviewed.\par Recommend repeat PSA f/t today.\par Will call with results.  If remains elevated, then will order MRI prostate.\par \par Continue on oxybutynin ER 10 mg once daily.\par

## 2021-08-04 LAB
PSA FREE FLD-MCNC: 11 %
PSA FREE SERPL-MCNC: 0.74 NG/ML
PSA SERPL-MCNC: 6.63 NG/ML

## 2021-08-06 NOTE — HISTORY OF PRESENT ILLNESS
[FreeTextEntry1] : PSA results reviewed.\par Urge incontinence: Remains on oxybutynin ER 10 mg once daily.  Doing well.\par \par Patient denies gross hematuria, dysuria or other s/s UTI. \par Patient denies abdominal pain, flank pain, lower extremity edema, anorexia, unexpected weight loss, excessive fatigue, fever/chills, nausea/vomiting.\par \par There have not been significant changes in medical/surgical hx since the last visit.\par The medication list and allergies were reviewed.\par

## 2021-08-06 NOTE — ASSESSMENT
[FreeTextEntry1] : Urge incontinence: continue oxybutynin ER 10 mg daily.\par \par Elevated PSA: continue PSA f/t follow up.

## 2021-08-18 ENCOUNTER — TRANSCRIPTION ENCOUNTER (OUTPATIENT)
Age: 81
End: 2021-08-18

## 2021-08-24 ENCOUNTER — EMERGENCY (EMERGENCY)
Facility: HOSPITAL | Age: 81
LOS: 1 days | Discharge: HOME | End: 2021-08-24
Attending: STUDENT IN AN ORGANIZED HEALTH CARE EDUCATION/TRAINING PROGRAM
Payer: COMMERCIAL

## 2021-08-24 VITALS
OXYGEN SATURATION: 96 % | HEIGHT: 73 IN | WEIGHT: 220.02 LBS | TEMPERATURE: 98 F | SYSTOLIC BLOOD PRESSURE: 160 MMHG | HEART RATE: 74 BPM | DIASTOLIC BLOOD PRESSURE: 84 MMHG | RESPIRATION RATE: 18 BRPM

## 2021-08-24 VITALS
OXYGEN SATURATION: 100 % | SYSTOLIC BLOOD PRESSURE: 146 MMHG | DIASTOLIC BLOOD PRESSURE: 66 MMHG | TEMPERATURE: 98 F | HEART RATE: 72 BPM | RESPIRATION RATE: 15 BRPM

## 2021-08-24 LAB
ALBUMIN SERPL ELPH-MCNC: 3.2 G/DL — LOW (ref 3.3–5)
ALP SERPL-CCNC: 243 U/L — HIGH (ref 40–120)
ALT FLD-CCNC: 16 U/L — SIGNIFICANT CHANGE UP (ref 10–45)
ANION GAP SERPL CALC-SCNC: 13 MMOL/L — SIGNIFICANT CHANGE UP (ref 5–17)
AST SERPL-CCNC: 32 U/L — SIGNIFICANT CHANGE UP (ref 10–40)
BASOPHILS # BLD AUTO: 0.04 K/UL — SIGNIFICANT CHANGE UP (ref 0–0.2)
BASOPHILS NFR BLD AUTO: 0.9 % — SIGNIFICANT CHANGE UP (ref 0–2)
BILIRUB SERPL-MCNC: 0.3 MG/DL — SIGNIFICANT CHANGE UP (ref 0.2–1.2)
BUN SERPL-MCNC: 23 MG/DL — SIGNIFICANT CHANGE UP (ref 7–23)
CALCIUM SERPL-MCNC: 9.8 MG/DL — SIGNIFICANT CHANGE UP (ref 8.4–10.5)
CHLORIDE SERPL-SCNC: 98 MMOL/L — SIGNIFICANT CHANGE UP (ref 96–108)
CO2 SERPL-SCNC: 20 MMOL/L — LOW (ref 22–31)
CREAT SERPL-MCNC: 1.49 MG/DL — HIGH (ref 0.5–1.3)
EOSINOPHIL # BLD AUTO: 0.04 K/UL — SIGNIFICANT CHANGE UP (ref 0–0.5)
EOSINOPHIL NFR BLD AUTO: 0.9 % — SIGNIFICANT CHANGE UP (ref 0–6)
GAS PNL BLDV: SIGNIFICANT CHANGE UP
GLUCOSE SERPL-MCNC: 154 MG/DL — HIGH (ref 70–99)
HCT VFR BLD CALC: 40.1 % — SIGNIFICANT CHANGE UP (ref 39–50)
HGB BLD-MCNC: 12.5 G/DL — LOW (ref 13–17)
LYMPHOCYTES # BLD AUTO: 0.5 K/UL — LOW (ref 1–3.3)
LYMPHOCYTES # BLD AUTO: 11.4 % — LOW (ref 13–44)
MAGNESIUM SERPL-MCNC: 2.2 MG/DL — SIGNIFICANT CHANGE UP (ref 1.6–2.6)
MCHC RBC-ENTMCNC: 24.9 PG — LOW (ref 27–34)
MCHC RBC-ENTMCNC: 31.2 GM/DL — LOW (ref 32–36)
MCV RBC AUTO: 79.9 FL — LOW (ref 80–100)
MONOCYTES # BLD AUTO: 0.39 K/UL — SIGNIFICANT CHANGE UP (ref 0–0.9)
MONOCYTES NFR BLD AUTO: 8.8 % — SIGNIFICANT CHANGE UP (ref 2–14)
NEUTROPHILS # BLD AUTO: 3.39 K/UL — SIGNIFICANT CHANGE UP (ref 1.8–7.4)
NEUTROPHILS NFR BLD AUTO: 71 % — SIGNIFICANT CHANGE UP (ref 43–77)
PLATELET # BLD AUTO: 173 K/UL — SIGNIFICANT CHANGE UP (ref 150–400)
POTASSIUM SERPL-MCNC: 4.9 MMOL/L — SIGNIFICANT CHANGE UP (ref 3.5–5.3)
POTASSIUM SERPL-SCNC: 4.9 MMOL/L — SIGNIFICANT CHANGE UP (ref 3.5–5.3)
PROT SERPL-MCNC: 6.9 G/DL — SIGNIFICANT CHANGE UP (ref 6–8.3)
RBC # BLD: 5.02 M/UL — SIGNIFICANT CHANGE UP (ref 4.2–5.8)
RBC # FLD: 18.2 % — HIGH (ref 10.3–14.5)
SODIUM SERPL-SCNC: 131 MMOL/L — LOW (ref 135–145)
WBC # BLD: 4.4 K/UL — SIGNIFICANT CHANGE UP (ref 3.8–10.5)
WBC # FLD AUTO: 4.4 K/UL — SIGNIFICANT CHANGE UP (ref 3.8–10.5)

## 2021-08-24 PROCEDURE — 85014 HEMATOCRIT: CPT

## 2021-08-24 PROCEDURE — 83605 ASSAY OF LACTIC ACID: CPT

## 2021-08-24 PROCEDURE — 84132 ASSAY OF SERUM POTASSIUM: CPT

## 2021-08-24 PROCEDURE — 71045 X-RAY EXAM CHEST 1 VIEW: CPT | Mod: 26

## 2021-08-24 PROCEDURE — 93010 ELECTROCARDIOGRAM REPORT: CPT

## 2021-08-24 PROCEDURE — 99284 EMERGENCY DEPT VISIT MOD MDM: CPT | Mod: 25

## 2021-08-24 PROCEDURE — 85018 HEMOGLOBIN: CPT

## 2021-08-24 PROCEDURE — 85025 COMPLETE CBC W/AUTO DIFF WBC: CPT

## 2021-08-24 PROCEDURE — 82803 BLOOD GASES ANY COMBINATION: CPT

## 2021-08-24 PROCEDURE — 82330 ASSAY OF CALCIUM: CPT

## 2021-08-24 PROCEDURE — 80053 COMPREHEN METABOLIC PANEL: CPT

## 2021-08-24 PROCEDURE — 84295 ASSAY OF SERUM SODIUM: CPT

## 2021-08-24 PROCEDURE — 99285 EMERGENCY DEPT VISIT HI MDM: CPT

## 2021-08-24 PROCEDURE — 82435 ASSAY OF BLOOD CHLORIDE: CPT

## 2021-08-24 PROCEDURE — 71045 X-RAY EXAM CHEST 1 VIEW: CPT

## 2021-08-24 PROCEDURE — 83735 ASSAY OF MAGNESIUM: CPT

## 2021-08-24 PROCEDURE — 82947 ASSAY GLUCOSE BLOOD QUANT: CPT

## 2021-08-24 PROCEDURE — 93005 ELECTROCARDIOGRAM TRACING: CPT

## 2021-08-24 RX ORDER — SODIUM CHLORIDE 9 MG/ML
1000 INJECTION INTRAMUSCULAR; INTRAVENOUS; SUBCUTANEOUS ONCE
Refills: 0 | Status: COMPLETED | OUTPATIENT
Start: 2021-08-24 | End: 2021-08-24

## 2021-08-24 RX ADMIN — SODIUM CHLORIDE 1000 MILLILITER(S): 9 INJECTION INTRAMUSCULAR; INTRAVENOUS; SUBCUTANEOUS at 13:46

## 2021-08-24 NOTE — ED PROVIDER NOTE - CARE PLAN
1 Principal Discharge DX:	2019 novel coronavirus disease (COVID-19)  Secondary Diagnosis:	BRIGID (acute kidney injury)

## 2021-08-24 NOTE — ED PROVIDER NOTE - PHYSICAL EXAMINATION
CONSTITUTIONAL: NAD, tired looking elderly M  SKIN: Warm dry  HEAD: NCAT  EYES: NL inspection  ENT: dry oral mucosa  NECK: Supple; non tender.  CARD: RRR   RESP: clear to ascultation, no crackles  ABD: S/NT no R/G  EXT: no pedal edema  NEURO: Grossly unremarkable  PSYCH: Cooperative, appropriate.

## 2021-08-24 NOTE — ED ADULT NURSE NOTE - CHPI ED NUR SYMPTOMS NEG
no body aches/no chest pain/no chills/no cough/no diaphoresis/no edema/no headache/no hemoptysis/no shortness of breath/no wheezing

## 2021-08-24 NOTE — ED PROVIDER NOTE - NSFOLLOWUPINSTRUCTIONS_ED_ALL_ED_FT
3
You are know to have COVID-19, follow the steps below to help prevent the disease from spreading to people in your home and community.   https://www.cdc.gov/coronavirus/2019-ncov/downloads/sick-with-2019-nCoV-fact-sheet.pdf    - Please continue to hydrate well even if you do not feel likely eating/or drinking  - Symptoms often last 10-14 days before significant improvement occurs  - if you develop difficulty breathing, worsening fevers, increased or foul smelling sputum, or other concerning symptoms please seek medical evaluation.   - Follow up with your primary care provider once your symptoms resolve for further evaluation.     Stay home except to get medical care   You should restrict activities outside your home, except for getting medical care. Do not go to work, school, or public areas. Avoid using public transportation, ride-sharing, or taxis.   Separate yourself from other people and animals in your home   People: As much as possible, you should stay in a specific room and away from other people in your home. Also, you should use a separate bathroom, if available.   Animals: Do not handle pets or other animals while sick. See COVID-19 and Animals for more information.   Call ahead before visiting your doctor   If you have a medical appointment, call the healthcare provider and tell them that you have or may have COVID-19. This will help the healthcare provider’s office take steps to keep other people from getting infected or exposed.     Wear a facemask   You should wear a facemask when you are around other people (e.g., sharing a room or vehicle) or pets and before you enter a healthcare provider’s office. If you are not able to wear a facemask (for example, because it causes trouble breathing), then people who live with you should not stay in the same room with you, or they should wear a facemask if they enter your room.     Cover your coughs and sneezes   Cover your mouth and nose with a tissue when you cough or sneeze. Throw used tissues in a lined trash can; immediately wash your hands with soap and water for at least 20 seconds or clean your hands with an alcohol-based hand  that contains at least 60% alcohol covering all surfaces of your hands and rubbing them together until they feel dry. Soap and water should be used preferentially if hands are visibly dirty.     Avoid sharing personal household items   You should not share dishes, drinking glasses, cups, eating utensils, towels, or bedding with other people or pets in your home. After using these items, they should be washed thoroughly with soap and water.     Clean your hands often   Wash your hands often with soap and water for at least 20 seconds. If soap and water are not available, clean your hands with an alcohol-based hand  that contains at least 60% alcohol, covering all surfaces of your hands and rubbing them together until they feel dry. Soap and water should be used preferentially if hands are visibly dirty. Avoid touching your eyes, nose, and mouth with unwashed hands.     Clean all “high-touch” surfaces every day   High touch surfaces include counters, tabletops, doorknobs, bathroom fixtures, toilets, phones, keyboards, tablets, and bedside tables. Also, clean any surfaces that may have blood, stool, or body fluids on them. Use a household cleaning spray or wipe, according to the label instructions. Labels contain instructions for safe and effective use of the cleaning product including precautions you should take when applying the product, such as wearing gloves and making sure you have good ventilation during use of the product.     Monitor your symptoms   Seek prompt medical attention if your illness is worsening (e.g., difficulty breathing). Before seeking care, call your healthcare provider and tell them that you have, or are being evaluated for, COVID-19. Put on a facemask before you enter the facility. These steps will help the healthcare provider’s office to keep other people in the office or waiting room from getting infected or exposed. Ask your healthcare provider to call the local or [facility] health department. Persons who are placed under active monitoring or facilitated self-monitoring should follow instructions provided by their local health department or occupational health professionals, as appropriate. When working with your local health department check their available hours. If you have a medical emergency and need to call 911, notify the dispatch personnel that you have, or are being evaluated for COVID-19. If possible, put on a facemask before emergency medical services arrive.     Discontinuing home isolation   Patients with confirmed COVID-19 should remain under home isolation precautions until the risk of secondary transmission to others is thought to be low. The decision to discontinue home isolation precautions should be made on a case-by-case basis, in consultation with healthcare providers and state and local health departments.  For more information: www.cdc.gov/COVID19

## 2021-08-24 NOTE — ED PROVIDER NOTE - PATIENT PORTAL LINK FT
You can access the FollowMyHealth Patient Portal offered by [facility] by registering at the following website: [email or web address]. By joining [email or web address]’s FollowMyHealth portal, you will also be able to view your health information using other applications (apps) compatible with our system.

## 2021-08-24 NOTE — ED ADULT NURSE NOTE - NSIMPLEMENTINTERV_GEN_ALL_ED
Implemented All Fall Risk Interventions:  [place] to call system. Call bell, personal items and telephone within reach. Instruct patient to call for assistance. Room bathroom lighting operational. Non-slip footwear when patient is off stretcher. Physically safe environment: no spills, clutter or unnecessary equipment. Stretcher in lowest position, wheels locked, appropriate side rails in place. Provide visual cue, wrist band, yellow gown, etc. Monitor gait and stability. Monitor for mental status changes and reorient to person, place, and time. Review medications for side effects contributing to fall risk. Reinforce activity limits and safety measures with patient and family.

## 2021-08-24 NOTE — ED PROVIDER NOTE - ATTENDING CONTRIBUTION TO CARE
I have personally seen and examined this patient.  I have fully participated in the care of this patient. I have reviewed all pertinent clinical information, including history, physical exam, plan and the Resident’s note and agree except as noted. - MD Lulu.    81 yo M with PMH of lymphoma, COVID vaccinated in jan/Feb, and waiting for third shot but recently got COVID, s/p monoclonal ab, pt feels weak but denies sob or cough. No fever. Pt is not hypoxic but pt is immuno compromise, will get lab, electrolytes, and check kidney function but likely d/c with [facility] program follow up.

## 2021-08-24 NOTE — ED PROVIDER NOTE - CLINICAL SUMMARY MEDICAL DECISION MAKING FREE TEXT BOX
Stefano/PGY1: 81y/o M with PMH of FL, HTN presents with COVID19 with symptoms of fatigue and no desire to eat. No dyspnea, increasing cough or fever, worsening LE edema or GI sxs. Already received vaccine and monoclonal Abs. VSS clinically dehydrated, no signs of worsening hypoxia/PNA on exam. Rehydrate, check labs. Will likely dispo home with [facility] f/u.

## 2021-08-24 NOTE — ED PROVIDER NOTE - OBJECTIVE STATEMENT
81y/o M with PMH of follicular lymphoma in remission, HTN, no cardiopulm disease presents after being diagnosed with +ve COVID 8/17 for persistent fatigue. Pos on 8/17 after developing cough/fatigue, got monoclonal Ab 8/18. Still having persistent fatigue, anorexia, cough with whitish sputum. No CP, SOB, abd pain, N/V/D/C, LE edema, MEEKS. Feels dehydrated as he hasn't really eaten much during that time. Tolerates PO but no appetite or desire for food.

## 2021-08-24 NOTE — ED PROVIDER NOTE - NS ED ROS FT
Constitutional:  See HPI, generalized fatigue, loss of appetite   Eyes:  No visual changes  ENMT: No neck pain or stiffness  Cardiac:  No chest pain  Respiratory:  No  respiratory distress. minimally productive cough  GI:  No nausea, vomiting, diarrhea or abdominal pain.  :  No dysuria, frequency or burning.  MS:  No back pain.  Neuro:  No headache   Skin:  No skin rash  Except as documented in the HPI,  all other systems are negative

## 2021-08-24 NOTE — ED ADULT NURSE NOTE - OBJECTIVE STATEMENT
79 yo M aaox4 c/o of weakness headache and dizziness. Recently diagnosed with Covid. Pt family sick as well as per EMS report. Pt had Monoclonal antibody therapy with little improvement in symptoms. Here for further evaluating. Pt is aaox4 well appearing, and speaking in full sentences without difficulty. Denies  CP. Cardiac monitor applied. IV line placed x1 attempt. Pt tolerated well, labs drawn and sent. Provider at bedside evaluating. Denies N/V. Breathing spontaneous and unlabored with pulse ox >95% on room air. Upon assessment, abdomen soft and nontender, +strong peripheral pulses, moving all extremities without difficulty, lungs clear, No pitting edema to b/l LE. IV line placed x1 attempt. Pt tolerated well, labs drawn and sent. Provider at bedside evaluating.

## 2021-08-24 NOTE — ED PROVIDER NOTE - PROGRESS NOTE DETAILS
Attending/MD Lulu. low sodium 131, vanessa 1.4, all explained from dehydration from COVID, pt is not hypoxic, but immuno compromise and mild alteration in the lab, needs [place] program follow up. Stefano/PGY1: discussed with pt and family (daughter and wife) on pt's phone about results and plan. Pt doesn't wish to stay in hospital and is willing to try going home and improving his diet and mobility. Aware that there is likely nothing acutely to intervene with but will watch for any worsening symptoms.

## 2021-09-03 ENCOUNTER — TRANSCRIPTION ENCOUNTER (OUTPATIENT)
Age: 81
End: 2021-09-03

## 2021-09-03 ENCOUNTER — NON-APPOINTMENT (OUTPATIENT)
Age: 81
End: 2021-09-03

## 2021-09-18 ENCOUNTER — OUTPATIENT (OUTPATIENT)
Dept: OUTPATIENT SERVICES | Facility: HOSPITAL | Age: 81
LOS: 1 days | End: 2021-09-18
Payer: COMMERCIAL

## 2021-09-18 ENCOUNTER — APPOINTMENT (OUTPATIENT)
Dept: MRI IMAGING | Facility: IMAGING CENTER | Age: 81
End: 2021-09-18
Payer: MEDICARE

## 2021-09-18 ENCOUNTER — RESULT REVIEW (OUTPATIENT)
Age: 81
End: 2021-09-18

## 2021-09-18 DIAGNOSIS — R97.20 ELEVATED PROSTATE SPECIFIC ANTIGEN [PSA]: ICD-10-CM

## 2021-09-18 PROCEDURE — 72197 MRI PELVIS W/O & W/DYE: CPT

## 2021-09-18 PROCEDURE — 76498 UNLISTED MR PROCEDURE: CPT

## 2021-09-18 PROCEDURE — 76498P: CUSTOM | Mod: 26

## 2021-09-18 PROCEDURE — 72197 MRI PELVIS W/O & W/DYE: CPT | Mod: 26

## 2021-09-18 PROCEDURE — A9585: CPT

## 2021-09-29 ENCOUNTER — NON-APPOINTMENT (OUTPATIENT)
Age: 81
End: 2021-09-29

## 2021-10-15 ENCOUNTER — APPOINTMENT (OUTPATIENT)
Dept: CV DIAGNOSTICS | Facility: HOSPITAL | Age: 81
End: 2021-10-15

## 2021-10-15 ENCOUNTER — OUTPATIENT (OUTPATIENT)
Dept: OUTPATIENT SERVICES | Facility: HOSPITAL | Age: 81
LOS: 1 days | End: 2021-10-15
Payer: COMMERCIAL

## 2021-10-15 DIAGNOSIS — R06.02 SHORTNESS OF BREATH: ICD-10-CM

## 2021-10-15 PROCEDURE — 93017 CV STRESS TEST TRACING ONLY: CPT

## 2021-10-15 PROCEDURE — 93016 CV STRESS TEST SUPVJ ONLY: CPT

## 2021-10-15 PROCEDURE — 93018 CV STRESS TEST I&R ONLY: CPT

## 2021-10-15 PROCEDURE — 78452 HT MUSCLE IMAGE SPECT MULT: CPT | Mod: 26,MH

## 2021-10-15 PROCEDURE — A9500: CPT

## 2021-10-15 PROCEDURE — 78452 HT MUSCLE IMAGE SPECT MULT: CPT | Mod: MH

## 2021-11-05 ENCOUNTER — APPOINTMENT (OUTPATIENT)
Dept: DISASTER EMERGENCY | Facility: CLINIC | Age: 81
End: 2021-11-05

## 2021-11-05 DIAGNOSIS — Z01.818 ENCOUNTER FOR OTHER PREPROCEDURAL EXAMINATION: ICD-10-CM

## 2021-11-06 LAB — SARS-COV-2 N GENE NPH QL NAA+PROBE: NOT DETECTED

## 2021-11-08 ENCOUNTER — OUTPATIENT (OUTPATIENT)
Dept: OUTPATIENT SERVICES | Facility: HOSPITAL | Age: 81
LOS: 1 days | End: 2021-11-08
Payer: COMMERCIAL

## 2021-11-08 VITALS
RESPIRATION RATE: 16 BRPM | OXYGEN SATURATION: 98 % | SYSTOLIC BLOOD PRESSURE: 155 MMHG | DIASTOLIC BLOOD PRESSURE: 72 MMHG | HEART RATE: 74 BPM

## 2021-11-08 VITALS
SYSTOLIC BLOOD PRESSURE: 156 MMHG | DIASTOLIC BLOOD PRESSURE: 74 MMHG | HEART RATE: 62 BPM | WEIGHT: 229.94 LBS | HEIGHT: 72 IN | RESPIRATION RATE: 16 BRPM | TEMPERATURE: 98 F | OXYGEN SATURATION: 97 %

## 2021-11-08 DIAGNOSIS — R94.39 ABNORMAL RESULT OF OTHER CARDIOVASCULAR FUNCTION STUDY: ICD-10-CM

## 2021-11-08 LAB
ANION GAP SERPL CALC-SCNC: 15 MMOL/L — SIGNIFICANT CHANGE UP (ref 5–17)
BUN SERPL-MCNC: 26 MG/DL — HIGH (ref 7–23)
CALCIUM SERPL-MCNC: 10 MG/DL — SIGNIFICANT CHANGE UP (ref 8.4–10.5)
CHLORIDE SERPL-SCNC: 103 MMOL/L — SIGNIFICANT CHANGE UP (ref 96–108)
CO2 SERPL-SCNC: 20 MMOL/L — LOW (ref 22–31)
CREAT SERPL-MCNC: 1.54 MG/DL — HIGH (ref 0.5–1.3)
GLUCOSE SERPL-MCNC: 123 MG/DL — HIGH (ref 70–99)
HCT VFR BLD CALC: 42.2 % — SIGNIFICANT CHANGE UP (ref 39–50)
HGB BLD-MCNC: 13.3 G/DL — SIGNIFICANT CHANGE UP (ref 13–17)
MCHC RBC-ENTMCNC: 27.1 PG — SIGNIFICANT CHANGE UP (ref 27–34)
MCHC RBC-ENTMCNC: 31.5 GM/DL — LOW (ref 32–36)
MCV RBC AUTO: 85.9 FL — SIGNIFICANT CHANGE UP (ref 80–100)
NRBC # BLD: 0 /100 WBCS — SIGNIFICANT CHANGE UP (ref 0–0)
PLATELET # BLD AUTO: 131 K/UL — LOW (ref 150–400)
POTASSIUM SERPL-MCNC: 4.1 MMOL/L — SIGNIFICANT CHANGE UP (ref 3.5–5.3)
POTASSIUM SERPL-SCNC: 4.1 MMOL/L — SIGNIFICANT CHANGE UP (ref 3.5–5.3)
RBC # BLD: 4.91 M/UL — SIGNIFICANT CHANGE UP (ref 4.2–5.8)
RBC # FLD: 16.1 % — HIGH (ref 10.3–14.5)
SODIUM SERPL-SCNC: 138 MMOL/L — SIGNIFICANT CHANGE UP (ref 135–145)
WBC # BLD: 4.36 K/UL — SIGNIFICANT CHANGE UP (ref 3.8–10.5)
WBC # FLD AUTO: 4.36 K/UL — SIGNIFICANT CHANGE UP (ref 3.8–10.5)

## 2021-11-08 PROCEDURE — 99153 MOD SED SAME PHYS/QHP EA: CPT

## 2021-11-08 PROCEDURE — 93458 L HRT ARTERY/VENTRICLE ANGIO: CPT

## 2021-11-08 PROCEDURE — 85027 COMPLETE CBC AUTOMATED: CPT

## 2021-11-08 PROCEDURE — 93005 ELECTROCARDIOGRAM TRACING: CPT

## 2021-11-08 PROCEDURE — 93571 IV DOP VEL&/PRESS C FLO 1ST: CPT | Mod: 26,RC

## 2021-11-08 PROCEDURE — 93571 IV DOP VEL&/PRESS C FLO 1ST: CPT | Mod: RC

## 2021-11-08 PROCEDURE — 99152 MOD SED SAME PHYS/QHP 5/>YRS: CPT

## 2021-11-08 PROCEDURE — C1769: CPT

## 2021-11-08 PROCEDURE — C1887: CPT

## 2021-11-08 PROCEDURE — 36415 COLL VENOUS BLD VENIPUNCTURE: CPT

## 2021-11-08 PROCEDURE — C1894: CPT

## 2021-11-08 PROCEDURE — 80048 BASIC METABOLIC PNL TOTAL CA: CPT

## 2021-11-08 PROCEDURE — 93010 ELECTROCARDIOGRAM REPORT: CPT

## 2021-11-08 PROCEDURE — 93458 L HRT ARTERY/VENTRICLE ANGIO: CPT | Mod: 26

## 2021-11-08 RX ORDER — COLCHICINE 0.6 MG
1 TABLET ORAL
Qty: 0 | Refills: 0 | DISCHARGE

## 2021-11-08 RX ORDER — ACETAMINOPHEN 500 MG
2 TABLET ORAL
Qty: 0 | Refills: 0 | DISCHARGE

## 2021-11-08 RX ORDER — INDOMETHACIN 50 MG
1 CAPSULE ORAL
Qty: 0 | Refills: 0 | DISCHARGE

## 2021-11-08 RX ORDER — ALPRAZOLAM 0.25 MG
1 TABLET ORAL
Qty: 0 | Refills: 0 | DISCHARGE

## 2021-11-08 RX ORDER — SODIUM CHLORIDE 9 MG/ML
250 INJECTION INTRAMUSCULAR; INTRAVENOUS; SUBCUTANEOUS ONCE
Refills: 0 | Status: COMPLETED | OUTPATIENT
Start: 2021-11-08 | End: 2021-11-08

## 2021-11-08 RX ORDER — ASPIRIN/CALCIUM CARB/MAGNESIUM 324 MG
1 TABLET ORAL
Qty: 30 | Refills: 0
Start: 2021-11-08 | End: 2021-12-07

## 2021-11-08 RX ORDER — ACYCLOVIR 50 MG/G
1 OINTMENT TOPICAL
Qty: 0 | Refills: 0 | DISCHARGE

## 2021-11-08 RX ORDER — CETIRIZINE HYDROCHLORIDE, PSEUDOEPHEDRINE HYDROCHLORIDE 5; 120 MG/1; MG/1
1 TABLET, FILM COATED, EXTENDED RELEASE ORAL
Qty: 0 | Refills: 0 | DISCHARGE

## 2021-11-08 RX ADMIN — SODIUM CHLORIDE 750 MILLILITER(S): 9 INJECTION INTRAMUSCULAR; INTRAVENOUS; SUBCUTANEOUS at 08:02

## 2021-11-08 NOTE — ASU DISCHARGE PLAN (ADULT/PEDIATRIC) - CARE PROVIDER_API CALL
Khai Eng  CARDIOVASCULAR DISEASE  [address] [place] [address]  Phone: [phone]  Fax: [phone]  Established Patient  Follow Up Time: 2 weeks

## 2021-11-08 NOTE — H&P CARDIOLOGY - HISTORY OF PRESENT ILLNESS
Patient is a 81y old   Male who presents with PMHx of HTN, Hyperlipidemia, Hypothyroidism duodenal ulcer (questionable duodenal perforation in past in charts however family denies),Follicular Lymphoma Grade 3 A chemotherapy with complete remission. Pt complained of recent sob had abnormal stress test on 10/15/21 which revealed mild defects in the distal anterolateral and apical lateral walls suggestive of mild ischemia large, mild to moderate defects in the inferoseptal basal to mid inferior and basal to mid inferolateral walls that are mostly fixed suggestive of infarct with mild bhavani-infarct ischemia. Now presents for [facility] today with Dr. Mcgowan . Currently CP free no sob no palpitations no lightheadedness or dizziness noted.     < from: Nuclear Stress Test-Pharmacologic (10.15.21 @ 09:48) >  IMPRESSIONS:Abnormal Study  * Chest Pain: No chest pain with administration of  Regadenoson.  * Symptom: No Symptoms.  * HR Response: Appropriate.  * BP Response: Appropriate.  * Heart Rhythm: Sinus Rhythm - 68 BPM.  * Baseline ECG: Nonspecific T wave abnormality.  * ECG Changes: No significant ischemic ST segment changes  beyond baseline abnormalities.  * Arrhythmia: None.  * Review of raw data shows: Minor motion artifact,  extensive splanchnic uptake  * The left ventricle was normal in size.  * There are small, mild defects in the distal  anterolateral and apical lateral walls that are reversible  suggestive of mild ischemia.  * There are also large, mild to moderate defects in the  inferoseptal, basal to mid inferior, and basal to mid  inferolateral walls that are mostly fixed suggestive of  infarct with mild bhavani-infarct ischemia.  * Post-stress gated wall motion analysis was performed  (LVEF = 61 %;LVEDV = 74 ml.) revealing mild hypokinesis of  the basal to mid inferior, basal to mid inferoseptal, and  basal to mid inferolateral walls.  *** No previous Nuclear/Stress exam.  ------------------------------------------------------------------------  Confirmed on  10/15/2021 - 13:19:47 by Shon Flores M.D.  ------------------------------------------------------------------------    < end of copied text >   Patient is a 81y old  Male with no known implantable devices noted with COVID 19 negative Vaccinated with Pfizer last dose Feb 2021 with PMHx of HTN, Hyperlipidemia, Hypothyroidism, BRIGID, Small bowel obstruction , hx tobacco smoking and Duodenal ulcer (questionable duodenal perforation ),Follicular Lymphoma Grade 3 A chemotherapy with complete remission. Pt complained of recent sob had abnormal stress test on 10/15/21 which revealed mild defects in the distal anterolateral and apical lateral walls suggestive of mild ischemia large, mild to moderate defects in the inferoseptal basal to mid inferior and basal to mid inferolateral walls that are mostly fixed suggestive of infarct with mild bhavani-infarct ischemia. Now presents for [facility] today with Dr. Mcgowan . Currently CP free no sob no palpitations no lightheadedness or dizziness noted.   Pt Cardiologist Dr. Khai Eng  *******Pt has contraindication to ASA due to Duodenal bleeding ulcers ***************  < from: Nuclear Stress Test-Pharmacologic (10.15.21 @ 09:48) >  IMPRESSIONS:Abnormal Study  * Chest Pain: No chest pain with administration of  Regadenoson.  * Symptom: No Symptoms.  * HR Response: Appropriate.  * BP Response: Appropriate.  * Heart Rhythm: Sinus Rhythm - 68 BPM.  * Baseline ECG: Nonspecific T wave abnormality.  * ECG Changes: No significant ischemic ST segment changes  beyond baseline abnormalities.  * Arrhythmia: None.  * Review of raw data shows: Minor motion artifact,  extensive splanchnic uptake  * The left ventricle was normal in size.  * There are small, mild defects in the distal  anterolateral and apical lateral walls that are reversible  suggestive of mild ischemia.  * There are also large, mild to moderate defects in the  inferoseptal, basal to mid inferior, and basal to mid  inferolateral walls that are mostly fixed suggestive of  infarct with mild bhavani-infarct ischemia.  * Post-stress gated wall motion analysis was performed  (LVEF = 61 %;LVEDV = 74 ml.) revealing mild hypokinesis of  the basal to mid inferior, basal to mid inferoseptal, and  basal to mid inferolateral walls.  *** No previous Nuclear/Stress exam.  ------------------------------------------------------------------------  Confirmed on  10/15/2021 - 13:19:47 by Shon Flores M.D.  ------------------------------------------------------------------------    < end of copied text >

## 2022-03-10 ENCOUNTER — APPOINTMENT (OUTPATIENT)
Dept: UROLOGY | Facility: CLINIC | Age: 82
End: 2022-03-10
Payer: MEDICARE

## 2022-03-10 PROCEDURE — 99213 OFFICE O/P EST LOW 20 MIN: CPT

## 2022-03-10 RX ORDER — ERGOCALCIFEROL 1.25 MG/1
1.25 MG CAPSULE ORAL
Refills: 0 | Status: ACTIVE | COMMUNITY

## 2022-03-10 RX ORDER — ROSUVASTATIN CALCIUM 5 MG/1
TABLET, FILM COATED ORAL
Refills: 0 | Status: ACTIVE | COMMUNITY

## 2022-03-10 RX ORDER — ATORVASTATIN CALCIUM 20 MG/1
20 TABLET, FILM COATED ORAL
Refills: 0 | Status: COMPLETED | COMMUNITY
End: 2022-03-10

## 2022-03-20 NOTE — HISTORY OF PRESENT ILLNESS
[FreeTextEntry1] : Patient is a 81 year old man presents today for elevated PSA \par Remains on oxybutynin ER 10 mg once daily for urinary frequency. He reports that medication has been very helpful in controlling frequency.  PSA December 2021 4.8 ng/mL \par \par MRI September 2021 - PIRADS 3, prostate volume 52 cc\par \par Prior PSA:\par 7/7/2021 6 mg/mL \par 6/15/2020 2.55\par 12/7/2019 2.2\par 9/4/2019 2.33\par 6/18/2019 8.11\par 9/6/2018 4.64\par 4/26/2017 3.63\par \par \par Has been on T replacement therapy with q2 weeks injection. \par \par He had a recent lung biopsy, results pending. Pulmonary nodule noticed to have slight increase in size, for his follow up follicular lymphoma.\par \par

## 2022-04-18 ENCOUNTER — EMERGENCY (EMERGENCY)
Facility: HOSPITAL | Age: 82
LOS: 1 days | Discharge: HOME | End: 2022-04-18
Attending: EMERGENCY MEDICINE | Admitting: EMERGENCY MEDICINE
Payer: COMMERCIAL

## 2022-04-18 VITALS
HEART RATE: 83 BPM | SYSTOLIC BLOOD PRESSURE: 167 MMHG | DIASTOLIC BLOOD PRESSURE: 82 MMHG | WEIGHT: 250 LBS | TEMPERATURE: 98 F | OXYGEN SATURATION: 97 % | RESPIRATION RATE: 18 BRPM | HEIGHT: 72 IN

## 2022-04-18 PROCEDURE — 12001 RPR S/N/AX/GEN/TRNK 2.5CM/<: CPT

## 2022-04-18 PROCEDURE — 99283 EMERGENCY DEPT VISIT LOW MDM: CPT

## 2022-04-18 PROCEDURE — 90715 TDAP VACCINE 7 YRS/> IM: CPT

## 2022-04-18 PROCEDURE — 99283 EMERGENCY DEPT VISIT LOW MDM: CPT | Mod: 25

## 2022-04-18 PROCEDURE — 90471 IMMUNIZATION ADMIN: CPT

## 2022-04-18 RX ORDER — TETANUS TOXOID, REDUCED DIPHTHERIA TOXOID AND ACELLULAR PERTUSSIS VACCINE, ADSORBED 5; 2.5; 8; 8; 2.5 [IU]/.5ML; [IU]/.5ML; UG/.5ML; UG/.5ML; UG/.5ML
0.5 SUSPENSION INTRAMUSCULAR ONCE
Refills: 0 | Status: COMPLETED | OUTPATIENT
Start: 2022-04-18 | End: 2022-04-18

## 2022-04-18 RX ADMIN — TETANUS TOXOID, REDUCED DIPHTHERIA TOXOID AND ACELLULAR PERTUSSIS VACCINE, ADSORBED 0.5 MILLILITER(S): 5; 2.5; 8; 8; 2.5 SUSPENSION INTRAMUSCULAR at 16:15

## 2022-04-18 NOTE — ED PROVIDER NOTE - PATIENT PORTAL LINK FT
You can access the FollowMyHealth Patient Portal offered by [facility] by registering at the following website: [email or web address]. By joining [employer]’s FollowMyHealth portal, you will also be able to view your health information using other applications (apps) compatible with our system.

## 2022-04-18 NOTE — ED ADULT TRIAGE NOTE - CHIEF COMPLAINT QUOTE
R thumb laceration which using a tape dispenser.    Last tetanus vaccine unknown.  Bleeding controlled with dressing at this time., cap refill to thumb <3 seconds.  nO additional injury.  BN

## 2022-04-18 NOTE — ED PROVIDER NOTE - NSFOLLOWUPINSTRUCTIONS_ED_ALL_ED_FT
Remove dressing in 24 hours, then may wash with soap and water but do not scrub the wound, apply splint as directed, do not peel off the dermabond glue, it will fall off on its own, return to ER if having severe pain, fever > 100.3F, redness going up the arm or worsneing of symptoms.

## 2022-05-20 ENCOUNTER — INPATIENT (INPATIENT)
Facility: HOSPITAL | Age: 82
LOS: 5 days | Discharge: HOME | DRG: 871 | End: 2022-05-26
Attending: INTERNAL MEDICINE | Admitting: INTERNAL MEDICINE
Payer: COMMERCIAL

## 2022-05-20 VITALS
TEMPERATURE: 98 F | HEIGHT: 72 IN | OXYGEN SATURATION: 93 % | RESPIRATION RATE: 20 BRPM | WEIGHT: 240.08 LBS | DIASTOLIC BLOOD PRESSURE: 64 MMHG | HEART RATE: 110 BPM | SYSTOLIC BLOOD PRESSURE: 165 MMHG

## 2022-05-20 DIAGNOSIS — R06.02 SHORTNESS OF BREATH: ICD-10-CM

## 2022-05-20 DIAGNOSIS — J18.9 PNEUMONIA, UNSPECIFIED ORGANISM: ICD-10-CM

## 2022-05-20 DIAGNOSIS — Z98.890 OTHER SPECIFIED POSTPROCEDURAL STATES: Chronic | ICD-10-CM

## 2022-05-20 LAB
ALBUMIN SERPL ELPH-MCNC: 3.7 G/DL — SIGNIFICANT CHANGE UP (ref 3.3–5)
ALP SERPL-CCNC: 130 U/L — HIGH (ref 40–120)
ALT FLD-CCNC: 13 U/L — SIGNIFICANT CHANGE UP (ref 10–45)
ANION GAP SERPL CALC-SCNC: 15 MMOL/L — SIGNIFICANT CHANGE UP (ref 5–17)
APPEARANCE UR: CLEAR — SIGNIFICANT CHANGE UP
APTT BLD: 30 SEC — SIGNIFICANT CHANGE UP (ref 27.5–35.5)
AST SERPL-CCNC: 38 U/L — SIGNIFICANT CHANGE UP (ref 10–40)
BACTERIA # UR AUTO: NEGATIVE — SIGNIFICANT CHANGE UP
BASE EXCESS BLDV CALC-SCNC: -3.2 MMOL/L — LOW (ref -2–2)
BASOPHILS # BLD AUTO: 0.01 K/UL — SIGNIFICANT CHANGE UP (ref 0–0.2)
BASOPHILS NFR BLD AUTO: 0.5 % — SIGNIFICANT CHANGE UP (ref 0–2)
BILIRUB SERPL-MCNC: 0.3 MG/DL — SIGNIFICANT CHANGE UP (ref 0.2–1.2)
BILIRUB UR-MCNC: NEGATIVE — SIGNIFICANT CHANGE UP
BUN SERPL-MCNC: 18 MG/DL — SIGNIFICANT CHANGE UP (ref 7–23)
CA-I SERPL-SCNC: 1.27 MMOL/L — SIGNIFICANT CHANGE UP (ref 1.15–1.33)
CALCIUM SERPL-MCNC: 9.8 MG/DL — SIGNIFICANT CHANGE UP (ref 8.4–10.5)
CHLORIDE BLDV-SCNC: 102 MMOL/L — SIGNIFICANT CHANGE UP (ref 96–108)
CHLORIDE SERPL-SCNC: 101 MMOL/L — SIGNIFICANT CHANGE UP (ref 96–108)
CO2 BLDV-SCNC: 23 MMOL/L — SIGNIFICANT CHANGE UP (ref 22–26)
CO2 SERPL-SCNC: 18 MMOL/L — LOW (ref 22–31)
COLOR SPEC: SIGNIFICANT CHANGE UP
CREAT SERPL-MCNC: 1.49 MG/DL — HIGH (ref 0.5–1.3)
DIFF PNL FLD: ABNORMAL
EGFR: 47 ML/MIN/1.73M2 — LOW
EOSINOPHIL # BLD AUTO: 0 K/UL — SIGNIFICANT CHANGE UP (ref 0–0.5)
EOSINOPHIL NFR BLD AUTO: 0 % — SIGNIFICANT CHANGE UP (ref 0–6)
EPI CELLS # UR: 1 /HPF — SIGNIFICANT CHANGE UP
GAS PNL BLDV: 131 MMOL/L — LOW (ref 136–145)
GAS PNL BLDV: SIGNIFICANT CHANGE UP
GLUCOSE BLDV-MCNC: 116 MG/DL — HIGH (ref 70–99)
GLUCOSE SERPL-MCNC: 112 MG/DL — HIGH (ref 70–99)
GLUCOSE UR QL: NEGATIVE — SIGNIFICANT CHANGE UP
HCO3 BLDV-SCNC: 22 MMOL/L — SIGNIFICANT CHANGE UP (ref 22–29)
HCT VFR BLD CALC: 41.5 % — SIGNIFICANT CHANGE UP (ref 39–50)
HCT VFR BLDA CALC: 37 % — LOW (ref 39–51)
HGB BLD CALC-MCNC: 12.3 G/DL — LOW (ref 12.6–17.4)
HGB BLD-MCNC: 12.1 G/DL — LOW (ref 13–17)
HMPV RNA SPEC QL NAA+PROBE: DETECTED
HYALINE CASTS # UR AUTO: 2 /LPF — SIGNIFICANT CHANGE UP (ref 0–2)
IMM GRANULOCYTES NFR BLD AUTO: 1.9 % — HIGH (ref 0–1.5)
INR BLD: 1.18 RATIO — HIGH (ref 0.88–1.16)
KETONES UR-MCNC: ABNORMAL
LACTATE BLDV-MCNC: 1.4 MMOL/L — SIGNIFICANT CHANGE UP (ref 0.7–2)
LEUKOCYTE ESTERASE UR-ACNC: NEGATIVE — SIGNIFICANT CHANGE UP
LYMPHOCYTES # BLD AUTO: 0.45 K/UL — LOW (ref 1–3.3)
LYMPHOCYTES # BLD AUTO: 21.1 % — SIGNIFICANT CHANGE UP (ref 13–44)
MANUAL SMEAR VERIFICATION: SIGNIFICANT CHANGE UP
MCHC RBC-ENTMCNC: 22.4 PG — LOW (ref 27–34)
MCHC RBC-ENTMCNC: 29.2 GM/DL — LOW (ref 32–36)
MCV RBC AUTO: 76.9 FL — LOW (ref 80–100)
MONOCYTES # BLD AUTO: 0.29 K/UL — SIGNIFICANT CHANGE UP (ref 0–0.9)
MONOCYTES NFR BLD AUTO: 13.6 % — SIGNIFICANT CHANGE UP (ref 2–14)
NEUTROPHILS # BLD AUTO: 1.34 K/UL — LOW (ref 1.8–7.4)
NEUTROPHILS NFR BLD AUTO: 62.9 % — SIGNIFICANT CHANGE UP (ref 43–77)
NITRITE UR-MCNC: NEGATIVE — SIGNIFICANT CHANGE UP
NRBC # BLD: 0 /100 WBCS — SIGNIFICANT CHANGE UP (ref 0–0)
PCO2 BLDV: 39 MMHG — LOW (ref 42–55)
PH BLDV: 7.36 — SIGNIFICANT CHANGE UP (ref 7.32–7.43)
PH UR: 7 — SIGNIFICANT CHANGE UP (ref 5–8)
PLAT MORPH BLD: NORMAL — SIGNIFICANT CHANGE UP
PLATELET # BLD AUTO: 70 K/UL — LOW (ref 150–400)
PO2 BLDV: 47 MMHG — HIGH (ref 25–45)
POTASSIUM BLDV-SCNC: 4.2 MMOL/L — SIGNIFICANT CHANGE UP (ref 3.5–5.1)
POTASSIUM SERPL-MCNC: 5 MMOL/L — SIGNIFICANT CHANGE UP (ref 3.5–5.3)
POTASSIUM SERPL-SCNC: 5 MMOL/L — SIGNIFICANT CHANGE UP (ref 3.5–5.3)
PROT SERPL-MCNC: 7.3 G/DL — SIGNIFICANT CHANGE UP (ref 6–8.3)
PROT UR-MCNC: ABNORMAL
PROTHROM AB SERPL-ACNC: 13.6 SEC — HIGH (ref 10.5–13.4)
RAPID RVP RESULT: DETECTED
RBC # BLD: 5.4 M/UL — SIGNIFICANT CHANGE UP (ref 4.2–5.8)
RBC # FLD: 18.1 % — HIGH (ref 10.3–14.5)
RBC BLD AUTO: SIGNIFICANT CHANGE UP
RBC CASTS # UR COMP ASSIST: 3 /HPF — SIGNIFICANT CHANGE UP (ref 0–4)
SAO2 % BLDV: 77.2 % — SIGNIFICANT CHANGE UP (ref 67–88)
SARS-COV-2 RNA SPEC QL NAA+PROBE: SIGNIFICANT CHANGE UP
SODIUM SERPL-SCNC: 134 MMOL/L — LOW (ref 135–145)
SP GR SPEC: 1.02 — SIGNIFICANT CHANGE UP (ref 1.01–1.02)
UROBILINOGEN FLD QL: NEGATIVE — SIGNIFICANT CHANGE UP
WBC # BLD: 2.13 K/UL — LOW (ref 3.8–10.5)
WBC # FLD AUTO: 2.13 K/UL — LOW (ref 3.8–10.5)
WBC UR QL: 1 /HPF — SIGNIFICANT CHANGE UP (ref 0–5)

## 2022-05-20 PROCEDURE — 71045 X-RAY EXAM CHEST 1 VIEW: CPT | Mod: 26

## 2022-05-20 PROCEDURE — 99284 EMERGENCY DEPT VISIT MOD MDM: CPT

## 2022-05-20 PROCEDURE — 93971 EXTREMITY STUDY: CPT | Mod: 26,RT

## 2022-05-20 PROCEDURE — 99223 1ST HOSP IP/OBS HIGH 75: CPT

## 2022-05-20 PROCEDURE — 71275 CT ANGIOGRAPHY CHEST: CPT | Mod: 26

## 2022-05-20 RX ORDER — SUCRALFATE 1 G
1 TABLET ORAL
Refills: 0 | Status: DISCONTINUED | OUTPATIENT
Start: 2022-05-20 | End: 2022-05-26

## 2022-05-20 RX ORDER — ALLOPURINOL 300 MG
100 TABLET ORAL DAILY
Refills: 0 | Status: DISCONTINUED | OUTPATIENT
Start: 2022-05-20 | End: 2022-05-26

## 2022-05-20 RX ORDER — AMLODIPINE BESYLATE 2.5 MG/1
2.5 TABLET ORAL AT BEDTIME
Refills: 0 | Status: DISCONTINUED | OUTPATIENT
Start: 2022-05-20 | End: 2022-05-26

## 2022-05-20 RX ORDER — OXYBUTYNIN CHLORIDE 5 MG
10 TABLET ORAL DAILY
Refills: 0 | Status: DISCONTINUED | OUTPATIENT
Start: 2022-05-20 | End: 2022-05-21

## 2022-05-20 RX ORDER — METOPROLOL TARTRATE 50 MG
25 TABLET ORAL
Refills: 0 | Status: DISCONTINUED | OUTPATIENT
Start: 2022-05-20 | End: 2022-05-26

## 2022-05-20 RX ORDER — AMLODIPINE BESYLATE 2.5 MG/1
1 TABLET ORAL
Qty: 0 | Refills: 0 | DISCHARGE

## 2022-05-20 RX ORDER — SODIUM CHLORIDE 9 MG/ML
2000 INJECTION INTRAMUSCULAR; INTRAVENOUS; SUBCUTANEOUS ONCE
Refills: 0 | Status: COMPLETED | OUTPATIENT
Start: 2022-05-20 | End: 2022-05-20

## 2022-05-20 RX ORDER — ERGOCALCIFEROL 1.25 MG/1
1 CAPSULE ORAL
Qty: 0 | Refills: 0 | DISCHARGE

## 2022-05-20 RX ORDER — ATORVASTATIN CALCIUM 80 MG/1
0.5 TABLET, FILM COATED ORAL
Qty: 0 | Refills: 0 | DISCHARGE

## 2022-05-20 RX ORDER — CEFEPIME 1 G/1
2000 INJECTION, POWDER, FOR SOLUTION INTRAMUSCULAR; INTRAVENOUS ONCE
Refills: 0 | Status: COMPLETED | OUTPATIENT
Start: 2022-05-20 | End: 2022-05-20

## 2022-05-20 RX ORDER — ATORVASTATIN CALCIUM 80 MG/1
80 TABLET, FILM COATED ORAL AT BEDTIME
Refills: 0 | Status: DISCONTINUED | OUTPATIENT
Start: 2022-05-20 | End: 2022-05-26

## 2022-05-20 RX ORDER — SERTRALINE 25 MG/1
25 TABLET, FILM COATED ORAL DAILY
Refills: 0 | Status: DISCONTINUED | OUTPATIENT
Start: 2022-05-20 | End: 2022-05-26

## 2022-05-20 RX ORDER — PANTOPRAZOLE SODIUM 20 MG/1
40 TABLET, DELAYED RELEASE ORAL
Refills: 0 | Status: DISCONTINUED | OUTPATIENT
Start: 2022-05-20 | End: 2022-05-26

## 2022-05-20 RX ORDER — MAGNESIUM OXIDE 400 MG ORAL TABLET 241.3 MG
400 TABLET ORAL DAILY
Refills: 0 | Status: DISCONTINUED | OUTPATIENT
Start: 2022-05-20 | End: 2022-05-26

## 2022-05-20 RX ORDER — LEVOTHYROXINE SODIUM 125 MCG
2 TABLET ORAL
Qty: 0 | Refills: 0 | DISCHARGE

## 2022-05-20 RX ORDER — LEVOTHYROXINE SODIUM 125 MCG
200 TABLET ORAL DAILY
Refills: 0 | Status: DISCONTINUED | OUTPATIENT
Start: 2022-05-20 | End: 2022-05-26

## 2022-05-20 RX ADMIN — CEFEPIME 100 MILLIGRAM(S): 1 INJECTION, POWDER, FOR SOLUTION INTRAMUSCULAR; INTRAVENOUS at 15:56

## 2022-05-20 RX ADMIN — SODIUM CHLORIDE 2000 MILLILITER(S): 9 INJECTION INTRAMUSCULAR; INTRAVENOUS; SUBCUTANEOUS at 15:44

## 2022-05-20 RX ADMIN — CEFEPIME 2000 MILLIGRAM(S): 1 INJECTION, POWDER, FOR SOLUTION INTRAMUSCULAR; INTRAVENOUS at 16:49

## 2022-05-20 NOTE — H&P ADULT - ASSESSMENT
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

## 2022-05-20 NOTE — ED CLERICAL - NS ED CLERK NOTE PRE-ARRIVAL INFORMATION; ADDITIONAL PRE-ARRIVAL INFORMATION
CC/Reason For referral: Received Pembro 5/9 for melanoma now c/ fever 101.4, sob and  - Covid negative on home test yesterday  Preferred Consultant(if applicable):  Who admits for you (if needed):  Do you have documents you would like to fax over? NO  Would you still like to speak to an ED attending? YES

## 2022-05-20 NOTE — H&P ADULT - PROBLEM SELECTOR PLAN 1
- given CT findings and pt's immunocompromised state, will start antibiotics  - azithro + zosyn  - per heme, pt's b/l GGO could be immune mediated pneumonitis, however pt's symptoms have been acute  - would call pulm consult in AM

## 2022-05-20 NOTE — H&P ADULT - NSHPSOCIALHISTORY_GEN_ALL_CORE
Social History:    Marital Status: ( x ) , (  ) Single, (  ) , (  ) , (  )   # of Children: 2  Lives with: (  ) alone, (  ) children, ( x ) spouse, (  ) parents, (  ) siblings, (  ) friends, (  ) other:   Occupation:     Substance Use/Illicit Drugs: (  ) never used vs other:   Tobacco Usage: (  ) never smoked, ( x ) former smoker, (  ) current smoker and Total Pack-Years: 50 pk yrs  Last Alcohol Usage/Frequency/Amount/Withdrawal/Hx of Abuse:  6 weeks ago  Foreign travel:   Animal exposure:

## 2022-05-20 NOTE — H&P ADULT - HISTORY OF PRESENT ILLNESS
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    Pt reports being in usual state of health until 3 days ago, when he started to have a productive cough and sore throat. Yesterday started to have SOB and fevers. Pt's daughter and grand child have tested positive for covid. Pt is boosted with pfizer. Pt denies chest pain, diarrhea, pain anywhere. Pt denies any relieving or exacerbating factors.

## 2022-05-20 NOTE — H&P ADULT - NSHPPHYSICALEXAM_GEN_ALL_CORE
PHYSICAL EXAM:   GENERAL: Alert. Not confused. No acute distress. Not thin. Not cachectic. Not obese.  HEAD:  Atraumatic. Normocephalic.  EYES: EOMI. PERRLA. Normal conjunctiva/sclera.  ENT: Neck supple. No JVD. Moist oral mucosa. Not edentulous. No thrush.  LYMPH: Normal supraclavicular/cervical lymph nodes.   CARDIAC: Not tachy, Not katie. Regular rhythm. Not irregularly irregular. S1. S2. No murmur. No rub. No distant heart sounds.  LUNG/CHEST: CTAB. BS equal bilaterally. No wheezes. No rales. No rhonchi.  ABDOMEN: Soft. No tenderness. No distension. No fluid wave. Normal bowel sounds.  BACK: No midline/vertebral tenderness. No flank tenderness.  VASCULAR: +2 b/l radial or ulnar pulses. Palpable DP pulses.  EXTREMITIES:  No clubbing. No cyanosis. No edema. Moving all 4.  NEUROLOGY: A&Ox3. Non-focal exam. Cranial nerves intact. Normal speech. Sensation intact.  PSYCH: Normal behavior. Normal affect.  SKIN: No jaundice. No erythema. No rash/lesion.  Vascular Access:     ICU Vital Signs Last 24 Hrs  T(C): 36.5 (20 May 2022 18:26), Max: 36.9 (20 May 2022 14:03)  T(F): 97.7 (20 May 2022 18:26), Max: 98.5 (20 May 2022 15:25)  HR: 100 (20 May 2022 18:26) (100 - 110)  BP: 171/89 (20 May 2022 18:26) (165/64 - 171/89)  BP(mean): --  ABP: --  ABP(mean): --  RR: 18 (20 May 2022 18:26) (18 - 20)  SpO2: 97% (20 May 2022 18:26) (93% - 97%)      I&O's Summary

## 2022-05-20 NOTE — ED PROVIDER NOTE - CLINICAL SUMMARY MEDICAL DECISION MAKING FREE TEXT BOX
80 yo M pmhx follicular lymphoma, melanoma with mets seen at [facility] on immuno therapy p/w 1 day of fever to 101 measured orally at home and SOB. gradual onset sob no chest pain. took 1000mg tylenol before arrival.  pt had 3 days of non productive cough sore throat. + sick contact. covid vaccinated and boosted. intially tach and hypoxic satting well after put on 2L c/f pna vs uri vs covid. will r/o acs. c/f DVT. will get US will get labs vgb vbg cxr ua blood urine cx give abx ivf and r/a

## 2022-05-20 NOTE — ED PROVIDER NOTE - OBJECTIVE STATEMENT
80 yo M pmhx follicular lymphoma, melanoma with mets seen at [facility] on immuno therapy p/w 1 day of fever to 101 measured orally at home and SOB. gradual onset sob no chest pain. took 1000mg tylenol before arrival.  pt had 3 days of non productive cough sore throat. + sick contact. covid vaccinated and boosted. Pt also reports RLE swelling. tachy and 90 O2 on RA put on 2L now satting well. No abd pain n/v dysuria diarrhea.

## 2022-05-20 NOTE — CONSULT NOTE ADULT - ASSESSMENT
Patient with metastatic melanoma (with unknown primary - metastatic right lung nodule - M1B disease) under care [place]. Nnamdi Farnsworth [place] [facility] 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 He started Pembrolizumab in 3/28/2022. Most recent treatment was 5/9/2022 (on q8 week dosing). Plan was to repeat imaging in 8 weeks around time of next immunotherapy treatment.    Patient called the office today complaining of new onset of fever, headache, cough, SOB and hypoxia. He was referred to the [facility] ED for evaluation and management    Metastatic Melanoma  --Under care by Dr. Nnamdi Farnsworth of [facility]  --Currently receiving Pembrolizumab q8 weeks - LD 5/9/2022  --Ongoing treatment after discharge    Fever, SOB, Cough, Headache  --Symptoms consistent with viral infection but need to rule out other causes  --Patient was reported to be hypoxic prior to admission but O2 saturation 93% on room air per Triage note  --Labs, imaging pending  --If GGO or evidence of pneumonitis, would be concerned for immune mediated pneumonitis  --Management per ED  --Pulmonary, ID consults if warranted    We will continue to follow patient and coordinate with [facility] if admitted.    Thank you for the opportunity to participate in Mr. Stover's care.    Fantasma Garcia PA-C  Hematology/Oncology  [facility]   [phone] (office)  [phone] (alt office)  Evenings and weekends please call MD on call or office    Patient with metastatic melanoma (with unknown primary - metastatic right lung nodule - M1B disease) under care by Dr. Nnamdi Farnsworth of [facility]. 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 He started Pembrolizumab in 3/28/2022. Most recent treatment was 5/9/2022 (on q8 week dosing). Plan was to repeat imaging in 8 weeks around time of next immunotherapy treatment.    Patient called the office today complaining of new onset of fever, headache, cough, SOB and hypoxia. He was referred to the [facility] ED for evaluation and management    Metastatic Melanoma  --Under care by Dr. Nnamdi Farnsworth of [facility]  --Currently receiving Pembrolizumab q8 weeks - LD 5/9/2022  --Ongoing treatment after discharge    Fever, SOB, Cough, Headache  --Temp at 101.7 - now normal secondary to Tylenol  --O2 Saturation 90% at home  --Labs, imaging pending  --Cultures drawn  --If GGO or evidence of pneumonitis, would be concerned for immune mediated pneumonitis  --Management per ED  --Pulmonary, ID consults if warranted    We will continue to follow patient and coordinate with [facility] if admitted.    Thank you for the opportunity to participate in Mr. Stover's care.    Fantasma Garcia PA-C  Hematology/Oncology  [facility]   [phone] (office)  [phone] (alt office)  Evenings and weekends please call MD on call or office

## 2022-05-20 NOTE — ED PROVIDER NOTE - PROGRESS NOTE DETAILS
Gisel BOB PGY1: pt found to have +rvp no covid. will get CTA in setting of persistent tachy. spoke to Dr. Perales who will admit pt for further work up.

## 2022-05-20 NOTE — CONSULT NOTE ADULT - NS ATTEND AMEND GEN_ALL_CORE FT
I have fully participated in the care of this patient. I have made amendments to the documentation where necessary, and agree with the history, physical exam, and plan as documented by the ACP.     1. FUO;obtain panculture; obtain ID consult    Thank you for the consultation    Sapna Rosen MD  Hematology/Oncology  [phone]

## 2022-05-20 NOTE — H&P ADULT - NSHPREVIEWOFSYSTEMS_GEN_ALL_CORE
REVIEW OF SYSTEMS:  CONSTITUTIONAL: No weakness. +fevers. No rigors. No weight loss. No night sweats. No poor appetite.  EYES: No blurry or double vision. No eye pain.  ENT: No hearing difficulty. No vertigo. No dysphagia. +sore throat. No Sinusitis/rhinorrhea.   NECK: No pain. No stiffness/rigidity.  CARDIAC: No chest pain. No palpitations. No lightheadedness. No syncope.  RESPIRATORY: +cough. +SOB. No hemoptysis.  GASTROINTESTINAL: No abdominal pain. No nausea. No vomiting. No hematemesis. No diarrhea. No constipation.   GENITOURINARY: No dysuria. No frequency. No hesitancy.   NEUROLOGICAL: No numbness/tingling. No focal weakness. No urinary or fecal incontinence. No headache. No unsteady gait.  BACK: No back pain. No flank pain.  EXTREMITIES: No lower extremity edema. Full ROM. No joint pain.  SKIN: No rashes. No itching. No other lesions.  ALLERGIC: No lip swelling. No hives.  All other review of systems is negative unless indicated above.  Unless indicated above, unable to assess ROS 2/2

## 2022-05-20 NOTE — H&P ADULT - NSICDXFAMILYHX_GEN_ALL_CORE_FT
FAMILY HISTORY:  Family history of non-Hodgkin's lymphoma    Child  Still living? Yes, Estimated age: Age Unknown  Family history of Hashimoto thyroiditis, Age at diagnosis: Age Unknown

## 2022-05-20 NOTE — ED ADULT NURSE NOTE - OBJECTIVE STATEMENT
Pt presents to the Ed c/o fever and SOB x today. PMH stage 4 melanoma. Pt states that he was expose to someone with Covid on 5/8/22, has had a cough w/mucus x3-4 days. pt is AOx4. breathing unlabored symmetrical rise and fall of the chest, speaking in clear full sentences. Abdomen is soft and nondistorted. Skin is warm and dry to touch. R LE swelling. Pt denies any CP, N/V, chills, urinary symptoms. On stretcher at lowest position.

## 2022-05-20 NOTE — ED PROVIDER NOTE - PHYSICAL EXAMINATION
Gen: NAD, non-toxic appearing  Head: normal appearing  HEENT: normal conjunctiva, oral mucosa moist  Lung: no respiratory distress, speaking in full sentences, CTA b/l 98% on 2 L   CV: tachy 100s and rhythm, no murmurs b/l leg swelling R>L  Abd: soft, non distended, non tender   MSK: no visible deformities  Neuro: No focal deficits, AAOx3  Skin: Warm  Psych: normal affect

## 2022-05-20 NOTE — CHART NOTE - NSCHARTNOTEFT_GEN_A_CORE
Patient with metastatic melanoma (with unknown primary - metastatic right lung nodule - M1B disease) under [place]nam[place]arn[place] [facility] 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 He started Pembrolizumab in 3/28/2022. Most recent treatment was 5/9/2022 (on q8 week dosing). Plan was to repeat imaging in 8 weeks around time of next immunotherapy treatment.    Patient called the office today complaining of new onset of fever, headache, cough, SOB and hypoxia. He was referred to the [facility] ED for evaluation and management    [place] [facility] Full consult to follow if patient admitted.    If patient admitted to medicine, please admit to Dr. Barry Perales who is aware of patient.    Thank you.    Fantasma Garcia PA-C  Hematology/Oncology  [facility]   [phone] (office)  [phone] (alt office)  Evenings and weekends please call MD on call or office Patient with metastatic melanoma (with unknown primary - metastatic right lung nodule - M1B disease) under care by Dr. Nnamdi Farnsworth of [facility]. 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 He started Pembrolizumab in 3/28/2022. Most recent treatment was 5/9/2022 (on q8 week dosing). Plan was to repeat imaging in 8 weeks around time of next immunotherapy treatment.    Patient called the office today complaining of new onset of fever, headache, cough, SOB and hypoxia. He was referred to the [facility] ED for evaluation and management    Records sent from [facility]. Full consult to follow.    If patient admitted to medicine, please admit to Dr. Barry Perales who is aware of patient.    Thank you.    Fantasma Garcia PA-C  Hematology/Oncology  [facility]   [phone] (office)  [phone] (alt office)  Evenings and weekends please call MD on call or office

## 2022-05-20 NOTE — CONSULT NOTE ADULT - SUBJECTIVE AND OBJECTIVE BOX
Reason for consult: Melanoma    HPI: Patient with metastatic melanoma (with unknown primary - metastatic right lung nodule - M1B disease) under care by Dr. Nnamdi Farnsworth of [facility]. He also has a history of follicular lymphoma - NIYAH after Bendamustine/Rituximab in . He started Pembrolizumab in 3/28/2022. Most recent treatment was 2022 (on q8 week dosing). Plan was to repeat imaging in 8 weeks around time of next immunotherapy treatment.    Patient called the office today complaining of new onset of fever, headache, cough, SOB and hypoxia. He was referred to the [facility] ED for evaluation and management        PAST MEDICAL & SURGICAL HISTORY:  Duodenal ulcer disease      SBO (small bowel obstruction)      BRIGID (acute kidney injury)      Hypertension      Hyperparathyroidism      No significant past surgical history          FAMILY HISTORY:  Family history of non-Hodgkin&#x27;s lymphoma        Alcohol Denied  Smoking: Nonsmoker  Drug Use: Denied  Marital Status:         Allergies    Cipro (Rash)  niacin (Flushing; Rash)  Reglan (Anaphylaxis)    Intolerances        MEDICATIONS  (STANDING):    MEDICATIONS  (PRN):      ROS  Fevers  Headache  SOB, cough  No n/v/d, abd pain, melena, hematochezia  No edema  No rash  No anxiety  No back pain, joint pain  No bleeding, bruising  No dysuria, hematuria    T(C): 36.9 (22 @ 14:03), Max: 36.9 (22 @ 14:03)  HR: 110 (22 @ 14:03) (110 - 110)  BP: 165/64 (22 @ 14:03) (165/64 - 165/64)  RR: 20 (22 @ 14:03) (20 - 20)  SpO2: 93% (22 @ 14:03) (93% - 93%)  Wt(kg): --    PE  NAD  Awake, alert  Anicteric, MMM  RRR  CTAB  Abd soft, NT, ND  No c/c/e  No rash grossly    [facility]   DEPARTMENT OF RADIOLOGY   [address] [address] [phone]     NUCLEAR MEDICINE   Report of Consultation     Name: GENIECATRINA Acc No: [id number]   MRN: [id number] Date of Service: 02/15/2022   : 1940 Sex: M Pt Loc: MSK   Ordered by: KALA WILLAMS MD Date of Report: 02/15/2022 11:57 AM   Procedure: NM PET/CT NECK/CAP - FDG Account: [id number]   PRID #: [id number]     FINAL REPORT   2/15/2022 Body FDG PET/CT   CLINICAL STATEMENT: Follicular lymphoma post treatment. New lung nodule on   CT 2022. Evaluate extent of disease.     TECHNIQUE:   Radiopharmaceutical: 12.6 mCi F-18 FDG   Uptake time: Body: 69 minutes   Field of view: Top of skull to the upper thigh   Equipment: GE Discovery MI (MSKNAS Room 1436) (Station: [id number])   Plasma glucose: 121 mg/dl   Oral Contrast: Not administered   IV Contrast: Not administered     The CT protocol used for this PET/CT study is designed for attenuation   correction and anatomic localization of PET abnormalities. This    CT is not designed to produce, and cannot replace, state-of-the-art   diagnostic CT scans with specific imaging protocols for different body   parts and indications.     The standardized uptake values (SUV) are normalized to patient body weight   and indicate the highest activity concentration (SUVmax) in a given   disease site.     COMPARISON: PET/CT 2021.   CORRELATION: CT chest abdomen and pelvis 2022.   FINDINGS:   REFERENCE REGIONS: SUVmax and mean in a reference region in the   liver are 3.7 and 2.9, previously 3.9 and 2.9.     HEAD/FACE: Unchanged minimal mucosal thickening in left maxillary sinus   likely infectious/inflammatory etiology     NECK: No abnormal uptake.   CHEST: Coronary artery calcifications.   LUNGS: Right lower lobe solid pulmonary nodule, with misregistered FDG   uptake, 1.3 x 1.3 cm, SUV 4.6, slightly increased in size from 1.3 x 0.9   cm on 2022 CT, new since the 21 PET/CT.     PLEURA/PERICARDIUM: No abnormal uptake.   THORACIC NODES: No abnormal uptake.   HEPATOBILIARY: No abnormal uptake.   SPLEEN: Decreased splenomegaly measuring   13.1 cm in craniocaudal axis, previously 13.9 cm. Slightly decreased   diffuse FDG uptake of spleen, now similar to liver background, SUV 2.9,   previously SUV 3.9.     PANCREAS: No abnormal uptake.   ADRENAL GLANDS: No abnormal uptake.   KIDNEYS/URETERS/   BLADDER: Excreted activity is present.     ABDOMINOPELVIC NODES: Unchanged non hypermetabolic left para-aortic   retroperitoneal nodes measuring up to 1.8 x 1.2 cm. Unchanged non   hypermetabolic rim calcified left upper quadrant mesenteric mass (image   216) and subcentimeter non-FDG avid mesenteric lymph nodes.     BOWEL/PERITONEUM/   MESENTERY: No abnormal uptake. See above.     PELVIC ORGANS: No abnormal uptake. Prostatomegaly.   BONES/SOFT TISSUES: No FDG avid bone lesions.. Unchanged   inflammatory FDG uptake at L2-L3 interspinous ligament level.     IMPRESSION:   1. Since 2021 PET/CT, new right lower lobe hypermetabolic solid   pulmonary nodule, slightly enlarged since 2022 CT, possibly   infectious/inflammatory, possibly neoplastic. Consider correlation with   histopathology.   2. Decreased splenomegaly without suspicious uptake.   3. No FDG avid lymphadenopathy.     Integrated Imaging Summary for above study and CT chest abdomen and pelvis   performed 2022     SUMMARY:   Since 2021 PET/CT,   1. New right lower lobe hypermetabolic solid pulmonary nodule, slightly   enlarged since 2022 CT, possibly infectious/inflammatory,   possibly neoplastic. Consider correlation with histopathology.   2. Decreased splenomegaly without suspicious uptake.   3. No FDG avid lymphadenopathy.     FINAL REPORT              Reason for consult: Melanoma    HPI: Patient with metastatic melanoma (with unknown primary - metastatic right lung nodule - M1B disease) under care by Dr. Nnamdi Farnsworth of [facility]. He also has a history of follicular lymphoma - NIYAH after Bendamustine/Rituximab in . He started Pembrolizumab in 3/28/2022. Most recent treatment was 2022 (on q8 week dosing). Plan was to repeat imaging in 8 weeks around time of next immunotherapy treatment.    Patient called the office today complaining of new onset of fever to 101.7, headache, cough, SOB and hypoxia to 90%. He was referred to the [facility] ED for evaluation and management        PAST MEDICAL & SURGICAL HISTORY:  Duodenal ulcer disease      SBO (small bowel obstruction)      BRIGID (acute kidney injury)      Hypertension      Hyperparathyroidism      No significant past surgical history          FAMILY HISTORY:  Family history of non-Hodgkin&#x27;s lymphoma        Alcohol Denied  Smoking: Nonsmoker  Drug Use: Denied  Marital Status:         Allergies    Cipro (Rash)  niacin (Flushing; Rash)  Reglan (Anaphylaxis)    Intolerances        MEDICATIONS  (STANDING):    MEDICATIONS  (PRN):      ROS  Fevers  Headache  SOB, cough  No n/v/d, abd pain, melena, hematochezia  No edema  No rash  No anxiety  No back pain, joint pain  No bleeding, bruising  No dysuria, hematuria    T(C): 36.9 (22 @ 14:03), Max: 36.9 (22 @ 14:03)  HR: 110 (22 @ 14:03) (110 - 110)  BP: 165/64 (22 @ 14:03) (165/64 - 165/64)  RR: 20 (22 @ 14:03) (20 - 20)  SpO2: 93% (22 @ 14:03) (93% - 93%)  Wt(kg): --    PE  NAD  Awake, alert  Tongue coated  Anicteric, MMM  RRR  CTAB  Abd soft, NT, ND  No c/c/e  No rash grossly    [facility]   DEPARTMENT OF RADIOLOGY   [address] [address] [phone]     NUCLEAR MEDICINE   Report of Consultation     Name: GENIECATRINA Acc No: [id number]   MRN: [id number] Date of Service: 02/15/2022   : 1940 Sex: M Pt Loc: MSK   Ordered by: KALA WILLAMS MD Date of Report: 02/15/2022 11:57 AM   Procedure: NM PET/CT NECK/CAP - FDG Account: [id number]   PRID #: [id number]     FINAL REPORT   2/15/2022 Body FDG PET/CT   CLINICAL STATEMENT: Follicular lymphoma post treatment. New lung nodule on   CT 2022. Evaluate extent of disease.     TECHNIQUE:   Radiopharmaceutical: 12.6 mCi F-18 FDG   Uptake time: Body: 69 minutes   Field of view: Top of skull to the upper thigh   Equipment: GE Discovery MI (MSKNAS Room 1436) (Station: [id number])   Plasma glucose: 121 mg/dl   Oral Contrast: Not administered   IV Contrast: Not administered     The CT protocol used for this PET/CT study is designed for attenuation   correction and anatomic localization of PET abnormalities. This    CT is not designed to produce, and cannot replace, state-of-the-art   diagnostic CT scans with specific imaging protocols for different body   parts and indications.     The standardized uptake values (SUV) are normalized to patient body weight   and indicate the highest activity concentration (SUVmax) in a given   disease site.     COMPARISON: PET/CT 2021.   CORRELATION: CT chest abdomen and pelvis 2022.   FINDINGS:   REFERENCE REGIONS: SUVmax and mean in a reference region in the   liver are 3.7 and 2.9, previously 3.9 and 2.9.     HEAD/FACE: Unchanged minimal mucosal thickening in left maxillary sinus   likely infectious/inflammatory etiology     NECK: No abnormal uptake.   CHEST: Coronary artery calcifications.   LUNGS: Right lower lobe solid pulmonary nodule, with misregistered FDG   uptake, 1.3 x 1.3 cm, SUV 4.6, slightly increased in size from 1.3 x 0.9   cm on 2022 CT, new since the 21 PET/CT.     PLEURA/PERICARDIUM: No abnormal uptake.   THORACIC NODES: No abnormal uptake.   HEPATOBILIARY: No abnormal uptake.   SPLEEN: Decreased splenomegaly measuring   13.1 cm in craniocaudal axis, previously 13.9 cm. Slightly decreased   diffuse FDG uptake of spleen, now similar to liver background, SUV 2.9,   previously SUV 3.9.     PANCREAS: No abnormal uptake.   ADRENAL GLANDS: No abnormal uptake.   KIDNEYS/URETERS/   BLADDER: Excreted activity is present.     ABDOMINOPELVIC NODES: Unchanged non hypermetabolic left para-aortic   retroperitoneal nodes measuring up to 1.8 x 1.2 cm. Unchanged non   hypermetabolic rim calcified left upper quadrant mesenteric mass (image   216) and subcentimeter non-FDG avid mesenteric lymph nodes.     BOWEL/PERITONEUM/   MESENTERY: No abnormal uptake. See above.     PELVIC ORGANS: No abnormal uptake. Prostatomegaly.   BONES/SOFT TISSUES: No FDG avid bone lesions.. Unchanged   inflammatory FDG uptake at L2-L3 interspinous ligament level.     IMPRESSION:   1. Since 2021 PET/CT, new right lower lobe hypermetabolic solid   pulmonary nodule, slightly enlarged since 2022 CT, possibly   infectious/inflammatory, possibly neoplastic. Consider correlation with   histopathology.   2. Decreased splenomegaly without suspicious uptake.   3. No FDG avid lymphadenopathy.     Integrated Imaging Summary for above study and CT chest abdomen and pelvis   performed 2022     SUMMARY:   Since 2021 PET/CT,   1. New right lower lobe hypermetabolic solid pulmonary nodule, slightly   enlarged since 2022 CT, possibly infectious/inflammatory,   possibly neoplastic. Consider correlation with histopathology.   2. Decreased splenomegaly without suspicious uptake.   3. No FDG avid lymphadenopathy.     FINAL REPORT

## 2022-05-20 NOTE — H&P ADULT - NSHPLABSRESULTS_GEN_ALL_CORE
Personally reviewed old records.  Personally reviewed labs.  Personally reviewed imaging.                          12.1   2.13  )-----------( 70       ( 20 May 2022 16:17 )             41.5       05-    134<L>  |  101  |  18  ----------------------------<  112<H>  5.0   |  18<L>  |  1.49<H>    Ca    9.8      20 May 2022 16:17    TPro  7.3  /  Alb  3.7  /  TBili  0.3  /  DBili  x   /  AST  38  /  ALT  13  /  AlkPhos  130<H>  -            LIVER FUNCTIONS - ( 20 May 2022 16:17 )  Alb: 3.7 g/dL / Pro: 7.3 g/dL / ALK PHOS: 130 U/L / ALT: 13 U/L / AST: 38 U/L / GGT: x             PT/INR - ( 20 May 2022 16:17 )   PT: 13.6 sec;   INR: 1.18 ratio         PTT - ( 20 May 2022 16:17 )  PTT:30.0 sec    Urinalysis Basic - ( 20 May 2022 17:55 )    Color: Light Yellow / Appearance: Clear / S.019 / pH: x  Gluc: x / Ketone: Small  / Bili: Negative / Urobili: Negative   Blood: x / Protein: 300 mg/dL / Nitrite: Negative   Leuk Esterase: Negative / RBC: 3 /hpf / WBC 1 /HPF   Sq Epi: x / Non Sq Epi: 1 /hpf / Bacteria: Negative

## 2022-05-20 NOTE — ED PROVIDER NOTE - ATTENDING CONTRIBUTION TO CARE
Metastatic melanoma being [place] with keytruda [place] [facility] [place] with one week of viral symptoms - sinus pain, sore throat, cough, chills, malaise, today developing shortness of breath.  Satting 90's on room air, improved with NC, not on O2 at baseline.  Diffusely slightly wheezy on exam.  Suspect viral bronchitis - COVID-19 vs other pulmonary virus - plan for labs, CXR, RVP admission given hypoxia.

## 2022-05-21 DIAGNOSIS — J12.3 HUMAN METAPNEUMOVIRUS PNEUMONIA: ICD-10-CM

## 2022-05-21 DIAGNOSIS — A41.9 SEPSIS, UNSPECIFIED ORGANISM: ICD-10-CM

## 2022-05-21 DIAGNOSIS — C79.9 SECONDARY MALIGNANT NEOPLASM OF UNSPECIFIED SITE: ICD-10-CM

## 2022-05-21 DIAGNOSIS — I10 ESSENTIAL (PRIMARY) HYPERTENSION: ICD-10-CM

## 2022-05-21 DIAGNOSIS — R09.02 HYPOXEMIA: ICD-10-CM

## 2022-05-21 DIAGNOSIS — N18.9 CHRONIC KIDNEY DISEASE, UNSPECIFIED: ICD-10-CM

## 2022-05-21 LAB
CULTURE RESULTS: SIGNIFICANT CHANGE UP
HMPV RNA SPEC QL NAA+PROBE: DETECTED
RAPID RVP RESULT: DETECTED
RAPID RVP RESULT: SIGNIFICANT CHANGE UP
SARS-COV-2 RNA SPEC QL NAA+PROBE: SIGNIFICANT CHANGE UP
SARS-COV-2 RNA SPEC QL NAA+PROBE: SIGNIFICANT CHANGE UP
SPECIMEN SOURCE: SIGNIFICANT CHANGE UP

## 2022-05-21 PROCEDURE — 99223 1ST HOSP IP/OBS HIGH 75: CPT | Mod: GC

## 2022-05-21 RX ORDER — IPRATROPIUM BROMIDE 0.2 MG/ML
500 SOLUTION, NON-ORAL INHALATION EVERY 6 HOURS
Refills: 0 | Status: DISCONTINUED | OUTPATIENT
Start: 2022-05-21 | End: 2022-05-26

## 2022-05-21 RX ORDER — OXYBUTYNIN CHLORIDE 5 MG
10 TABLET ORAL DAILY
Refills: 0 | Status: DISCONTINUED | OUTPATIENT
Start: 2022-05-21 | End: 2022-05-26

## 2022-05-21 RX ORDER — AZITHROMYCIN 500 MG/1
TABLET, FILM COATED ORAL
Refills: 0 | Status: DISCONTINUED | OUTPATIENT
Start: 2022-05-21 | End: 2022-05-24

## 2022-05-21 RX ORDER — ACETYLCYSTEINE 200 MG/ML
4 VIAL (ML) MISCELLANEOUS
Refills: 0 | Status: DISCONTINUED | OUTPATIENT
Start: 2022-05-21 | End: 2022-05-26

## 2022-05-21 RX ORDER — ACETAMINOPHEN 500 MG
650 TABLET ORAL EVERY 6 HOURS
Refills: 0 | Status: DISCONTINUED | OUTPATIENT
Start: 2022-05-21 | End: 2022-05-26

## 2022-05-21 RX ORDER — PIPERACILLIN AND TAZOBACTAM 4; .5 G/20ML; G/20ML
3.38 INJECTION, POWDER, LYOPHILIZED, FOR SOLUTION INTRAVENOUS EVERY 8 HOURS
Refills: 0 | Status: DISCONTINUED | OUTPATIENT
Start: 2022-05-21 | End: 2022-05-26

## 2022-05-21 RX ORDER — AZITHROMYCIN 500 MG/1
500 TABLET, FILM COATED ORAL ONCE
Refills: 0 | Status: COMPLETED | OUTPATIENT
Start: 2022-05-21 | End: 2022-05-21

## 2022-05-21 RX ORDER — PIPERACILLIN AND TAZOBACTAM 4; .5 G/20ML; G/20ML
3.38 INJECTION, POWDER, LYOPHILIZED, FOR SOLUTION INTRAVENOUS ONCE
Refills: 0 | Status: COMPLETED | OUTPATIENT
Start: 2022-05-21 | End: 2022-05-21

## 2022-05-21 RX ORDER — AZITHROMYCIN 500 MG/1
500 TABLET, FILM COATED ORAL EVERY 24 HOURS
Refills: 0 | Status: DISCONTINUED | OUTPATIENT
Start: 2022-05-22 | End: 2022-05-24

## 2022-05-21 RX ADMIN — Medication 25 MILLIGRAM(S): at 12:14

## 2022-05-21 RX ADMIN — PANTOPRAZOLE SODIUM 40 MILLIGRAM(S): 20 TABLET, DELAYED RELEASE ORAL at 05:36

## 2022-05-21 RX ADMIN — Medication 500 MICROGRAM(S): at 05:36

## 2022-05-21 RX ADMIN — Medication 1 GRAM(S): at 05:36

## 2022-05-21 RX ADMIN — Medication 650 MILLIGRAM(S): at 12:14

## 2022-05-21 RX ADMIN — PIPERACILLIN AND TAZOBACTAM 25 GRAM(S): 4; .5 INJECTION, POWDER, LYOPHILIZED, FOR SOLUTION INTRAVENOUS at 05:52

## 2022-05-21 RX ADMIN — PIPERACILLIN AND TAZOBACTAM 25 GRAM(S): 4; .5 INJECTION, POWDER, LYOPHILIZED, FOR SOLUTION INTRAVENOUS at 21:07

## 2022-05-21 RX ADMIN — Medication 25 MILLIGRAM(S): at 00:14

## 2022-05-21 RX ADMIN — Medication 10 MILLIGRAM(S): at 18:10

## 2022-05-21 RX ADMIN — Medication 500 MICROGRAM(S): at 18:07

## 2022-05-21 RX ADMIN — PIPERACILLIN AND TAZOBACTAM 200 GRAM(S): 4; .5 INJECTION, POWDER, LYOPHILIZED, FOR SOLUTION INTRAVENOUS at 01:37

## 2022-05-21 RX ADMIN — PIPERACILLIN AND TAZOBACTAM 25 GRAM(S): 4; .5 INJECTION, POWDER, LYOPHILIZED, FOR SOLUTION INTRAVENOUS at 13:36

## 2022-05-21 RX ADMIN — MAGNESIUM OXIDE 400 MG ORAL TABLET 400 MILLIGRAM(S): 241.3 TABLET ORAL at 12:14

## 2022-05-21 RX ADMIN — ATORVASTATIN CALCIUM 80 MILLIGRAM(S): 80 TABLET, FILM COATED ORAL at 21:22

## 2022-05-21 RX ADMIN — Medication 4 MILLILITER(S): at 18:08

## 2022-05-21 RX ADMIN — Medication 500 MICROGRAM(S): at 12:15

## 2022-05-21 RX ADMIN — Medication 200 MICROGRAM(S): at 05:36

## 2022-05-21 RX ADMIN — Medication 650 MILLIGRAM(S): at 18:07

## 2022-05-21 RX ADMIN — AZITHROMYCIN 255 MILLIGRAM(S): 500 TABLET, FILM COATED ORAL at 02:12

## 2022-05-21 RX ADMIN — Medication 4 MILLILITER(S): at 05:36

## 2022-05-21 RX ADMIN — Medication 1 GRAM(S): at 18:07

## 2022-05-21 RX ADMIN — AMLODIPINE BESYLATE 2.5 MILLIGRAM(S): 2.5 TABLET ORAL at 00:14

## 2022-05-21 RX ADMIN — AMLODIPINE BESYLATE 2.5 MILLIGRAM(S): 2.5 TABLET ORAL at 21:22

## 2022-05-21 RX ADMIN — Medication 100 MILLIGRAM(S): at 12:14

## 2022-05-21 RX ADMIN — SERTRALINE 25 MILLIGRAM(S): 25 TABLET, FILM COATED ORAL at 12:14

## 2022-05-21 RX ADMIN — Medication 650 MILLIGRAM(S): at 12:41

## 2022-05-21 NOTE — PHYSICAL THERAPY INITIAL EVALUATION ADULT - DIAGNOSIS, PT EVAL
Generalized weakness Quality 110: Preventive Care And Screening: Influenza Immunization: Influenza Immunization previously received during influenza season Detail Level: Detailed Quality 130: Documentation Of Current Medications In The Medical Record: Current Medications Documented Quality 402: Tobacco Use And Help With Quitting Among Adolescents: Patient screened for tobacco and never smoked Quality 131: Pain Assessment And Follow-Up: Pain assessment using a standardized tool is documented as negative, no follow-up plan required Quality 431: Preventive Care And Screening: Unhealthy Alcohol Use - Screening: Patient screened for unhealthy alcohol use using a single question and scores less than 2 times per year

## 2022-05-21 NOTE — PHYSICAL THERAPY INITIAL EVALUATION ADULT - ADDITIONAL COMMENTS
As per the pt, he lives in a pvt split level house w/ wife, 2STE and 5-6 steps to each level. PTA, Pt was independent in functional ambulation and BADLs. Pt owns RW at home but doesn;'t use.

## 2022-05-21 NOTE — PROGRESS NOTE ADULT - SUBJECTIVE AND OBJECTIVE BOX
Patient is a 81y old  Male who presents with a chief complaint of fever, sob (21 May 2022 14:16)    Date of servie : 22 @ 17:36  INTERVAL HPI/OVERNIGHT EVENTS:  T(C): 36.4 (22 @ 17:00), Max: 37.2 (22 @ 00:04)  HR: 80 (22 @ 17:00) (80 - 100)  BP: 165/75 (22 @ 17:00) (126/73 - 171/89)  RR: 18 (22 @ 17:00) (18 - 20)  SpO2: 96% (22 @ 17:00) (94% - 97%)  Wt(kg): --  I&O's Summary    20 May 2022 07:01  -  21 May 2022 07:00  --------------------------------------------------------  IN: 0 mL / OUT: 650 mL / NET: -650 mL    21 May 2022 07:01  -  21 May 2022 17:36  --------------------------------------------------------  IN: 0 mL / OUT: 600 mL / NET: -600 mL        LABS:                        12.1   2.13  )-----------( 70       ( 20 May 2022 16:17 )             41.5     05-20    134<L>  |  101  |  18  ----------------------------<  112<H>  5.0   |  18<L>  |  1.49<H>    Ca    9.8      20 May 2022 16:17    TPro  7.3  /  Alb  3.7  /  TBili  0.3  /  DBili  x   /  AST  38  /  ALT  13  /  AlkPhos  130<H>  05-20    PT/INR - ( 20 May 2022 16:17 )   PT: 13.6 sec;   INR: 1.18 ratio         PTT - ( 20 May 2022 16:17 )  PTT:30.0 sec  Urinalysis Basic - ( 20 May 2022 17:55 )    Color: Light Yellow / Appearance: Clear / S.019 / pH: x  Gluc: x / Ketone: Small  / Bili: Negative / Urobili: Negative   Blood: x / Protein: 300 mg/dL / Nitrite: Negative   Leuk Esterase: Negative / RBC: 3 /hpf / WBC 1 /HPF   Sq Epi: x / Non Sq Epi: 1 /hpf / Bacteria: Negative      CAPILLARY BLOOD GLUCOSE            Urinalysis Basic - ( 20 May 2022 17:55 )    Color: Light Yellow / Appearance: Clear / S.019 / pH: x  Gluc: x / Ketone: Small  / Bili: Negative / Urobili: Negative   Blood: x / Protein: 300 mg/dL / Nitrite: Negative   Leuk Esterase: Negative / RBC: 3 /hpf / WBC 1 /HPF   Sq Epi: x / Non Sq Epi: 1 /hpf / Bacteria: Negative        MEDICATIONS  (STANDING):  acetylcysteine 10%  Inhalation 4 milliLiter(s) Inhalation two times a day  allopurinol 100 milliGRAM(s) Oral daily  amLODIPine   Tablet 2.5 milliGRAM(s) Oral at bedtime  atorvastatin 80 milliGRAM(s) Oral at bedtime  azithromycin  IVPB      ipratropium    for Nebulization 500 MICROGram(s) Nebulizer every 6 hours  levothyroxine 200 MICROGram(s) Oral daily  magnesium oxide 400 milliGRAM(s) Oral daily  metoprolol tartrate 25 milliGRAM(s) Oral two times a day  oxybutynin XL 10 milliGRAM(s) Oral daily  pantoprazole    Tablet 40 milliGRAM(s) Oral before breakfast  piperacillin/tazobactam IVPB.. 3.375 Gram(s) IV Intermittent every 8 hours  sertraline 25 milliGRAM(s) Oral daily  sucralfate 1 Gram(s) Oral two times a day    MEDICATIONS  (PRN):  acetaminophen     Tablet .. 650 milliGRAM(s) Oral every 6 hours PRN Moderate Pain (4 - 6)          PHYSICAL EXAM:  GENERAL: NAD, well-groomed, well-developed  HEAD:  Atraumatic, Normocephalic  CHEST/LUNG: Clear to percussion bilaterally; No rales, rhonchi, wheezing, or rubs  HEART: Regular rate and rhythm; No murmurs, rubs, or gallops  ABDOMEN: Soft, Nontender, Nondistended; Bowel sounds present  EXTREMITIES:  2+ Peripheral Pulses, No clubbing, cyanosis, or edema  LYMPH: No lymphadenopathy noted  SKIN: No rashes or lesions    Care Discussed with Consultants/Other Providers [ x] YES  [ ] NO

## 2022-05-21 NOTE — CONSULT NOTE ADULT - SUBJECTIVE AND OBJECTIVE BOX
Patient is a 81y old  Male who presents with a chief complaint of fever, sob (20 May 2022 22:40)    HPI: Mr. 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 He was referred to the [facility] ED by his hematologist for evaluation and management. Pt's daughter and grand child have tested positive for covid. Pt is boosted with pfizer. Pt denies chest pain, diarrhea, pain anywhere. Pt denies any relieving or exacerbating factors. Here pt tested positive for metapneumovirus. ID consulted for the same.       REVIEW OF SYSTEMS  [  ] ROS unobtainable because:    [ x ] All other systems negative except as noted below    Constitutional:  [ ] fever [ ] chills  [ ] weight loss  [ ]night sweat  [ ]poor appetite/PO intake [ ]fatigue   Skin:  [ ] rash [ ] phlebitis	  Eyes: [ ] icterus [ ] pain  [ ] discharge	  ENMT: [ ] sore throat  [ ] thrush [ ] ulcers [ ] exudates [ ]anosmia  Respiratory: [ ] dyspnea [ ] hemoptysis [ ] cough [ ] sputum	  Cardiovascular:  [ ] chest pain [ ] palpitations [ ] edema	  Gastrointestinal:  [ ] nausea [ ] vomiting [ ] diarrhea [ ] constipation [ ] pain	  Genitourinary:  [ ] dysuria [ ] frequency [ ] hematuria [ ] discharge [ ] flank pain  [ ] incontinence  Musculoskeletal:  [ ] myalgias [ ] arthralgias [ ] arthritis  [ ] back pain  Neurological:  [ ] headache [ ] weakness [ ] seizures  [ ] confusion/altered mental status    prior hospital charts reviewed [V]  primary team notes reviewed [V]  other consultant notes reviewed [V]    PAST MEDICAL & SURGICAL HISTORY:  Duodenal ulcer disease  SBO (small bowel obstruction)  BRIGID (acute kidney injury)  Hypertension  Hyperparathyroidism  S/P exploratory laparotomy    SOCIAL HISTORY:  - Denied smoking/vaping/alcohol/recreational drug use    FAMILY HISTORY:  Family history of non-Hodgkin&#x27;s lymphoma  Family history of Hashimoto thyroiditis (Child)      Allergies  Cipro (Rash)  niacin (Flushing; Rash)  Reglan (Anaphylaxis)        ANTIMICROBIALS:  azithromycin  IVPB    piperacillin/tazobactam IVPB.. 3.375 every 8 hours      ANTIMICROBIALS (past 90 days):  MEDICATIONS  (STANDING):  azithromycin  IVPB   255 mL/Hr IV Intermittent (22 @ 02:12)    cefepime   IVPB   100 mL/Hr IV Intermittent (22 @ 15:56)    piperacillin/tazobactam IVPB.   200 mL/Hr IV Intermittent (22 @ 01:37)    piperacillin/tazobactam IVPB..   25 mL/Hr IV Intermittent (22 @ 05:52)        OTHER MEDS:   MEDICATIONS  (STANDING):  acetylcysteine 10%  Inhalation 4 two times a day  allopurinol 100 daily  amLODIPine   Tablet 2.5 at bedtime  atorvastatin 80 at bedtime  ipratropium    for Nebulization 500 every 6 hours  levothyroxine 200 daily  metoprolol tartrate 25 two times a day  oxybutynin XL 10 daily  pantoprazole    Tablet 40 before breakfast  sertraline 25 daily  sucralfate 1 two times a day      VITALS:  Vital Signs Last 24 Hrs  T(F): 98.2 (22 @ 04:26), Max: 98.9 (22 @ 00:04)    Vital Signs Last 24 Hrs  HR: 95 (22 @ 04:26) (95 - 110)  BP: 126/73 (22 @ 04:26) (126/73 - 171/89)  RR: 18 (22 @ 04:26)  SpO2: 95% (22 @ 04:26) (93% - 97%)  Wt(kg): --    EXAM:    GA: NAD, AOx3  HEENT: oral cavity no lesion  CV: nl S1/S2, no RMG  Lungs: CTAB, No distress  Abd: BS+, soft, nontender, no rebounding pain  Ext: no edema  Neuro: No focal deficits  Skin: Intact  IV: no phlebitis    Labs:                        12.1   2.13  )-----------( 70       ( 20 May 2022 16:17 )             41.5         134<L>  |  101  |  18  ----------------------------<  112<H>  5.0   |  18<L>  |  1.49<H>    Ca    9.8      20 May 2022 16:17    TPro  7.3  /  Alb  3.7  /  TBili  0.3  /  DBili  x   /  AST  38  /  ALT  13  /  AlkPhos  130<H>        WBC Trend:  WBC Count: 2.13 (22 @ 16:17)      Auto Neutrophil #: 1.34 K/uL (22 @ 16:17)    Creatine Trend:  Creatinine, Serum: 1.49 ()      Liver Biochemical Testing Trend:  Alanine Aminotransferase (ALT/SGPT): 13 ()  Alanine Aminotransferase (ALT/SGPT): 16 ()  Aspartate Aminotransferase (AST/SGOT): 38 (22 @ 16:17)  Aspartate Aminotransferase (AST/SGOT): 32 (- @ 13:42)  Bilirubin Total, Serum: 0.3 ()  Bilirubin Total, Serum: 0.3 ()      Trend LDH  19 @ 00:57  115      Auto Eosinophil %: 0.0 % (22 @ 16:17)      Urinalysis Basic - ( 20 May 2022 17:55 )    Color: Light Yellow / Appearance: Clear / S.019 / pH: x  Gluc: x / Ketone: Small  / Bili: Negative / Urobili: Negative   Blood: x / Protein: 300 mg/dL / Nitrite: Negative   Leuk Esterase: Negative / RBC: 3 /hpf / WBC 1 /HPF   Sq Epi: x / Non Sq Epi: 1 /hpf / Bacteria: Negative        MICROBIOLOGY:      Rapid RVP Result: Detected ( @ 16:17)          RADIOLOGY:  imaging below personally reviewed    < from: [facility] Duplex Lower Ext Vein Scan, Right (22 @ 17:30) >  No evidence of right lower extremity deep venous thrombosis. Probable right Baker's cyst.  measures 4.9 x 2.4 x 0.8 cm    < end of copied text >      < from: Xray Chest 1 View- PORTABLE-Urgent (22 @ 17:46) >  Rounded opacity in the right lower lung fields, indeterminate. CT chest can be performed for further evaluation.   < end of copied text >        < from: CT Angio Chest PE Protocol w/ IV Cont (22 @ 20:54) >  No PE in the main, right and left, and lobar pulmonary arteries. Limited evaluation of the segmental and subsegmental branches. Diffuse patchy bilateral groundglass and consolidative opacities and tree  in bud nodules, may be of infectious etiology. Mediastinal lymphadenopathy. Bibasilar subsegmental atelectasis. F/u official report in am.   < end of copied text >   Patient is a 81y old  Male who presents with a chief complaint of fever, sob (20 May 2022 22:40)    HPI: Mr. 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 He was referred to the [facility] ED by his hematologist for evaluation and management. Pt's daughter and grand child have tested positive for covid 2 weeks back, but havent been in contact with him since then. Pt is boosted with pfizer. Pt denies chest pain, diarrhea, pain anywhere. Pt denies any relieving or exacerbating factors. Here pt tested positive for metapneumovirus. ID consulted for the same.       REVIEW OF SYSTEMS  [  ] ROS unobtainable because:    [ x ] All other systems negative except as noted below    Constitutional:  [ ] fever [ ] chills  [ ] weight loss  [ ]night sweat  [ ]poor appetite/PO intake [ ]fatigue   Skin:  [ ] rash [ ] phlebitis	  Eyes: [ ] icterus [ ] pain  [ ] discharge	  ENMT: [ ] sore throat  [ ] thrush [ ] ulcers [ ] exudates [ ]anosmia  Respiratory: [ ] dyspnea [ ] hemoptysis [ ] cough [ ] sputum	  Cardiovascular:  [ ] chest pain [ ] palpitations [ ] edema	  Gastrointestinal:  [ ] nausea [ ] vomiting [ ] diarrhea [ ] constipation [ ] pain	  Genitourinary:  [ ] dysuria [ ] frequency [ ] hematuria [ ] discharge [ ] flank pain  [ ] incontinence  Musculoskeletal:  [ ] myalgias [ ] arthralgias [ ] arthritis  [ ] back pain  Neurological:  [ ] headache [ ] weakness [ ] seizures  [ ] confusion/altered mental status    prior hospital charts reviewed [V]  primary team notes reviewed [V]  other consultant notes reviewed [V]    PAST MEDICAL & SURGICAL HISTORY:  Duodenal ulcer disease  SBO (small bowel obstruction)  BRIGID (acute kidney injury)  Hypertension  Hyperparathyroidism  S/P exploratory laparotomy    SOCIAL HISTORY:  - Denied smoking/vaping/alcohol/recreational drug use    FAMILY HISTORY:  Family history of non-Hodgkin&#x27;s lymphoma  Family history of Hashimoto thyroiditis (Child)      Allergies  Cipro (Rash)  niacin (Flushing; Rash)  Reglan (Anaphylaxis)        ANTIMICROBIALS:  azithromycin  IVPB    piperacillin/tazobactam IVPB.. 3.375 every 8 hours      ANTIMICROBIALS (past 90 days):  MEDICATIONS  (STANDING):  azithromycin  IVPB   255 mL/Hr IV Intermittent (22 @ 02:12)    cefepime   IVPB   100 mL/Hr IV Intermittent (22 @ 15:56)    piperacillin/tazobactam IVPB.   200 mL/Hr IV Intermittent (22 @ 01:37)    piperacillin/tazobactam IVPB..   25 mL/Hr IV Intermittent (22 @ 05:52)        OTHER MEDS:   MEDICATIONS  (STANDING):  acetylcysteine 10%  Inhalation 4 two times a day  allopurinol 100 daily  amLODIPine   Tablet 2.5 at bedtime  atorvastatin 80 at bedtime  ipratropium    for Nebulization 500 every 6 hours  levothyroxine 200 daily  metoprolol tartrate 25 two times a day  oxybutynin XL 10 daily  pantoprazole    Tablet 40 before breakfast  sertraline 25 daily  sucralfate 1 two times a day      VITALS:  Vital Signs Last 24 Hrs  T(F): 98.2 (22 @ 04:26), Max: 98.9 (22 @ 00:04)    Vital Signs Last 24 Hrs  HR: 95 (22 @ 04:26) (95 - 110)  BP: 126/73 (22 @ 04:26) (126/73 - 171/89)  RR: 18 (22 @ 04:26)  SpO2: 95% (22 @ 04:26) (93% - 97%)  Wt(kg): --    EXAM:    PHYSICAL EXAM:  General:  non-toxic, AOx3  HEAD/EYES: PERRL, white sclera   ENT:  normal, No thrush and no pharyngeal exudate  Neck: Supple  Cardiovascular:   No murmur,  normal S1 and S2  Respiratory: crackles to ausculation bilaterally  GI:  soft, non-tender, normal bowel sounds  : no talavera, no CVA tenderness   Musculoskeletal:  no synovitis  Neurologic: non-focal exam   Skin: no rash  Lymph:  no lymphadenopathy  Psychiatric: Cooperative, appropriate affect, alert & oriented  Lines:  no phlebitis         Labs:                        12.1   2.13  )-----------( 70       ( 20 May 2022 16:17 )             41.5         134<L>  |  101  |  18  ----------------------------<  112<H>  5.0   |  18<L>  |  1.49<H>    Ca    9.8      20 May 2022 16:17    TPro  7.3  /  Alb  3.7  /  TBili  0.3  /  DBili  x   /  AST  38  /  ALT  13  /  AlkPhos  130<H>        WBC Trend:  WBC Count: 2.13 (22 @ 16:17)      Auto Neutrophil #: 1.34 K/uL (22 @ 16:17)    Creatine Trend:  Creatinine, Serum: 1.49 ()      Liver Biochemical Testing Trend:  Alanine Aminotransferase (ALT/SGPT): 13 ()  Alanine Aminotransferase (ALT/SGPT): 16 ()  Aspartate Aminotransferase (AST/SGOT): 38 (22 @ 16:17)  Aspartate Aminotransferase (AST/SGOT): 32 (- @ 13:42)  Bilirubin Total, Serum: 0.3 ()  Bilirubin Total, Serum: 0.3 ()      Trend LDH  19 @ 00:57  115      Auto Eosinophil %: 0.0 % (22 @ 16:17)      Urinalysis Basic - ( 20 May 2022 17:55 )    Color: Light Yellow / Appearance: Clear / S.019 / pH: x  Gluc: x / Ketone: Small  / Bili: Negative / Urobili: Negative   Blood: x / Protein: 300 mg/dL / Nitrite: Negative   Leuk Esterase: Negative / RBC: 3 /hpf / WBC 1 /HPF   Sq Epi: x / Non Sq Epi: 1 /hpf / Bacteria: Negative        MICROBIOLOGY:      Rapid RVP Result: Detected ( @ 16:17)          RADIOLOGY:  imaging below personally reviewed    < from: [facility] Duplex Lower Ext Vein Scan, Right (22 @ 17:30) >  No evidence of right lower extremity deep venous thrombosis. Probable right Baker's cyst.  measures 4.9 x 2.4 x 0.8 cm    < end of copied text >      < from: Xray Chest 1 View- PORTABLE-Urgent (22 @ 17:46) >  Rounded opacity in the right lower lung fields, indeterminate. CT chest can be performed for further evaluation.   < end of copied text >        < from: CT Angio Chest PE Protocol w/ IV Cont (22 @ 20:54) >  No PE in the main, right and left, and lobar pulmonary arteries. Limited evaluation of the segmental and subsegmental branches. Diffuse patchy bilateral groundglass and consolidative opacities and tree  in bud nodules, may be of infectious etiology. Mediastinal lymphadenopathy. Bibasilar subsegmental atelectasis. F/u official report in am.   < end of copied text >

## 2022-05-21 NOTE — PATIENT PROFILE ADULT - NSPROHMSYMPCOND_GEN_A_NUR
see outpatient records, gets immunotherapy for melanoma. has hypertension, takes levothyroxine for thyroid, kidney disease, sees an oncologist at [facility], sees nephrologist

## 2022-05-21 NOTE — PROGRESS NOTE ADULT - ASSESSMENT
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    Problem/Plan - 1:  ·  Problem: CAP (community acquired pneumonia).  hMPV   ·  Plan: - given CT findings and pt's immunocompromised state, will start antibiotics  - ID fu   - per heme, pt's b/l GGO could be immune mediated pneumonitis, however pt's symptoms have been acute  - pulm fu     Problem/Plan - 2:  ·  Problem: Human metapneumovirus (hMPV) pneumonia.   ·  Plan: - supportive care as above and below.    Problem/Plan - 3:  ·  Problem: Hypoxia.   ·  Plan: - chest PT  - duonebs  - CTA neg for PE.    Problem/Plan - 4:  ·  Problem: Sepsis.   ·  Plan: - f/u blood cultures.    Problem/Plan - 5:  ·  Problem: Metastatic melanoma.   ·  Plan: - f/u onc recs.    Problem/Plan - 6:  ·  Problem: Chronic kidney disease, unspecified CKD stage.   ·  Plan: - avoid nephrotoxins.    Problem/Plan - 7:  ·  Problem: HTN (hypertension).   ·  Plan: - cont metoprolol and norvasc.

## 2022-05-21 NOTE — PROGRESS NOTE ADULT - ASSESSMENT
Patient with metastatic melanoma (with unknown primary - metastatic right lung nodule - M1B disease) under [place]namdi Farn[place] [facility] 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 He started Pembrolizumab in 3/28/2022. Most recent treatment was 5/9/2022 (on q8 week dosing). Plan was to repeat imaging in 8 weeks around time of next immunotherapy treatment.    Patient called the office today complaining of new onset of fever, headache, cough, SOB and hypoxia. He was referred to the [facility] ED for evaluation and management    Metastatic Melanoma  --Under [place]. Nnamdi F[place] [facility]  --Currently receiving Pembrolizumab q8 weeks - LD 5/9/2022  --Ongoing follow up after discharge    Fever, SOB, Cough, and fever 101.7 --Pulmonary, ID consults if warranted  - ID on board. Appreciate input. Additional tests sent off  - RVP + for HMPV  - CTA with concern for multifocal PNA currently on Abx. Fevers resolved,   VSS on 2L NC.    Patient with metastatic melanoma (with unknown primary - metastatic right lung nodule - M1B disease) under care by Dr. Nnamdi Farnsworth of [facility] [place]. 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 He started Pembrolizumab in 3/28/2022. Most recent treatment was 5/9/2022 (on q8 week dosing). Plan was to repeat imaging in 8 weeks around time of next immunotherapy treatment.    Patient called the office today complaining of new onset of fever, headache, cough, SOB and hypoxia. He was referred to the [facility] ED for evaluation and management    Metastatic Melanoma  --Under care by Dr. Nnamdi Farnsworth of [facility]  --Currently receiving Pembrolizumab q8 weeks - LD 5/9/2022  --Ongoing follow up after discharge    Fever, SOB, Cough, and fever 101.7 --Pulmonary, ID consults if warranted  - ID on board. Appreciate input. Additional tests sent off  - RVP + for HMPV  - CTA with concern for multifocal PNA currently on Abx. Fevers resolved,   - VSS on 2L NC.   - Will hold off on steroids at this time given resolution of fevers and mild clinical improvement on antibiotics. Appreciate ID input.     We will continue to follow along with you and would be happy to assist with any oncology questions on behalf of ALVIN Cross D.O  Hematology Oncology   [facility]  [phone] ( Office)   Evenings and weekends please call MD on call or office

## 2022-05-21 NOTE — CONSULT NOTE ADULT - ASSESSMENT
Mr. 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 He was referred to the [facility] ED by his hematologist for evaluation and management. Pt's daughter and grand child have tested positive for covid. Pt is boosted with pfizer. Pt denies chest pain, diarrhea, pain anywhere. Pt denies any relieving or exacerbating factors. Here pt tested positive for metapneumovirus. ID consulted for the same.    WORKUP  UA : Negative  RVP: Metapneumovirus (5/20)   VA Duplex Lower Ext Vein Scan, Right (05.20): No evidence of right lower extremity deep venous thrombosis. Probable right Baker's cyst.  measures 4.9 x 2.4 x 0.8 cm  Xray Chest (05.20): Rounded opacity in the right lower lung fields, indeterminate. CT chest can be performed for further evaluation.   CT Angio Chest PE Protocol w/ IV Cont (05.20.): Prelim Report: NO PE.  Diffuse patchy bilateral groundglass and consolidative opacities and tree  in bud nodules, may be of infectious etiology. Mediastinal lymphadenopathy. Bibasilar subsegmental atelectasis.  Pt's daughter and grand child have tested positive for covid.  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 Mild leukopenia  s/p cefepime in ER  Currently on azithromycin and Zosyn    - Given GGO in prelim report of CTA chest: There is concern for immune mediated pneumonitis. Recommend Pulm eval  - Blood and urine cx in lab. Will f/u    PT TO BE SEEN. PRELIM NOTE  PENDING RECS. PLEASE WAIT FOR FINAL RECS AFTER DISCUSSION WITH ATTENDINGFe Schwartz MD, PGY4   ID fellow  [employer] Teams Preferred  After 5pm/weekends call [phone]   Mr. 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 He was referred to the [facility] ED by his hematologist for evaluation and management. Pt's daughter and grand child have tested positive for covid. Pt is boosted with pfizer. Pt denies chest pain, diarrhea, pain anywhere. Pt denies any relieving or exacerbating factors. Here pt tested positive for metapneumovirus. ID consulted for the same.    WORKUP  UA : Negative  RVP: Metapneumovirus (5/20)   VA Duplex Lower Ext Vein Scan, Right (05.20): No evidence of right lower extremity deep venous thrombosis. Probable right Baker's cyst.  measures 4.9 x 2.4 x 0.8 cm  Xray Chest (05.20): Rounded opacity in the right lower lung fields, indeterminate. CT chest can be performed for further evaluation.   CT Angio Chest PE Protocol w/ IV Cont (05.20.): Prelim Report: NO PE.  Diffuse patchy bilateral groundglass and consolidative opacities and tree  in bud nodules, may be of infectious etiology. Mediastinal lymphadenopathy. Bibasilar subsegmental atelectasis.  Pt's daughter and grand child have tested positive for covid.  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 Mild leukopenia  s/p cefepime in ER  Currently on azithromycin and Zosyn    - Given GGO in prelim report of CTA chest: There is concern for immune mediated pneumonitis. Recommend Pulm eval  - Possibility of COVID still remains given exposure, GGO and symptoms of cough, fever and sore throat. -> Will repeat RVP  - Ordered MRSA PCR, Ur. Legionella and Quant gold  - c/w azithro and zosyn for now  - Blood and urine cx in lab. Will f/u    Pt seen and examined. Case d/w attending and primary team    Poncho Schwartz MD, PGY4   ID fellow  [employer] Teams Preferred  After 5pm/weekends call [phone]

## 2022-05-21 NOTE — PROGRESS NOTE ADULT - SUBJECTIVE AND OBJECTIVE BOX
Patient is a 81y old  Male who presents with a chief complaint of fever, sob (21 May 2022 09:38)      MEDICATIONS  (STANDING):  acetylcysteine 10%  Inhalation 4 milliLiter(s) Inhalation two times a day  allopurinol 100 milliGRAM(s) Oral daily  amLODIPine   Tablet 2.5 milliGRAM(s) Oral at bedtime  atorvastatin 80 milliGRAM(s) Oral at bedtime  azithromycin  IVPB      ipratropium    for Nebulization 500 MICROGram(s) Nebulizer every 6 hours  levothyroxine 200 MICROGram(s) Oral daily  magnesium oxide 400 milliGRAM(s) Oral daily  metoprolol tartrate 25 milliGRAM(s) Oral two times a day  oxybutynin XL 10 milliGRAM(s) Oral daily  pantoprazole    Tablet 40 milliGRAM(s) Oral before breakfast  piperacillin/tazobactam IVPB.. 3.375 Gram(s) IV Intermittent every 8 hours  sertraline 25 milliGRAM(s) Oral daily  sucralfate 1 Gram(s) Oral two times a day    MEDICATIONS  (PRN):  acetaminophen     Tablet .. 650 milliGRAM(s) Oral every 6 hours PRN Moderate Pain (4 - 6)      ROS  No fever, sweats, chills  No epistaxis, HA, sore throat  No CP, SOB, cough, sputum  No n/v/d, abd pain, melena, hematochezia  No edema  No rash  No anxiety  No back pain, joint pain  No bleeding, bruising  No dysuria, hematuria    Vital Signs Last 24 Hrs  T(C): 36.8 (21 May 2022 04:26), Max: 37.2 (21 May 2022 00:04)  T(F): 98.2 (21 May 2022 04:26), Max: 98.9 (21 May 2022 00:04)  HR: 85 (21 May 2022 12:13) (85 - 102)  BP: 145/70 (21 May 2022 12:13) (126/73 - 171/89)  BP(mean): --  RR: 18 (21 May 2022 04:26) (18 - 20)  SpO2: 94% (21 May 2022 12:13) (94% - 97%)    PE  NAD  Awake, alert  Anicteric, MMM  RRR  CTAB  Abd soft, NT, ND  No c/c/e  No rash grossly  FROM                          12.1   2.13  )-----------( 70       ( 20 May 2022 16:17 )             41.5       05-20    134<L>  |  101  |  18  ----------------------------<  112<H>  5.0   |  18<L>  |  1.49<H>    Ca    9.8      20 May 2022 16:17    TPro  7.3  /  Alb  3.7  /  TBili  0.3  /  DBili  x   /  AST  38  /  ALT  13  /  AlkPhos  130<H>  05-20        IMPRESSION:    No main, lobar or segmental pulmonary embolism. Limited evaluation of the   subsegmental pulmonary arteries.    Patchy consolidative and groundglass opacities in the bilateral lower   lobes with additional groundglass, and nodular opacities seen in the left   upper lobe. Findings suspicious for multifocal pneumonia, although   underlying tumor, especially in the right lower lobe, cannot be excluded.    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 Comparison with prior studies to be   useful.    Mediastinal and right hilar adenopathy.    Splenomegaly.    --- End of Report ---         Patient is a 81y old  Male who presents with a chief complaint of fever, sob (21 May 2022 09:38)  Patient notes he feels a bit better this morning. still with a drive cough. fevers resolved     MEDICATIONS  (STANDING):  acetylcysteine 10%  Inhalation 4 milliLiter(s) Inhalation two times a day  allopurinol 100 milliGRAM(s) Oral daily  amLODIPine   Tablet 2.5 milliGRAM(s) Oral at bedtime  atorvastatin 80 milliGRAM(s) Oral at bedtime  azithromycin  IVPB      ipratropium    for Nebulization 500 MICROGram(s) Nebulizer every 6 hours  levothyroxine 200 MICROGram(s) Oral daily  magnesium oxide 400 milliGRAM(s) Oral daily  metoprolol tartrate 25 milliGRAM(s) Oral two times a day  oxybutynin XL 10 milliGRAM(s) Oral daily  pantoprazole    Tablet 40 milliGRAM(s) Oral before breakfast  piperacillin/tazobactam IVPB.. 3.375 Gram(s) IV Intermittent every 8 hours  sertraline 25 milliGRAM(s) Oral daily  sucralfate 1 Gram(s) Oral two times a day    MEDICATIONS  (PRN):  acetaminophen     Tablet .. 650 milliGRAM(s) Oral every 6 hours PRN Moderate Pain (4 - 6)      ROS  No fever, sweats, chills  No epistaxis, HA, sore throat  No CP, SOB, cough, sputum  No n/v/d, abd pain, melena, hematochezia  No edema  No rash  No anxiety  No back pain, joint pain  No bleeding, bruising  No dysuria, hematuria    Vital Signs Last 24 Hrs  T(C): 36.8 (21 May 2022 04:26), Max: 37.2 (21 May 2022 00:04)  T(F): 98.2 (21 May 2022 04:26), Max: 98.9 (21 May 2022 00:04)  HR: 85 (21 May 2022 12:13) (85 - 102)  BP: 145/70 (21 May 2022 12:13) (126/73 - 171/89)  BP(mean): --  RR: 18 (21 May 2022 04:26) (18 - 20)  SpO2: 94% (21 May 2022 12:13) (94% - 97%)    PE  NAD  Awake, alert  Anicteric, MMM  RRR  mild b/l crackles. Dry cough   Abd soft, NT, ND  No c/c/e  No rash grossly  FROM                          12.1   2.13  )-----------( 70       ( 20 May 2022 16:17 )             41.5       05-20    134<L>  |  101  |  18  ----------------------------<  112<H>  5.0   |  18<L>  |  1.49<H>    Ca    9.8      20 May 2022 16:17    TPro  7.3  /  Alb  3.7  /  TBili  0.3  /  DBili  x   /  AST  38  /  ALT  13  /  AlkPhos  130<H>  05-20        IMPRESSION:    No main, lobar or segmental pulmonary embolism. Limited evaluation of the   subsegmental pulmonary arteries.    Patchy consolidative and groundglass opacities in the bilateral lower   lobes with additional groundglass, and nodular opacities seen in the left   upper lobe. Findings suspicious for multifocal pneumonia, although   underlying tumor, especially in the right lower lobe, cannot be excluded.    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 Comparison with prior studies to be   useful.    Mediastinal and right hilar adenopathy.    Splenomegaly.    --- End of Report ---

## 2022-05-21 NOTE — PATIENT PROFILE ADULT - FALL HARM RISK - HARM RISK INTERVENTIONS

## 2022-05-21 NOTE — PHYSICAL THERAPY INITIAL EVALUATION ADULT - PERTINENT HX OF CURRENT PROBLEM, REHAB EVAL
80 yo M pmhx follicular lymphoma, melanoma with mets seen at [facility] on immuno therapy p/w 1 day of fever to 101 measured orally at home and SOB. gradual onset sob no chest pain. Venous duplex LE: No DVT, CTA Chest: No PE, CXR: Rounded opacity in the right lower lung fields

## 2022-05-21 NOTE — CONSULT NOTE ADULT - ATTENDING COMMENTS
Patient seen and examined with Dr martines    I agree with his history exam and plans as noted above  Patient with a history of metastatic melanoma on Keytruda, multiple family members with recent COVID infeciotn, grandson with 'some other' viral infection Patient presenting with RVP + human metapneumovirus and bilateral ground glass infiltrates on CT scan imaging    All- quinolones with anaphylaxis    exam febrile  no thrush  lungs- decreased BS bilateral  Cor reg  Abd- soft  no adenopathy peripherally appreciated    CT scan shows mediastinal adenopathy, consolidative and ground glass infiltrates    Differential:  pneumonitis fungal, bacterial, viral vs medication related side effect  send galactomannon, fungitell, quantiferon,  sputum for gram stain and culture if able to provide   urine for legionella  urine histo ag  blood cultures x2 sets    Agree with Zosyn plus Azithromycin pending urine legionella and cultures  supportive care for Hmetapneumovirus    discussed with patient, wife, son in law and daughter    Hugh Hernandez MD  Can be called via Teams  After 5pm/weekends [phone]

## 2022-05-22 LAB
MRSA PCR RESULT.: SIGNIFICANT CHANGE UP
S AUREUS DNA NOSE QL NAA+PROBE: SIGNIFICANT CHANGE UP

## 2022-05-22 PROCEDURE — 99233 SBSQ HOSP IP/OBS HIGH 50: CPT

## 2022-05-22 RX ADMIN — Medication 100 MILLIGRAM(S): at 12:03

## 2022-05-22 RX ADMIN — PIPERACILLIN AND TAZOBACTAM 25 GRAM(S): 4; .5 INJECTION, POWDER, LYOPHILIZED, FOR SOLUTION INTRAVENOUS at 22:38

## 2022-05-22 RX ADMIN — Medication 500 MICROGRAM(S): at 22:45

## 2022-05-22 RX ADMIN — Medication 1 GRAM(S): at 17:08

## 2022-05-22 RX ADMIN — Medication 4 MILLILITER(S): at 17:09

## 2022-05-22 RX ADMIN — Medication 25 MILLIGRAM(S): at 12:03

## 2022-05-22 RX ADMIN — Medication 650 MILLIGRAM(S): at 08:06

## 2022-05-22 RX ADMIN — Medication 500 MICROGRAM(S): at 06:06

## 2022-05-22 RX ADMIN — MAGNESIUM OXIDE 400 MG ORAL TABLET 400 MILLIGRAM(S): 241.3 TABLET ORAL at 12:03

## 2022-05-22 RX ADMIN — Medication 650 MILLIGRAM(S): at 15:54

## 2022-05-22 RX ADMIN — PIPERACILLIN AND TAZOBACTAM 25 GRAM(S): 4; .5 INJECTION, POWDER, LYOPHILIZED, FOR SOLUTION INTRAVENOUS at 13:36

## 2022-05-22 RX ADMIN — ATORVASTATIN CALCIUM 80 MILLIGRAM(S): 80 TABLET, FILM COATED ORAL at 22:38

## 2022-05-22 RX ADMIN — PANTOPRAZOLE SODIUM 40 MILLIGRAM(S): 20 TABLET, DELAYED RELEASE ORAL at 06:06

## 2022-05-22 RX ADMIN — Medication 650 MILLIGRAM(S): at 16:30

## 2022-05-22 RX ADMIN — Medication 650 MILLIGRAM(S): at 08:36

## 2022-05-22 RX ADMIN — Medication 650 MILLIGRAM(S): at 23:10

## 2022-05-22 RX ADMIN — Medication 25 MILLIGRAM(S): at 00:17

## 2022-05-22 RX ADMIN — Medication 200 MICROGRAM(S): at 06:06

## 2022-05-22 RX ADMIN — Medication 500 MICROGRAM(S): at 17:08

## 2022-05-22 RX ADMIN — Medication 10 MILLIGRAM(S): at 12:03

## 2022-05-22 RX ADMIN — AMLODIPINE BESYLATE 2.5 MILLIGRAM(S): 2.5 TABLET ORAL at 22:39

## 2022-05-22 RX ADMIN — AZITHROMYCIN 255 MILLIGRAM(S): 500 TABLET, FILM COATED ORAL at 00:17

## 2022-05-22 RX ADMIN — Medication 650 MILLIGRAM(S): at 22:38

## 2022-05-22 RX ADMIN — Medication 500 MICROGRAM(S): at 12:04

## 2022-05-22 RX ADMIN — Medication 500 MICROGRAM(S): at 00:19

## 2022-05-22 RX ADMIN — SERTRALINE 25 MILLIGRAM(S): 25 TABLET, FILM COATED ORAL at 12:03

## 2022-05-22 RX ADMIN — PIPERACILLIN AND TAZOBACTAM 25 GRAM(S): 4; .5 INJECTION, POWDER, LYOPHILIZED, FOR SOLUTION INTRAVENOUS at 06:06

## 2022-05-22 RX ADMIN — Medication 4 MILLILITER(S): at 06:13

## 2022-05-22 RX ADMIN — Medication 1 GRAM(S): at 06:06

## 2022-05-22 NOTE — CONSULT NOTE ADULT - PROBLEM SELECTOR RECOMMENDATION 9
pt presented with pneumonia like symptoms: on antibiotics and workup being done by ID: he is also on pembroilizumab : he also has hMPV infection but have not been wheezing::  for now cont antibiotics: side effects of pembrolizumab is a possibility as it is known to give rise to ILD, pneumonitis, CVVD associated ILD, alveolar hemorrhage, acute eosinophilic pneumonia: periurethral GGO, nodular opacities , fungal infection and tb etc:   heis being worked up for those:  in addition he also has viral infection, which can explain his findings:  need to watch how his lung issues progress on current therapy:  agree to hold chemo:

## 2022-05-22 NOTE — PROGRESS NOTE ADULT - SUBJECTIVE AND OBJECTIVE BOX
Patient is a 81y old  Male who presents with a chief complaint of fever, sob (21 May 2022 17:34)    No new issues overnight. Stable on RA. Afebrile.     MEDICATIONS  (STANDING):  acetylcysteine 10%  Inhalation 4 milliLiter(s) Inhalation two times a day  allopurinol 100 milliGRAM(s) Oral daily  amLODIPine   Tablet 2.5 milliGRAM(s) Oral at bedtime  atorvastatin 80 milliGRAM(s) Oral at bedtime  azithromycin  IVPB 500 milliGRAM(s) IV Intermittent every 24 hours  azithromycin  IVPB      ipratropium    for Nebulization 500 MICROGram(s) Nebulizer every 6 hours  levothyroxine 200 MICROGram(s) Oral daily  magnesium oxide 400 milliGRAM(s) Oral daily  metoprolol tartrate 25 milliGRAM(s) Oral two times a day  oxybutynin XL 10 milliGRAM(s) Oral daily  pantoprazole    Tablet 40 milliGRAM(s) Oral before breakfast  piperacillin/tazobactam IVPB.. 3.375 Gram(s) IV Intermittent every 8 hours  sertraline 25 milliGRAM(s) Oral daily  sucralfate 1 Gram(s) Oral two times a day    MEDICATIONS  (PRN):  acetaminophen     Tablet .. 650 milliGRAM(s) Oral every 6 hours PRN Moderate Pain (4 - 6)      ROS  No fever, sweats, chills  No epistaxis, HA, sore throat  No CP, SOB, cough, sputum  No n/v/d, abd pain, melena, hematochezia  No edema  No rash  No anxiety  No back pain, joint pain  No bleeding, bruising  No dysuria, hematuria    Vital Signs Last 24 Hrs  T(C): 37.4 (22 May 2022 06:15), Max: 37.4 (22 May 2022 06:15)  T(F): 99.4 (22 May 2022 06:15), Max: 99.4 (22 May 2022 06:15)  HR: 80 (22 May 2022 06:15) (79 - 87)  BP: 154/74 (22 May 2022 06:15) (127/68 - 165/75)  BP(mean): --  RR: 18 (22 May 2022 06:15) (18 - 20)  SpO2: 94% (22 May 2022 06:15) (94% - 96%)    PE  NAD  Awake, alert  Anicteric, MMM  RRR  CTAB  Abd soft, NT, ND  No c/c/e  No rash grossly  FROM                          12.1   2.13  )-----------( 70       ( 20 May 2022 16:17 )             41.5       05-20    134<L>  |  101  |  18  ----------------------------<  112<H>  5.0   |  18<L>  |  1.49<H>    Ca    9.8      20 May 2022 16:17    TPro  7.3  /  Alb  3.7  /  TBili  0.3  /  DBili  x   /  AST  38  /  ALT  13  /  AlkPhos  130<H>  05-20       Patient is a 81y old  Male who presents with a chief complaint of fever, sob (21 May 2022 17:34)    No new issues overnight. Stable on RA. Afebrile.     MEDICATIONS  (STANDING):  acetylcysteine 10%  Inhalation 4 milliLiter(s) Inhalation two times a day  allopurinol 100 milliGRAM(s) Oral daily  amLODIPine   Tablet 2.5 milliGRAM(s) Oral at bedtime  atorvastatin 80 milliGRAM(s) Oral at bedtime  azithromycin  IVPB 500 milliGRAM(s) IV Intermittent every 24 hours  azithromycin  IVPB      ipratropium    for Nebulization 500 MICROGram(s) Nebulizer every 6 hours  levothyroxine 200 MICROGram(s) Oral daily  magnesium oxide 400 milliGRAM(s) Oral daily  metoprolol tartrate 25 milliGRAM(s) Oral two times a day  oxybutynin XL 10 milliGRAM(s) Oral daily  pantoprazole    Tablet 40 milliGRAM(s) Oral before breakfast  piperacillin/tazobactam IVPB.. 3.375 Gram(s) IV Intermittent every 8 hours  sertraline 25 milliGRAM(s) Oral daily  sucralfate 1 Gram(s) Oral two times a day    MEDICATIONS  (PRN):  acetaminophen     Tablet .. 650 milliGRAM(s) Oral every 6 hours PRN Moderate Pain (4 - 6)      ROS  No fever, sweats, chills  No epistaxis, HA, sore throat  No CP, improved SOB,+ cough, no sputum  No n/v/d, abd pain, melena, hematochezia  No edema  No rash  No anxiety  No back pain, joint pain  No bleeding, bruising  No dysuria, hematuria    Vital Signs Last 24 Hrs  T(C): 37.4 (22 May 2022 06:15), Max: 37.4 (22 May 2022 06:15)  T(F): 99.4 (22 May 2022 06:15), Max: 99.4 (22 May 2022 06:15)  HR: 80 (22 May 2022 06:15) (79 - 87)  BP: 154/74 (22 May 2022 06:15) (127/68 - 165/75)  BP(mean): --  RR: 18 (22 May 2022 06:15) (18 - 20)  SpO2: 94% (22 May 2022 06:15) (94% - 96%)    PE  NAD  Awake, alert  Anicteric, MMM  RRR  mild crackles, no rhonchi   Abd soft, NT, ND  No c/c/e  No rash grossly  FROM                          12.1   2.13  )-----------( 70       ( 20 May 2022 16:17 )             41.5       05-20    134<L>  |  101  |  18  ----------------------------<  112<H>  5.0   |  18<L>  |  1.49<H>    Ca    9.8      20 May 2022 16:17    TPro  7.3  /  Alb  3.7  /  TBili  0.3  /  DBili  x   /  AST  38  /  ALT  13  /  AlkPhos  130<H>  05-20

## 2022-05-22 NOTE — CONSULT NOTE ADULT - SUBJECTIVE AND OBJECTIVE BOX
22 @ 11:08    Patient is a 81y old  Male who presents with a chief complaint of fever, sob (22 May 2022 08:20)      HPI:  81M c hx Follicular lymphoma () in remission, metastatic right lung nodule of unknown primary (dx ) on (Pembrolizumab starting 3/28/2022, q8wk, last dose 2022), hyperparathyroid, hypothyroid, HTN, nonobstructive CAD (told he has "40% blockage"), CKD stage 3, ?thrombocytopenia, ?SBO, chronic b/l R>L LE edema, former smoker, pw 3 days productive cough, and 1 day fever and sob.    Pt reports being in usual state of health until 3 days ago, when he started to have a productive cough and sore throat. Yesterday started to have SOB and fevers. Pt's daughter and grand child have tested positive for covid. Pt is boosted with pfizer. Pt denies chest pain, diarrhea, pain anywhere. Pt denies any relieving or exacerbating factors.    (20 May 2022 22:40)    pt has extensive history of smoking too: but deneis having copd:  he has not been wheezing: he has metastatic melanoma   on rx:         ?FOLLOWING PRESENT  x[ ] Hx of PE/DVT, [ x] Hx COPD, [ x] Hx of Asthma, [ y] Hx of Hospitalization, [ x]x  Hx of BiPAP/CPAP use, [y ] Hx of CRISTÓBAL    Allergies    Cipro (Rash)  niacin (Flushing; Rash)  Reglan (Anaphylaxis)    Intolerances        PAST MEDICAL & SURGICAL HISTORY:  Duodenal ulcer disease      SBO (small bowel obstruction)      BRIGID (acute kidney injury)      Hypertension      Hyperparathyroidism      S/P exploratory laparotomy          FAMILY HISTORY:  Family history of non-Hodgkin&#x27;s lymphoma    Family history of Hashimoto thyroiditis (Child)        Social History: [> 50 years  ] TOBACCO                  [ x ] ETOH                                 [x  ] IVDA/DRUGS    REVIEW OF SYSTEMS      General:	fever    Skin/Breast:x  	  Ophthalmologic:x  	  ENMT:	x    Respiratory and Thorax: cough, phlegm , sob;   	  Cardiovascular:	x    Gastrointestinal:	x    Genitourinary:	x    Musculoskeletal:	x    Neurological:	x  x  Psychiatric:	    Hematology/Lymphatics:	x    Endocrine:	x    Allergic/Immunologic:	x    MEDICATIONS  (STANDING):  acetylcysteine 10%  Inhalation 4 milliLiter(s) Inhalation two times a day  allopurinol 100 milliGRAM(s) Oral daily  amLODIPine   Tablet 2.5 milliGRAM(s) Oral at bedtime  atorvastatin 80 milliGRAM(s) Oral at bedtime  azithromycin  IVPB 500 milliGRAM(s) IV Intermittent every 24 hours  azithromycin  IVPB      ipratropium    for Nebulization 500 MICROGram(s) Nebulizer every 6 hours  levothyroxine 200 MICROGram(s) Oral daily  magnesium oxide 400 milliGRAM(s) Oral daily  metoprolol tartrate 25 milliGRAM(s) Oral two times a day  oxybutynin XL 10 milliGRAM(s) Oral daily  pantoprazole    Tablet 40 milliGRAM(s) Oral before breakfast  piperacillin/tazobactam IVPB.. 3.375 Gram(s) IV Intermittent every 8 hours  sertraline 25 milliGRAM(s) Oral daily  sucralfate 1 Gram(s) Oral two times a day    MEDICATIONS  (PRN):  acetaminophen     Tablet .. 650 milliGRAM(s) Oral every 6 hours PRN Moderate Pain (4 - 6)       Vital Signs Last 24 Hrs  T(C): 37.4 (22 May 2022 06:15), Max: 37.4 (22 May 2022 06:15)  T(F): 99.4 (22 May 2022 06:15), Max: 99.4 (22 May 2022 06:15)  HR: 80 (22 May 2022 06:15) (79 - 87)  BP: 154/74 (22 May 2022 06:15) (127/68 - 165/75)  BP(mean): --  RR: 18 (22 May 2022 06:15) (18 - 20)  SpO2: 94% (22 May 2022 06:15) (94% - 96%)Orthostatic VS          I&O's Summary    21 May 2022 07:01  -  22 May 2022 07:00  --------------------------------------------------------  IN: 1390 mL / OUT: 1300 mL / NET: 90 mL    22 May 2022 07:01  -  22 May 2022 11:08  --------------------------------------------------------  IN: 240 mL / OUT: 0 mL / NET: 240 mL        Physical Exam:   GENERAL: NAD, well-groomed, well-developed  HEENT: LYDIA/   Atraumatic, Normocephalic  ENMT: No tonsillar erythema, exudates, or enlargement; Moist mucous membranes, Good dentition, No lesions  NECK: Supple, No JVD, Normal thyroid  CHEST/LUNG: Clear to auscultation bilaterally- no wheezing  CVS: Regular rate and rhythm; No murmurs, rubs, or gallops  GI: : Soft, Nontender, Nondistended; Bowel sounds present  NERVOUS SYSTEM:  Alert & Oriented X3  EXTREMITIES:  - edema  LYMPH: No lymphadenopathy noted  SKIN: No rashes or lesions  ENDOCRINOLOGY: No Thyromegaly  PSYCH: Appropriate    Labs:  -3.2<39<4>>47<<7.365>>-3.2<<3><<4><<5<<479>>SARS-CoV-2: Invalid (21 May 2022 13:38)  SARS-CoV-2: NotDetec (21 May 2022 11:23)  SARS-CoV-2: NotDetec (20 May 2022 16:17)                              12.1   2.13  )-----------( 70       ( 20 May 2022 16:17 )             41.5     05-20    134<L>  |  101  |  18  ----------------------------<  112<H>  5.0   |  18<L>  |  1.49<H>    Ca    9.8      20 May 2022 16:17    TPro  7.3  /  Alb  3.7  /  TBili  0.3  /  DBili  x   /  AST  38  /  ALT  13  /  AlkPhos  130<H>  05-20    CAPILLARY BLOOD GLUCOSE        LIVER FUNCTIONS - ( 20 May 2022 16:17 )  Alb: 3.7 g/dL / Pro: 7.3 g/dL / ALK PHOS: 130 U/L / ALT: 13 U/L / AST: 38 U/L / GGT: x           PT/INR - ( 20 May 2022 16:17 )   PT: 13.6 sec;   INR: 1.18 ratio         PTT - ( 20 May 2022 16:17 )  PTT:30.0 sec  Urinalysis Basic - ( 20 May 2022 17:55 )    Color: Light Yellow / Appearance: Clear / S.019 / pH: x  Gluc: x / Ketone: Small  / Bili: Negative / Urobili: Negative   Blood: x / Protein: 300 mg/dL / Nitrite: Negative   Leuk Esterase: Negative / RBC: 3 /hpf / WBC 1 /HPF   Sq Epi: x / Non Sq Epi: 1 /hpf / Bacteria: Negative      Culture - Urine (collected 20 May 2022 17:55)  Source: Clean Catch Clean Catch (Midstream)  Final Report (21 May 2022 14:58):    <10,000 CFU/mL Normal Urogenital [place]    Culture - Blood (collected 20 May 2022 15:35)  Source: .Blood Blood-Peripheral  Preliminary Report (21 May 2022 23:01):    No growth to date.    Culture - Blood (collected 20 May 2022 15:30)  Source: .Blood Blood-Peripheral  Preliminary Report (21 May 2022 23:01):    No growth to date.      D DImer      Studies  Chest X-RAY  CT SCAN Chest   CT Abdomen  Venous Dopplers: LE:   Others    rad< from: CT Angio Chest PE Protocol w/ IV Cont (22 @ 20:54) >  BONES/SOFT TISSUES: Degenerative changes of the spine.    IMPRESSION:    No main, lobar or segmental pulmonary embolism. Limited evaluation of the   subsegmental pulmonary arteries.    Patchy consolidative and groundglass opacities in the bilateral lower   lobes with additional groundglass, and nodular opacities seen in the left   upper lobe. Findings suspicious for multifocal pneumonia, although   underlying tumor, especially in the right lower lobe, cannot be excluded.    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 Comparison with prior studies to be   useful.    Mediastinal and right hilar adenopathy.    Splenomegaly.    --- End of Report ---      < end of copied text >

## 2022-05-22 NOTE — PROGRESS NOTE ADULT - ASSESSMENT
Mr. 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 He was referred to the [facility] ED by his hematologist for evaluation and management. Pt's daughter and grand child have tested positive for covid. Pt is boosted with pfizer. Pt denies chest pain, diarrhea, pain anywhere. Pt denies any relieving or exacerbating factors. Here pt tested positive for metapneumovirus. ID consulted for the same.    WORKUP  UA : Negative  RVP: Metapneumovirus (5/20)   VA Duplex Lower Ext Vein Scan, Right (05.20): No evidence of right lower extremity deep venous thrombosis. Probable right Baker's cyst.  measures 4.9 x 2.4 x 0.8 cm  Xray Chest (05.20): Rounded opacity in the right lower lung fields, indeterminate. CT chest can be performed for further evaluation.   CT Angio Chest PE Protocol w/ IV Cont (05.20.): Prelim Report: NO PE.  Diffuse patchy bilateral groundglass and consolidative opacities and tree  in bud nodules, may be of infectious etiology. Mediastinal lymphadenopathy. Bibasilar subsegmental atelectasis.  Pt's daughter and grand child have tested positive for covid.  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 Mild leukopenia  s/p cefepime in ER  Currently on azithromycin and Zosyn    - Given GGO in prelim report of CTA chest: There is concern for immune mediated pneumonitis. Recommend Pulm eval  - Possibility of COVID still remains given exposure, GGO and symptoms of cough, fever and sore throat. -> Will repeat RVP- sent a negative for organisms other than metapneumovirus  human metapneumovirus- no proven effective therapy other than supportive care  - Ordered MRSA PCR, Ur. Legionella and Quant gold  - c/w azithro and zosyn for now  - Blood and urine cx in lab. NGTD  - if urine legionella ag is negative then would stop the Azithromycin    Hugh Hernandez MD  Can be called via Teams  After 5pm/weekends [phone]

## 2022-05-22 NOTE — PROGRESS NOTE ADULT - SUBJECTIVE AND OBJECTIVE BOX
INFECTIOUS DISEASES FOLLOW UP-- Eva Hernandez  [phone]    This is a follow up note for this  81yMale with  Shortness of breath        ROS:  CONSTITUTIONAL:  No fever, good appetite  CARDIOVASCULAR:  No chest pain or palpitations  RESPIRATORY:  No dyspnea  GASTROINTESTINAL:  No nausea, vomiting, diarrhea, or abdominal pain  GENITOURINARY:  No dysuria  NEUROLOGIC:  No headache,     Allergies    Cipro (Rash)  niacin (Flushing; Rash)  Reglan (Anaphylaxis)    Intolerances        ANTIBIOTICS/RELEVANT:  antimicrobials  azithromycin  IVPB 500 milliGRAM(s) IV Intermittent every 24 hours  azithromycin  IVPB      piperacillin/tazobactam IVPB.. 3.375 Gram(s) IV Intermittent every 8 hours    immunologic:    OTHER:  acetaminophen     Tablet .. 650 milliGRAM(s) Oral every 6 hours PRN  acetylcysteine 10%  Inhalation 4 milliLiter(s) Inhalation two times a day  allopurinol 100 milliGRAM(s) Oral daily  amLODIPine   Tablet 2.5 milliGRAM(s) Oral at bedtime  atorvastatin 80 milliGRAM(s) Oral at bedtime  ipratropium    for Nebulization 500 MICROGram(s) Nebulizer every 6 hours  levothyroxine 200 MICROGram(s) Oral daily  magnesium oxide 400 milliGRAM(s) Oral daily  metoprolol tartrate 25 milliGRAM(s) Oral two times a day  oxybutynin XL 10 milliGRAM(s) Oral daily  pantoprazole    Tablet 40 milliGRAM(s) Oral before breakfast  sertraline 25 milliGRAM(s) Oral daily  sucralfate 1 Gram(s) Oral two times a day      Objective:  Vital Signs Last 24 Hrs  T(C): 36.8 (22 May 2022 11:09), Max: 37.4 (22 May 2022 06:15)  T(F): 98.2 (22 May 2022 11:09), Max: 99.4 (22 May 2022 06:15)  HR: 79 (22 May 2022 11:09) (79 - 87)  BP: 126/73 (22 May 2022 11:09) (126/73 - 165/75)  BP(mean): --  RR: 18 (22 May 2022 11:09) (18 - 20)  SpO2: 93% (22 May 2022 11:09) (93% - 96%)    PHYSICAL EXAM:  Constitutional:no acute distress  Eyes:LYDIA, EOMI  Ear/Nose/Throat: no oral lesions, 	  Respiratory: clear BL  Cardiovascular: S1S2  Gastrointestinal:soft, (+) BS, no tenderness  Extremities:no e/e/c  No Lymphadenopathy  IV sites not inflammed.    LABS:                        12.1   2.13  )-----------( 70       ( 20 May 2022 16:17 )             41.5         134<L>  |  101  |  18  ----------------------------<  112<H>  5.0   |  18<L>  |  1.49<H>    Ca    9.8      20 May 2022 16:17    TPro  7.3  /  Alb  3.7  /  TBili  0.3  /  DBili  x   /  AST  38  /  ALT  13  /  AlkPhos  130<H>      PT/INR - ( 20 May 2022 16:17 )   PT: 13.6 sec;   INR: 1.18 ratio         PTT - ( 20 May 2022 16:17 )  PTT:30.0 sec  Urinalysis Basic - ( 20 May 2022 17:55 )    Color: Light Yellow / Appearance: Clear / S.019 / pH: x  Gluc: x / Ketone: Small  / Bili: Negative / Urobili: Negative   Blood: x / Protein: 300 mg/dL / Nitrite: Negative   Leuk Esterase: Negative / RBC: 3 /hpf / WBC 1 /HPF   Sq Epi: x / Non Sq Epi: 1 /hpf / Bacteria: Negative        MICROBIOLOGY:            RECENT CULTURES:   @ 17:55  Clean Catch Clean Catch (Midstream)  --  --  --    <10,000 CFU/mL Normal Urogenital Asia  --   @ 15:35  .Blood Blood-Peripheral  --  --  --    No growth to date.  --   @ 15:30  .Blood Blood-Peripheral  --  --  --    No growth to date.  --      RADIOLOGY & ADDITIONAL STUDIES:    < from: CT Angio Chest PE Protocol w/ IV Cont (22 @ 20:54) >    IMPRESSION:    No main, lobar or segmental pulmonary embolism. Limited evaluation of the   subsegmental pulmonary arteries.    Patchy consolidative and groundglass opacities in the bilateral lower   lobes with additional groundglass, and nodular opacities seen in the left   upper lobe. Findings suspicious for multifocal pneumonia, although   underlying tumor, especially in the right lower lobe, cannot be excluded.    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 Comparison with prior studies to be   useful.    Mediastinal and right hilar adenopathy.    Splenomegaly.    < end of copied text >

## 2022-05-22 NOTE — PROGRESS NOTE ADULT - SUBJECTIVE AND OBJECTIVE BOX
Patient is a 81y old  Male who presents with a chief complaint of fever, sob (22 May 2022 11:07)    Date of servie : 22 @ 11:32  INTERVAL HPI/OVERNIGHT EVENTS:  T(C): 36.8 (22 @ 11:09), Max: 37.4 (22 @ 06:15)  HR: 79 (22 @ 11:09) (79 - 87)  BP: 126/73 (22 @ 11:09) (126/73 - 165/75)  RR: 18 (22 @ 11:09) (18 - 20)  SpO2: 93% (22 @ 11:09) (93% - 96%)  Wt(kg): --  I&O's Summary    21 May 2022 07:01  -  22 May 2022 07:00  --------------------------------------------------------  IN: 1390 mL / OUT: 1300 mL / NET: 90 mL    22 May 2022 07:01  -  22 May 2022 11:32  --------------------------------------------------------  IN: 240 mL / OUT: 0 mL / NET: 240 mL        LABS:                        12.1   2.13  )-----------( 70       ( 20 May 2022 16:17 )             41.5     05-20    134<L>  |  101  |  18  ----------------------------<  112<H>  5.0   |  18<L>  |  1.49<H>    Ca    9.8      20 May 2022 16:17    TPro  7.3  /  Alb  3.7  /  TBili  0.3  /  DBili  x   /  AST  38  /  ALT  13  /  AlkPhos  130<H>  05-20    PT/INR - ( 20 May 2022 16:17 )   PT: 13.6 sec;   INR: 1.18 ratio         PTT - ( 20 May 2022 16:17 )  PTT:30.0 sec  Urinalysis Basic - ( 20 May 2022 17:55 )    Color: Light Yellow / Appearance: Clear / S.019 / pH: x  Gluc: x / Ketone: Small  / Bili: Negative / Urobili: Negative   Blood: x / Protein: 300 mg/dL / Nitrite: Negative   Leuk Esterase: Negative / RBC: 3 /hpf / WBC 1 /HPF   Sq Epi: x / Non Sq Epi: 1 /hpf / Bacteria: Negative      CAPILLARY BLOOD GLUCOSE            Urinalysis Basic - ( 20 May 2022 17:55 )    Color: Light Yellow / Appearance: Clear / S.019 / pH: x  Gluc: x / Ketone: Small  / Bili: Negative / Urobili: Negative   Blood: x / Protein: 300 mg/dL / Nitrite: Negative   Leuk Esterase: Negative / RBC: 3 /hpf / WBC 1 /HPF   Sq Epi: x / Non Sq Epi: 1 /hpf / Bacteria: Negative        MEDICATIONS  (STANDING):  acetylcysteine 10%  Inhalation 4 milliLiter(s) Inhalation two times a day  allopurinol 100 milliGRAM(s) Oral daily  amLODIPine   Tablet 2.5 milliGRAM(s) Oral at bedtime  atorvastatin 80 milliGRAM(s) Oral at bedtime  azithromycin  IVPB 500 milliGRAM(s) IV Intermittent every 24 hours  azithromycin  IVPB      ipratropium    for Nebulization 500 MICROGram(s) Nebulizer every 6 hours  levothyroxine 200 MICROGram(s) Oral daily  magnesium oxide 400 milliGRAM(s) Oral daily  metoprolol tartrate 25 milliGRAM(s) Oral two times a day  oxybutynin XL 10 milliGRAM(s) Oral daily  pantoprazole    Tablet 40 milliGRAM(s) Oral before breakfast  piperacillin/tazobactam IVPB.. 3.375 Gram(s) IV Intermittent every 8 hours  sertraline 25 milliGRAM(s) Oral daily  sucralfate 1 Gram(s) Oral two times a day    MEDICATIONS  (PRN):  acetaminophen     Tablet .. 650 milliGRAM(s) Oral every 6 hours PRN Moderate Pain (4 - 6)          PHYSICAL EXAM:  GENERAL: NAD, well-groomed, well-developed  HEAD:  Atraumatic, Normocephalic  CHEST/LUNG: Clear to percussion bilaterally; No rales, rhonchi, wheezing, or rubs  HEART: Regular rate and rhythm; No murmurs, rubs, or gallops  ABDOMEN: Soft, Nontender, Nondistended; Bowel sounds present  EXTREMITIES:  2+ Peripheral Pulses, No clubbing, cyanosis, or edema  LYMPH: No lymphadenopathy noted  SKIN: No rashes or lesions    Care Discussed with Consultants/Other Providers [ ] YES  [ ] NO

## 2022-05-22 NOTE — PROGRESS NOTE ADULT - ASSESSMENT
Patient with metastatic melanoma (with unknown primary - metastatic right lung nodule - M1B disease) under care by Dr. Nnamdi Farnsworth of [facility]. 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 He started Pembrolizumab in 3/28/2022. Most recent treatment was 5/9/2022 (on q8 week dosing). Plan was to repeat imaging in 8 weeks around time of next immunotherapy treatment.    Patient called the office today complaining of new onset of fever, headache, cough, SOB and hypoxia. He was referred to the [facility] ED for evaluation and management    Metastatic Melanoma  --Under care by Dr. Nnamdi Farnsworth of [facility]  --Currently receiving Pembrolizumab q8 weeks - LD 5/9/2022  --Ongoing follow up after discharge    Fever, SOB, Cough, and fever 101.7   - RVP + for HMPV  - CTA with concern for multifocal PNA currently on Abx.   - Patient clinically doing better this morning. Now off O2. Also afebrile. Breathing improved.   - Given symptomatic improvement with medical management and no steroids- symptoms are likely infection related and less likely to be immunotherapy related Pneumonitis.   - Would continue to hold off on steroids at this time and continue medial management and antibiotics as he is doing   -  ID following. Appreciate input     We will continue to follow along with you and would be happy to assist with any oncology questions on behalf of [facility]. Please call if questions     Katrina Cross D.O  Hematology Oncology   [facility]  [phone] ( Office)   Evenings and weekends please call MD on call or office

## 2022-05-23 LAB
ALBUMIN SERPL ELPH-MCNC: 3.5 G/DL — SIGNIFICANT CHANGE UP (ref 3.3–5)
ALP SERPL-CCNC: 109 U/L — SIGNIFICANT CHANGE UP (ref 40–120)
ALT FLD-CCNC: 11 U/L — SIGNIFICANT CHANGE UP (ref 10–45)
ANION GAP SERPL CALC-SCNC: 14 MMOL/L — SIGNIFICANT CHANGE UP (ref 5–17)
ANION GAP SERPL CALC-SCNC: 15 MMOL/L — SIGNIFICANT CHANGE UP (ref 5–17)
AST SERPL-CCNC: 24 U/L — SIGNIFICANT CHANGE UP (ref 10–40)
BILIRUB SERPL-MCNC: 0.3 MG/DL — SIGNIFICANT CHANGE UP (ref 0.2–1.2)
BUN SERPL-MCNC: 13 MG/DL — SIGNIFICANT CHANGE UP (ref 7–23)
BUN SERPL-MCNC: 14 MG/DL — SIGNIFICANT CHANGE UP (ref 7–23)
CALCIUM SERPL-MCNC: 9.8 MG/DL — SIGNIFICANT CHANGE UP (ref 8.4–10.5)
CALCIUM SERPL-MCNC: 9.9 MG/DL — SIGNIFICANT CHANGE UP (ref 8.4–10.5)
CHLORIDE SERPL-SCNC: 100 MMOL/L — SIGNIFICANT CHANGE UP (ref 96–108)
CHLORIDE SERPL-SCNC: 99 MMOL/L — SIGNIFICANT CHANGE UP (ref 96–108)
CO2 SERPL-SCNC: 20 MMOL/L — LOW (ref 22–31)
CO2 SERPL-SCNC: 21 MMOL/L — LOW (ref 22–31)
CREAT SERPL-MCNC: 1.76 MG/DL — HIGH (ref 0.5–1.3)
CREAT SERPL-MCNC: 1.76 MG/DL — HIGH (ref 0.5–1.3)
EGFR: 39 ML/MIN/1.73M2 — LOW
EGFR: 39 ML/MIN/1.73M2 — LOW
FERRITIN SERPL-MCNC: 141 NG/ML — SIGNIFICANT CHANGE UP (ref 30–400)
FOLATE SERPL-MCNC: 11.6 NG/ML — SIGNIFICANT CHANGE UP
GAMMA INTERFERON BACKGROUND BLD IA-ACNC: 0.48 IU/ML — SIGNIFICANT CHANGE UP
GLUCOSE SERPL-MCNC: 148 MG/DL — HIGH (ref 70–99)
GLUCOSE SERPL-MCNC: 150 MG/DL — HIGH (ref 70–99)
HCT VFR BLD CALC: 41.7 % — SIGNIFICANT CHANGE UP (ref 39–50)
HCT VFR BLD CALC: 41.8 % — SIGNIFICANT CHANGE UP (ref 39–50)
HGB BLD-MCNC: 12.2 G/DL — LOW (ref 13–17)
HGB BLD-MCNC: 12.3 G/DL — LOW (ref 13–17)
IRON SATN MFR SERPL: 26 UG/DL — LOW (ref 45–165)
IRON SATN MFR SERPL: 8 % — LOW (ref 16–55)
LDH SERPL L TO P-CCNC: 295 U/L — HIGH (ref 50–242)
LEGIONELLA AG UR QL: NEGATIVE — SIGNIFICANT CHANGE UP
M TB IFN-G BLD-IMP: NEGATIVE — SIGNIFICANT CHANGE UP
M TB IFN-G CD4+ BCKGRND COR BLD-ACNC: -0.03 IU/ML — SIGNIFICANT CHANGE UP
M TB IFN-G CD4+CD8+ BCKGRND COR BLD-ACNC: -0.06 IU/ML — SIGNIFICANT CHANGE UP
MCHC RBC-ENTMCNC: 22.3 PG — LOW (ref 27–34)
MCHC RBC-ENTMCNC: 22.5 PG — LOW (ref 27–34)
MCHC RBC-ENTMCNC: 29.2 GM/DL — LOW (ref 32–36)
MCHC RBC-ENTMCNC: 29.5 GM/DL — LOW (ref 32–36)
MCV RBC AUTO: 76.2 FL — LOW (ref 80–100)
MCV RBC AUTO: 76.3 FL — LOW (ref 80–100)
NRBC # BLD: 0 /100 WBCS — SIGNIFICANT CHANGE UP (ref 0–0)
NRBC # BLD: 0 /100 WBCS — SIGNIFICANT CHANGE UP (ref 0–0)
PLATELET # BLD AUTO: 103 K/UL — LOW (ref 150–400)
PLATELET # BLD AUTO: 107 K/UL — LOW (ref 150–400)
POTASSIUM SERPL-MCNC: 3.7 MMOL/L — SIGNIFICANT CHANGE UP (ref 3.5–5.3)
POTASSIUM SERPL-MCNC: 3.9 MMOL/L — SIGNIFICANT CHANGE UP (ref 3.5–5.3)
POTASSIUM SERPL-SCNC: 3.7 MMOL/L — SIGNIFICANT CHANGE UP (ref 3.5–5.3)
POTASSIUM SERPL-SCNC: 3.9 MMOL/L — SIGNIFICANT CHANGE UP (ref 3.5–5.3)
PROT SERPL-MCNC: 7 G/DL — SIGNIFICANT CHANGE UP (ref 6–8.3)
QUANT TB PLUS MITOGEN MINUS NIL: 9.52 IU/ML — SIGNIFICANT CHANGE UP
RBC # BLD: 5.47 M/UL — SIGNIFICANT CHANGE UP (ref 4.2–5.8)
RBC # BLD: 5.48 M/UL — SIGNIFICANT CHANGE UP (ref 4.2–5.8)
RBC # BLD: 5.48 M/UL — SIGNIFICANT CHANGE UP (ref 4.2–5.8)
RBC # FLD: 18 % — HIGH (ref 10.3–14.5)
RBC # FLD: 18.6 % — HIGH (ref 10.3–14.5)
RETICS #: 37.8 K/UL — SIGNIFICANT CHANGE UP (ref 25–125)
RETICS/RBC NFR: 0.7 % — SIGNIFICANT CHANGE UP (ref 0.5–2.5)
SODIUM SERPL-SCNC: 134 MMOL/L — LOW (ref 135–145)
SODIUM SERPL-SCNC: 135 MMOL/L — SIGNIFICANT CHANGE UP (ref 135–145)
TIBC SERPL-MCNC: 324 UG/DL — SIGNIFICANT CHANGE UP (ref 220–430)
TRANSFERRIN SERPL-MCNC: 253 MG/DL — SIGNIFICANT CHANGE UP (ref 200–360)
TSH SERPL-MCNC: 0.72 UIU/ML — SIGNIFICANT CHANGE UP (ref 0.27–4.2)
UIBC SERPL-MCNC: 298 UG/DL — SIGNIFICANT CHANGE UP (ref 110–370)
VIT B12 SERPL-MCNC: 613 PG/ML — SIGNIFICANT CHANGE UP (ref 232–1245)
WBC # BLD: 1.57 K/UL — LOW (ref 3.8–10.5)
WBC # BLD: 1.62 K/UL — LOW (ref 3.8–10.5)
WBC # FLD AUTO: 1.57 K/UL — LOW (ref 3.8–10.5)
WBC # FLD AUTO: 1.62 K/UL — LOW (ref 3.8–10.5)

## 2022-05-23 PROCEDURE — 93970 EXTREMITY STUDY: CPT | Mod: 26

## 2022-05-23 PROCEDURE — 99232 SBSQ HOSP IP/OBS MODERATE 35: CPT

## 2022-05-23 RX ADMIN — Medication 100 MILLIGRAM(S): at 13:37

## 2022-05-23 RX ADMIN — Medication 500 MICROGRAM(S): at 18:30

## 2022-05-23 RX ADMIN — Medication 650 MILLIGRAM(S): at 13:28

## 2022-05-23 RX ADMIN — Medication 10 MILLIGRAM(S): at 18:28

## 2022-05-23 RX ADMIN — Medication 200 MICROGRAM(S): at 05:02

## 2022-05-23 RX ADMIN — PIPERACILLIN AND TAZOBACTAM 25 GRAM(S): 4; .5 INJECTION, POWDER, LYOPHILIZED, FOR SOLUTION INTRAVENOUS at 21:21

## 2022-05-23 RX ADMIN — Medication 500 MICROGRAM(S): at 13:27

## 2022-05-23 RX ADMIN — ATORVASTATIN CALCIUM 80 MILLIGRAM(S): 80 TABLET, FILM COATED ORAL at 21:21

## 2022-05-23 RX ADMIN — Medication 4 MILLILITER(S): at 05:39

## 2022-05-23 RX ADMIN — Medication 25 MILLIGRAM(S): at 13:28

## 2022-05-23 RX ADMIN — SERTRALINE 25 MILLIGRAM(S): 25 TABLET, FILM COATED ORAL at 13:37

## 2022-05-23 RX ADMIN — Medication 650 MILLIGRAM(S): at 15:24

## 2022-05-23 RX ADMIN — Medication 4 MILLILITER(S): at 18:30

## 2022-05-23 RX ADMIN — Medication 650 MILLIGRAM(S): at 21:23

## 2022-05-23 RX ADMIN — PANTOPRAZOLE SODIUM 40 MILLIGRAM(S): 20 TABLET, DELAYED RELEASE ORAL at 05:05

## 2022-05-23 RX ADMIN — Medication 650 MILLIGRAM(S): at 05:01

## 2022-05-23 RX ADMIN — AZITHROMYCIN 255 MILLIGRAM(S): 500 TABLET, FILM COATED ORAL at 00:21

## 2022-05-23 RX ADMIN — Medication 1 GRAM(S): at 18:28

## 2022-05-23 RX ADMIN — PIPERACILLIN AND TAZOBACTAM 25 GRAM(S): 4; .5 INJECTION, POWDER, LYOPHILIZED, FOR SOLUTION INTRAVENOUS at 13:35

## 2022-05-23 RX ADMIN — AMLODIPINE BESYLATE 2.5 MILLIGRAM(S): 2.5 TABLET ORAL at 21:21

## 2022-05-23 RX ADMIN — Medication 500 MICROGRAM(S): at 05:01

## 2022-05-23 RX ADMIN — MAGNESIUM OXIDE 400 MG ORAL TABLET 400 MILLIGRAM(S): 241.3 TABLET ORAL at 13:37

## 2022-05-23 RX ADMIN — Medication 1 GRAM(S): at 05:01

## 2022-05-23 RX ADMIN — PIPERACILLIN AND TAZOBACTAM 25 GRAM(S): 4; .5 INJECTION, POWDER, LYOPHILIZED, FOR SOLUTION INTRAVENOUS at 05:02

## 2022-05-23 RX ADMIN — Medication 25 MILLIGRAM(S): at 00:21

## 2022-05-23 RX ADMIN — Medication 650 MILLIGRAM(S): at 05:35

## 2022-05-23 NOTE — PROGRESS NOTE ADULT - ASSESSMENT
Patient with metastatic melanoma (with unknown primary - metastatic right lung nodule - M1B disease) under care by Dr. Nnamdi Farnsworth of [facility]. 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 He started Pembrolizumab in 3/28/2022. Most recent treatment was 5/9/2022 (on q8 week dosing). Plan was to repeat imaging in 8 weeks around time of next immunotherapy treatment.    Patient called the office today complaining of new onset of fever, headache, cough, SOB and hypoxia. He was referred to the [facility] ED for evaluation and management    Metastatic Melanoma  --Under care by Dr. Nnamdi Farnsworth of [facility]  --Currently receiving Pembrolizumab q8 weeks - LD 5/9/2022  --Ongoing follow up after discharge    Fever, SOB, Cough, and fever 101.7   - RVP + for HMPV  - CTA with concern for multifocal PNA currently on Abx.   - Patient clinically doing better. Afebrile. Breathing improved and saturation well on room air.  - Given symptomatic improvement with medical management and no steroids- symptoms are likely infection related and less likely to be immunotherapy related Pneumonitis.   - Would continue to hold off on steroids at this time and continue medial management and antibiotics as he is doing   -  ID following. Appreciate input     We will continue to follow along with you and would be happy to assist with any oncology questions on behalf of [facility].     Fantasma Garcia PA-C  Hematology/Oncology  [facility]   [phone] (office)  [phone] (alt office)  Evenings and weekends please call MD on call or office

## 2022-05-23 NOTE — PROGRESS NOTE ADULT - SUBJECTIVE AND OBJECTIVE BOX
Patient is a 81y old  Male who presents with a chief complaint of fever, sob (23 May 2022 12:04)    Date of servie : 05-23-22 @ 16:24  INTERVAL HPI/OVERNIGHT EVENTS:  T(C): 36.3 (05-23-22 @ 12:41), Max: 36.9 (05-22-22 @ 20:24)  HR: 99 (05-23-22 @ 12:41) (71 - 99)  BP: 156/84 (05-23-22 @ 12:41) (132/97 - 158/85)  RR: 18 (05-23-22 @ 12:41) (18 - 18)  SpO2: 95% (05-23-22 @ 12:41) (92% - 98%)  Wt(kg): --  I&O's Summary    22 May 2022 07:01  -  23 May 2022 07:00  --------------------------------------------------------  IN: 1270 mL / OUT: 0 mL / NET: 1270 mL    23 May 2022 07:01  -  23 May 2022 16:24  --------------------------------------------------------  IN: 480 mL / OUT: 0 mL / NET: 480 mL        LABS:                        12.3   1.62  )-----------( 107      ( 23 May 2022 11:08 )             41.7     05-23    134<L>  |  99  |  13  ----------------------------<  150<H>  3.7   |  21<L>  |  1.76<H>    Ca    9.8      23 May 2022 11:08    TPro  7.0  /  Alb  3.5  /  TBili  0.3  /  DBili  x   /  AST  24  /  ALT  11  /  AlkPhos  109  05-23        CAPILLARY BLOOD GLUCOSE                MEDICATIONS  (STANDING):  acetylcysteine 10%  Inhalation 4 milliLiter(s) Inhalation two times a day  allopurinol 100 milliGRAM(s) Oral daily  amLODIPine   Tablet 2.5 milliGRAM(s) Oral at bedtime  atorvastatin 80 milliGRAM(s) Oral at bedtime  azithromycin  IVPB 500 milliGRAM(s) IV Intermittent every 24 hours  azithromycin  IVPB      ipratropium    for Nebulization 500 MICROGram(s) Nebulizer every 6 hours  levothyroxine 200 MICROGram(s) Oral daily  magnesium oxide 400 milliGRAM(s) Oral daily  metoprolol tartrate 25 milliGRAM(s) Oral two times a day  oxybutynin XL 10 milliGRAM(s) Oral daily  pantoprazole    Tablet 40 milliGRAM(s) Oral before breakfast  piperacillin/tazobactam IVPB.. 3.375 Gram(s) IV Intermittent every 8 hours  sertraline 25 milliGRAM(s) Oral daily  sucralfate 1 Gram(s) Oral two times a day    MEDICATIONS  (PRN):  acetaminophen     Tablet .. 650 milliGRAM(s) Oral every 6 hours PRN Moderate Pain (4 - 6)          PHYSICAL EXAM:  GENERAL: NAD, well-groomed, well-developed  HEAD:  Atraumatic, Normocephalic  CHEST/LUNG: Clear to percussion bilaterally; No rales, rhonchi, wheezing, or rubs  HEART: Regular rate and rhythm; No murmurs, rubs, or gallops  ABDOMEN: Soft, Nontender, Nondistended; Bowel sounds present  EXTREMITIES:  2+ Peripheral Pulses, No clubbing, cyanosis, or edema  LYMPH: No lymphadenopathy noted  SKIN: No rashes or lesions    Care Discussed with Consultants/Other Providers [ ] YES  [ ] NO

## 2022-05-23 NOTE — PROGRESS NOTE ADULT - SUBJECTIVE AND OBJECTIVE BOX
INFECTIOUS DISEASES FOLLOW UP-- Eva Hernandez  [phone]    This is a follow up note for this  81yMale with  Shortness of breath        ROS:  CONSTITUTIONAL:  No fever, good appetite  CARDIOVASCULAR:  No chest pain or palpitations  RESPIRATORY:  No dyspnea  GASTROINTESTINAL:  No nausea, vomiting, diarrhea, or abdominal pain  GENITOURINARY:  No dysuria  NEUROLOGIC:  No headache,     Allergies    Cipro (Rash)  niacin (Flushing; Rash)  Reglan (Anaphylaxis)    Intolerances        ANTIBIOTICS/RELEVANT:  antimicrobials  azithromycin  IVPB 500 milliGRAM(s) IV Intermittent every 24 hours  azithromycin  IVPB      piperacillin/tazobactam IVPB.. 3.375 Gram(s) IV Intermittent every 8 hours    immunologic:    OTHER:  acetaminophen     Tablet .. 650 milliGRAM(s) Oral every 6 hours PRN  acetylcysteine 10%  Inhalation 4 milliLiter(s) Inhalation two times a day  allopurinol 100 milliGRAM(s) Oral daily  amLODIPine   Tablet 2.5 milliGRAM(s) Oral at bedtime  atorvastatin 80 milliGRAM(s) Oral at bedtime  ipratropium    for Nebulization 500 MICROGram(s) Nebulizer every 6 hours  levothyroxine 200 MICROGram(s) Oral daily  magnesium oxide 400 milliGRAM(s) Oral daily  metoprolol tartrate 25 milliGRAM(s) Oral two times a day  oxybutynin XL 10 milliGRAM(s) Oral daily  pantoprazole    Tablet 40 milliGRAM(s) Oral before breakfast  sertraline 25 milliGRAM(s) Oral daily  sucralfate 1 Gram(s) Oral two times a day      Objective:  Vital Signs Last 24 Hrs  T(C): 36.3 (23 May 2022 12:41), Max: 36.9 (22 May 2022 20:24)  T(F): 97.4 (23 May 2022 12:41), Max: 98.5 (22 May 2022 20:24)  HR: 99 (23 May 2022 12:41) (71 - 99)  BP: 156/84 (23 May 2022 12:41) (132/97 - 158/85)  BP(mean): --  RR: 18 (23 May 2022 12:41) (18 - 18)  SpO2: 95% (23 May 2022 12:41) (92% - 98%)    PHYSICAL EXAM:  Constitutional:no acute distress  Eyes:LYDIA, EOMI  Ear/Nose/Throat: no oral lesions, 	  Respiratory: clear BL  Cardiovascular: S1S2  Gastrointestinal:soft, (+) BS, no tenderness  Extremities:no e/e/c  No Lymphadenopathy  IV sites not inflammed.    LABS:                        12.3   1.62  )-----------( 107      ( 23 May 2022 11:08 )             41.7     05-23    134<L>  |  99  |  13  ----------------------------<  150<H>  3.7   |  21<L>  |  1.76<H>    Ca    9.8      23 May 2022 11:08    TPro  7.0  /  Alb  3.5  /  TBili  0.3  /  DBili  x   /  AST  24  /  ALT  11  /  AlkPhos  109  05-23          MICROBIOLOGY:            RECENT CULTURES:  05-20 @ 17:55  Clean Catch Clean Catch (Midstream)  --  --  --    <10,000 CFU/mL Normal Urogenital Asia  --  05-20 @ 15:35  .Blood Blood-Peripheral  --  --  --    No growth to date.  --  05-20 @ 15:30  .Blood Blood-Peripheral  --  --  --    No growth to date.  --      RADIOLOGY & ADDITIONAL STUDIES:

## 2022-05-23 NOTE — PROGRESS NOTE ADULT - SUBJECTIVE AND OBJECTIVE BOX
Patient is a 81y old  Male who presents with a chief complaint of fever, sob (22 May 2022 12:43)    Patient seen this morning. No complaints. Afebrile. Breathing has improved - now on room air.    MEDICATIONS  (STANDING):  acetylcysteine 10%  Inhalation 4 milliLiter(s) Inhalation two times a day  allopurinol 100 milliGRAM(s) Oral daily  amLODIPine   Tablet 2.5 milliGRAM(s) Oral at bedtime  atorvastatin 80 milliGRAM(s) Oral at bedtime  azithromycin  IVPB 500 milliGRAM(s) IV Intermittent every 24 hours  azithromycin  IVPB      ipratropium    for Nebulization 500 MICROGram(s) Nebulizer every 6 hours  levothyroxine 200 MICROGram(s) Oral daily  magnesium oxide 400 milliGRAM(s) Oral daily  metoprolol tartrate 25 milliGRAM(s) Oral two times a day  oxybutynin XL 10 milliGRAM(s) Oral daily  pantoprazole    Tablet 40 milliGRAM(s) Oral before breakfast  piperacillin/tazobactam IVPB.. 3.375 Gram(s) IV Intermittent every 8 hours  sertraline 25 milliGRAM(s) Oral daily  sucralfate 1 Gram(s) Oral two times a day    MEDICATIONS  (PRN):  acetaminophen     Tablet .. 650 milliGRAM(s) Oral every 6 hours PRN Moderate Pain (4 - 6)    Vital Signs Last 24 Hrs  T(C): 36.6 (23 May 2022 09:34), Max: 36.9 (22 May 2022 20:24)  T(F): 97.8 (23 May 2022 09:34), Max: 98.5 (22 May 2022 20:24)  HR: 71 (23 May 2022 09:34) (71 - 86)  BP: 158/85 (23 May 2022 09:34) (126/73 - 158/85)  BP(mean): --  RR: 18 (23 May 2022 09:34) (18 - 18)  SpO2: 94% (23 May 2022 09:34) (92% - 98%)    PE  NAD  Awake, alert  Anicteric, MMM  No c/c/e  No rash grossly

## 2022-05-23 NOTE — PROGRESS NOTE ADULT - ASSESSMENT
Mr. 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 He was referred to the [facility] ED by his hematologist for evaluation and management. Pt's daughter and grand child have tested positive for covid. Pt is boosted with pfizer. Pt denies chest pain, diarrhea, pain anywhere. Pt denies any relieving or exacerbating factors. Here pt tested positive for metapneumovirus. ID consulted for the same.    WORKUP  UA : Negative  RVP: Metapneumovirus (5/20)   VA Duplex Lower Ext Vein Scan, Right (05.20): No evidence of right lower extremity deep venous thrombosis. Probable right Baker's cyst.  measures 4.9 x 2.4 x 0.8 cm  Xray Chest (05.20): Rounded opacity in the right lower lung fields, indeterminate. CT chest can be performed for further evaluation.   CT Angio Chest PE Protocol w/ IV Cont (05.20.): Prelim Report: NO PE.  Diffuse patchy bilateral groundglass and consolidative opacities and tree  in bud nodules, may be of infectious etiology. Mediastinal lymphadenopathy. Bibasilar subsegmental atelectasis.  Pt's daughter and grand child have tested positive for covid.  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 Mild leukopenia  s/p cefepime in ER  Currently on azithromycin and Zosyn    - Given GGO in prelim report of CTA chest: There is concern for immune mediated pneumonitis. Recommend Pulm eval  - Possibility of COVID still remains given exposure, GGO and symptoms of cough, fever and sore throat. -> Will repeat RVP- sent a negative for organisms other than metapneumovirus  human metapneumovirus- no proven effective therapy other than supportive care  - Ordered MRSA PCR, Ur. Legionella and Quant gold  - c/w azithro and zosyn for now  - Blood and urine cx in lab. NGTD  - if urine legionella ag is negative then would stop the Azithromycin    Hugh Hernandez MD  Can be called via Teams  After 5pm/weekends [phone]

## 2022-05-24 ENCOUNTER — TRANSCRIPTION ENCOUNTER (OUTPATIENT)
Age: 82
End: 2022-05-24

## 2022-05-24 LAB
ANION GAP SERPL CALC-SCNC: 14 MMOL/L — SIGNIFICANT CHANGE UP (ref 5–17)
BUN SERPL-MCNC: 16 MG/DL — SIGNIFICANT CHANGE UP (ref 7–23)
CALCIUM SERPL-MCNC: 9.7 MG/DL — SIGNIFICANT CHANGE UP (ref 8.4–10.5)
CHLORIDE SERPL-SCNC: 101 MMOL/L — SIGNIFICANT CHANGE UP (ref 96–108)
CO2 SERPL-SCNC: 20 MMOL/L — LOW (ref 22–31)
CREAT SERPL-MCNC: 1.64 MG/DL — HIGH (ref 0.5–1.3)
EGFR: 42 ML/MIN/1.73M2 — LOW
GLUCOSE SERPL-MCNC: 91 MG/DL — SIGNIFICANT CHANGE UP (ref 70–99)
HCT VFR BLD CALC: 41 % — SIGNIFICANT CHANGE UP (ref 39–50)
HGB BLD-MCNC: 11.9 G/DL — LOW (ref 13–17)
MCHC RBC-ENTMCNC: 22.4 PG — LOW (ref 27–34)
MCHC RBC-ENTMCNC: 29 GM/DL — LOW (ref 32–36)
MCV RBC AUTO: 77.1 FL — LOW (ref 80–100)
NRBC # BLD: 0 /100 WBCS — SIGNIFICANT CHANGE UP (ref 0–0)
PLATELET # BLD AUTO: 108 K/UL — LOW (ref 150–400)
POTASSIUM SERPL-MCNC: 3.9 MMOL/L — SIGNIFICANT CHANGE UP (ref 3.5–5.3)
POTASSIUM SERPL-SCNC: 3.9 MMOL/L — SIGNIFICANT CHANGE UP (ref 3.5–5.3)
RBC # BLD: 5.32 M/UL — SIGNIFICANT CHANGE UP (ref 4.2–5.8)
RBC # FLD: 18.2 % — HIGH (ref 10.3–14.5)
SODIUM SERPL-SCNC: 135 MMOL/L — SIGNIFICANT CHANGE UP (ref 135–145)
WBC # BLD: 2.31 K/UL — LOW (ref 3.8–10.5)
WBC # FLD AUTO: 2.31 K/UL — LOW (ref 3.8–10.5)

## 2022-05-24 PROCEDURE — 71045 X-RAY EXAM CHEST 1 VIEW: CPT | Mod: 26

## 2022-05-24 PROCEDURE — 99232 SBSQ HOSP IP/OBS MODERATE 35: CPT

## 2022-05-24 RX ADMIN — Medication 650 MILLIGRAM(S): at 09:00

## 2022-05-24 RX ADMIN — Medication 500 MICROGRAM(S): at 00:26

## 2022-05-24 RX ADMIN — Medication 650 MILLIGRAM(S): at 09:35

## 2022-05-24 RX ADMIN — PIPERACILLIN AND TAZOBACTAM 25 GRAM(S): 4; .5 INJECTION, POWDER, LYOPHILIZED, FOR SOLUTION INTRAVENOUS at 22:11

## 2022-05-24 RX ADMIN — MAGNESIUM OXIDE 400 MG ORAL TABLET 400 MILLIGRAM(S): 241.3 TABLET ORAL at 12:33

## 2022-05-24 RX ADMIN — Medication 100 MILLIGRAM(S): at 12:34

## 2022-05-24 RX ADMIN — Medication 500 MICROGRAM(S): at 12:34

## 2022-05-24 RX ADMIN — PIPERACILLIN AND TAZOBACTAM 25 GRAM(S): 4; .5 INJECTION, POWDER, LYOPHILIZED, FOR SOLUTION INTRAVENOUS at 05:40

## 2022-05-24 RX ADMIN — PIPERACILLIN AND TAZOBACTAM 25 GRAM(S): 4; .5 INJECTION, POWDER, LYOPHILIZED, FOR SOLUTION INTRAVENOUS at 13:57

## 2022-05-24 RX ADMIN — SERTRALINE 25 MILLIGRAM(S): 25 TABLET, FILM COATED ORAL at 12:34

## 2022-05-24 RX ADMIN — Medication 1 GRAM(S): at 05:39

## 2022-05-24 RX ADMIN — Medication 500 MICROGRAM(S): at 17:24

## 2022-05-24 RX ADMIN — Medication 200 MICROGRAM(S): at 05:40

## 2022-05-24 RX ADMIN — ATORVASTATIN CALCIUM 80 MILLIGRAM(S): 80 TABLET, FILM COATED ORAL at 22:10

## 2022-05-24 RX ADMIN — Medication 25 MILLIGRAM(S): at 23:21

## 2022-05-24 RX ADMIN — AMLODIPINE BESYLATE 2.5 MILLIGRAM(S): 2.5 TABLET ORAL at 22:10

## 2022-05-24 RX ADMIN — PANTOPRAZOLE SODIUM 40 MILLIGRAM(S): 20 TABLET, DELAYED RELEASE ORAL at 05:42

## 2022-05-24 RX ADMIN — Medication 4 MILLILITER(S): at 05:42

## 2022-05-24 RX ADMIN — Medication 500 MICROGRAM(S): at 05:42

## 2022-05-24 RX ADMIN — Medication 25 MILLIGRAM(S): at 12:34

## 2022-05-24 RX ADMIN — Medication 1 GRAM(S): at 17:24

## 2022-05-24 RX ADMIN — Medication 10 MILLIGRAM(S): at 12:33

## 2022-05-24 RX ADMIN — AZITHROMYCIN 255 MILLIGRAM(S): 500 TABLET, FILM COATED ORAL at 00:25

## 2022-05-24 RX ADMIN — Medication 4 MILLILITER(S): at 17:25

## 2022-05-24 RX ADMIN — Medication 25 MILLIGRAM(S): at 00:23

## 2022-05-24 NOTE — DISCHARGE NOTE PROVIDER - PROVIDER TOKENS
PROVIDER:[TOKEN:[[id number]],FOLLOWUP:[1 week]] PROVIDER:[TOKEN:[[id number]],FOLLOWUP:[1 week]],PROVIDER:[TOKEN:[[id number]],FOLLOWUP:[1 week]]

## 2022-05-24 NOTE — DISCHARGE NOTE PROVIDER - CARE PROVIDER_API CALL
Mohseni, Haleh G (MD)  Family Medicine  [address] [place] [address]  Phone: [phone]  Fax: [phone]  Follow Up Time: 1 week   Mohseni, Haleh G (MD)  Family Medicine  [address] [place] [address]  Phone: [phone]  Fax: [phone]  Follow Up Time: 1 week    SOTO HANNAH  Internal Medicine  [address] [place] [id number]  Phone: [phone]  Fax: ()-  Follow Up Time: 1 week

## 2022-05-24 NOTE — PROGRESS NOTE ADULT - SUBJECTIVE AND OBJECTIVE BOX
Patient is a 81y old  Male who presents with a chief complaint of fever, sob (24 May 2022 19:33)    Date of servie : 05-24-22 @ 20:13  INTERVAL HPI/OVERNIGHT EVENTS:  T(C): 36.9 (05-24-22 @ 19:30), Max: 36.9 (05-24-22 @ 19:30)  HR: 79 (05-24-22 @ 19:30) (76 - 90)  BP: 165/90 (05-24-22 @ 19:30) (129/75 - 165/90)  RR: 18 (05-24-22 @ 19:30) (18 - 18)  SpO2: 94% (05-24-22 @ 19:30) (94% - 100%)  Wt(kg): --  I&O's Summary    23 May 2022 07:01  -  24 May 2022 07:00  --------------------------------------------------------  IN: 780 mL / OUT: 0 mL / NET: 780 mL    24 May 2022 07:01  -  24 May 2022 20:13  --------------------------------------------------------  IN: 830 mL / OUT: 0 mL / NET: 830 mL        LABS:                        11.9   2.31  )-----------( 108      ( 24 May 2022 07:58 )             41.0     05-24    135  |  101  |  16  ----------------------------<  91  3.9   |  20<L>  |  1.64<H>    Ca    9.7      24 May 2022 07:58    TPro  7.0  /  Alb  3.5  /  TBili  0.3  /  DBili  x   /  AST  24  /  ALT  11  /  AlkPhos  109  05-23        CAPILLARY BLOOD GLUCOSE                MEDICATIONS  (STANDING):  acetylcysteine 10%  Inhalation 4 milliLiter(s) Inhalation two times a day  allopurinol 100 milliGRAM(s) Oral daily  amLODIPine   Tablet 2.5 milliGRAM(s) Oral at bedtime  atorvastatin 80 milliGRAM(s) Oral at bedtime  ipratropium    for Nebulization 500 MICROGram(s) Nebulizer every 6 hours  levothyroxine 200 MICROGram(s) Oral daily  magnesium oxide 400 milliGRAM(s) Oral daily  metoprolol tartrate 25 milliGRAM(s) Oral two times a day  oxybutynin XL 10 milliGRAM(s) Oral daily  pantoprazole    Tablet 40 milliGRAM(s) Oral before breakfast  piperacillin/tazobactam IVPB.. 3.375 Gram(s) IV Intermittent every 8 hours  sertraline 25 milliGRAM(s) Oral daily  sucralfate 1 Gram(s) Oral two times a day    MEDICATIONS  (PRN):  acetaminophen     Tablet .. 650 milliGRAM(s) Oral every 6 hours PRN Moderate Pain (4 - 6)          PHYSICAL EXAM:  GENERAL: NAD, well-groomed, well-developed  HEAD:  Atraumatic, Normocephalic  CHEST/LUNG: Clear to percussion bilaterally; No rales, rhonchi, wheezing, or rubs  HEART: Regular rate and rhythm; No murmurs, rubs, or gallops  ABDOMEN: Soft, Nontender, Nondistended; Bowel sounds present  EXTREMITIES:  2+ Peripheral Pulses, No clubbing, cyanosis, or edema  LYMPH: No lymphadenopathy noted  SKIN: No rashes or lesions    Care Discussed with Consultants/Other Providers [ ] YES  [ ] NO

## 2022-05-24 NOTE — DISCHARGE NOTE PROVIDER - HOSPITAL COURSE
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     Problem/Plan - 1:  ·  Problem: CAP (community acquired pneumonia).  hMPV   ·  Plan: - given CT findings and pt's immunocompromised state, s/p zosyn  - ID fu   - per heme, pt's b/l GGO could be immune mediated pneumonitis, however pt's symptoms have been acute  - pulm fu   - repeat chest x ray - resolving infiltrate      Problem/Plan - 2:  ·  Problem: Human metapneumovirus (hMPV) pneumonia.   ·  Plan: - supportive care as above and below.  he has not been wheezing;   no need for steroids at this time     Problem/Plan - 3:  ·  Problem: Hypoxia.   ·  Plan: - chest PT  - duonebs  - CTA neg for PE.     Problem/Plan - 4:  ·  Problem: Sepsis.   ·  Plan: - f/u blood cultures.     Problem/Plan - 5:  ·  Problem: Metastatic melanoma.   ·  Plan: - f/u onc recs.     Problem/Plan - 6:  ·  Problem: Chronic kidney disease, unspecified CKD stage.   ·  Plan: - avoid nephrotoxins.     Problem/Plan - 7:  ·  Problem: HTN (hypertension).   ·  Plan: - cont metoprolol and norvasc.   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     Problem/Plan - 1:  ·  Problem: CAP (community acquired pneumonia).  hMPV   ·  Plan: - given CT findings and pt's immunocompromised state, s/p Zosyn and Azithromycin.  urine legionella ag is negative  - per heme, pt's b/l GGO could be immune mediated pneumonitis, however pt's symptoms have been acute  - Repeat chest x ray showing resolving infiltrate.      Problem/Plan - 2:  ·  Problem: Human metapneumovirus (hMPV) pneumonia.   ·  Plan: - supportive care as above and below.     Problem/Plan - 3:  ·  Problem: Hypoxia.   ·  Plan: - chest PT  - duonebs  - CTA neg for PE.     Problem/Plan - 4:  ·  Problem: Sepsis.   ·  Plan: - f/u blood cultures.     Problem/Plan - 5:  ·  Problem: Metastatic melanoma.   ·  Plan: - f/u onc recs.     Problem/Plan - 6:  ·  Problem: Chronic kidney disease, unspecified CKD stage.   ·  Plan: - avoid nephrotoxins.     Problem/Plan - 7:  ·  Problem: HTN (hypertension).   ·  Plan: - cont metoprolol and norvasc.      Patient is medically cleared and stable for discharge. Discussed with . 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     Problem/Plan - 1:  CAP (community acquired pneumonia).  hMPV   ·  Plan: - Given CT findings and pt's immunocompromised state, s/p Zosyn and Azithromycin.  urine legionella ag is negative  - per heme, pt's b/l GGO could be immune mediated pneumonitis, however pt's symptoms have been acute  - Repeat chest x ray showing resolving infiltrate.      Problem/Plan - 2:  Human meta pneumovirus (hMPV) pneumonia.   ·  Plan: - supportive care as above and below.     Problem/Plan - 3:  Hypoxia:  ·  Plan: - chest PT  - duonebs  - CTA neg for PE.     Problem/Plan - 4:  ·  Problem: Sepsis.   ·  Plan: - f/u blood cultures.     Problem/Plan - 5:  ·  Problem: Metastatic melanoma.   ·  Plan: - f/u onc recs.     Problem/Plan - 6:  Chronic kidney disease, unspecified CKD stage.   ·  Plan: - avoid nephrotoxins.     Problem/Plan - 7:  HTN (hypertension).   ·  Plan: - cont metoprolol and norvasc.    Patient is medically cleared and stable for discharge per Med Attending  .

## 2022-05-24 NOTE — PROGRESS NOTE ADULT - ASSESSMENT
Patient with metastatic melanoma (with unknown primary - metastatic right lung nodule - M1B disease) under care by Dr. Nnamdi Farnsworth of [facility]. 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 He started Pembrolizumab in 3/28/2022. Most recent treatment was 5/9/2022 (on q8 week dosing). Plan was to repeat imaging in 8 weeks around time of next immunotherapy treatment.    Patient called the office today complaining of new onset of fever, headache, cough, SOB and hypoxia. He was referred to the [facility] ED for evaluation and management    Metastatic Melanoma  --Under care by Dr. Nnamdi Farnsworth of [facility]  --Currently receiving Pembrolizumab q8 weeks - LD 5/9/2022  --Ongoing follow up after discharge    Fever, SOB, Cough, and fever 101.7   - RVP + for HMPV  - CTA with concern for multifocal PNA currently on Zosyn and Azithromycin  --Legionella urine negative - ID may decide to DC azithromycin  - Patient clinically doing better. Afebrile. Breathing improved and saturation well on room air.  - Given symptomatic improvement with medical management and no steroids- symptoms are likely infection related and less likely to be immunotherapy related Pneumonitis.   - Would continue to hold off on steroids at this time and continue medial management and antibiotics as he is doing   -  ID following. Appreciate input     We will continue to follow along with you and would be happy to assist with any oncology questions on behalf of [facility].     Fantasma Garcia PA-C  Hematology/Oncology  [facility]   [phone] (office)  [phone] (alt office)  Evenings and weekends please call MD on call or office

## 2022-05-24 NOTE — DISCHARGE NOTE PROVIDER - NSDCMRMEDTOKEN_GEN_ALL_CORE_FT
allopurinol 100 mg oral tablet: 1 tab(s) orally once a day  amLODIPine 2.5 mg oral tablet: 1 tab(s) orally once a day (at bedtime)  Crestor 20 mg oral tablet: 1 tab(s) orally once a day  ergocalciferol 1.25 mg (50,000 intl units) oral capsule: 1 cap(s) orally once a week  Fish Oil 1000 mg oral capsule: 2 cap(s) orally once a day  levothyroxine 200 mcg (0.2 mg) oral capsule: 1 cap(s) orally once a day  magnesium oxide 250 mg oral tablet: 1 tab(s) orally once a day  metoprolol tartrate 50 mg oral tablet: 0.5 tab(s) orally 2 times a day  oxybutynin 10 mg/24 hr oral tablet, extended release: 1 tab(s) orally once a day  Protonix 40 mg oral delayed release tablet: 1 tab(s) orally once a day  sertraline 25 mg oral tablet: 1 tab(s) orally once a day  sucralfate 1 g oral tablet: 1 tab(s) orally once a day  testosterone:    acetylcysteine 10% inhalation solution: 4 milliliter(s) inhaled 2 times a day  allopurinol 100 mg oral tablet: 1 tab(s) orally once a day  amLODIPine 2.5 mg oral tablet: 1 tab(s) orally once a day (at bedtime)  cefpodoxime 200 mg oral tablet: 1 tab(s) orally 2 times a day   Crestor 20 mg oral tablet: 1 tab(s) orally once a day  ergocalciferol 1.25 mg (50,000 intl units) oral capsule: 1 cap(s) orally once a week  Fish Oil 1000 mg oral capsule: 2 cap(s) orally once a day  levothyroxine 200 mcg (0.2 mg) oral capsule: 1 cap(s) orally once a day  magnesium oxide 250 mg oral tablet: 1 tab(s) orally once a day  metoprolol tartrate 50 mg oral tablet: 0.5 tab(s) orally 2 times a day  oxybutynin 10 mg/24 hr oral tablet, extended release: 1 tab(s) orally once a day  Protonix 40 mg oral delayed release tablet: 1 tab(s) orally once a day  sertraline 25 mg oral tablet: 1 tab(s) orally once a day  sucralfate 1 g oral tablet: 1 tab(s) orally 2 times a day  testosterone:    acetylcysteine 10% inhalation solution: 4 milliliter(s) inhaled 2 times a day  allopurinol 100 mg oral tablet: 1 tab(s) orally once a day  amLODIPine 2.5 mg oral tablet: 1 tab(s) orally once a day (at bedtime)  Crestor 20 mg oral tablet: 1 tab(s) orally once a day  ergocalciferol 1.25 mg (50,000 intl units) oral capsule: 1 cap(s) orally once a week  Fish Oil 1000 mg oral capsule: 2 cap(s) orally once a day  levothyroxine 200 mcg (0.2 mg) oral capsule: 1 cap(s) orally once a day  magnesium oxide 250 mg oral tablet: 1 tab(s) orally once a day  metoprolol tartrate 50 mg oral tablet: 0.5 tab(s) orally 2 times a day  oxybutynin 10 mg/24 hr oral tablet, extended release: 1 tab(s) orally once a day  Protonix 40 mg oral delayed release tablet: 1 tab(s) orally once a day  sertraline 25 mg oral tablet: 1 tab(s) orally once a day  sucralfate 1 g oral tablet: 1 tab(s) orally 2 times a day  testosterone:

## 2022-05-24 NOTE — PROGRESS NOTE ADULT - ASSESSMENT
Mr. 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 He was referred to the [facility] ED by his hematologist for evaluation and management. Pt's daughter and grand child have tested positive for covid. Pt is boosted with pfizer. Pt denies chest pain, diarrhea, pain anywhere. Pt denies any relieving or exacerbating factors. Here pt tested positive for metapneumovirus. ID consulted for the same.    WORKUP  UA : Negative  RVP: Metapneumovirus (5/20)   [facility] Duplex Lower Ext Vein Scan, Right (05.20): No evidence of right lower extremity deep venous thrombosis. Probable right Baker's cyst.  measures 4.9 x 2.4 x 0.8 cm  Xray Chest (05.20): Rounded opacity in the right lower lung fields, indeterminate. CT chest can be performed for further evaluation.   CT Angio Chest PE Protocol w/ IV Cont (05.20.): Prelim Report: NO PE.  Diffuse patchy bilateral groundglass and consolidative opacities and tree  in bud nodules, may be of infectious etiology. Mediastinal lymphadenopathy. Bibasilar subsegmental atelectasis.  Pt's daughter and grand child have tested positive for covid.  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 Mild leukopenia  s/p cefepime in ER  Currently on azithromycin and Zosyn    - Given GGO in prelim report of CTA chest: There is concern for immune mediated pneumonitis. Recommend Pulm eval  - Possibility of COVID still remains given exposure, GGO and symptoms of cough, fever and sore throat. -> Will repeat RVP- sent a negative for organisms other than metapneumovirus  human metapneumovirus- no proven effective therapy other than supportive care  - Ordered MRSA PCR, Ur. Legionella and Quant gold  - c/w  zosyn  can complete five day course (5/25 end)  - Blood and urine cx in lab. NGTD  -  urine legionella ag is negative = would stop the Azithromycin    stable for discharge home tomorrow from an ID perspective    Hugh Hernandez MD  Can be called via Teams  After 5pm/weekends [phone]

## 2022-05-24 NOTE — PROGRESS NOTE ADULT - SUBJECTIVE AND OBJECTIVE BOX
Date of Service: 05-24-22 @ 13:27    Patient is a 81y old  Male who presents with a chief complaint of fever, sob (24 May 2022 10:19)      Any change in ROS: clinically he seems to be better:  on room air:       MEDICATIONS  (STANDING):  acetylcysteine 10%  Inhalation 4 milliLiter(s) Inhalation two times a day  allopurinol 100 milliGRAM(s) Oral daily  amLODIPine   Tablet 2.5 milliGRAM(s) Oral at bedtime  atorvastatin 80 milliGRAM(s) Oral at bedtime  ipratropium    for Nebulization 500 MICROGram(s) Nebulizer every 6 hours  levothyroxine 200 MICROGram(s) Oral daily  magnesium oxide 400 milliGRAM(s) Oral daily  metoprolol tartrate 25 milliGRAM(s) Oral two times a day  oxybutynin XL 10 milliGRAM(s) Oral daily  pantoprazole    Tablet 40 milliGRAM(s) Oral before breakfast  piperacillin/tazobactam IVPB.. 3.375 Gram(s) IV Intermittent every 8 hours  sertraline 25 milliGRAM(s) Oral daily  sucralfate 1 Gram(s) Oral two times a day    MEDICATIONS  (PRN):  acetaminophen     Tablet .. 650 milliGRAM(s) Oral every 6 hours PRN Moderate Pain (4 - 6)    Vital Signs Last 24 Hrs  T(C): 36.4 (24 May 2022 12:05), Max: 36.7 (23 May 2022 20:44)  T(F): 97.6 (24 May 2022 12:05), Max: 98 (23 May 2022 20:44)  HR: 76 (24 May 2022 12:05) (76 - 90)  BP: 159/81 (24 May 2022 12:05) (129/75 - 159/81)  BP(mean): --  RR: 18 (24 May 2022 12:05) (18 - 18)  SpO2: 94% (24 May 2022 12:05) (94% - 100%)    I&O's Summary    23 May 2022 07:01  -  24 May 2022 07:00  --------------------------------------------------------  IN: 780 mL / OUT: 0 mL / NET: 780 mL    24 May 2022 07:01  -  24 May 2022 13:27  --------------------------------------------------------  IN: 240 mL / OUT: 0 mL / NET: 240 mL          Physical Exam:   GENERAL: NAD, well-groomed, well-developed  HEENT: LYDIA/   Atraumatic, Normocephalic  ENMT: No tonsillar erythema, exudates, or enlargement; Moist mucous membranes, Good dentition, No lesions  NECK: Supple, No JVD, Normal thyroid  CHEST/LUNG: Clear to auscultaion  CVS: Regular rate and rhythm; No murmurs, rubs, or gallops  GI: : Soft, Nontender, Nondistended; Bowel sounds present  NERVOUS SYSTEM:  Alert & Oriented X3  EXTREMITIES:  2- edema  LYMPH: No lymphadenopathy noted  SKIN: No rashes or lesions  ENDOCRINOLOGY: No Thyromegaly  PSYCH: Appropriate    Labs:  22                            11.9   2.31  )-----------( 108      ( 24 May 2022 07:58 )             41.0                         12.3   1.62  )-----------( 107      ( 23 May 2022 11:08 )             41.7                         12.1   2.13  )-----------( 70       ( 20 May 2022 16:17 )             41.5     05-24    < from: VA Duplex Lower Ext Vein Scan, Bilat (05.23.22 @ 20:04) >  No RIGHT calf vein thrombosis is detected.    LEFT:  Normal compressibility of the LEFT common femoral, femoral and popliteal   veins.  Doppler examination shows normal spontaneous and phasic flow.  No LEFT calf vein thrombosis is detected.    IMPRESSION:  No evidence of deep venous thrombosis in either lower extremity.    --- End of Report ---            WHITNEY PENNINGTON MD; Attending Radiologist  This document has been electronically signed. May 23 2022  8:53PM    < end of copied text >  < from: CT Angio Chest PE Protocol w/ IV Cont (05.20.22 @ 20:54) >  lobes. Mild interlobular septal thickening. No pleural effusion.    UPPER ABDOMEN: Spleen is enlarged, as on the prior, and partially imaged.   Small left hepatic cyst. Colonic diverticulosis.    BONES/SOFT TISSUES: Degenerative changes of the spine.    IMPRESSION:    No main, lobar or segmental pulmonary embolism. Limited evaluation of the   subsegmental pulmonary arteries.    Patchy consolidative and groundglass opacities in the bilateral lower   lobes with additional groundglass, and nodular opacities seen in the left   upper lobe. Findings suspicious for multifocal pneumonia, although   underlying tumor, especially in the right lower lobe, cannot be excluded.    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 Comparison with prior studies to be   useful.    Mediastinal and right hilar adenopathy.    Splenomegaly.    --- End of Report ---          JEAN SINGH MD; Resident Radiology  This document has been electronically signed.  NARCISA SHARIF MD; Attending Radiologist  This document has been electronically signed. May 21 2022 11:43AM    < end of copied text >  135  |  101  |  16  ----------------------------<  91  3.9   |  20<L>  |  1.64<H>  05-23    134<L>  |  99  |  13  ----------------------------<  150<H>  3.7   |  21<L>  |  1.76<H>  05-20    134<L>  |  101  |  18  ----------------------------<  112<H>  5.0   |  18<L>  |  1.49<H>    Ca    9.7      24 May 2022 07:58  Ca    9.8      23 May 2022 11:08    TPro  7.0  /  Alb  3.5  /  TBili  0.3  /  DBili  x   /  AST  24  /  ALT  11  /  AlkPhos  109  05-23  TPro  7.3  /  Alb  3.7  /  TBili  0.3  /  DBili  x   /  AST  38  /  ALT  13  /  AlkPhos  130<H>  05-20    CAPILLARY BLOOD GLUCOSE          LIVER FUNCTIONS - ( 23 May 2022 11:08 )  Alb: 3.5 g/dL / Pro: 7.0 g/dL / ALK PHOS: 109 U/L / ALT: 11 U/L / AST: 24 U/L / GGT: x                     RECENT CULTURES:  05-20 @ 17:55 Clean Catch Clean Catch (Midstream)                <10,000 CFU/mL Normal Urogenital [place]    05-20 @ 15:35 .Blood Blood-Peripheral                No growth to date.    05-20 @ 15:30 .Blood Blood-Peripheral                No growth to date.          RESPIRATORY CULTURES:          Studies  Chest X-RAY  CT SCAN Chest   Venous Dopplers: LE:   CT Abdomen  Others

## 2022-05-24 NOTE — PROGRESS NOTE ADULT - SUBJECTIVE AND OBJECTIVE BOX
Patient is a 81y old  Male who presents with a chief complaint of fever, sob (23 May 2022 19:47)    Patient seen this morning. Still with dry cough but feeling much better overall. Afebrile on room air. Ambulating well.    MEDICATIONS  (STANDING):  acetylcysteine 10%  Inhalation 4 milliLiter(s) Inhalation two times a day  allopurinol 100 milliGRAM(s) Oral daily  amLODIPine   Tablet 2.5 milliGRAM(s) Oral at bedtime  atorvastatin 80 milliGRAM(s) Oral at bedtime  azithromycin  IVPB 500 milliGRAM(s) IV Intermittent every 24 hours  azithromycin  IVPB      ipratropium    for Nebulization 500 MICROGram(s) Nebulizer every 6 hours  levothyroxine 200 MICROGram(s) Oral daily  magnesium oxide 400 milliGRAM(s) Oral daily  metoprolol tartrate 25 milliGRAM(s) Oral two times a day  oxybutynin XL 10 milliGRAM(s) Oral daily  pantoprazole    Tablet 40 milliGRAM(s) Oral before breakfast  piperacillin/tazobactam IVPB.. 3.375 Gram(s) IV Intermittent every 8 hours  sertraline 25 milliGRAM(s) Oral daily  sucralfate 1 Gram(s) Oral two times a day    MEDICATIONS  (PRN):  acetaminophen     Tablet .. 650 milliGRAM(s) Oral every 6 hours PRN Moderate Pain (4 - 6)    Vital Signs Last 24 Hrs  T(C): 36.2 (24 May 2022 04:34), Max: 36.7 (23 May 2022 20:44)  T(F): 97.2 (24 May 2022 04:34), Max: 98 (23 May 2022 20:44)  HR: 90 (24 May 2022 04:34) (80 - 99)  BP: 129/75 (24 May 2022 04:34) (129/75 - 159/76)  BP(mean): --  RR: 18 (24 May 2022 04:34) (18 - 18)  SpO2: 100% (24 May 2022 04:34) (95% - 100%)    PE  NAD  Awake, alert  Anicteric, MMM  No c/c/e  No rash grossly                            11.9   2.31  )-----------( 108      ( 24 May 2022 07:58 )             41.0       05-24    135  |  101  |  16  ----------------------------<  91  3.9   |  20<L>  |  1.64<H>    Ca    9.7      24 May 2022 07:58    TPro  7.0  /  Alb  3.5  /  TBili  0.3  /  DBili  x   /  AST  24  /  ALT  11  /  AlkPhos  109  05-23      ACC: [id number] EXAM:  DUPLEX SCAN EXT VEINS LOWER BI                          PROCEDURE DATE:  05/23/2022          INTERPRETATION:  CLINICAL INFORMATION: Shortness of breath, lower   extremity swelling.    COMPARISON: None available.    TECHNIQUE: Duplex sonography of the BILATERAL LOWER extremity veins with   color and spectral Doppler, with and without compression.    FINDINGS:    RIGHT:  Normal compressibility of the RIGHT common femoral, femoral and popliteal   veins.  Doppler examination shows normal spontaneous and phasic flow.  No RIGHT calf vein thrombosis is detected.    LEFT:  Normal compressibility of the LEFT common femoral, femoral and popliteal   veins.  Doppler examination shows normal spontaneous and phasic flow.  No LEFT calf vein thrombosis is detected.    IMPRESSION:  No evidence of deep venous thrombosis in either lower extremity.    --- End of Report ---

## 2022-05-24 NOTE — DISCHARGE NOTE PROVIDER - NSDCCPCAREPLAN_GEN_ALL_CORE_FT
PRINCIPAL DISCHARGE DIAGNOSIS  Diagnosis: SOB (shortness of breath)  Assessment and Plan of Treatment: Need repeat ct scan chest in 4-6 weeks time to follow up lung opacities:       PRINCIPAL DISCHARGE DIAGNOSIS  Diagnosis: SOB (shortness of breath)  Assessment and Plan of Treatment: RVP + for HMPV  - CTA with concern for multifocal PNA received Zosyn and Azithromycin  --Legionella urine negative.  - Patient clinically doing better. Afebrile. Breathing improved and saturation well on room air.  Need repeat ct scan chest in 4-6 weeks time to follow up lung opacities:      SECONDARY DISCHARGE DIAGNOSES  Diagnosis: Metastatic melanoma  Assessment and Plan of Treatment: receiving Pembrolizumab q8 weeks  Under care by Dr. Nnamdi Farnsworth of [facility] [place] - f/u in 1 week     PRINCIPAL DISCHARGE DIAGNOSIS  Diagnosis: SOB (shortness of breath)  Assessment and Plan of Treatment: RVP + for HMPV  - CTA with concern for multifocal PNA received Zosyn and Azithromycin  --Legionella urine negative.  - Patient clinically doing better. Afebrile. Breathing improved and saturation well on room air.  - - Repeat chest x ray showing resolving infiltrate.   Need repeat ct scan chest in 4-6 weeks time to follow up lung opacities:      SECONDARY DISCHARGE DIAGNOSES  Diagnosis: Metastatic melanoma  Assessment and Plan of Treatment: receiving Pembrolizumab q8 weeks  [place] [facility] [place]     PRINCIPAL DISCHARGE DIAGNOSIS  Diagnosis: SOB (shortness of breath)  Assessment and Plan of Treatment: RVP + for HMPV  - CTA with concern for multifocal PNA received Zosyn and Azithromycin  --Legionella urine negative.  - Patient clinically doing better. Afebrile. Breathing improved and saturation well on room air.  - - Repeat chest x ray showing resolving infiltrate.   - Antibiotics were sent to your pharmacy for 4 days, please take i\them. Thanks.  Need repeat ct scan chest in 4-6 weeks time to follow up lung opacities:      SECONDARY DISCHARGE DIAGNOSES  Diagnosis: Metastatic melanoma  Assessment and Plan of Treatment: Receiving Pembrolizumab q8 weeks  Under care by Dr. Nnamdi Farnsworth of [facility] [place] - f/u in 1 week     PRINCIPAL DISCHARGE DIAGNOSIS  Diagnosis: Sepsis  Assessment and Plan of Treatment: RVP + for HMPV  - CTA with concern for multifocal PNA received Zosyn and Azithromycin  --Legionella urine negative.  - Patient clinically doing better. Afebrile. Breathing improved and saturation well on room air.  - - Repeat chest x ray showing resolving infiltrate.   - Antibiotics were sent to your pharmacy for 4 days, please take them. Thanks.  You will need to have  repeat ct scan chest in 4-6 weeks time to follow up lung opacities.  FOLLOW UP WITH YOUR MD AT [facility] FOR MANAGEMENT AND TO SCHEDULE CHEST CT SCAN IN 4-6 WEEKS.      SECONDARY DISCHARGE DIAGNOSES  Diagnosis: Chronic kidney disease, unspecified CKD stage  Assessment and Plan of Treatment: Follow up with your PMD for monitoring and management.    Diagnosis: SOB (shortness of breath)  Assessment and Plan of Treatment: RVP + for HMPV  - CTA with concern for multifocal PNA received Zosyn and Azithromycin  --Legionella urine negative.  - Patient clinically doing better. Afebrile. Breathing improved and saturation well on room air.  - - Repeat chest x ray showing resolving infiltrate.   - Antibiotics were sent to your pharmacy for 4 days, please take i\them. Thanks.  Need repeat ct scan chest in 4-6 weeks time to follow up lung opacities:    Diagnosis: CAP (community acquired pneumonia)  Assessment and Plan of Treatment: Completed Zosyn and Azithromycin.  Urine legionella ag is negative  Repeat chest x ray showing resolving infiltrate.   Follow uyp with your PMD in 1 week.       Diagnosis: Metastatic melanoma  Assessment and Plan of Treatment: Receiving Pembrolizumab q8 weeks  Under care by Dr. Nnamdi Farnsworth of [facility] - f/u in 1 week

## 2022-05-24 NOTE — PROGRESS NOTE ADULT - SUBJECTIVE AND OBJECTIVE BOX
INFECTIOUS DISEASES FOLLOW UP-- Eva Hernandez  [phone]    This is a follow up note for this  81yMale with  Shortness of breath        ROS:  CONSTITUTIONAL:  No fever, good appetite  CARDIOVASCULAR:  No chest pain or palpitations  RESPIRATORY:  No dyspnea  GASTROINTESTINAL:  No nausea, vomiting, diarrhea, or abdominal pain  GENITOURINARY:  No dysuria  NEUROLOGIC:  No headache,     Allergies    Cipro (Rash)  niacin (Flushing; Rash)  Reglan (Anaphylaxis)    Intolerances        ANTIBIOTICS/RELEVANT:  antimicrobials  piperacillin/tazobactam IVPB.. 3.375 Gram(s) IV Intermittent every 8 hours    immunologic:    OTHER:  acetaminophen     Tablet .. 650 milliGRAM(s) Oral every 6 hours PRN  acetylcysteine 10%  Inhalation 4 milliLiter(s) Inhalation two times a day  allopurinol 100 milliGRAM(s) Oral daily  amLODIPine   Tablet 2.5 milliGRAM(s) Oral at bedtime  atorvastatin 80 milliGRAM(s) Oral at bedtime  ipratropium    for Nebulization 500 MICROGram(s) Nebulizer every 6 hours  levothyroxine 200 MICROGram(s) Oral daily  magnesium oxide 400 milliGRAM(s) Oral daily  metoprolol tartrate 25 milliGRAM(s) Oral two times a day  oxybutynin XL 10 milliGRAM(s) Oral daily  pantoprazole    Tablet 40 milliGRAM(s) Oral before breakfast  sertraline 25 milliGRAM(s) Oral daily  sucralfate 1 Gram(s) Oral two times a day      Objective:  Vital Signs Last 24 Hrs  T(C): 36.9 (24 May 2022 19:30), Max: 36.9 (24 May 2022 19:30)  T(F): 98.5 (24 May 2022 19:30), Max: 98.5 (24 May 2022 19:30)  HR: 79 (24 May 2022 19:30) (76 - 90)  BP: 165/90 (24 May 2022 19:30) (129/75 - 165/90)  BP(mean): --  RR: 18 (24 May 2022 19:30) (18 - 18)  SpO2: 94% (24 May 2022 19:30) (94% - 100%)    PHYSICAL EXAM:  Constitutional:no acute distress  Eyes:LYDIA, EOMI  Ear/Nose/Throat: no oral lesions, 	  Respiratory: clear BL  Cardiovascular: S1S2  Gastrointestinal:soft, (+) BS, no tenderness  Extremities:no e/e/c  No Lymphadenopathy  IV sites not inflammed.    LABS:                        11.9   2.31  )-----------( 108      ( 24 May 2022 07:58 )             41.0     05-24    135  |  101  |  16  ----------------------------<  91  3.9   |  20<L>  |  1.64<H>    Ca    9.7      24 May 2022 07:58    TPro  7.0  /  Alb  3.5  /  TBili  0.3  /  DBili  x   /  AST  24  /  ALT  11  /  AlkPhos  109  05-23          MICROBIOLOGY:            RECENT CULTURES:  05-20 @ 17:55  Clean Catch Clean Catch (Midstream)  --  --  --    <10,000 CFU/mL Normal Urogenital Asia  --  05-20 @ 15:35  .Blood Blood-Peripheral  --  --  --    No growth to date.  --  05-20 @ 15:30  .Blood Blood-Peripheral  --  --  --    No growth to date.  --      RADIOLOGY & ADDITIONAL STUDIES:  < from: Xray Chest 1 View- PORTABLE-Routine (Xray Chest 1 View- PORTABLE-Routine .) (05.24.22 @ 08:22) >    IMPRESSION:  Resolving infiltrate.    < end of copied text >

## 2022-05-25 LAB
ANION GAP SERPL CALC-SCNC: 14 MMOL/L — SIGNIFICANT CHANGE UP (ref 5–17)
BUN SERPL-MCNC: 15 MG/DL — SIGNIFICANT CHANGE UP (ref 7–23)
CALCIUM SERPL-MCNC: 9.7 MG/DL — SIGNIFICANT CHANGE UP (ref 8.4–10.5)
CHLORIDE SERPL-SCNC: 102 MMOL/L — SIGNIFICANT CHANGE UP (ref 96–108)
CO2 SERPL-SCNC: 19 MMOL/L — LOW (ref 22–31)
CREAT SERPL-MCNC: 1.71 MG/DL — HIGH (ref 0.5–1.3)
CULTURE RESULTS: SIGNIFICANT CHANGE UP
CULTURE RESULTS: SIGNIFICANT CHANGE UP
EGFR: 40 ML/MIN/1.73M2 — LOW
GLUCOSE SERPL-MCNC: 101 MG/DL — HIGH (ref 70–99)
HCT VFR BLD CALC: 42.1 % — SIGNIFICANT CHANGE UP (ref 39–50)
HGB BLD-MCNC: 12.4 G/DL — LOW (ref 13–17)
MCHC RBC-ENTMCNC: 22.7 PG — LOW (ref 27–34)
MCHC RBC-ENTMCNC: 29.5 GM/DL — LOW (ref 32–36)
MCV RBC AUTO: 77.1 FL — LOW (ref 80–100)
NRBC # BLD: 0 /100 WBCS — SIGNIFICANT CHANGE UP (ref 0–0)
PLATELET # BLD AUTO: 127 K/UL — LOW (ref 150–400)
POTASSIUM SERPL-MCNC: 4 MMOL/L — SIGNIFICANT CHANGE UP (ref 3.5–5.3)
POTASSIUM SERPL-SCNC: 4 MMOL/L — SIGNIFICANT CHANGE UP (ref 3.5–5.3)
RBC # BLD: 5.46 M/UL — SIGNIFICANT CHANGE UP (ref 4.2–5.8)
RBC # FLD: 18.5 % — HIGH (ref 10.3–14.5)
SODIUM SERPL-SCNC: 135 MMOL/L — SIGNIFICANT CHANGE UP (ref 135–145)
SPECIMEN SOURCE: SIGNIFICANT CHANGE UP
SPECIMEN SOURCE: SIGNIFICANT CHANGE UP
WBC # BLD: 3.55 K/UL — LOW (ref 3.8–10.5)
WBC # FLD AUTO: 3.55 K/UL — LOW (ref 3.8–10.5)

## 2022-05-25 PROCEDURE — 71045 X-RAY EXAM CHEST 1 VIEW: CPT | Mod: 26

## 2022-05-25 RX ORDER — SUCRALFATE 1 G
1 TABLET ORAL
Qty: 60 | Refills: 0
Start: 2022-05-25 | End: 2022-06-23

## 2022-05-25 RX ORDER — CEFPODOXIME PROXETIL 100 MG
1 TABLET ORAL
Qty: 8 | Refills: 0
Start: 2022-05-25 | End: 2022-05-28

## 2022-05-25 RX ORDER — ACETYLCYSTEINE 200 MG/ML
4 VIAL (ML) MISCELLANEOUS
Qty: 32 | Refills: 0
Start: 2022-05-25 | End: 2022-05-28

## 2022-05-25 RX ORDER — SUCRALFATE 1 G
1 TABLET ORAL
Qty: 0 | Refills: 0 | DISCHARGE

## 2022-05-25 RX ADMIN — PIPERACILLIN AND TAZOBACTAM 25 GRAM(S): 4; .5 INJECTION, POWDER, LYOPHILIZED, FOR SOLUTION INTRAVENOUS at 21:20

## 2022-05-25 RX ADMIN — Medication 600 MILLIGRAM(S): at 21:20

## 2022-05-25 RX ADMIN — Medication 500 MICROGRAM(S): at 05:17

## 2022-05-25 RX ADMIN — Medication 200 MICROGRAM(S): at 05:16

## 2022-05-25 RX ADMIN — Medication 25 MILLIGRAM(S): at 23:05

## 2022-05-25 RX ADMIN — ATORVASTATIN CALCIUM 80 MILLIGRAM(S): 80 TABLET, FILM COATED ORAL at 21:20

## 2022-05-25 RX ADMIN — SERTRALINE 25 MILLIGRAM(S): 25 TABLET, FILM COATED ORAL at 12:00

## 2022-05-25 RX ADMIN — AMLODIPINE BESYLATE 2.5 MILLIGRAM(S): 2.5 TABLET ORAL at 21:20

## 2022-05-25 RX ADMIN — Medication 10 MILLIGRAM(S): at 12:00

## 2022-05-25 RX ADMIN — PANTOPRAZOLE SODIUM 40 MILLIGRAM(S): 20 TABLET, DELAYED RELEASE ORAL at 05:16

## 2022-05-25 RX ADMIN — Medication 500 MICROGRAM(S): at 23:04

## 2022-05-25 RX ADMIN — Medication 4 MILLILITER(S): at 05:41

## 2022-05-25 RX ADMIN — Medication 500 MICROGRAM(S): at 13:39

## 2022-05-25 RX ADMIN — Medication 25 MILLIGRAM(S): at 12:00

## 2022-05-25 RX ADMIN — PIPERACILLIN AND TAZOBACTAM 25 GRAM(S): 4; .5 INJECTION, POWDER, LYOPHILIZED, FOR SOLUTION INTRAVENOUS at 13:40

## 2022-05-25 RX ADMIN — Medication 500 MICROGRAM(S): at 17:13

## 2022-05-25 RX ADMIN — MAGNESIUM OXIDE 400 MG ORAL TABLET 400 MILLIGRAM(S): 241.3 TABLET ORAL at 12:00

## 2022-05-25 RX ADMIN — Medication 1 GRAM(S): at 05:16

## 2022-05-25 RX ADMIN — Medication 1 GRAM(S): at 17:14

## 2022-05-25 RX ADMIN — Medication 100 MILLIGRAM(S): at 12:00

## 2022-05-25 RX ADMIN — Medication 4 MILLILITER(S): at 17:14

## 2022-05-25 RX ADMIN — PIPERACILLIN AND TAZOBACTAM 25 GRAM(S): 4; .5 INJECTION, POWDER, LYOPHILIZED, FOR SOLUTION INTRAVENOUS at 05:17

## 2022-05-25 NOTE — PROGRESS NOTE ADULT - SUBJECTIVE AND OBJECTIVE BOX
Date of Service: 05-25-22 @ 14:18    Patient is a 81y old  Male who presents with a chief complaint of fever, sob (25 May 2022 09:56)      Any change in ROS: He fEels a little SOB: no cough: but feels SOB after walking 50 feet without oxygen: dw his wife too      MEDICATIONS  (STANDING):  acetylcysteine 10%  Inhalation 4 milliLiter(s) Inhalation two times a day  allopurinol 100 milliGRAM(s) Oral daily  amLODIPine   Tablet 2.5 milliGRAM(s) Oral at bedtime  atorvastatin 80 milliGRAM(s) Oral at bedtime  ipratropium    for Nebulization 500 MICROGram(s) Nebulizer every 6 hours  levothyroxine 200 MICROGram(s) Oral daily  magnesium oxide 400 milliGRAM(s) Oral daily  metoprolol tartrate 25 milliGRAM(s) Oral two times a day  oxybutynin XL 10 milliGRAM(s) Oral daily  pantoprazole    Tablet 40 milliGRAM(s) Oral before breakfast  piperacillin/tazobactam IVPB.. 3.375 Gram(s) IV Intermittent every 8 hours  sertraline 25 milliGRAM(s) Oral daily  sucralfate 1 Gram(s) Oral two times a day    MEDICATIONS  (PRN):  acetaminophen     Tablet .. 650 milliGRAM(s) Oral every 6 hours PRN Moderate Pain (4 - 6)    Vital Signs Last 24 Hrs  T(C): 36.4 (25 May 2022 12:45), Max: 36.9 (24 May 2022 19:30)  T(F): 97.6 (25 May 2022 12:45), Max: 98.5 (24 May 2022 19:30)  HR: 76 (25 May 2022 12:45) (68 - 79)  BP: 149/82 (25 May 2022 12:45) (146/78 - 165/90)  BP(mean): --  RR: 18 (25 May 2022 12:45) (18 - 18)  SpO2: 97% (25 May 2022 12:45) (94% - 97%)    I&O's Summary    24 May 2022 07:01  -  25 May 2022 07:00  --------------------------------------------------------  IN: 830 mL / OUT: 0 mL / NET: 830 mL    25 May 2022 07:01  -  25 May 2022 14:18  --------------------------------------------------------  IN: 300 mL / OUT: 0 mL / NET: 300 mL          Physical Exam:   GENERAL: NAD, well-groomed, well-developed  HEENT: LYDIA/   Atraumatic, Normocephalic  ENMT: No tonsillar erythema, exudates, or enlargement; Moist mucous membranes, Good dentition, No lesions  NECK: Supple, No JVD, Normal thyroid  CHEST/LUNG: Clear to auscultaion- MOSTLY: NO WHEEZING  CVS: Regular rate and rhythm; No murmurs, rubs, or gallops  GI: : Soft, Nontender, Nondistended; Bowel sounds present  NERVOUS SYSTEM:  Alert & Oriented X3  EXTREMITIES:  2+ Peripheral Pulses, No clubbing, cyanosis, or edema  LYMPH: No lymphadenopathy noted  SKIN: No rashes or lesions  ENDOCRINOLOGY: No Thyromegaly  PSYCH: Appropriate    Labs:  22                            12.4   3.55  )-----------( 127      ( 25 May 2022 07:18 )             42.1                         11.9   2.31  )-----------( 108      ( 24 May 2022 07:58 )             41.0                         12.3   1.62  )-----------( 107      ( 23 May 2022 11:08 )             41.7     05-25    135  |  102  |  15  ----------------------------<  101<H>  4.0   |  19<L>  |  1.71<H>  05-24    135  |  101  |  16  ----------------------------<  91  3.9   |  20<L>  |  1.64<H>  05-23    134<L>  |  99  |  13  ----------------------------<  150<H>  3.7   |  21<L>  |  1.76<H>    Ca    9.7      25 May 2022 07:12  Ca    9.7      24 May 2022 07:58    TPro  7.0  /  Alb  3.5  /  TBili  0.3  /  DBili  x   /  AST  24  /  ALT  11  /  AlkPhos  109  05-23    CAPILLARY BLOOD GLUCOSE              RAD< from: Xray Chest 1 View- PORTABLE-Routine (Xray Chest 1 View- PORTABLE-Routine .) (05.24.22 @ 08:22) >    INTERPRETATION:  Chest one view    HISTORY: Pneumonia    COMPARISON STUDY: 5/20/2022    Frontal expiratory view of the chest shows the heart to be similar in   size. The lungs show clearing of the right base and there is no evidence   of pneumothorax nor pleural effusion.    IMPRESSION:  Resolving infiltrate.      Thank you for the courtesy of this referral.    --- End of Report ---            MICHAEL GARCIA MD; Attending Interventional Radiologist  This document has been electronically signed. May 24 2022  4:31PM    < end of copied text >          RECENT CULTURES:  05-20 @ 17:55 Clean Catch Clean Catch (Midstream)                <10,000 CFU/mL Normal Urogenital Asia    05-20 @ 15:35 .Blood Blood-Peripheral                No growth to date.    05-20 @ 15:30 .Blood Blood-Peripheral                No growth to date.          RESPIRATORY CULTURES:          Studies  Chest X-RAY  CT SCAN Chest   Venous Dopplers: LE:   CT Abdomen  Others

## 2022-05-25 NOTE — PROGRESS NOTE ADULT - ASSESSMENT
Patient with metastatic melanoma (with unknown primary - metastatic right lung nodule - M1B disease) [place] [facility] 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 He started Pembrolizumab in 3/28/2022. Most recent treatment was 5/9/2022 (on q8 week dosing). Plan was to repeat imaging in 8 weeks around time of next immunotherapy treatment.    Patient called the office today complaining of new onset of fever, headache, cough, SOB and hypoxia. He was referred to the [facility] ED for evaluation and management    Metastatic Melanoma  --[place] [facility] [place]  --Currently receiving Pembrolizumab q8 weeks - LD 5/9/2022  --Ongoing follow up after discharge    Fever, SOB, Cough, and fever 101.7   - RVP + for HMPV  - CTA with concern for multifocal PNA currently on Zosyn - per ID plan to complete 5 day course today  --Legionella urine negative - Azithromycin DCd  - Patient clinically doing better. Afebrile. Breathing improved and saturation well on room air.  - Given symptomatic improvement with medical management and no steroids- symptoms are likely infection related and less likely to be immunotherapy related Pneumonitis.   - Would continue to hold off on steroids at this time and continue medial management and antibiotics as he is doing   -  ID following. Appreciate input     We will continue to follow along with you and would be happy to assist with any oncology questions on behalf of [facility].     Fantasma Garcia PA-C  Hematology/Oncology  [facility]   [phone] (office)  [phone] (alt office)  Evenings and weekends please call MD on call or office

## 2022-05-25 NOTE — GOALS OF CARE CONVERSATION - ADVANCED CARE PLANNING - CONVERSATION DETAILS
This alert and oriented x 4 patient was seen today for goals of care planning and conversation. Introduced self and role of PCE. Patient states understanding of health status . Patient states he  lives with spouse   and has been independent with all aspects of ADLs prior to admission. Patient states  has prior HCP which is still current and valid,  phone number for spouse update at patients request.       Patient offered  informational video however he refuse same. Patient expressed wish to be a full CPR , intubation procedures and possible complications explained. Patient reports that he has spoken to his wife and daughter multiple time regarding same. Mr Stover called spouse to verify same who states he believes in life at all cost and even if he is brain dead he would like to be kept alive. He is convinced he does not want DNR. Educated on TLT  and  Information videoprovided on  TLT and legacy and  were left  so he can view with family.

## 2022-05-25 NOTE — GOALS OF CARE CONVERSATION - ADVANCED CARE PLANNING - WHAT MATTERS MOST
To beat his cancer. Patient states he was successfully treated for lymphoma last year which is now in remission and was recently diagnosed with metastatic melanoma of unknown primary To beat his cancer. Patient states he was successfully treated for lymphoma  in 2020  which is now in remission and was recently diagnosed with metastatic melanoma of unknown primary. Presently undergoing treatment.

## 2022-05-25 NOTE — PROGRESS NOTE ADULT - SUBJECTIVE AND OBJECTIVE BOX
Patient is a 81y old  Male who presents with a chief complaint of fever, sob (24 May 2022 20:13)    Patient seen this morning. Feeling well without any complaints. Afebrile on room air.    MEDICATIONS  (STANDING):  acetylcysteine 10%  Inhalation 4 milliLiter(s) Inhalation two times a day  allopurinol 100 milliGRAM(s) Oral daily  amLODIPine   Tablet 2.5 milliGRAM(s) Oral at bedtime  atorvastatin 80 milliGRAM(s) Oral at bedtime  ipratropium    for Nebulization 500 MICROGram(s) Nebulizer every 6 hours  levothyroxine 200 MICROGram(s) Oral daily  magnesium oxide 400 milliGRAM(s) Oral daily  metoprolol tartrate 25 milliGRAM(s) Oral two times a day  oxybutynin XL 10 milliGRAM(s) Oral daily  pantoprazole    Tablet 40 milliGRAM(s) Oral before breakfast  piperacillin/tazobactam IVPB.. 3.375 Gram(s) IV Intermittent every 8 hours  sertraline 25 milliGRAM(s) Oral daily  sucralfate 1 Gram(s) Oral two times a day    MEDICATIONS  (PRN):  acetaminophen     Tablet .. 650 milliGRAM(s) Oral every 6 hours PRN Moderate Pain (4 - 6)      Vital Signs Last 24 Hrs  T(C): 36.7 (25 May 2022 05:00), Max: 36.9 (24 May 2022 19:30)  T(F): 98.1 (25 May 2022 05:00), Max: 98.5 (24 May 2022 19:30)  HR: 68 (25 May 2022 05:00) (68 - 79)  BP: 146/78 (25 May 2022 05:00) (146/78 - 165/90)  BP(mean): --  RR: 18 (25 May 2022 05:00) (18 - 18)  SpO2: 95% (25 May 2022 05:00) (94% - 95%)    PE  NAD  Awake, alert  Anicteric, MMM  No c/c/e  No rash grossly                            12.4   3.55  )-----------( 127      ( 25 May 2022 07:18 )             42.1       05-25    135  |  102  |  15  ----------------------------<  101<H>  4.0   |  19<L>  |  1.71<H>    Ca    9.7      25 May 2022 07:12    TPro  7.0  /  Alb  3.5  /  TBili  0.3  /  DBili  x   /  AST  24  /  ALT  11  /  AlkPhos  109  05-23    ACC: [id number] EXAM:  XR CHEST PORTABLE ROUTINE 1V                          PROCEDURE DATE:  05/24/2022          INTERPRETATION:  Chest one view    HISTORY: Pneumonia    COMPARISON STUDY: 5/20/2022    Frontal expiratory view of the chest shows the heart to be similar in   size. The lungs show clearing of the right base and there is no evidence   of pneumothorax nor pleural effusion.    IMPRESSION:  Resolving infiltrate.      Thank you for the courtesy of this referral.    --- End of Report ---

## 2022-05-25 NOTE — PROGRESS NOTE ADULT - SUBJECTIVE AND OBJECTIVE BOX
Patient is a 81y old  Male who presents with a chief complaint of fever, sob (25 May 2022 14:18)    Date of servie : 05-25-22 @ 17:06  INTERVAL HPI/OVERNIGHT EVENTS:  T(C): 36.4 (05-25-22 @ 12:45), Max: 36.9 (05-24-22 @ 19:30)  HR: 93 (05-25-22 @ 16:01) (68 - 93)  BP: 149/82 (05-25-22 @ 12:45) (146/78 - 165/90)  RR: 18 (05-25-22 @ 12:45) (18 - 18)  SpO2: 93% (05-25-22 @ 16:01) (93% - 97%)  Wt(kg): --  I&O's Summary    24 May 2022 07:01  -  25 May 2022 07:00  --------------------------------------------------------  IN: 830 mL / OUT: 0 mL / NET: 830 mL    25 May 2022 07:01  -  25 May 2022 17:06  --------------------------------------------------------  IN: 540 mL / OUT: 0 mL / NET: 540 mL        LABS:                        12.4   3.55  )-----------( 127      ( 25 May 2022 07:18 )             42.1     05-25    135  |  102  |  15  ----------------------------<  101<H>  4.0   |  19<L>  |  1.71<H>    Ca    9.7      25 May 2022 07:12          CAPILLARY BLOOD GLUCOSE                MEDICATIONS  (STANDING):  acetylcysteine 10%  Inhalation 4 milliLiter(s) Inhalation two times a day  allopurinol 100 milliGRAM(s) Oral daily  amLODIPine   Tablet 2.5 milliGRAM(s) Oral at bedtime  atorvastatin 80 milliGRAM(s) Oral at bedtime  ipratropium    for Nebulization 500 MICROGram(s) Nebulizer every 6 hours  levothyroxine 200 MICROGram(s) Oral daily  magnesium oxide 400 milliGRAM(s) Oral daily  metoprolol tartrate 25 milliGRAM(s) Oral two times a day  oxybutynin XL 10 milliGRAM(s) Oral daily  pantoprazole    Tablet 40 milliGRAM(s) Oral before breakfast  piperacillin/tazobactam IVPB.. 3.375 Gram(s) IV Intermittent every 8 hours  sertraline 25 milliGRAM(s) Oral daily  sucralfate 1 Gram(s) Oral two times a day    MEDICATIONS  (PRN):  acetaminophen     Tablet .. 650 milliGRAM(s) Oral every 6 hours PRN Moderate Pain (4 - 6)

## 2022-05-26 ENCOUNTER — TRANSCRIPTION ENCOUNTER (OUTPATIENT)
Age: 82
End: 2022-05-26

## 2022-05-26 VITALS
DIASTOLIC BLOOD PRESSURE: 71 MMHG | TEMPERATURE: 98 F | RESPIRATION RATE: 18 BRPM | OXYGEN SATURATION: 98 % | SYSTOLIC BLOOD PRESSURE: 136 MMHG | HEART RATE: 86 BPM

## 2022-05-26 PROCEDURE — 87086 URINE CULTURE/COLONY COUNT: CPT

## 2022-05-26 PROCEDURE — 85045 AUTOMATED RETICULOCYTE COUNT: CPT

## 2022-05-26 PROCEDURE — 87641 MR-STAPH DNA AMP PROBE: CPT

## 2022-05-26 PROCEDURE — 82607 VITAMIN B-12: CPT

## 2022-05-26 PROCEDURE — 85014 HEMATOCRIT: CPT

## 2022-05-26 PROCEDURE — 82803 BLOOD GASES ANY COMBINATION: CPT

## 2022-05-26 PROCEDURE — 86481 TB AG RESPONSE T-CELL SUSP: CPT

## 2022-05-26 PROCEDURE — 0225U NFCT DS DNA&RNA 21 SARSCOV2: CPT

## 2022-05-26 PROCEDURE — 93005 ELECTROCARDIOGRAM TRACING: CPT

## 2022-05-26 PROCEDURE — 87640 STAPH A DNA AMP PROBE: CPT

## 2022-05-26 PROCEDURE — 97116 GAIT TRAINING THERAPY: CPT

## 2022-05-26 PROCEDURE — 93971 EXTREMITY STUDY: CPT

## 2022-05-26 PROCEDURE — 97530 THERAPEUTIC ACTIVITIES: CPT

## 2022-05-26 PROCEDURE — 82728 ASSAY OF FERRITIN: CPT

## 2022-05-26 PROCEDURE — 87449 NOS EACH ORGANISM AG IA: CPT

## 2022-05-26 PROCEDURE — 99285 EMERGENCY DEPT VISIT HI MDM: CPT

## 2022-05-26 PROCEDURE — 83615 LACTATE (LD) (LDH) ENZYME: CPT

## 2022-05-26 PROCEDURE — 81001 URINALYSIS AUTO W/SCOPE: CPT

## 2022-05-26 PROCEDURE — 84466 ASSAY OF TRANSFERRIN: CPT

## 2022-05-26 PROCEDURE — 94640 AIRWAY INHALATION TREATMENT: CPT

## 2022-05-26 PROCEDURE — 85027 COMPLETE CBC AUTOMATED: CPT

## 2022-05-26 PROCEDURE — 83540 ASSAY OF IRON: CPT

## 2022-05-26 PROCEDURE — 83605 ASSAY OF LACTIC ACID: CPT

## 2022-05-26 PROCEDURE — 84295 ASSAY OF SERUM SODIUM: CPT

## 2022-05-26 PROCEDURE — 80048 BASIC METABOLIC PNL TOTAL CA: CPT

## 2022-05-26 PROCEDURE — 84484 ASSAY OF TROPONIN QUANT: CPT

## 2022-05-26 PROCEDURE — 36415 COLL VENOUS BLD VENIPUNCTURE: CPT

## 2022-05-26 PROCEDURE — 84132 ASSAY OF SERUM POTASSIUM: CPT

## 2022-05-26 PROCEDURE — 96365 THER/PROPH/DIAG IV INF INIT: CPT

## 2022-05-26 PROCEDURE — 80053 COMPREHEN METABOLIC PANEL: CPT

## 2022-05-26 PROCEDURE — 85025 COMPLETE CBC W/AUTO DIFF WBC: CPT

## 2022-05-26 PROCEDURE — 87040 BLOOD CULTURE FOR BACTERIA: CPT

## 2022-05-26 PROCEDURE — 84443 ASSAY THYROID STIM HORMONE: CPT

## 2022-05-26 PROCEDURE — 85610 PROTHROMBIN TIME: CPT

## 2022-05-26 PROCEDURE — 83550 IRON BINDING TEST: CPT

## 2022-05-26 PROCEDURE — 82746 ASSAY OF FOLIC ACID SERUM: CPT

## 2022-05-26 PROCEDURE — 71045 X-RAY EXAM CHEST 1 VIEW: CPT

## 2022-05-26 PROCEDURE — 85018 HEMOGLOBIN: CPT

## 2022-05-26 PROCEDURE — 82330 ASSAY OF CALCIUM: CPT

## 2022-05-26 PROCEDURE — 93970 EXTREMITY STUDY: CPT

## 2022-05-26 PROCEDURE — 82947 ASSAY GLUCOSE BLOOD QUANT: CPT

## 2022-05-26 PROCEDURE — 71275 CT ANGIOGRAPHY CHEST: CPT | Mod: MA

## 2022-05-26 PROCEDURE — 85730 THROMBOPLASTIN TIME PARTIAL: CPT

## 2022-05-26 PROCEDURE — 82435 ASSAY OF BLOOD CHLORIDE: CPT

## 2022-05-26 PROCEDURE — 97162 PT EVAL MOD COMPLEX 30 MIN: CPT

## 2022-05-26 RX ORDER — CEFPODOXIME PROXETIL 100 MG
1 TABLET ORAL
Qty: 8 | Refills: 0
Start: 2022-05-26 | End: 2022-05-29

## 2022-05-26 RX ADMIN — Medication 500 MICROGRAM(S): at 12:18

## 2022-05-26 RX ADMIN — Medication 25 MILLIGRAM(S): at 12:17

## 2022-05-26 RX ADMIN — Medication 500 MICROGRAM(S): at 05:06

## 2022-05-26 RX ADMIN — PANTOPRAZOLE SODIUM 40 MILLIGRAM(S): 20 TABLET, DELAYED RELEASE ORAL at 05:07

## 2022-05-26 RX ADMIN — PIPERACILLIN AND TAZOBACTAM 25 GRAM(S): 4; .5 INJECTION, POWDER, LYOPHILIZED, FOR SOLUTION INTRAVENOUS at 05:08

## 2022-05-26 RX ADMIN — Medication 4 MILLILITER(S): at 05:07

## 2022-05-26 RX ADMIN — Medication 200 MICROGRAM(S): at 05:07

## 2022-05-26 RX ADMIN — SERTRALINE 25 MILLIGRAM(S): 25 TABLET, FILM COATED ORAL at 12:17

## 2022-05-26 RX ADMIN — Medication 10 MILLIGRAM(S): at 12:18

## 2022-05-26 RX ADMIN — Medication 100 MILLIGRAM(S): at 12:18

## 2022-05-26 RX ADMIN — MAGNESIUM OXIDE 400 MG ORAL TABLET 400 MILLIGRAM(S): 241.3 TABLET ORAL at 12:17

## 2022-05-26 RX ADMIN — Medication 1 GRAM(S): at 05:07

## 2022-05-26 NOTE — PROGRESS NOTE ADULT - PROBLEM SELECTOR PROBLEM 1
CAP (community acquired pneumonia)

## 2022-05-26 NOTE — PROGRESS NOTE ADULT - PROBLEM SELECTOR PROBLEM 2
Human metapneumovirus (hMPV) pneumonia

## 2022-05-26 NOTE — DISCHARGE NOTE NURSING/CASE MANAGEMENT/SOCIAL WORK - PATIENT PORTAL LINK FT
You can access the FollowMyHealth Patient Portal offered by [facility] by registering at the following website: [email or web address]. By joining [facility]’s FollowMyHealth portal, you will also be able to view your health information using other applications (apps) compatible with our system.

## 2022-05-26 NOTE — PROGRESS NOTE ADULT - PROBLEM SELECTOR PLAN 3
seems to have resolved:    5/25: resolved!    5/26: normoxic
seems to have resolved:
seems to have resolved:
seems to have resolved:    5/25: resolved!

## 2022-05-26 NOTE — PROGRESS NOTE ADULT - ASSESSMENT
Patient with metastatic melanoma (with unknown primary - metastatic right lung nodule - M1B disease) under care by Dr. Nnamdi Farnsworth of [facility] [place]. 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 He started Pembrolizumab in 3/28/2022. Most recent treatment was 5/9/2022 (on q8 week dosing). Plan was to repeat imaging in 8 weeks around time of next immunotherapy treatment.    Patient called the office today complaining of new onset of fever, headache, cough, SOB and hypoxia. He was referred to the [facility] ED for evaluation and management    Metastatic Melanoma  --Under care by Dr. Nnamdi Farnsworth of [facility] [place]  --Currently receiving Pembrolizumab q8 weeks - LD 5/9/2022  --Ongoing follow up after discharge    Fever, SOB, Cough, and fever 101.7 on admission  - RVP + for HMPV  - CTA with concern for multifocal PNA Zosyn completed  --Legionella urine negative - Azithromycin DCd  - Patient clinically doing better. Afebrile. Breathing improved and saturation well on room air.  - Given symptomatic improvement with medical management and no steroids- symptoms are likely infection related and less likely to be immunotherapy related Pneumonitis.   - Would continue to hold off on steroids at this time and continue medial management and antibiotics as he is doing   -  ID following. Appreciate input     We will continue to follow along with you and would be happy to assist with any oncology questions on behalf of [facility].     Fantasma Garcia PA-C  Hematology/Oncology  [facility]   [phone] (office)  [phone] (alt office)  Evenings and weekends please call MD on call or office

## 2022-05-26 NOTE — PROGRESS NOTE ADULT - SUBJECTIVE AND OBJECTIVE BOX
Date of Service: 05-26-22 @ 14:23    Patient is a 81y old  Male who presents with a chief complaint of fever, sob (26 May 2022 12:26)      Any change in ROS: doing much better:  no sob:       MEDICATIONS  (STANDING):  acetylcysteine 10%  Inhalation 4 milliLiter(s) Inhalation two times a day  allopurinol 100 milliGRAM(s) Oral daily  amLODIPine   Tablet 2.5 milliGRAM(s) Oral at bedtime  atorvastatin 80 milliGRAM(s) Oral at bedtime  ipratropium    for Nebulization 500 MICROGram(s) Nebulizer every 6 hours  levothyroxine 200 MICROGram(s) Oral daily  magnesium oxide 400 milliGRAM(s) Oral daily  metoprolol tartrate 25 milliGRAM(s) Oral two times a day  oxybutynin XL 10 milliGRAM(s) Oral daily  pantoprazole    Tablet 40 milliGRAM(s) Oral before breakfast  piperacillin/tazobactam IVPB.. 3.375 Gram(s) IV Intermittent every 8 hours  sertraline 25 milliGRAM(s) Oral daily  sucralfate 1 Gram(s) Oral two times a day    MEDICATIONS  (PRN):  acetaminophen     Tablet .. 650 milliGRAM(s) Oral every 6 hours PRN Moderate Pain (4 - 6)    Vital Signs Last 24 Hrs  T(C): 36.4 (26 May 2022 12:13), Max: 36.8 (25 May 2022 19:35)  T(F): 97.6 (26 May 2022 12:13), Max: 98.2 (25 May 2022 19:35)  HR: 86 (26 May 2022 12:13) (62 - 93)  BP: 136/71 (26 May 2022 12:13) (131/75 - 148/73)  BP(mean): --  RR: 18 (26 May 2022 12:13) (18 - 18)  SpO2: 98% (26 May 2022 12:13) (93% - 98%)    I&O's Summary    25 May 2022 07:01  -  26 May 2022 07:00  --------------------------------------------------------  IN: 1250 mL / OUT: 0 mL / NET: 1250 mL    26 May 2022 07:01  -  26 May 2022 14:23  --------------------------------------------------------  IN: 480 mL / OUT: 0 mL / NET: 480 mL          Physical Exam:   GENERAL: NAD, well-groomed, well-developed  HEENT: LYDIA/   Atraumatic, Normocephalic  ENMT: No tonsillar erythema, exudates, or enlargement; Moist mucous membranes, Good dentition, No lesions  NECK: Supple, No JVD, Normal thyroid  CHEST/LUNG:no wheezing  CVS: Regular rate and rhythm; No murmurs, rubs, or gallops  GI: : Soft, Nontender, Nondistended; Bowel sounds present  NERVOUS SYSTEM:  Alert & Oriented X3  EXTREMITIES:  2+ Peripheral Pulses, No clubbing, cyanosis, or edema  LYMPH: No lymphadenopathy noted  SKIN: No rashes or lesions  ENDOCRINOLOGY: No Thyromegaly  PSYCH: Appropriate    Labs:  22                            12.4   3.55  )-----------( 127      ( 25 May 2022 07:18 )             42.1                         11.9   2.31  )-----------( 108      ( 24 May 2022 07:58 )             41.0                         12.3   1.62  )-----------( 107      ( 23 May 2022 11:08 )             41.7     05-25    135  |  102  |  15  ----------------------------<  101<H>  4.0   |  19<L>  |  1.71<H>  05-24    135  |  101  |  16  ----------------------------<  91  3.9   |  20<L>  |  1.64<H>  05-23    134<L>  |  99  |  13  ----------------------------<  150<H>  3.7   |  21<L>  |  1.76<H>    Ca    9.7      25 May 2022 07:12    TPro  7.0  /  Alb  3.5  /  TBili  0.3  /  DBili  x   /  AST  24  /  ALT  11  /  AlkPhos  109  05-23    CAPILLARY BLOOD GLUCOSE                      RECENT CULTURES:  05-20 @ 17:55 Clean Catch Clean Catch (Midstream)                <10,000 CFU/mL Normal Urogenital Asia    05-20 @ 15:35 .Blood Blood-Peripheral              rad< from: Xray Chest 1 View- PORTABLE-Urgent (Xray Chest 1 View- PORTABLE-Urgent .) (05.25.22 @ 13:25) >  INTERPRETATION:  INDICATION: Shortness of breath    COMPARISON: Chest radiograph 5/24/2022    FINDINGS:  Heart/Vascular: The cardiac silhouette is normal in size.  Pulmonary: No focal consolidation, pleural effusion or pneumothorax.  Bones: The visualized osseous structures are unremarkable.    Impression:  Clear lungs.    < end of copied text >    No Growth Final    05-20 @ 15:30 .Blood Blood-Peripheral                No Growth Final          RESPIRATORY CULTURES:          Studies  Chest X-RAY  CT SCAN Chest   Venous Dopplers: LE:   CT Abdomen  Others        rad< from: Xray Chest 1 View- PORTABLE-Urgent (Xray Chest 1 View- PORTABLE-Urgent .) (05.25.22 @ 13:25) >  INTERPRETATION:  INDICATION: Shortness of breath    COMPARISON: Chest radiograph 5/24/2022    FINDINGS:  Heart/Vascular: The cardiac silhouette is normal in size.  Pulmonary: No focal consolidation, pleural effusion or pneumothorax.  Bones: The visualized osseous structures are unremarkable.    Impression:  Clear lungs.    --- End of Report ---    < end of copied text >

## 2022-05-26 NOTE — PROGRESS NOTE ADULT - SUBJECTIVE AND OBJECTIVE BOX
Patient is a 81y old  Male who presents with a chief complaint of fever, sob (25 May 2022 17:06)    Patient seen this morning. Feeling well without complaints. Antibiotics have been completed.    MEDICATIONS  (STANDING):  acetylcysteine 10%  Inhalation 4 milliLiter(s) Inhalation two times a day  allopurinol 100 milliGRAM(s) Oral daily  amLODIPine   Tablet 2.5 milliGRAM(s) Oral at bedtime  atorvastatin 80 milliGRAM(s) Oral at bedtime  ipratropium    for Nebulization 500 MICROGram(s) Nebulizer every 6 hours  levothyroxine 200 MICROGram(s) Oral daily  magnesium oxide 400 milliGRAM(s) Oral daily  metoprolol tartrate 25 milliGRAM(s) Oral two times a day  oxybutynin XL 10 milliGRAM(s) Oral daily  pantoprazole    Tablet 40 milliGRAM(s) Oral before breakfast  piperacillin/tazobactam IVPB.. 3.375 Gram(s) IV Intermittent every 8 hours  sertraline 25 milliGRAM(s) Oral daily  sucralfate 1 Gram(s) Oral two times a day    MEDICATIONS  (PRN):  acetaminophen     Tablet .. 650 milliGRAM(s) Oral every 6 hours PRN Moderate Pain (4 - 6)      Vital Signs Last 24 Hrs  T(C): 36.4 (26 May 2022 12:13), Max: 36.8 (25 May 2022 19:35)  T(F): 97.6 (26 May 2022 12:13), Max: 98.2 (25 May 2022 19:35)  HR: 86 (26 May 2022 12:13) (62 - 93)  BP: 136/71 (26 May 2022 12:13) (131/75 - 149/82)  BP(mean): --  RR: 18 (26 May 2022 12:13) (18 - 18)  SpO2: 98% (26 May 2022 12:13) (93% - 98%)    PE  NAD  Awake, alert  Anicteric, MMM  No c/c/e  No rash grossly                            12.4   3.55  )-----------( 127      ( 25 May 2022 07:18 )             42.1       05-25    135  |  102  |  15  ----------------------------<  101<H>  4.0   |  19<L>  |  1.71<H>    Ca    9.7      25 May 2022 07:12      ACC: [id number] EXAM:  XR CHEST PORTABLE URGENT 1V                          PROCEDURE DATE:  05/25/2022          INTERPRETATION:  INDICATION: Shortness of breath    COMPARISON: Chest radiograph 5/24/2022    FINDINGS:  Heart/Vascular: The cardiac silhouette is normal in size.  Pulmonary: No focal consolidation, pleural effusion or pneumothorax.  Bones: The visualized osseous structures are unremarkable.    Impression:  Clear lungs.    --- End of Report ---

## 2022-05-26 NOTE — PROGRESS NOTE ADULT - PROBLEM SELECTOR PLAN 2
supportive care:  he has not been wheezing;   no need for steorids at this time
supportive care:  he has not been wheezing;   no need for steorids at this time    5/24: cont supportive care!    5/25: out of isolation:  no wheezing:
supportive care:  he has not been wheezing;   no need for steorids at this time    5/24: cont supportive care!
supportive care:  he has not been wheezing;   no need for steorids at this time    : cont supportive care!    : out of isolation:  no wheezin/26: resolved

## 2022-05-26 NOTE — DISCHARGE NOTE NURSING/CASE MANAGEMENT/SOCIAL WORK - NSDCPEFALRISK_GEN_ALL_CORE
For information on Fall & Injury Prevention, visit: [email or web address] OR  [email or web address] OR  https://www.cdc.gov/steadi/patient.html

## 2022-05-26 NOTE — PROGRESS NOTE ADULT - PROBLEM SELECTOR PLAN 5
[facility]
per[facility]
perhemonc

## 2022-05-26 NOTE — PROGRESS NOTE ADULT - REASON FOR ADMISSION
fever, sob

## 2022-05-26 NOTE — DISCHARGE NOTE NURSING/CASE MANAGEMENT/SOCIAL WORK - NSDCVIVACCINE_GEN_ALL_CORE_FT
Tdap; 18-Apr-2022 16:15; Nathaniel Nelson (RN); [employer]; [id number] (Exp. Date: 18-Jan-2024); IntraMuscular; Deltoid Right.; 0.5 milliLiter(s); VIS (VIS Published: 09-May-2013, VIS Presented: 18-Apr-2022);

## 2022-05-26 NOTE — DISCHARGE NOTE NURSING/CASE MANAGEMENT/SOCIAL WORK - NSTRANSFEREYEGLASSESPAIRS_GEN_A_NUR
CM met with patient and [facility]/MPOA [facility]ili [facility] stated that they prefer [address] [place] [address],[address] [place] [address] over [address] [place] [address]. Mili Gracia stated she has spoken with [address] [place] [address] and they have a private room available today- wondering if patient can be discharged, CM paged attending MD. The family is asking CM if they should pay to hold the private room for the patient- The CM stated this is a personal and family decision, the family does not want patient in a semi-private room, CM again stated this is a personal/family decision, CM stated she would page MD and talk with [address] [place] [address] to see if they can accept patient today, they have accepted in [facility]. The patient's daughter confirmed patient has not had any psychiatric hospitalizations in past two years. The family again endorses that stay is for short-term rehab, family already has plan between the patient's children to have 24/7 supervision and support at home following discharge from rehab.  
 
11:44 a.m.- CM called and left voicemail message requesting a call back from [address] [place] [address] admissions. CM will speak with therapy regarding transport- anticipate patient will require BLS transport. MALGORZATA Rodriguez CM spoke with therapy- patient will require BLS transport upon discharge. CM awaiting to hear from MD and [address] [place] [address] admissions. MALGORZATA Rodriguez 
 
12:11 p.m.- BURKE called and spoke with Faith Blanchard in admissions with [facility] ([facility]) stated they can accept patient today if medically stable, Ynes and admissions have been talking with the family and family plans to pay privately to hold the private room at [address] [place] [address]. CM awaiting to hear from attending MD if patient can discharge, will need BLS transport. CM will continue to follow.  MALGORZATA Rodriguez CM spoke with attending MD- stated patient is medically ready for d/c. CM spoke with Love in admissions at [address] [place] [address]- stated patient is going to private room today, and again verified they can accept patient. CM sent referral to [facility] requesting 3:00 p.m. Pick-up to [facility], therapy stated patient will need BLS. The CM will fax discharge summary once available (f:[phone]). RN to call report to ([phone]), Envelope on chart for RN to complete MARS, KARDEX, etc. MALGORZATA Dan CM met with patient and daughter/JAYME Damon Peers at bedside- both aware and agreeable for discharge to [facility] today. MALGORZATA Dan  
 
13:00 p.m.- CM provided update with Nobie Handler with [facility] and provided update that family wants [facility]- anticipate patient may be great candidate for two-step program, SNF then transition to inpatient rehab. Nobie Handler called the patient's daughter and discussed program- [address] [place] [address] will follow while at [facility]. [facility] can accommodate 4:30 p.m. Pick-up. CM called Jason Yeh with [facility] and verbalized agreement with estimated time of arrival, CM faxed discharge summary to facility. CM notified daughter Nelson Peers at bedside of time of transport, she plans to follow ambulance in her personal vehicle on the way to [facility]. MALGORZATA Dan CM put free 30 day Eliquis card in the patient's envelope to go with patient to [facility]. [facility] selected in cclink, CM called [facility] and confirmed receipt of discharge summary- CM also faxed medication reconciliation from pharmacy. 15:56 p.m.- CM met with daughter/JAYME Damon Peers and other son Margaret Severance at bedside- all aware and agreeable for d/c to [address] asked if other son Juliet Sampson needs to be called,Teresa stated no- family is very close and all children are aware and agreeable. Maine Jackson 1 pair

## 2022-05-26 NOTE — PROGRESS NOTE ADULT - NSPROGADDITIONALINFOA_GEN_ALL_CORE
gamaliel id: he would need repeat ct scan chest in 4-6 weeks time to follow up lung opacities:
gamaliel id: he would need repeat ct scan chest in 4-6 weeks time to follow up lung opacities:    5/25: gamaliel weaver and wife
gamaliel id: he would need repeat ct scan chest in 4-6 weeks time to follow up lung opacities:    5/25: gamaliel acp and wife    5/26: daiana planning

## 2022-05-26 NOTE — PROGRESS NOTE ADULT - PROVIDER SPECIALTY LIST ADULT
Heme/Onc
Hospitalist
Infectious Disease
Heme/Onc
Hospitalist
Infectious Disease
Heme/Onc
Heme/Onc
Hospitalist
Heme/Onc
Heme/Onc
Hospitalist
Hospitalist
Infectious Disease
Pulmonology

## 2022-05-26 NOTE — DISCHARGE NOTE NURSING/CASE MANAGEMENT/SOCIAL WORK - NSDCPETBCESMAN_GEN_ALL_CORE
If you are a smoker, it is important for your health to stop smoking. Please be aware that second hand smoke is also harmful. Hemigard Postcare Instructions: The HEMIGARD strips are to remain completely dry for at least 5-7 days.

## 2022-05-26 NOTE — PROGRESS NOTE ADULT - NS ATTEND AMEND GEN_ALL_CORE FT
none
80 y/o male with metastatic melanoma, on pembrolizumab under treatment at [facility], admitted with hypoxia, fever.     Patient has finished antibiotics at this time and symptoms have improved.  Will follow-up with [facility] as outpatient for resumption of immunotherapy.
pt improved with abx. lung exam is normal. DC planning. ID following

## 2022-05-26 NOTE — PROGRESS NOTE ADULT - PROBLEM SELECTOR PLAN 4
cont antibtiocs
cont antibtiocs    5/25: antibiotics per ID
cont antibtiocs    5/25: antibiotics per ID
cont antibtiocs

## 2022-05-26 NOTE — PROGRESS NOTE ADULT - PROBLEM SELECTOR PLAN 1
he is on broad spectrum antibiotics:  he is on room air:  while drug induced pneumonitis remains a possibility:  for now clinically he seems to be getting better:  would cont current rx:  danielle repeat chest xray in a day or two    : to me he is imrpoving:  no sob:  no wheezing:  todays chest xray looks normal to me: official report is pendin/25: he seems better: but not interested in going h ome today : on room air: chest xray yesterday  with resolving infiltrates:    : repeat chest xray yesterday was clear:heis feeling much better: for dc today : he will have repeat ct scan chest with [facility] mid next month
he is on broad spectrum antibiotics:  he is on room air:  while drug induced pneumonitis remains a possibility:  for now clinically he seems to be getting better:  would cont current rx:  danielle repeat chest xray in a day or two
he is on broad spectrum antibiotics:  he is on room air:  while drug induced pneumonitis remains a possibility:  for now clinically he seems to be getting better:  would cont current rx:  danielle repeat chest xray in a day or two    5/24: to me he is imrpoving:  no sob:  no wheezing:  todays chest xray looks normal to me: official report is pending:
he is on broad spectrum antibiotics:  he is on room air:  while drug induced pneumonitis remains a possibility:  for now clinically he seems to be getting better:  would cont current rx:  danielle repeat chest xray in a day or two    : to me he is imrpoving:  no sob:  no wheezing:  todays chest xray looks normal to me: official report is pendin/25: he seems better: but not interested in going h ome today : on room air: chest xray yesterday  with resolving infiltrates:

## 2022-05-27 ENCOUNTER — NON-APPOINTMENT (OUTPATIENT)
Age: 82
End: 2022-05-27

## 2022-07-17 ENCOUNTER — RX RENEWAL (OUTPATIENT)
Age: 82
End: 2022-07-17

## 2022-07-17 RX ORDER — OXYBUTYNIN CHLORIDE 10 MG/1
10 TABLET, EXTENDED RELEASE ORAL DAILY
Qty: 90 | Refills: 3 | Status: ACTIVE | COMMUNITY
Start: 2018-10-03 | End: 1900-01-01

## 2022-12-15 ENCOUNTER — APPOINTMENT (OUTPATIENT)
Dept: UROLOGY | Facility: CLINIC | Age: 82
End: 2022-12-15

## 2022-12-15 DIAGNOSIS — N13.8 BENIGN PROSTATIC HYPERPLASIA WITH LOWER URINARY TRACT SYMPMS: ICD-10-CM

## 2022-12-15 DIAGNOSIS — N39.41 URGE INCONTINENCE: ICD-10-CM

## 2022-12-15 DIAGNOSIS — N40.1 BENIGN PROSTATIC HYPERPLASIA WITH LOWER URINARY TRACT SYMPMS: ICD-10-CM

## 2022-12-15 DIAGNOSIS — R97.20 ELEVATED PROSTATE, SPECIFIC ANTIGEN [PSA]: ICD-10-CM

## 2022-12-15 PROCEDURE — 99214 OFFICE O/P EST MOD 30 MIN: CPT

## 2022-12-15 RX ORDER — SERTRALINE HYDROCHLORIDE 50 MG/1
50 TABLET, FILM COATED ORAL
Refills: 0 | Status: COMPLETED | COMMUNITY
End: 2022-12-15

## 2022-12-15 RX ORDER — ALFUZOSIN HYDROCHLORIDE 10 MG/1
10 TABLET, EXTENDED RELEASE ORAL
Qty: 90 | Refills: 3 | Status: ACTIVE | COMMUNITY
Start: 2022-12-15 | End: 1900-01-01

## 2022-12-15 RX ORDER — ENCORAFENIB 75 MG/1
75 CAPSULE ORAL
Refills: 0 | Status: ACTIVE | COMMUNITY

## 2022-12-15 RX ORDER — BINIMETINIB 15 MG/1
15 TABLET, FILM COATED ORAL
Refills: 0 | Status: ACTIVE | COMMUNITY

## 2022-12-15 RX ORDER — TESTOSTERONE CYPIONATE, ALCOHOL 200 MG/ML
200 KIT INTRAMUSCULAR
Refills: 0 | Status: COMPLETED | COMMUNITY
End: 2022-12-15

## 2022-12-22 LAB
APPEARANCE: CLEAR
BACTERIA UR CULT: NORMAL
BACTERIA: NEGATIVE
BILIRUBIN URINE: NEGATIVE
BLOOD URINE: NEGATIVE
COLOR: YELLOW
GLUCOSE QUALITATIVE U: NEGATIVE
HYALINE CASTS: 2 /LPF
KETONES URINE: NEGATIVE
LEUKOCYTE ESTERASE URINE: NEGATIVE
MICROSCOPIC-UA: NORMAL
NITRITE URINE: NEGATIVE
PH URINE: 6.5
PROTEIN URINE: ABNORMAL
PSA FREE FLD-MCNC: 11 %
PSA FREE SERPL-MCNC: 0.87 NG/ML
PSA SERPL-MCNC: 7.63 NG/ML
RED BLOOD CELLS URINE: 3 /HPF
SPECIFIC GRAVITY URINE: 1.02
SQUAMOUS EPITHELIAL CELLS: 3 /HPF
UROBILINOGEN URINE: NORMAL
WHITE BLOOD CELLS URINE: 2 /HPF

## 2022-12-28 NOTE — ASSESSMENT
[FreeTextEntry1] : BPH: recommend to start alfuzosin 10 mg once daily.\par Goals of medication reviewed.  Discussed the potential adverse effects of the medication.  Discussed the proper administration of the medication.\par \par Continue oxybutynin ER 10 mg,\par \par PSA today.\par UA, culture.\par \par Follow up in 3 months.

## 2022-12-28 NOTE — HISTORY OF PRESENT ILLNESS
[FreeTextEntry1] : Patient is a 82 year old man \par Currently undergoing treatment for metastatic melanoma, [facility] \par On Binimetinib and Encorafenib\par \par Reports that stream is weak, does not feel that it has been emptying over the last 3 months\par Remains on Oxybutynin 10 mg ER but feels urgency and frequency has the increased.\par

## 2023-02-11 ENCOUNTER — INPATIENT (INPATIENT)
Facility: HOSPITAL | Age: 83
LOS: 3 days | Discharge: HOME | DRG: 71 | End: 2023-02-15
Attending: INTERNAL MEDICINE | Admitting: INTERNAL MEDICINE
Payer: COMMERCIAL

## 2023-02-11 VITALS
DIASTOLIC BLOOD PRESSURE: 86 MMHG | TEMPERATURE: 98 F | SYSTOLIC BLOOD PRESSURE: 165 MMHG | RESPIRATION RATE: 20 BRPM | HEART RATE: 85 BPM | WEIGHT: 235.89 LBS | OXYGEN SATURATION: 97 % | HEIGHT: 75 IN

## 2023-02-11 DIAGNOSIS — Z98.890 OTHER SPECIFIED POSTPROCEDURAL STATES: Chronic | ICD-10-CM

## 2023-02-11 LAB
ALBUMIN SERPL ELPH-MCNC: 3.7 G/DL — SIGNIFICANT CHANGE UP (ref 3.3–5)
ALP SERPL-CCNC: 250 U/L — HIGH (ref 40–120)
ALT FLD-CCNC: 13 U/L — SIGNIFICANT CHANGE UP (ref 10–45)
ANION GAP SERPL CALC-SCNC: 14 MMOL/L — SIGNIFICANT CHANGE UP (ref 5–17)
APPEARANCE UR: CLEAR — SIGNIFICANT CHANGE UP
APTT BLD: 29.3 SEC — SIGNIFICANT CHANGE UP (ref 27.5–35.5)
AST SERPL-CCNC: 27 U/L — SIGNIFICANT CHANGE UP (ref 10–40)
BACTERIA # UR AUTO: NEGATIVE — SIGNIFICANT CHANGE UP
BASE EXCESS BLDV CALC-SCNC: 0.6 MMOL/L — SIGNIFICANT CHANGE UP (ref -2–3)
BASOPHILS # BLD AUTO: 0.01 K/UL — SIGNIFICANT CHANGE UP (ref 0–0.2)
BASOPHILS NFR BLD AUTO: 0.2 % — SIGNIFICANT CHANGE UP (ref 0–2)
BILIRUB SERPL-MCNC: 0.3 MG/DL — SIGNIFICANT CHANGE UP (ref 0.2–1.2)
BILIRUB UR-MCNC: NEGATIVE — SIGNIFICANT CHANGE UP
BUN SERPL-MCNC: 18 MG/DL — SIGNIFICANT CHANGE UP (ref 7–23)
CA-I SERPL-SCNC: 1.36 MMOL/L — HIGH (ref 1.15–1.33)
CALCIUM SERPL-MCNC: 10.6 MG/DL — HIGH (ref 8.4–10.5)
CHLORIDE BLDV-SCNC: 99 MMOL/L — SIGNIFICANT CHANGE UP (ref 96–108)
CHLORIDE SERPL-SCNC: 98 MMOL/L — SIGNIFICANT CHANGE UP (ref 96–108)
CO2 BLDV-SCNC: 28 MMOL/L — HIGH (ref 22–26)
CO2 SERPL-SCNC: 22 MMOL/L — SIGNIFICANT CHANGE UP (ref 22–31)
COLOR SPEC: SIGNIFICANT CHANGE UP
CREAT SERPL-MCNC: 1.36 MG/DL — HIGH (ref 0.5–1.3)
DIFF PNL FLD: ABNORMAL
EGFR: 53 ML/MIN/1.73M2 — LOW
EOSINOPHIL # BLD AUTO: 0 K/UL — SIGNIFICANT CHANGE UP (ref 0–0.5)
EOSINOPHIL NFR BLD AUTO: 0 % — SIGNIFICANT CHANGE UP (ref 0–6)
EPI CELLS # UR: 0 /HPF — SIGNIFICANT CHANGE UP
GAS PNL BLDV: 131 MMOL/L — LOW (ref 136–145)
GAS PNL BLDV: SIGNIFICANT CHANGE UP
GLUCOSE BLDV-MCNC: 105 MG/DL — HIGH (ref 70–99)
GLUCOSE SERPL-MCNC: 105 MG/DL — HIGH (ref 70–99)
GLUCOSE UR QL: NEGATIVE — SIGNIFICANT CHANGE UP
HCO3 BLDV-SCNC: 27 MMOL/L — SIGNIFICANT CHANGE UP (ref 22–29)
HCT VFR BLD CALC: 40.6 % — SIGNIFICANT CHANGE UP (ref 39–50)
HCT VFR BLDA CALC: 38 % — LOW (ref 39–51)
HGB BLD CALC-MCNC: 12.6 G/DL — SIGNIFICANT CHANGE UP (ref 12.6–17.4)
HGB BLD-MCNC: 12.4 G/DL — LOW (ref 13–17)
HYALINE CASTS # UR AUTO: 0 /LPF — SIGNIFICANT CHANGE UP (ref 0–2)
IMM GRANULOCYTES NFR BLD AUTO: 1 % — HIGH (ref 0–0.9)
INR BLD: 1.14 RATIO — SIGNIFICANT CHANGE UP (ref 0.88–1.16)
KETONES UR-MCNC: NEGATIVE — SIGNIFICANT CHANGE UP
LACTATE BLDV-MCNC: 1.2 MMOL/L — SIGNIFICANT CHANGE UP (ref 0.5–2)
LDH SERPL L TO P-CCNC: 1181 U/L — HIGH (ref 50–242)
LEUKOCYTE ESTERASE UR-ACNC: NEGATIVE — SIGNIFICANT CHANGE UP
LYMPHOCYTES # BLD AUTO: 0.58 K/UL — LOW (ref 1–3.3)
LYMPHOCYTES # BLD AUTO: 12.1 % — LOW (ref 13–44)
MAGNESIUM SERPL-MCNC: 1.9 MG/DL — SIGNIFICANT CHANGE UP (ref 1.6–2.6)
MCHC RBC-ENTMCNC: 24.6 PG — LOW (ref 27–34)
MCHC RBC-ENTMCNC: 30.5 GM/DL — LOW (ref 32–36)
MCV RBC AUTO: 80.6 FL — SIGNIFICANT CHANGE UP (ref 80–100)
MONOCYTES # BLD AUTO: 0.74 K/UL — SIGNIFICANT CHANGE UP (ref 0–0.9)
MONOCYTES NFR BLD AUTO: 15.5 % — HIGH (ref 2–14)
NEUTROPHILS # BLD AUTO: 3.4 K/UL — SIGNIFICANT CHANGE UP (ref 1.8–7.4)
NEUTROPHILS NFR BLD AUTO: 71.2 % — SIGNIFICANT CHANGE UP (ref 43–77)
NITRITE UR-MCNC: NEGATIVE — SIGNIFICANT CHANGE UP
NRBC # BLD: 0 /100 WBCS — SIGNIFICANT CHANGE UP (ref 0–0)
PCO2 BLDV: 47 MMHG — SIGNIFICANT CHANGE UP (ref 42–55)
PH BLDV: 7.36 — SIGNIFICANT CHANGE UP (ref 7.32–7.43)
PH UR: 6.5 — SIGNIFICANT CHANGE UP (ref 5–8)
PHOSPHATE SERPL-MCNC: 2.9 MG/DL — SIGNIFICANT CHANGE UP (ref 2.5–4.5)
PLATELET # BLD AUTO: 160 K/UL — SIGNIFICANT CHANGE UP (ref 150–400)
PO2 BLDV: 17 MMHG — LOW (ref 25–45)
POTASSIUM BLDV-SCNC: 4.5 MMOL/L — SIGNIFICANT CHANGE UP (ref 3.5–5.1)
POTASSIUM SERPL-MCNC: 4.7 MMOL/L — SIGNIFICANT CHANGE UP (ref 3.5–5.3)
POTASSIUM SERPL-SCNC: 4.7 MMOL/L — SIGNIFICANT CHANGE UP (ref 3.5–5.3)
PROT SERPL-MCNC: 7.9 G/DL — SIGNIFICANT CHANGE UP (ref 6–8.3)
PROT UR-MCNC: ABNORMAL
PROTHROM AB SERPL-ACNC: 13.2 SEC — SIGNIFICANT CHANGE UP (ref 10.5–13.4)
RAPID RVP RESULT: SIGNIFICANT CHANGE UP
RBC # BLD: 5.04 M/UL — SIGNIFICANT CHANGE UP (ref 4.2–5.8)
RBC # FLD: 19.9 % — HIGH (ref 10.3–14.5)
RBC CASTS # UR COMP ASSIST: 18 /HPF — HIGH (ref 0–4)
SAO2 % BLDV: 18.4 % — LOW (ref 67–88)
SARS-COV-2 RNA SPEC QL NAA+PROBE: SIGNIFICANT CHANGE UP
SODIUM SERPL-SCNC: 134 MMOL/L — LOW (ref 135–145)
SP GR SPEC: 1.04 — HIGH (ref 1.01–1.02)
URATE SERPL-MCNC: 6.4 MG/DL — SIGNIFICANT CHANGE UP (ref 3.4–8.8)
UROBILINOGEN FLD QL: NEGATIVE — SIGNIFICANT CHANGE UP
WBC # BLD: 4.78 K/UL — SIGNIFICANT CHANGE UP (ref 3.8–10.5)
WBC # FLD AUTO: 4.78 K/UL — SIGNIFICANT CHANGE UP (ref 3.8–10.5)
WBC UR QL: 0 /HPF — SIGNIFICANT CHANGE UP (ref 0–5)

## 2023-02-11 PROCEDURE — 99285 EMERGENCY DEPT VISIT HI MDM: CPT

## 2023-02-11 PROCEDURE — 70460 CT HEAD/BRAIN W/DYE: CPT | Mod: 26,MA

## 2023-02-11 PROCEDURE — 71045 X-RAY EXAM CHEST 1 VIEW: CPT | Mod: 26

## 2023-02-11 PROCEDURE — 71275 CT ANGIOGRAPHY CHEST: CPT | Mod: 26,MA

## 2023-02-11 NOTE — ED ADULT NURSE NOTE - NSIMPLEMENTINTERV_GEN_ALL_ED
Implemented All Fall Risk Interventions:  Elfrida to call system. Call bell, personal items and telephone within reach. Instruct patient to call for assistance. Room bathroom lighting operational. Non-slip footwear when patient is off stretcher. Physically safe environment: no spills, clutter or unnecessary equipment. Stretcher in lowest position, wheels locked, appropriate side rails in place. Provide visual cue, wrist band, yellow gown, etc. Monitor gait and stability. Monitor for mental status changes and reorient to person, place, and time. Review medications for side effects contributing to fall risk. Reinforce activity limits and safety measures with patient and family.

## 2023-02-11 NOTE — ED PROVIDER NOTE - CLINICAL SUMMARY MEDICAL DECISION MAKING FREE TEXT BOX
81 yo M pmhx Follicular lymphoma, Melanoma with metastases currently on chemotherapy seen at [facility] treatment 1 week ago presents with  1 week of lethargy fatigue confusion decreased memory. VSS. benign exam.   Concern for infectious versus metabolic versus electrolyte abnormality versus medication side effect versus metastasis.  plan Labs EKG chest x-ray UA CT head and chest and likely admission

## 2023-02-11 NOTE — ED PROVIDER NOTE - ATTENDING APP SHARED VISIT CONTRIBUTION OF CARE
Private Physician Mohseni/pcp/Kelley ACP,   82y M PMH Duodenal ulcer, Hyperparathyroidism, HTN, SBO,metastatic melanoma Gerd, HLD, Hypothryodism, Gout, covid tx mcab, PT comes to ed c/o confusion/trouble w short term memory since 1/29. Took chemo 1/30 Obdualag, Took Mektovi/Braftovi for another week. Pt now sleeping 12h at night and additional 4-5 during the day. Has also c/o shortness of breath for past several weeks. Spoke to MD at [facility] and referred to Endocrine felt to be 2/2 chemo. Private Physician Mohseni/pcp/Kelley ACP,   82y M PMH Duodenal ulcer, Hyperparathyroidism, HTN, SBO,metastatic melanoma Gerd, HLD, Hypothryodism, Gout, covid tx mcab, PT comes to ed c/o confusion/trouble w short term memory since 1/29. Took chemo 1/30 Obdualag, Took Mektovi/Braftovi for another week. Pt now sleeping 12h at night and additional 4-5 during the day. Has also c/o shortness of breath for past several weeks. Spoke to MD at [facility] and referred to Endocrine felt to be 2/2 chemo. SOB not severe has also c/o headaches for several years. Mod responsive to Tylenol.   PE Adult male awake alert normocephalic atraumatic neck supple chest clear anterior & posterior cv no rubs, gallops or murmurs abd soft +bs no mass guarding neuro gcs 15 speech fluent power 5/5 all extr. awake alert oriented x3.  Bry Aldrich MD, Facep Private Physician Mohseni/pcp/Kelley ACP,   82y M PMH Duodenal ulcer, Hyperparathyroidism, HTN, SBO,metastatic melanoma Gerd, HLD, Hypothryodism, Gout, covid tx mcab, PT comes to ed c/o confusion/trouble w short term memory since 1/29. Took chemo 1/30 Obdualag, Took Mektovi/Braftovi for another week. Pt now sleeping 12h at night and additional 4-5 during the day. Has also c/o shortness of breath for past several weeks. Spoke to MD at [facility] and referred to Endocrine felt to be 2/2 chemo. SOB not severe has also c/o headaches for several years. Mod responsive to Tylenol.   PE Adult male awake alert normocephalic atraumatic neck supple chest clear anterior & posterior cv no rubs, gallops or murmurs abd soft +bs no mass guarding neuro gcs 15 speech fluent power 5/5 all extr. awake alert oriented x3. Memory 0/3 at 5min.  Bry Aldrich MD, Facep

## 2023-02-11 NOTE — ED PROVIDER NOTE - NSICDXPASTMEDICALHX_GEN_ALL_CORE_FT
PAST MEDICAL HISTORY:  2019 novel coronavirus disease (COVID-19)     BRIGID (acute kidney injury)     COVID-19 vaccine series completed     Duodenal ulcer disease     GERD (gastroesophageal reflux disease)     Gout     HLD (hyperlipidemia)     Hyperparathyroidism     Hypertension     Hypothyroid     SBO (small bowel obstruction)      PAST MEDICAL HISTORY:  2019 novel coronavirus disease (COVID-19)     BRIGID (acute kidney injury)     COVID-19 vaccine series completed     Duodenal ulcer disease     GERD (gastroesophageal reflux disease)     Gout     HLD (hyperlipidemia)     Hyperparathyroidism     Hypertension     Hypothyroid     Lymphoma     Melanoma     SBO (small bowel obstruction)

## 2023-02-11 NOTE — ED PROVIDER NOTE - NS ED ATTENDING STATEMENT MOD
This was a shared visit with the ANANTH. I reviewed and verified the documentation and independently performed the documented:

## 2023-02-11 NOTE — ED PROVIDER NOTE - PROGRESS NOTE DETAILS
Freedom: I have reviewed the patient's results including the metastases in the liver and the head.  I spoke with the patient's daughter and wife who were aware of the liver, however the brain metastases are new.  Informed him that he will be admitted to the hospital for further work-up and evaluation of these metastases.  I spoke with Dr. Perales who is aware of the patient.

## 2023-02-11 NOTE — ED PROVIDER NOTE - OBJECTIVE STATEMENT
83 yo M pmhx Follicular lymphoma, Melanoma with metastases currently on chemotherapy seen at [facility] treatment 1 week ago presents with  1 week of lethargy fatigue confusion decreased memory.   History by wife and daughter at bedside.  Patient also having chronic shortness of breath no chest pain. Patient also had chills today with nonproductive cough.  No measured fevers. no urinary freq hematuria dysuria. no abd pain n/v.

## 2023-02-11 NOTE — ED ADULT NURSE NOTE - NSICDXPASTMEDICALHX_GEN_ALL_CORE_FT
PAST MEDICAL HISTORY:  2019 novel coronavirus disease (COVID-19)     BRIGID (acute kidney injury)     COVID-19 vaccine series completed     Duodenal ulcer disease     GERD (gastroesophageal reflux disease)     Gout     HLD (hyperlipidemia)     Hyperparathyroidism     Hypertension     Hypothyroid     Lymphoma     Melanoma     SBO (small bowel obstruction)

## 2023-02-11 NOTE — ED ADULT NURSE NOTE - OBJECTIVE STATEMENT
Pt is a 82y M AxO2-3 experiencing short term memory loss and extreme fatigue. PMH of HTN, GERD, CKD, multiple myeloma currently undergo chemo infusions since Jan 30th. Pt wife states for 3 weeks pt is sleeping 14+ hours a day and is still fatigued. Today, he was unable to sit up out of bed and felt "foggy", he then started to shake with chills throughout his body. Pt states he has not been eating or drinking enough x3 weeks. On assessment, pt lungs clear, abd soft and nontender, sensation intact bilaterally in LE and UE, pt is well appearing, speaking full sentences, on the cardiac monitor, bed in lowest position with side rails up, family at bedside.

## 2023-02-12 DIAGNOSIS — R68.83 CHILLS (WITHOUT FEVER): ICD-10-CM

## 2023-02-12 DIAGNOSIS — C43.9 MALIGNANT MELANOMA OF SKIN, UNSPECIFIED: ICD-10-CM

## 2023-02-12 DIAGNOSIS — R09.89 OTHER SPECIFIED SYMPTOMS AND SIGNS INVOLVING THE CIRCULATORY AND RESPIRATORY SYSTEMS: ICD-10-CM

## 2023-02-12 DIAGNOSIS — E03.9 HYPOTHYROIDISM, UNSPECIFIED: ICD-10-CM

## 2023-02-12 DIAGNOSIS — G93.40 ENCEPHALOPATHY, UNSPECIFIED: ICD-10-CM

## 2023-02-12 DIAGNOSIS — K25.9 GASTRIC ULCER, UNSPECIFIED AS ACUTE OR CHRONIC, WITHOUT HEMORRHAGE OR PERFORATION: ICD-10-CM

## 2023-02-12 DIAGNOSIS — R53.83 OTHER FATIGUE: ICD-10-CM

## 2023-02-12 DIAGNOSIS — C82.90 FOLLICULAR LYMPHOMA, UNSPECIFIED, UNSPECIFIED SITE: ICD-10-CM

## 2023-02-12 DIAGNOSIS — N18.30 CHRONIC KIDNEY DISEASE, STAGE 3 UNSPECIFIED: ICD-10-CM

## 2023-02-12 DIAGNOSIS — I10 ESSENTIAL (PRIMARY) HYPERTENSION: ICD-10-CM

## 2023-02-12 DIAGNOSIS — E78.5 HYPERLIPIDEMIA, UNSPECIFIED: ICD-10-CM

## 2023-02-12 LAB — TSH SERPL-MCNC: 16.7 UIU/ML — HIGH (ref 0.27–4.2)

## 2023-02-12 PROCEDURE — 99223 1ST HOSP IP/OBS HIGH 75: CPT

## 2023-02-12 PROCEDURE — 99222 1ST HOSP IP/OBS MODERATE 55: CPT

## 2023-02-12 RX ORDER — ALLOPURINOL 300 MG
100 TABLET ORAL DAILY
Refills: 0 | Status: DISCONTINUED | OUTPATIENT
Start: 2023-02-12 | End: 2023-02-15

## 2023-02-12 RX ORDER — CALCITRIOL 0.5 UG/1
0.25 CAPSULE ORAL DAILY
Refills: 0 | Status: DISCONTINUED | OUTPATIENT
Start: 2023-02-12 | End: 2023-02-15

## 2023-02-12 RX ORDER — SUCRALFATE 1 G
1 TABLET ORAL
Refills: 0 | Status: DISCONTINUED | OUTPATIENT
Start: 2023-02-12 | End: 2023-02-15

## 2023-02-12 RX ORDER — ROSUVASTATIN CALCIUM 5 MG/1
1 TABLET ORAL
Qty: 0 | Refills: 0 | DISCHARGE

## 2023-02-12 RX ORDER — LANOLIN ALCOHOL/MO/W.PET/CERES
3 CREAM (GRAM) TOPICAL AT BEDTIME
Refills: 0 | Status: DISCONTINUED | OUTPATIENT
Start: 2023-02-12 | End: 2023-02-15

## 2023-02-12 RX ORDER — PANTOPRAZOLE SODIUM 20 MG/1
40 TABLET, DELAYED RELEASE ORAL
Refills: 0 | Status: DISCONTINUED | OUTPATIENT
Start: 2023-02-12 | End: 2023-02-15

## 2023-02-12 RX ORDER — AMLODIPINE BESYLATE 2.5 MG/1
2.5 TABLET ORAL AT BEDTIME
Refills: 0 | Status: DISCONTINUED | OUTPATIENT
Start: 2023-02-12 | End: 2023-02-15

## 2023-02-12 RX ORDER — OXYBUTYNIN CHLORIDE 5 MG
5 TABLET ORAL
Refills: 0 | Status: DISCONTINUED | OUTPATIENT
Start: 2023-02-12 | End: 2023-02-15

## 2023-02-12 RX ORDER — ACETAMINOPHEN 500 MG
650 TABLET ORAL EVERY 6 HOURS
Refills: 0 | Status: DISCONTINUED | OUTPATIENT
Start: 2023-02-12 | End: 2023-02-15

## 2023-02-12 RX ORDER — METOPROLOL TARTRATE 50 MG
25 TABLET ORAL
Refills: 0 | Status: DISCONTINUED | OUTPATIENT
Start: 2023-02-12 | End: 2023-02-15

## 2023-02-12 RX ORDER — ONDANSETRON 8 MG/1
4 TABLET, FILM COATED ORAL EVERY 8 HOURS
Refills: 0 | Status: DISCONTINUED | OUTPATIENT
Start: 2023-02-12 | End: 2023-02-15

## 2023-02-12 RX ORDER — LEVOTHYROXINE SODIUM 125 MCG
200 TABLET ORAL DAILY
Refills: 0 | Status: DISCONTINUED | OUTPATIENT
Start: 2023-02-12 | End: 2023-02-14

## 2023-02-12 RX ORDER — SODIUM CHLORIDE 9 MG/ML
1000 INJECTION INTRAMUSCULAR; INTRAVENOUS; SUBCUTANEOUS
Refills: 0 | Status: DISCONTINUED | OUTPATIENT
Start: 2023-02-12 | End: 2023-02-15

## 2023-02-12 RX ORDER — DEXAMETHASONE 0.5 MG/5ML
4 ELIXIR ORAL EVERY 6 HOURS
Refills: 0 | Status: DISCONTINUED | OUTPATIENT
Start: 2023-02-12 | End: 2023-02-15

## 2023-02-12 RX ORDER — SERTRALINE 25 MG/1
1 TABLET, FILM COATED ORAL
Qty: 0 | Refills: 0 | DISCHARGE

## 2023-02-12 RX ORDER — SODIUM CHLORIDE 9 MG/ML
1000 INJECTION, SOLUTION INTRAVENOUS
Refills: 0 | Status: DISCONTINUED | OUTPATIENT
Start: 2023-02-12 | End: 2023-02-15

## 2023-02-12 RX ORDER — CALCITRIOL 0.5 UG/1
1 CAPSULE ORAL
Qty: 0 | Refills: 0 | DISCHARGE

## 2023-02-12 RX ADMIN — Medication 100 MILLIGRAM(S): at 11:04

## 2023-02-12 RX ADMIN — SODIUM CHLORIDE 200 MILLILITER(S): 9 INJECTION, SOLUTION INTRAVENOUS at 03:38

## 2023-02-12 RX ADMIN — AMLODIPINE BESYLATE 2.5 MILLIGRAM(S): 2.5 TABLET ORAL at 03:35

## 2023-02-12 RX ADMIN — SODIUM CHLORIDE 50 MILLILITER(S): 9 INJECTION INTRAMUSCULAR; INTRAVENOUS; SUBCUTANEOUS at 18:16

## 2023-02-12 RX ADMIN — Medication 4 MILLIGRAM(S): at 18:16

## 2023-02-12 RX ADMIN — Medication 5 MILLIGRAM(S): at 03:53

## 2023-02-12 RX ADMIN — CALCITRIOL 0.25 MICROGRAM(S): 0.5 CAPSULE ORAL at 11:03

## 2023-02-12 RX ADMIN — Medication 5 MILLIGRAM(S): at 18:15

## 2023-02-12 RX ADMIN — AMLODIPINE BESYLATE 2.5 MILLIGRAM(S): 2.5 TABLET ORAL at 22:33

## 2023-02-12 RX ADMIN — PANTOPRAZOLE SODIUM 40 MILLIGRAM(S): 20 TABLET, DELAYED RELEASE ORAL at 08:47

## 2023-02-12 RX ADMIN — Medication 4 MILLIGRAM(S): at 11:03

## 2023-02-12 RX ADMIN — Medication 200 MICROGRAM(S): at 06:31

## 2023-02-12 RX ADMIN — Medication 25 MILLIGRAM(S): at 03:37

## 2023-02-12 RX ADMIN — Medication 1 GRAM(S): at 08:47

## 2023-02-12 RX ADMIN — Medication 4 MILLIGRAM(S): at 23:44

## 2023-02-12 RX ADMIN — Medication 25 MILLIGRAM(S): at 18:16

## 2023-02-12 NOTE — PHYSICAL THERAPY INITIAL EVALUATION ADULT - PERTINENT HX OF CURRENT PROBLEM, REHAB EVAL
82M w/ hx of follicular lymphoma, newly diagnosed metastatic melanoma 03/2022 w/ mets to liver, spleen, lymph node and lung, up to recently on immunotherapy, HTN, HLD, gastric ulcer, CKD, hypothyroid p/w acute encephalopathy, fatigue and decreased PO. 2/11 Chest Xray: Bibasilar subsegmental atelectasis, otherwise clear lungs. CT Head: Innumerable metastatic lesions in the bilateral cerebral hemispheres. Correlation with outside prior imaging would be helpful to assess for interval change as the only prior imaging at this institution is from 8/23/2019. No shift of the midline or central herniation. CTA Chest PE protocol: No pulmonary embolism. No acute parenchymal or pleural lung disease. Interval decrease cluster of right lower lobe nodules; representative nodule currently measuring up to 10 mm previously measured 20 mm. Worsening subcarinal lymphadenopathy and new enlarged left internal mammary lymph node along the anterior chest wall measuring up to 3.6 x 2.7 cm. Improved right hilar lymphadenopathy. Hepatosplenomegaly with new low-attenuation lesions in the liver, largest measuring up to 8.6 x 7.4 cm and the left hepatic lobe, concerning for metastatic disease.

## 2023-02-12 NOTE — CONSULT NOTE ADULT - ASSESSMENT
1. Metastatic BRAF+ Malignant Melanoma    -- currently on Nivolumab+ Relatlimab, first dose on 1/30/23  -- has progressed on Pembrolizumab, Nivolumab/Ipililumab, and Encorafenib/Binimetinib  -- Known mets to Lung, Spleen, Lungs and LNs  -- follows at [facility] [place]  -- CTA Chest w No PE but has pulm mets, liver mets and int mammary ln mets. Will ask [facility] to review    2. AMS    -- CT Head w new diffuse brain mets  -- please get  MRI Brain with and without Contrast  -- mild  swelling w no midline shift  -- would consult Neurosurgery and Radiation Oncology  -- would start Dexamethasone 4mg PO Q 6 hrs    Damari Forte MD

## 2023-02-12 NOTE — CONSULT NOTE ADULT - SUBJECTIVE AND OBJECTIVE BOX
Lizandro Stover  82M w/ hx of follicular lymphoma, newly diagnosed metastatic melanoma 03/2022 (on immunotherapy) w/ mets to liver, spleen, lymph node and lung, p/w 2 weeks of increased confusion and lethargy. CT head w/con shows multiple brain mets, largest in R temporal cortex (1.9cm) and in the high L frontal cortex (2.1 cm). Confusion improved after starting steroids. No focal deficits.  --Anticoagulation--    T(C): 36.3 (02-12-23 @ 11:34), Max: 37.4 (02-11-23 @ 21:03)  HR: 72 (02-12-23 @ 11:34) (65 - 81)  BP: 145/69 (02-12-23 @ 11:34) (127/69 - 164/65)  RR: 18 (02-12-23 @ 11:34) (18 - 23)  SpO2: 97% (02-12-23 @ 11:34) (95% - 98%)  Wt(kg): --    Exam: AO3, PERRL, EOMI, no droop/drift, FERNANDEZ 5/5

## 2023-02-12 NOTE — H&P ADULT - PROBLEM SELECTOR PLAN 2
No obvious source of infection on initial work up.  -Trend fever curve  -Will order blood cultures in AM

## 2023-02-12 NOTE — CONSULT NOTE ADULT - ASSESSMENT
Lizandro Stover  82M w/ hx of follicular lymphoma, newly diagnosed metastatic melanoma 03/2022 (on immunotherapy) w/ mets to liver, spleen, lymph node and lung, p/w 2 weeks of increased confusion and lethargy. CT head w/con shows multiple brain mets, largest in R temporal cortex (1.9cm) and in the high L frontal cortex (2.1 cm). Confusion improved after starting steroids. No focal deficits. Exam: intact    -no acute neurosurgical intervention  -MRI w/wo contrast   -Neuronc and rad-onc consultation for SRS   -Please update patient's [facility] oncologist (Dr. Nnamdi Cochran)  -Steroids per neuronc

## 2023-02-12 NOTE — H&P ADULT - PROBLEM SELECTOR PLAN 3
Diagnosed in 03/2022. Was on Encorafenib and Binimetinib until recently. Mets to liver, lung, lymph node  -CT head showing new innumerable metastatic lesions in bilateral cerebral hemispheres  -[facility] Oncology consult in AM  -Detailed course of recent treatment in paper chart

## 2023-02-12 NOTE — CONSULT NOTE ADULT - SUBJECTIVE AND OBJECTIVE BOX
HPI:  82M w/ hx of follicular lymphoma, newly diagnosed metastatic melanoma 03/2022 w/ mets to liver, spleen, lymph node and lung, up to recently on immunotherapy (Encorafenib and Binimetinib), HTN, HLD, gastric ulcer, CKD, hypothyroid p/w altered mentation. Pt was at [facility] ~2 weeks ago after discovering multiple new skin lesions. At that point was getting dual immunotherapy which was deemed to be ineffective. For the past week pt has seemed more confused and lethargic to his family. Also with significantly decreased PO. Today, pt also endorsed severe chills. Family called his oncologist's office today regarding these chills and other symptoms. Was recommended to come to ER for further evaluation. Pt denies any fevers, cough, new urinary symptoms, abd pain or diarrhea.      In ED labs unremarkable but CT Head w/o contrast s/o new diffuse brain mets                 PAST MEDICAL & SURGICAL HISTORY:  Duodenal ulcer disease      SBO (small bowel obstruction)      BRIGID (acute kidney injury)      Hypertension      Hyperparathyroidism      Hypothyroid      HLD (hyperlipidemia)      GERD (gastroesophageal reflux disease)      Gout      2019 novel coronavirus disease (COVID-19)      COVID-19 vaccine series completed      Melanoma      Lymphoma      S/P exploratory laparotomy          ROS:  Negative except for:    MEDICATIONS  (STANDING):  allopurinol 100 milliGRAM(s) Oral daily  amLODIPine   Tablet 2.5 milliGRAM(s) Oral at bedtime  calcitriol   Capsule 0.25 MICROGram(s) Oral daily  lactated ringers. 1000 milliLiter(s) (200 mL/Hr) IV Continuous <Continuous>  levothyroxine 200 MICROGram(s) Oral daily  metoprolol tartrate 25 milliGRAM(s) Oral two times a day  oxybutynin 5 milliGRAM(s) Oral two times a day  pantoprazole    Tablet 40 milliGRAM(s) Oral before breakfast  sucralfate 1 Gram(s) Oral <User Schedule>    MEDICATIONS  (PRN):  acetaminophen     Tablet .. 650 milliGRAM(s) Oral every 6 hours PRN Temp greater or equal to 38C (100.4F), Mild Pain (1 - 3)  melatonin 3 milliGRAM(s) Oral at bedtime PRN Insomnia  ondansetron Injectable 4 milliGRAM(s) IV Push every 8 hours PRN Nausea and/or Vomiting      Allergies    Cipro (Rash)  niacin (Flushing; Rash)  Reglan (Anaphylaxis)    Intolerances        Vital Signs Last 24 Hrs  T(C): 37 (12 Feb 2023 03:51), Max: 37.4 (11 Feb 2023 21:03)  T(F): 98.6 (12 Feb 2023 03:51), Max: 99.4 (11 Feb 2023 21:03)  HR: 71 (12 Feb 2023 03:51) (65 - 85)  BP: 127/69 (12 Feb 2023 03:51) (127/69 - 165/86)  BP(mean): --  RR: 18 (12 Feb 2023 03:51) (18 - 23)  SpO2: 95% (12 Feb 2023 03:51) (95% - 98%)    Parameters below as of 12 Feb 2023 03:51  Patient On (Oxygen Delivery Method): room air        PHYSICAL EXAM  General: adult in NAD  HEENT: clear oropharynx, anicteric sclera, pink conjunctiva  Neck: supple  CV: normal S1/S2 with no murmur rubs or gallops  Lungs: positive air movement b/l ant lungs,clear to auscultation, no wheezes, no rales  Abdomen: soft non-tender non-distended, no hepatosplenomegaly  Ext: no clubbing cyanosis or edema     LABS:                          12.4   4.78  )-----------( 160      ( 11 Feb 2023 19:27 )             40.6     Serial CBC's  02-11 @ 19:27  Hct-40.6 / Hgb-12.4 / Plat-160 / RBC-5.04 / WBC-4.78            02-11    134<L>  |  98  |  18  ----------------------------<  105<H>  4.7   |  22  |  1.36<H>    Ca    10.6<H>      11 Feb 2023 19:27  Phos  2.9     02-11  Mg     1.9     02-11    TPro  7.9  /  Alb  3.7  /  TBili  0.3  /  DBili  x   /  AST  27  /  ALT  13  /  AlkPhos  250<H>  02-11      PT/INR - ( 11 Feb 2023 19:27 )   PT: 13.2 sec;   INR: 1.14 ratio         PTT - ( 11 Feb 2023 19:27 )  PTT:29.3 sec    WBC Count: 4.78 K/uL (02-11 @ 19:27)  Hemoglobin: 12.4 g/dL (02-11 @ 19:27)            RADIOLOGY & ADDITIONAL STUDIES:

## 2023-02-12 NOTE — H&P ADULT - NSHPADDITIONALINFOADULT_GEN_ALL_CORE
I was asked to see this patient by the hospitalist in charge. Dr. Perales to assume care for patient in AM and thereafter

## 2023-02-12 NOTE — CONSULT NOTE ADULT - SUBJECTIVE AND OBJECTIVE BOX
Patient is a 82y Male whom presented to the hospital with     PAST MEDICAL & SURGICAL HISTORY:  Duodenal ulcer disease      SBO (small bowel obstruction)      BRIGID (acute kidney injury)      Hypertension      Hyperparathyroidism      Hypothyroid      HLD (hyperlipidemia)      GERD (gastroesophageal reflux disease)      Gout      2019 novel coronavirus disease (COVID-19)      COVID-19 vaccine series completed      Melanoma      Lymphoma      S/P exploratory laparotomy          MEDICATIONS  (STANDING):  allopurinol 100 milliGRAM(s) Oral daily  amLODIPine   Tablet 2.5 milliGRAM(s) Oral at bedtime  calcitriol   Capsule 0.25 MICROGram(s) Oral daily  dexAMETHasone     Tablet 4 milliGRAM(s) Oral every 6 hours  lactated ringers. 1000 milliLiter(s) (200 mL/Hr) IV Continuous <Continuous>  levothyroxine 200 MICROGram(s) Oral daily  metoprolol tartrate 25 milliGRAM(s) Oral two times a day  oxybutynin 5 milliGRAM(s) Oral two times a day  pantoprazole    Tablet 40 milliGRAM(s) Oral before breakfast  sodium chloride 0.9%. 1000 milliLiter(s) (50 mL/Hr) IV Continuous <Continuous>  sucralfate 1 Gram(s) Oral <User Schedule>      Allergies    Cipro (Rash)  niacin (Flushing; Rash)  Reglan (Anaphylaxis)    Intolerances        SOCIAL HISTORY:  Denies ETOh,Smoking,     FAMILY HISTORY:  Family history of non-Hodgkin&#x27;s lymphoma    Family history of Hashimoto thyroiditis (Child)        REVIEW OF SYSTEMS:    CONSTITUTIONAL: No weakness, fevers or chills  EYES/ENT: No visual changes;  no throat pain   NECK: No pain or stiffness  RESPIRATORY: No cough, wheezing, hemoptysis; No shortness of breath  CARDIOVASCULAR: No chest pain or palpitations  GASTROINTESTINAL: No abdominal or epigastric pain. No nausea, vomiting,     No diarrhea or constipation. No melena   GENITOURINARY: No dysuria, frequency or hematuria  NEUROLOGICAL: No numbness or weakness  SKIN: dry      VITAL:  T(C): , Max: 37.4 (23 @ 21:03)  T(F): , Max: 99.4 (23 @ 21:03)  HR: 72 (23 @ 11:34)  BP: 145/69 (23 @ 11:34)  BP(mean): --  RR: 18 (23 @ 11:34)  SpO2: 97% (23 @ 11:34)  Wt(kg): --    I and O's:     @ 07:01  -   @ 16:35  --------------------------------------------------------  IN: 0 mL / OUT: 2 mL / NET: -2 mL      Height (cm): 190.5 ( @ 18:00)  Weight (kg): 107 ( @ 18:00)  BMI (kg/m2): 29.5 ( @ 18:00)  BSA (m2): 2.35 ( @ 18:00)    PHYSICAL EXAM:    Constitutional: NAD  HEENT: conjunctive   clear   Neck:  No JVD  Respiratory: CTAB  Cardiovascular: S1 and S2  Gastrointestinal: BS+, soft, NT/ND  Extremities: No peripheral edema  Neurological: A/O x 3, no focal deficits  Psychiatric: Normal mood, normal affect  : No [place]  Skin: No rashes  Access: Not applicable    LABS:                        12.4   4.78  )-----------( 160      ( 2023 19:27 )             40.6         134<L>  |  98  |  18  ----------------------------<  105<H>  4.7   |  22  |  1.36<H>    Ca    10.6<H>      2023 19:27  Phos  2.9       Mg     1.9         TPro  7.9  /  Alb  3.7  /  TBili  0.3  /  DBili  x   /  AST  27  /  ALT  13  /  AlkPhos  250<H>        Urine Studies:  Urinalysis Basic - ( 2023 22:30 )    Color: Light Yellow / Appearance: Clear / S.038 / pH: x  Gluc: x / Ketone: Negative  / Bili: Negative / Urobili: Negative   Blood: x / Protein: 100 mg/dL / Nitrite: Negative   Leuk Esterase: Negative / RBC: 18 /hpf / WBC 0 /HPF   Sq Epi: x / Non Sq Epi: 0 /hpf / Bacteria: Negative            RADIOLOGY & ADDITIONAL STUDIES:

## 2023-02-12 NOTE — CONSULT NOTE ADULT - ASSESSMENT
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 Pt was at [facility] ~2 weeks ago after discovering multiple new skin lesions. At that point was getting dual immunotherapy which was deemed to be ineffective. For the past week pt has seemed more confused and lethargic to his family. Also with significantly decreased PO. Today, pt also endorsed severe chills. Family called his oncologist's office today regarding these chills and other symptoms. Was recommended to come to ER for further evaluation. Pt denies any fevers, cough, new urinary symptoms, abd pain or diarrhea.  (12 Feb 2023 02:34)        CHRONIC KIDNEY DISEASE, STAGE 3   Serum creatinine is stable at 1.36 approximating a GFR of *** ml/min.   There is no progression.  No uremic symptoms. No evidence of  worsening  Anemia. Fluid status stable.   Will continue to avoid nephrotoxic drugs.  Patient remains asymptomatic.  Continue current therapy.      BP monitoring,continue current antihypertensive meds, low salt diet,followup with PMD in 1-2 weeks

## 2023-02-12 NOTE — CONSULT NOTE ADULT - ATTENDING COMMENTS
82M PMHx follicular lymphoma (in remission), metastatic melanoma (with mets to liver, spleen, lymph nodes and lung, currently on Nivolumab+ Relatlimab, first dose on 1/30/23), HTN, HLD, gastric ulcer, CKD, and hypothyroidism, presented with AMS.    No leukocytosis  Afebrile here  Low suspicion for infection  Monitor off of antibiotics   Follow blood cultures    I will continue to follow. Please feel free to contact me with any further questions.    Dipak Clemens M.D.  [facility] Division of Infectious Disease  8AM-5PM Monday - Friday: Available on [employer] Teams  After Hours and Holidays (or if no response on [employer] Teams): Please contact the Infectious Diseases Office at [phone]

## 2023-02-12 NOTE — H&P ADULT - PROBLEM SELECTOR PLAN 4
S/p hx of treatment and follow up at [facility] [place]  -Course of treatment documented with in paper chart  -F/u LDH and uric acid

## 2023-02-12 NOTE — PHYSICAL THERAPY INITIAL EVALUATION ADULT - ADDITIONAL COMMENTS
History obtained from pt's daughter at bedside. Pt lives in a private home with his wife, 2 steps to enter, but 2 flights to bedroom. Pt was independent with all functional mobility and ADLs prior to admission without AD.

## 2023-02-12 NOTE — H&P ADULT - HISTORY OF PRESENT ILLNESS
82M w/ hx of follicular lymphoma, newly diagnosed metastatic melanoma 03/2022 w/ mets to liver, spleen, lymph node and lung, up to recently on immunotherapy, HTN, HLD, gastric ulcer, CKD, hypothyroid p/w altered mentation. [place] [facility] [place] after discovering multiple new skin lesions. At that point was getting dual immunotherapy which was deemed to be ineffective. For the past week pt has seemed more confused and lethargic to his family. Also with significantly decreased PO. Today, pt also endorsed severe chills. Family called his oncologist's office today regarding these chills and other symptoms. Was recommended to come to ER for further evaluation. Pt denies any fevers, cough, new urinary symptoms, abd pain or diarrhea.  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 [place] [facility] [place] after discovering multiple new skin lesions. At that point was getting dual immunotherapy which was deemed to be ineffective. For the past week pt has seemed more confused and lethargic to his family. Also with significantly decreased PO. Today, pt also endorsed severe chills. Family called his oncologist's office today regarding these chills and other symptoms. Was recommended to come to ER for further evaluation. Pt denies any fevers, cough, new urinary symptoms, abd pain or diarrhea.

## 2023-02-12 NOTE — H&P ADULT - PROBLEM SELECTOR PLAN 1
Recent "brain fog" likely from new numerous lesions seen on CT head. Metabolic work up relatively negative.  -[facility] Oncology consult in AM  -Will defer need for additional MRI imaging to oncology  -Falls and aspiration precautions  -Will order PT consult and speech/swallow Recent "brain fog" likely from new numerous lesions seen on CT head. Metabolic work up relatively negative.  -[facility] Oncology consult in AM  -Will defer need for additional MRI imaging to oncology  -Falls and aspiration precautions  -Will order PT consult and speech/swallow  -Neuro consult? AED for prophylaxis?

## 2023-02-12 NOTE — H&P ADULT - ASSESSMENT
82M w/ hx of follicular lymphoma, newly diagnosed metastatic melanoma 03/2022 w/ mets to liver, spleen, lymph node and lung, up to recently on immunotherapy, HTN, HLD, gastric ulcer, CKD, hypothyroid p/w acute encephalopathy, fatigue and decreased PO

## 2023-02-12 NOTE — H&P ADULT - NSHPPHYSICALEXAM_GEN_ALL_CORE
Vital Signs Last 24 Hrs  T(C): 36.8 (02-11-23 @ 23:18), Max: 37.4 (02-11-23 @ 21:03)  T(F): 98.3 (02-11-23 @ 23:18), Max: 99.4 (02-11-23 @ 21:03)  HR: 65 (02-11-23 @ 23:18) (65 - 85)  BP: 164/65 (02-11-23 @ 23:18) (135/74 - 165/86)  BP(mean): --  RR: 23 (02-11-23 @ 23:18) (18 - 23)  SpO2: 96% (02-11-23 @ 23:18) (95% - 98%)

## 2023-02-12 NOTE — H&P ADULT - PROBLEM SELECTOR PLAN 6
Crt seems relatively stable  -Trend BMP  -Renal dose medications Crt seems relatively stable if not improved than prior  -Trend BMP  -Renal dose medications

## 2023-02-12 NOTE — CONSULT NOTE ADULT - ASSESSMENT
82M PMHx follicular lymphoma (in remission), metastatic melanoma (with mets to liver, spleen, lymph nodes and lung, currently on Nivolumab+ Relatlimab, first dose on 1/30/23), HTN, HLD, gastric ulcer, CKD, and hypothyroidism, presented with AMS. CT Head showing new brain mets, CT C/A/P showing POD with increasing lymphadenopathy and hepatic mets.     Afebrile, HD stable  WBC 5K   Urinalysis unremarkable    Impression:  #Encephalopathy - in setting of brain mets 82M PMHx follicular lymphoma (in remission), metastatic melanoma (with mets to liver, spleen, lymph nodes and lung, currently on Nivolumab+ Relatlimab, first dose on 1/30/23), HTN, HLD, gastric ulcer, CKD, and hypothyroidism, presented with AMS. CT Head showing new brain mets, CT C/A/P showing POD with increasing lymphadenopathy and hepatic mets.     Afebrile, HD stable  WBC 5K   Urinalysis unremarkable    Impression:  #Encephalopathy - in setting of brain mets, no evidence of infectious etiology  #Chills - now resolved, no documented fevers    Recs:  - monitor off antibiotics, low suspicion for active infection  - primary team ordered for blood cultures, can follow up  - monitor for fever    Please call ID back with questions    Surinder Marshall MD  Infectious Disease Fellow  Available on [employer] Teams  Before 9AM or after 5PM: [phone]

## 2023-02-12 NOTE — CONSULT NOTE ADULT - SUBJECTIVE AND OBJECTIVE BOX
Patient is a 82y old  Male who presents with a chief complaint of AMS and chills (2023 08:19)    HPI:  82M w/ hx of follicular lymphoma, newly diagnosed metastatic melanoma 2022 w/ mets to liver, spleen, lymph node and lung, up to recently on immunotherapy (Encorafenib and Binimetinib), HTN, HLD, gastric ulcer, CKD, hypothyroid p/w altered mentation. Pt was at [facility] [place] ~2 weeks ago after discovering multiple new skin lesions. At that point was getting dual immunotherapy which was deemed to be ineffective. For the past week pt has seemed more confused and lethargic to his family. Also with significantly decreased PO. Today, pt also endorsed severe chills. Family called his oncologist's office today regarding these chills and other symptoms. Was recommended to come to ER for further evaluation. Pt denies any fevers, cough, new urinary symptoms, abd pain or diarrhea.  (2023 02:34)     ID consulted for AMS. Patient found to have innumerable brain lesions concerning for mets.     REVIEW OF SYSTEMS  [  ] ROS unobtainable because:    [ x ] All other systems negative except as noted below    Constitutional:  [ ] fever [ ] chills  [ ] weight loss  [ ]night sweat  [ ]poor appetite/PO intake [ ]fatigue   Skin:  [ ] rash [ ] phlebitis	  Eyes: [ ] icterus [ ] pain  [ ] discharge	  ENMT: [ ] sore throat  [ ] thrush [ ] ulcers [ ] exudates [ ]anosmia  Respiratory: [ ] dyspnea [ ] hemoptysis [ ] cough [ ] sputum	  Cardiovascular:  [ ] chest pain [ ] palpitations [ ] edema	  Gastrointestinal:  [ ] nausea [ ] vomiting [ ] diarrhea [ ] constipation [ ] pain	  Genitourinary:  [ ] dysuria [ ] frequency [ ] hematuria [ ] discharge [ ] flank pain  [ ] incontinence  Musculoskeletal:  [ ] myalgias [ ] arthralgias [ ] arthritis  [ ] back pain  Neurological:  [ ] headache [ ] weakness [ ] seizures  [ ] confusion/altered mental status    prior hospital charts reviewed [V]  primary team notes reviewed [V]  other consultant notes reviewed [V]    PAST MEDICAL & SURGICAL HISTORY:  Duodenal ulcer disease    SBO (small bowel obstruction)    BRIGID (acute kidney injury)    Hypertension    Hyperprathyroidism    Hypothyroid    HLD (hyperlipidemia)    GERD (gastroesophageal reflux disease)    Gout     novel coronavirus disease (COVID-19)    COVID-19 vaccine series completed    Melanoma    Lymphoma    S/P exploratory laparotomy      FAMILY HISTORY:  Family history of non-Hodgkins lymphoma  Family history of Hashimoto thyroiditis (Child)      SOCIAL HISTORY:  Smoker    Allergies  Cipro (Rash)  niacin (Flushing; Rash)  Reglan (Anaphylaxis)      ANTIMICROBIALS:    ANTIMICROBIALS (past 90 days):   MEDICATIONS  (STANDING):    MEDICATIONS  (STANDING):  acetaminophen     Tablet .. 650 every 6 hours PRN  allopurinol 100 daily  amLODIPine   Tablet 2.5 at bedtime  dexAMETHasone     Tablet 4 every 6 hours  levothyroxine 200 daily  melatonin 3 at bedtime PRN  metoprolol tartrate 25 two times a day  ondansetron Injectable 4 every 8 hours PRN  oxybutynin 5 two times a day  pantoprazole    Tablet 40 before breakfast  sucralfate 1 <User Schedule>    VITALS:  Vital Signs Last 24 Hrs  T(F): 98.6 (23 @ 03:51), Max: 99.4 (23 @ 21:03)  Vital Signs Last 24 Hrs  HR: 71 (23 @ 03:51) (65 - 85)  BP: 127/69 (23 @ 03:51) (127/69 - 165/86)  RR: 18 (23 @ 03:51)  SpO2: 95% (23 @ 03:51) (95% - 98%)  Wt(kg): --    PHYSICAL EXAM:  Constitutional: non-toxic, no distress  HEAD/EYES: anicteric, no conjunctival injection  ENT:  supple, no thrush  Cardiac:   normal S1/S2  Respiratory:  clear BS bilaterally  GI:  non-tender, +bowel sounds  :  no talavera, no CVA tenderness  Musculoskeletal:  no synovitis, normal ROM  Neurologic: awake and alert,  no focal findings  Skin:  no rash, no erythema, no phlebitis  Vascular:  warm extremities b/l  Psychiatric:  calm, cooperative      Labs:                        12.4   4.78  )-----------( 160      ( 2023 19:27 )             40.6         134<L>  |  98  |  18  ----------------------------<  105<H>  4.7   |  22  |  1.36<H>    Ca    10.6<H>      2023 19:27  Phos  2.9       Mg     1.9         TPro  7.9  /  Alb  3.7  /  TBili  0.3  /  DBili  x   /  AST  27  /  ALT  13  /  AlkPhos  250<H>        WBC Trend:  WBC Count: 4.78 (23 @ 19:27)    Auto Neutrophil #: 3.40 K/uL (23 @ 19:27)  Auto Neutrophil #: 1.34 K/uL (22 @ 16:17)    Auto Eosinophil %: 0.0 % (23 @ 19:27)    Urinalysis Basic - ( 2023 22:30 )    Color: Light Yellow / Appearance: Clear / S.038 / pH: x  Gluc: x / Ketone: Negative  / Bili: Negative / Urobili: Negative   Blood: x / Protein: 100 mg/dL / Nitrite: Negative   Leuk Esterase: Negative / RBC: 18 /hpf / WBC 0 /HPF   Sq Epi: x / Non Sq Epi: 0 /hpf / Bacteria: Negative      MICROBIOLOGY:  MRSA PCR Result.: NotDetec (22 @ 11:40)    Culture - Urine (collected 20 May 2022 17:55)  Source: Clean Catch Clean Catch (Midstream)  Final Report:    <10,000 CFU/mL Normal Urogenital [place]    Culture - Blood (collected 20 May 2022 15:35)  Source: .Blood Blood-Peripheral  Final Report:    No Growth Final    Culture - Blood (collected 20 May 2022 15:30)  Source: .Blood Blood-Peripheral  Final Report:    No Growth Final    Rapid RVP Result: NotDetec ( @ 19:28)      RADIOLOGY:  imaging below personally reviewed    < from: CT Angio Chest PE Protocol w/ IV Cont (23 @ 21:53) >  IMPRESSION:  No pulmonary embolism.  No acute parenchymal or pleural lung disease.  Interval decrease cluster of right lower lobe nodules; representative   nodule currently measuring up to 10 mm previously measured 20 mm.  Worsening subcarinal lymphadenopathy and new enlarged left internal   mammary lymph node along the anterior chest wall measuring up to 3.6 x   2.7 cm. Improved right hilar lymphadenopathy.  Hepatosplenomegaly with new low-attenuation lesions in the liver, largest   measuring up to 8.6 x 7.4 cm and the left hepatic lobe, concerning for   metastatic disease.  < end of copied text >   Patient is a 82y old  Male who presents with a chief complaint of AMS and chills (2023 08:19)    HPI:    82M w/ hx of follicular lymphoma, newly diagnosed metastatic melanoma 2022 w/ mets to liver, spleen, lymph node and lung, up to recently on immunotherapy (Encorafenib and Binimetinib), HTN, HLD, gastric ulcer, CKD, hypothyroid p/w altered mentation. Pt was at [facility] [place] ~2 weeks ago after discovering multiple new skin lesions. At that point was getting dual immunotherapy which was deemed to be ineffective. For the past week pt has seemed more confused and lethargic to his family. Also with significantly decreased PO. Today, pt also endorsed severe chills. Family called his oncologist's office today regarding these chills and other symptoms. Was recommended to come to ER for further evaluation. Pt denies any fevers, cough, new urinary symptoms, abd pain or diarrhea.  (2023 02:34)     ID consulted for AMS. Patient found to have innumerable brain lesions concerning for mets.     REVIEW OF SYSTEMS  [  ] ROS unobtainable because:    [ x ] All other systems negative except as noted below    Constitutional:  [ ] fever [ ] chills  [ ] weight loss  [ ]night sweat  [ ]poor appetite/PO intake [ ]fatigue   Skin:  [ ] rash [ ] phlebitis	  Eyes: [ ] icterus [ ] pain  [ ] discharge	  ENMT: [ ] sore throat  [ ] thrush [ ] ulcers [ ] exudates [ ]anosmia  Respiratory: [ ] dyspnea [ ] hemoptysis [ ] cough [ ] sputum	  Cardiovascular:  [ ] chest pain [ ] palpitations [ ] edema	  Gastrointestinal:  [ ] nausea [ ] vomiting [ ] diarrhea [ ] constipation [ ] pain	  Genitourinary:  [ ] dysuria [ ] frequency [ ] hematuria [ ] discharge [ ] flank pain  [ ] incontinence  Musculoskeletal:  [ ] myalgias [ ] arthralgias [ ] arthritis  [ ] back pain  Neurological:  [ ] headache [ ] weakness [ ] seizures  [ ] confusion/altered mental status    prior hospital charts reviewed [V]  primary team notes reviewed [V]  other consultant notes reviewed [V]    PAST MEDICAL & SURGICAL HISTORY:  Duodenal ulcer disease    SBO (small bowel obstruction)    BRIGID (acute kidney injury)    Hypertension    Hyperprathyroidism    Hypothyroid    HLD (hyperlipidemia)    GERD (gastroesophageal reflux disease)    Gout     novel coronavirus disease (COVID-19)    COVID-19 vaccine series completed    Melanoma    Lymphoma    S/P exploratory laparotomy      FAMILY HISTORY:  Family history of non-Hodgkins lymphoma  Family history of Hashimoto thyroiditis (Child)      SOCIAL HISTORY:  Smoker    Allergies  Cipro (Rash)  niacin (Flushing; Rash)  Reglan (Anaphylaxis)      ANTIMICROBIALS:    ANTIMICROBIALS (past 90 days):   MEDICATIONS  (STANDING):    MEDICATIONS  (STANDING):  acetaminophen     Tablet .. 650 every 6 hours PRN  allopurinol 100 daily  amLODIPine   Tablet 2.5 at bedtime  dexAMETHasone     Tablet 4 every 6 hours  levothyroxine 200 daily  melatonin 3 at bedtime PRN  metoprolol tartrate 25 two times a day  ondansetron Injectable 4 every 8 hours PRN  oxybutynin 5 two times a day  pantoprazole    Tablet 40 before breakfast  sucralfate 1 <User Schedule>    VITALS:  Vital Signs Last 24 Hrs  T(F): 98.6 (23 @ 03:51), Max: 99.4 (23 @ 21:03)  Vital Signs Last 24 Hrs  HR: 71 (23 @ 03:51) (65 - 85)  BP: 127/69 (23 @ 03:51) (127/69 - 165/86)  RR: 18 (23 @ 03:51)  SpO2: 95% (23 @ 03:51) (95% - 98%)  Wt(kg): --    PHYSICAL EXAM:  Constitutional: non-toxic, no distress  HEAD/EYES: anicteric, no conjunctival injection  ENT:  supple, no thrush  Cardiac:   normal S1/S2  Respiratory:  clear BS bilaterally  GI:  non-tender, +bowel sounds  :  no talavera, no CVA tenderness  Musculoskeletal:  no synovitis, normal ROM  Neurologic: awake and alert,  no focal findings  Skin:  no rash, no erythema, no phlebitis  Vascular:  warm extremities b/l  Psychiatric:  calm, cooperative      Labs:                        12.4   4.78  )-----------( 160      ( 2023 19:27 )             40.6         134<L>  |  98  |  18  ----------------------------<  105<H>  4.7   |  22  |  1.36<H>    Ca    10.6<H>      2023 19:27  Phos  2.9       Mg     1.9         TPro  7.9  /  Alb  3.7  /  TBili  0.3  /  DBili  x   /  AST  27  /  ALT  13  /  AlkPhos  250<H>        WBC Trend:  WBC Count: 4.78 (23 @ 19:27)    Auto Neutrophil #: 3.40 K/uL (23 @ 19:27)  Auto Neutrophil #: 1.34 K/uL (22 @ 16:17)    Auto Eosinophil %: 0.0 % (23 @ 19:27)    Urinalysis Basic - ( 2023 22:30 )    Color: Light Yellow / Appearance: Clear / S.038 / pH: x  Gluc: x / Ketone: Negative  / Bili: Negative / Urobili: Negative   Blood: x / Protein: 100 mg/dL / Nitrite: Negative   Leuk Esterase: Negative / RBC: 18 /hpf / WBC 0 /HPF   Sq Epi: x / Non Sq Epi: 0 /hpf / Bacteria: Negative      MICROBIOLOGY:  MRSA PCR Result.: NotDetec (22 @ 11:40)    Culture - Urine (collected 20 May 2022 17:55)  Source: Clean Catch Clean Catch (Midstream)  Final Report:    <10,000 CFU/mL Normal Urogenital [place]    Culture - Blood (collected 20 May 2022 15:35)  Source: .Blood Blood-Peripheral  Final Report:    No Growth Final    Culture - Blood (collected 20 May 2022 15:30)  Source: .Blood Blood-Peripheral  Final Report:    No Growth Final    Rapid RVP Result: NotDetec ( @ 19:28)      RADIOLOGY:  imaging below personally reviewed    < from: CT Angio Chest PE Protocol w/ IV Cont (23 @ 21:53) >  IMPRESSION:  No pulmonary embolism.  No acute parenchymal or pleural lung disease.  Interval decrease cluster of right lower lobe nodules; representative   nodule currently measuring up to 10 mm previously measured 20 mm.  Worsening subcarinal lymphadenopathy and new enlarged left internal   mammary lymph node along the anterior chest wall measuring up to 3.6 x   2.7 cm. Improved right hilar lymphadenopathy.  Hepatosplenomegaly with new low-attenuation lesions in the liver, largest   measuring up to 8.6 x 7.4 cm and the left hepatic lobe, concerning for   metastatic disease.  < end of copied text >

## 2023-02-13 LAB
ALBUMIN SERPL ELPH-MCNC: 3.3 G/DL — SIGNIFICANT CHANGE UP (ref 3.3–5)
ALP SERPL-CCNC: 225 U/L — HIGH (ref 40–120)
ALT FLD-CCNC: 13 U/L — SIGNIFICANT CHANGE UP (ref 10–45)
ANION GAP SERPL CALC-SCNC: 14 MMOL/L — SIGNIFICANT CHANGE UP (ref 5–17)
AST SERPL-CCNC: 18 U/L — SIGNIFICANT CHANGE UP (ref 10–40)
BASOPHILS # BLD AUTO: 0.01 K/UL — SIGNIFICANT CHANGE UP (ref 0–0.2)
BASOPHILS NFR BLD AUTO: 0.2 % — SIGNIFICANT CHANGE UP (ref 0–2)
BILIRUB SERPL-MCNC: 0.2 MG/DL — SIGNIFICANT CHANGE UP (ref 0.2–1.2)
BUN SERPL-MCNC: 23 MG/DL — SIGNIFICANT CHANGE UP (ref 7–23)
CALCIUM SERPL-MCNC: 10.1 MG/DL — SIGNIFICANT CHANGE UP (ref 8.4–10.5)
CHLORIDE SERPL-SCNC: 102 MMOL/L — SIGNIFICANT CHANGE UP (ref 96–108)
CO2 SERPL-SCNC: 19 MMOL/L — LOW (ref 22–31)
CREAT SERPL-MCNC: 1.33 MG/DL — HIGH (ref 0.5–1.3)
CULTURE RESULTS: SIGNIFICANT CHANGE UP
EGFR: 54 ML/MIN/1.73M2 — LOW
EOSINOPHIL # BLD AUTO: 0 K/UL — SIGNIFICANT CHANGE UP (ref 0–0.5)
EOSINOPHIL NFR BLD AUTO: 0 % — SIGNIFICANT CHANGE UP (ref 0–6)
GLUCOSE BLDC GLUCOMTR-MCNC: 193 MG/DL — HIGH (ref 70–99)
GLUCOSE BLDC GLUCOMTR-MCNC: 291 MG/DL — HIGH (ref 70–99)
GLUCOSE SERPL-MCNC: 206 MG/DL — HIGH (ref 70–99)
HCT VFR BLD CALC: 35.8 % — LOW (ref 39–50)
HGB BLD-MCNC: 11.1 G/DL — LOW (ref 13–17)
IMM GRANULOCYTES NFR BLD AUTO: 1.6 % — HIGH (ref 0–0.9)
LYMPHOCYTES # BLD AUTO: 0.59 K/UL — LOW (ref 1–3.3)
LYMPHOCYTES # BLD AUTO: 13.5 % — SIGNIFICANT CHANGE UP (ref 13–44)
MAGNESIUM SERPL-MCNC: 1.7 MG/DL — SIGNIFICANT CHANGE UP (ref 1.6–2.6)
MCHC RBC-ENTMCNC: 24.7 PG — LOW (ref 27–34)
MCHC RBC-ENTMCNC: 31 GM/DL — LOW (ref 32–36)
MCV RBC AUTO: 79.7 FL — LOW (ref 80–100)
MONOCYTES # BLD AUTO: 0.58 K/UL — SIGNIFICANT CHANGE UP (ref 0–0.9)
MONOCYTES NFR BLD AUTO: 13.2 % — SIGNIFICANT CHANGE UP (ref 2–14)
NEUTROPHILS # BLD AUTO: 3.13 K/UL — SIGNIFICANT CHANGE UP (ref 1.8–7.4)
NEUTROPHILS NFR BLD AUTO: 71.5 % — SIGNIFICANT CHANGE UP (ref 43–77)
NRBC # BLD: 0 /100 WBCS — SIGNIFICANT CHANGE UP (ref 0–0)
PLATELET # BLD AUTO: 170 K/UL — SIGNIFICANT CHANGE UP (ref 150–400)
POTASSIUM SERPL-MCNC: 4.3 MMOL/L — SIGNIFICANT CHANGE UP (ref 3.5–5.3)
POTASSIUM SERPL-SCNC: 4.3 MMOL/L — SIGNIFICANT CHANGE UP (ref 3.5–5.3)
PROT SERPL-MCNC: 6.7 G/DL — SIGNIFICANT CHANGE UP (ref 6–8.3)
RBC # BLD: 4.49 M/UL — SIGNIFICANT CHANGE UP (ref 4.2–5.8)
RBC # FLD: 18.9 % — HIGH (ref 10.3–14.5)
SODIUM SERPL-SCNC: 135 MMOL/L — SIGNIFICANT CHANGE UP (ref 135–145)
SPECIMEN SOURCE: SIGNIFICANT CHANGE UP
URATE SERPL-MCNC: 5.8 MG/DL — SIGNIFICANT CHANGE UP (ref 3.4–8.8)
WBC # BLD: 4.38 K/UL — SIGNIFICANT CHANGE UP (ref 3.8–10.5)
WBC # FLD AUTO: 4.38 K/UL — SIGNIFICANT CHANGE UP (ref 3.8–10.5)

## 2023-02-13 PROCEDURE — 70553 MRI BRAIN STEM W/O & W/DYE: CPT | Mod: 26

## 2023-02-13 RX ADMIN — Medication 1 GRAM(S): at 11:59

## 2023-02-13 RX ADMIN — Medication 5 MILLIGRAM(S): at 05:58

## 2023-02-13 RX ADMIN — CALCITRIOL 0.25 MICROGRAM(S): 0.5 CAPSULE ORAL at 11:59

## 2023-02-13 RX ADMIN — Medication 100 MILLIGRAM(S): at 11:01

## 2023-02-13 RX ADMIN — PANTOPRAZOLE SODIUM 40 MILLIGRAM(S): 20 TABLET, DELAYED RELEASE ORAL at 06:19

## 2023-02-13 RX ADMIN — Medication 25 MILLIGRAM(S): at 22:36

## 2023-02-13 RX ADMIN — Medication 4 MILLIGRAM(S): at 17:46

## 2023-02-13 RX ADMIN — Medication 4 MILLIGRAM(S): at 06:02

## 2023-02-13 RX ADMIN — AMLODIPINE BESYLATE 2.5 MILLIGRAM(S): 2.5 TABLET ORAL at 22:36

## 2023-02-13 RX ADMIN — Medication 5 MILLIGRAM(S): at 17:47

## 2023-02-13 RX ADMIN — Medication 4 MILLIGRAM(S): at 11:01

## 2023-02-13 NOTE — SWALLOW BEDSIDE ASSESSMENT ADULT - SWALLOW EVAL: DIAGNOSIS
Pt is an 81 y/o male who presents with overtly functional oropharyngeal swallowing skills.  No signs or symptoms of laryngeal penetration/aspiration evident with any consistency presented.  Pharyngeal swallow judged to be timely with adequate laryngeal elevation. Pt denies difficulty swallowing and family at b/s reports increased PO intake since Pt has been in the hospital.

## 2023-02-13 NOTE — PATIENT PROFILE ADULT - MONEY FOR FOOD
CHIEF COMPLAINT:  Skin Assessment (areas of concern to right upper arm)       HISTORY OF PRESENT ILLNESS:  Jimena Pickard is a 72 year old female who presented requesting evaluation for a total body skin exam.    Patient has areas of concern under skin on right upper arm which have been present for 1 year.    PAST HISTORIES:  Dermatologic History: History of eczema.  Personal history of skin cancer:No  Family history of skin cancer: Yes: Non Melanoma Skin Cancer  History of sunburns/tanning bed use:  sunburns as a child/teenager and sunburns occasionally as an adult   Sunscreen use: always    Review of systems:  No fevers or chills  No swollen glands  No recent weight changes  No cardiac or hematological abnormality that would prevent a biopsy or procedure    Physical Exam:  General:  Well nourished, well developed, in no acute distress.  Skin: Stable brown freckles. Hair pulls out from the roots.  Head:  Normocephalic-atraumatic.  Neurologic:  Oriented times 4.  No focal deficits.    Total skin exam: Areas checked: Head (including face and scalp), eyelids, lips, neck, upper extremities, lower extremities, buttocks, chest, back, abdomen, and scalp/body hair.          Assessment/plan:  1. Psoriasis    2. Telogen effluvium        Dysplastic nevi  · Multiple brown black flat hazy-slightly raised symmetric macules and papules  · 2 to 5 mm in size   Plan:   Follow    Seborrheic Keratoses  · Brownish stuck-on hyperkeratotic, waxy papules  · Thin stuck on papules with increased skin markings  Plan:  Follow  Call if change      Actinic damage  · Thin actinically damaged skin  · Photo exposed areas  Plan:  Follow  Call if change in growth noted      Return in about 1 year (around 7/11/2020), or with any areas of concern, for Skin check.     FOLLOW UP IN 1 YEAR OR SOONER IF NEEDED    On 7/11/2019, Vanessa SOSA LPN scribed the services personally performed by MD IAN Lobo, Dr Ellis, attest that the  documentation recorded by the scribe accurately and completely reflects the service(s) I personally performed and the decisions made by me.        no

## 2023-02-13 NOTE — CHART NOTE - NSCHARTNOTEFT_GEN_A_CORE
-MR done, multiple brain mets, all are of sizes favorable to radiation, given non-focal/good neuro exam will defer management to [facility]

## 2023-02-13 NOTE — RAPID RESPONSE TEAM SUMMARY - NSSITUATIONBACKGROUNDRRT_GEN_ALL_CORE
82M w/ hx of follicular lymphoma, newly diagnosed metastatic melanoma 03/2022 w/ mets to liver, spleen, lymph node and lung, up to recently on immunotherapy, HTN, HLD, gastric ulcer, CKD, hypothyroid p/w acute encephalopathy, fatigue and decreased PO.  RRT was called due to slow speech. At bedside, full neuroexam was done: CN II-XII: intact, motor strength 5/5 on UEs and LEs, AxOx3. Speech was articulate, checked with family at bedside that his speech was back to baseline. Given intact neuro-exam and mental status at baseline, VSS, RRT was ended. Pending MRI brain per primary team. Updated family and primary team at bedside.

## 2023-02-13 NOTE — CHART NOTE - NSCHARTNOTEFT_GEN_A_CORE
s/p RRT for pt being very forgetful and slow speech.   Now pt with NAD, no focal neurologic deficit.   No concern for stroke at this time as per RRT team   - Will continue close monitoring on neurocheck q 4 hours   - MRI of brain pending   - Will consult neurooncology and radiation oncology as discussed with Dr. Perales   - Attending Dr. Perales aware of RRT.  - Family at bedside updated with plan     Monica Lam NP-C  #[id number] s/p RRT for pt being very forgetful and slow speech.   Now pt with NAD, no focal neurologic deficit.   No concern for stroke at this time as per RRT team   - Will continue close monitoring on neurocheck q 4 hours   - MRI of brain pending   - Will consult neurooncology and radiation oncology as discussed with Dr. Perales   - Attending Dr. Perales aware of RRT.  - Family at bedside updated with plan     Monica PYLE  #[id number]    Addendum at 17:30  Neuron Dr. Alonso consult called- will see pt in am   Rad-onc consultation for SRS - emailed at [email or web address]    Monica jiménez NP-C  #[id number] s/p RRT for pt being very forgetful and slow speech.   Now pt with NAD, no focal neurologic deficit.   No concern for stroke at this time as per RRT team   - Will continue close monitoring on neurocheck q 4 hours   - MRI of brain pending   - Will consult neurooncology and radiation oncology as discussed with Dr. Perales   - Attending Dr. Perales aware of RRT.  - Family at bedside updated with plan     Monica Lam NP-C  #[id number]    Addendum at 17:30  Neuron Dr. Alonso consult called- will see pt in am   Rad-onc consultation for SRS - emailed at [email or web address]  Will send CBC and CMP  Blood cultures were ordered on 2/12, not done. so reordered   Noted Elevated TSH 16.7- will check Free T4 in am       Monica lam NP-C  #[id number]

## 2023-02-13 NOTE — SWALLOW BEDSIDE ASSESSMENT ADULT - SLP GENERAL OBSERVATIONS
Pt awake, alert and oriented to self/hospital and easily engaged in conversation. Suspect slight confusion at times.

## 2023-02-14 LAB
ANION GAP SERPL CALC-SCNC: 13 MMOL/L — SIGNIFICANT CHANGE UP (ref 5–17)
BUN SERPL-MCNC: 26 MG/DL — HIGH (ref 7–23)
CALCIUM SERPL-MCNC: 10.5 MG/DL — SIGNIFICANT CHANGE UP (ref 8.4–10.5)
CHLORIDE SERPL-SCNC: 104 MMOL/L — SIGNIFICANT CHANGE UP (ref 96–108)
CO2 SERPL-SCNC: 21 MMOL/L — LOW (ref 22–31)
CREAT SERPL-MCNC: 1.36 MG/DL — HIGH (ref 0.5–1.3)
EGFR: 53 ML/MIN/1.73M2 — LOW
GLUCOSE BLDC GLUCOMTR-MCNC: 167 MG/DL — HIGH (ref 70–99)
GLUCOSE BLDC GLUCOMTR-MCNC: 173 MG/DL — HIGH (ref 70–99)
GLUCOSE BLDC GLUCOMTR-MCNC: 249 MG/DL — HIGH (ref 70–99)
GLUCOSE SERPL-MCNC: 159 MG/DL — HIGH (ref 70–99)
HCT VFR BLD CALC: 37.6 % — LOW (ref 39–50)
HGB BLD-MCNC: 11.5 G/DL — LOW (ref 13–17)
MCHC RBC-ENTMCNC: 24.2 PG — LOW (ref 27–34)
MCHC RBC-ENTMCNC: 30.6 GM/DL — LOW (ref 32–36)
MCV RBC AUTO: 79 FL — LOW (ref 80–100)
NRBC # BLD: 0 /100 WBCS — SIGNIFICANT CHANGE UP (ref 0–0)
PLATELET # BLD AUTO: 185 K/UL — SIGNIFICANT CHANGE UP (ref 150–400)
POTASSIUM SERPL-MCNC: 4.3 MMOL/L — SIGNIFICANT CHANGE UP (ref 3.5–5.3)
POTASSIUM SERPL-SCNC: 4.3 MMOL/L — SIGNIFICANT CHANGE UP (ref 3.5–5.3)
RBC # BLD: 4.76 M/UL — SIGNIFICANT CHANGE UP (ref 4.2–5.8)
RBC # FLD: 19.2 % — HIGH (ref 10.3–14.5)
SODIUM SERPL-SCNC: 138 MMOL/L — SIGNIFICANT CHANGE UP (ref 135–145)
T4 FREE SERPL-MCNC: 1 NG/DL — SIGNIFICANT CHANGE UP (ref 0.9–1.8)
WBC # BLD: 4.71 K/UL — SIGNIFICANT CHANGE UP (ref 3.8–10.5)
WBC # FLD AUTO: 4.71 K/UL — SIGNIFICANT CHANGE UP (ref 3.8–10.5)

## 2023-02-14 PROCEDURE — 99222 1ST HOSP IP/OBS MODERATE 55: CPT

## 2023-02-14 RX ORDER — ROSUVASTATIN CALCIUM 5 MG/1
20 TABLET ORAL AT BEDTIME
Refills: 0 | Status: DISCONTINUED | OUTPATIENT
Start: 2023-02-14 | End: 2023-02-15

## 2023-02-14 RX ORDER — LEVOTHYROXINE SODIUM 125 MCG
200 TABLET ORAL DAILY
Refills: 0 | Status: DISCONTINUED | OUTPATIENT
Start: 2023-02-14 | End: 2023-02-15

## 2023-02-14 RX ORDER — ALFUZOSIN HYDROCHLORIDE 10 MG/1
10 TABLET, EXTENDED RELEASE ORAL AT BEDTIME
Refills: 0 | Status: DISCONTINUED | OUTPATIENT
Start: 2023-02-14 | End: 2023-02-14

## 2023-02-14 RX ORDER — ALFUZOSIN HYDROCHLORIDE 10 MG/1
10 TABLET, EXTENDED RELEASE ORAL AT BEDTIME
Refills: 0 | Status: DISCONTINUED | OUTPATIENT
Start: 2023-02-14 | End: 2023-02-15

## 2023-02-14 RX ADMIN — ROSUVASTATIN CALCIUM 20 MILLIGRAM(S): 5 TABLET ORAL at 23:29

## 2023-02-14 RX ADMIN — Medication 200 MICROGRAM(S): at 06:46

## 2023-02-14 RX ADMIN — Medication 4 MILLIGRAM(S): at 00:16

## 2023-02-14 RX ADMIN — ALFUZOSIN HYDROCHLORIDE 10 MILLIGRAM(S): 10 TABLET, EXTENDED RELEASE ORAL at 23:29

## 2023-02-14 RX ADMIN — AMLODIPINE BESYLATE 2.5 MILLIGRAM(S): 2.5 TABLET ORAL at 22:43

## 2023-02-14 RX ADMIN — Medication 4 MILLIGRAM(S): at 23:33

## 2023-02-14 RX ADMIN — PANTOPRAZOLE SODIUM 40 MILLIGRAM(S): 20 TABLET, DELAYED RELEASE ORAL at 06:46

## 2023-02-14 RX ADMIN — Medication 5 MILLIGRAM(S): at 06:46

## 2023-02-14 RX ADMIN — CALCITRIOL 0.25 MICROGRAM(S): 0.5 CAPSULE ORAL at 12:21

## 2023-02-14 RX ADMIN — Medication 4 MILLIGRAM(S): at 12:21

## 2023-02-14 RX ADMIN — Medication 5 MILLIGRAM(S): at 17:29

## 2023-02-14 RX ADMIN — Medication 4 MILLIGRAM(S): at 06:47

## 2023-02-14 RX ADMIN — Medication 1 GRAM(S): at 09:49

## 2023-02-14 RX ADMIN — Medication 25 MILLIGRAM(S): at 08:54

## 2023-02-14 RX ADMIN — Medication 100 MILLIGRAM(S): at 12:21

## 2023-02-14 RX ADMIN — Medication 4 MILLIGRAM(S): at 17:29

## 2023-02-14 NOTE — CONSULT NOTE ADULT - ASSESSMENT
In summary, this is an 83 yo man with h/o of follicular lymphoma (2020) and more recently metastatic melanoma with recent onset confusion now with MRI with multiple metastases - will discuss with radiology - lack of hemorrhage possibly consistent but less common with melanoma.  Would check routine EEG - will present at tumor board in am.

## 2023-02-14 NOTE — CONSULT NOTE ADULT - SUBJECTIVE AND OBJECTIVE BOX
Mr. Stover is an 89 yo right handed man who was admitted to [facility] on 2/12 with confusion and lethargy.    PMH notable for history of follicular lymphoma and more recently metastatic melanoma. History is per chart and per daughter - will need to confirm specifics with his oncologist at [facility].  Per his daughter - he was diagnosed with lymphoma in May, 2020 and was treated with chemotherapy for a 6 month period (specifics unknown) - per family CT scans 1/2021 reportedly "clean."  He was then diagnosed in 3/2022 with melanoma after a follow up scan had shown a new LN which was biopsied and proven to be melanoma. He has been treated with immunotherapy - recently changed as disease had progressed.  Per his daughter, he is typically very sharp and had been acting more confused and slower over the past 10 days.    He denies headache, focal weakness, numbness, seizure, or change in walking.    CT of the body was performed but compared to an old scan from 5/2022 - he is noted to have RLL nodules, subcarinal LN, R hilar LAD, as well as liver metastases - largest measuring 8.6 x 7.4 cm.    Head CT was abnormal - MRI brain performed and reviewed - official read pending  - multiple areas of homogenous enhancement bilaterally - high frontal with edema - only one region with possible associate heme - slightly atypical for melanoma (left frontal) - will review with radiology.    PMH otherwise notable for duodenal ulcer, BRIGID, HTN    On exam - he is awake and alert - knows he is in [facility] month "March" year - 2023.    /76, P 52, T 97.7F, O2 sat 95%    Names well and follows commands  EOMI, VFF  Face symmetric and tongue midline  Slight right drift  Strength full in UE and LE  Gaye and FNF ok    LABS - cr 1.36  CBC ok UA negative

## 2023-02-14 NOTE — CONSULT NOTE ADULT - SUBJECTIVE AND OBJECTIVE BOX
HPI:  82M w/ hx of follicular lymphoma, newly diagnosed metastatic melanoma 03/2022 w/ mets to liver, spleen, lymph node and lung, up to recently on immunotherapy (Encorafenib and Binimetinib), HTN, HLD, gastric ulcer, CKD, hypothyroid p/w altered mentation. [place] [facility] [place] discovering multiple new skin lesions. At that point was getting dual immunotherapy which was deemed to be ineffective. For the past week pt has seemed more confused and lethargic to his family. Also with significantly decreased PO. Today, pt also endorsed severe chills. Family called his oncologist's office today regarding these chills and other symptoms. Was recommended to come to ER for further evaluation. Pt denies any fevers, cough, new urinary symptoms, abd pain or diarrhea.    Rad Onc was consulted after MRI showed innumerable metastases.   CT head resulted below, MRI has not yet resulted but was reviewed.     < from: CT Head w/ IV Cont (02.11.23 @ 21:53) >  IMPRESSION:  Innumerable metastatic lesions in the bilateral cerebral hemispheres.   Correlation with outside prior imaging would be helpful toassess for   interval change as the only prior imaging at this institution is from   8/23/2019. No shift of the midline or central herniation.      < from: CT Angio Chest PE Protocol w/ IV Cont (02.11.23 @ 21:53) >  IMPRESSION:  No pulmonary embolism.    No acute parenchymal or pleural lung disease.    Interval decrease cluster of right lower lobe nodules; representative   nodule currently measuring up to 10 mm previously measured 20 mm.    Worsening subcarinal lymphadenopathy and new enlarged left internal   mammary lymph node along the anterior chest wall measuring up to 3.6 x   2.7 cm. Improved right hilar lymphadenopathy.    Hepatosplenomegaly with new low-attenuation lesions in the liver, largest   measuring up to 8.6 x 7.4 cm and the left hepatic lobe, concerning for   metastatic disease.      < end of copied text >        Allergies    Cipro (Rash)  niacin (Flushing; Rash)  Reglan (Anaphylaxis)    Intolerances        ROS: [  ] Fever  [  ] Chills  [  ]Chest Pain [  ] SOB  [  ]Cough [  ] N/V  [  ] Diarrhea [  ]Constipation [  ]Other ROS:  [  ] ROS otherwise negative    PAST MEDICAL & SURGICAL HISTORY:  Duodenal ulcer disease    SBO (small bowel obstruction)    BRIGID (acute kidney injury)    Hypertension    Hyperparathyroidism    Hypothyroid    HLD (hyperlipidemia)    GERD (gastroesophageal reflux disease)    Gout    2019 novel coronavirus disease (COVID-19)    COVID-19 vaccine series completed    Melanoma    Lymphoma    No significant past surgical history    S/P exploratory laparotomy        FAMILY HISTORY:  Family history of non-Hodgkin&#x27;s lymphoma    Family history of Hashimoto thyroiditis (Child)        MEDICATIONS  (STANDING):  allopurinol 100 milliGRAM(s) Oral daily  amLODIPine   Tablet 2.5 milliGRAM(s) Oral at bedtime  calcitriol   Capsule 0.25 MICROGram(s) Oral daily  dexAMETHasone     Tablet 4 milliGRAM(s) Oral every 6 hours  lactated ringers. 1000 milliLiter(s) (200 mL/Hr) IV Continuous <Continuous>  levothyroxine 200 MICROGram(s) Oral daily  metoprolol tartrate 25 milliGRAM(s) Oral two times a day  oxybutynin 5 milliGRAM(s) Oral two times a day  pantoprazole    Tablet 40 milliGRAM(s) Oral before breakfast  sodium chloride 0.9%. 1000 milliLiter(s) (50 mL/Hr) IV Continuous <Continuous>  sucralfate 1 Gram(s) Oral <User Schedule>    MEDICATIONS  (PRN):  acetaminophen     Tablet .. 650 milliGRAM(s) Oral every 6 hours PRN Temp greater or equal to 38C (100.4F), Mild Pain (1 - 3)  melatonin 3 milliGRAM(s) Oral at bedtime PRN Insomnia  ondansetron Injectable 4 milliGRAM(s) IV Push every 8 hours PRN Nausea and/or Vomiting      PHYSICAL EXAM  Vital Signs Last 24 Hrs  T(C): 36.5 (14 Feb 2023 06:19), Max: 36.8 (13 Feb 2023 13:23)  T(F): 97.7 (14 Feb 2023 06:19), Max: 98.2 (13 Feb 2023 13:23)  HR: 52 (14 Feb 2023 06:19) (52 - 69)  BP: 137/76 (14 Feb 2023 06:19) (121/63 - 153/74)  BP(mean): --  RR: 18 (14 Feb 2023 06:19) (18 - 18)  SpO2: 95% (14 Feb 2023 06:19) (95% - 97%)    Parameters below as of 14 Feb 2023 06:19  Patient On (Oxygen Delivery Method): room air        IMAGING/LABS/PATHOLOGY: I have personally reviewed the relevant labs, pathology, and imaging as noted in the HPI.  In addition,                          11.5   4.71  )-----------( 185      ( 14 Feb 2023 07:16 )             37.6     02-14    138  |  104  |  26<H>  ----------------------------<  159<H>  4.3   |  21<L>  |  1.36<H>    Ca    10.5      14 Feb 2023 07:13  Mg     1.7     02-13    TPro  6.7  /  Alb  3.3  /  TBili  0.2  /  DBili  x   /  AST  18  /  ALT  13  /  AlkPhos  225<H>  02-13        ASSESSMENT/PLAN    CATRINA PRESCOTT 82M w/ hx of follicular lymphoma, newly diagnosed metastatic melanoma 03/2022 w/ mets to liver, spleen, lymph node and lung, up to recently on immunotherapy (Encorafenib and Binimetinib), HTN, HLD, gastric ulcer, CKD, hypothyroid p/w altered mentation. [place] [facility], now at [facility] with innumerable brain mets seen on imaging CT and MRI reviewed.  Prognosis is poor.  Please have heme onc assess prognosis in time.  We strongly recommend palliative care consult discuss with family hospice over  transfer of care to [facility] for WBRT/ 5 fractions which likely will not prolong life and could  have worsening effects on mentation/cognition.  Would recommend trial of steroids to see if there is any improvement  in symptoms.  Will follow discussion of hospice vs WBRT options.   Thank you.   D/w Dr. Fernández and medicine PA covering today.

## 2023-02-14 NOTE — PROVIDER CONTACT NOTE (OTHER) - SITUATION
Pt has a Blood Glucose of 249 per order notify provider if BG >200.
Patient has an active order for IV fluids NS at 50ml/h, when I received pt in my care IV fluids not infusing, per handoff report family refusing IV fluids.

## 2023-02-14 NOTE — CHART NOTE - NSCHARTNOTEFT_GEN_A_CORE
Case discussed with oncology; awaiting family to be updated by onc/rad onc re: prognosis and risks/benefits of rad onc.  I have reached out to the primary team as well. Palliative will follow up on later date to discuss GOC further.  [phone]

## 2023-02-14 NOTE — PROVIDER CONTACT NOTE (OTHER) - ASSESSMENT
Family at the bedside now, they are refusing IV fluids because patient is taking PO steroid and they are concerned about fluids retention, patient noted to have trace BLE edema.

## 2023-02-15 ENCOUNTER — TRANSCRIPTION ENCOUNTER (OUTPATIENT)
Age: 83
End: 2023-02-15

## 2023-02-15 VITALS
TEMPERATURE: 97 F | HEART RATE: 58 BPM | RESPIRATION RATE: 18 BRPM | OXYGEN SATURATION: 95 % | WEIGHT: 237 LBS | SYSTOLIC BLOOD PRESSURE: 148 MMHG | DIASTOLIC BLOOD PRESSURE: 73 MMHG

## 2023-02-15 DIAGNOSIS — Z71.89 OTHER SPECIFIED COUNSELING: ICD-10-CM

## 2023-02-15 DIAGNOSIS — Z51.5 ENCOUNTER FOR PALLIATIVE CARE: ICD-10-CM

## 2023-02-15 LAB
ALBUMIN SERPL ELPH-MCNC: 3.3 G/DL — SIGNIFICANT CHANGE UP (ref 3.3–5)
ALP SERPL-CCNC: 220 U/L — HIGH (ref 40–120)
ALT FLD-CCNC: 12 U/L — SIGNIFICANT CHANGE UP (ref 10–45)
ANION GAP SERPL CALC-SCNC: 13 MMOL/L — SIGNIFICANT CHANGE UP (ref 5–17)
AST SERPL-CCNC: 17 U/L — SIGNIFICANT CHANGE UP (ref 10–40)
BASOPHILS # BLD AUTO: 0.01 K/UL — SIGNIFICANT CHANGE UP (ref 0–0.2)
BASOPHILS NFR BLD AUTO: 0.2 % — SIGNIFICANT CHANGE UP (ref 0–2)
BILIRUB SERPL-MCNC: 0.2 MG/DL — SIGNIFICANT CHANGE UP (ref 0.2–1.2)
BUN SERPL-MCNC: 30 MG/DL — HIGH (ref 7–23)
CALCIUM SERPL-MCNC: 10.6 MG/DL — HIGH (ref 8.4–10.5)
CHLORIDE SERPL-SCNC: 101 MMOL/L — SIGNIFICANT CHANGE UP (ref 96–108)
CO2 SERPL-SCNC: 21 MMOL/L — LOW (ref 22–31)
CREAT SERPL-MCNC: 1.44 MG/DL — HIGH (ref 0.5–1.3)
EGFR: 49 ML/MIN/1.73M2 — LOW
EOSINOPHIL # BLD AUTO: 0 K/UL — SIGNIFICANT CHANGE UP (ref 0–0.5)
EOSINOPHIL NFR BLD AUTO: 0 % — SIGNIFICANT CHANGE UP (ref 0–6)
GLUCOSE BLDC GLUCOMTR-MCNC: 149 MG/DL — HIGH (ref 70–99)
GLUCOSE BLDC GLUCOMTR-MCNC: 194 MG/DL — HIGH (ref 70–99)
GLUCOSE SERPL-MCNC: 168 MG/DL — HIGH (ref 70–99)
HCT VFR BLD CALC: 38 % — LOW (ref 39–50)
HGB BLD-MCNC: 11.4 G/DL — LOW (ref 13–17)
IMM GRANULOCYTES NFR BLD AUTO: 2.1 % — HIGH (ref 0–0.9)
LDH SERPL L TO P-CCNC: 502 U/L — HIGH (ref 50–242)
LYMPHOCYTES # BLD AUTO: 0.8 K/UL — LOW (ref 1–3.3)
LYMPHOCYTES # BLD AUTO: 18.7 % — SIGNIFICANT CHANGE UP (ref 13–44)
MCHC RBC-ENTMCNC: 23.9 PG — LOW (ref 27–34)
MCHC RBC-ENTMCNC: 30 GM/DL — LOW (ref 32–36)
MCV RBC AUTO: 79.7 FL — LOW (ref 80–100)
MONOCYTES # BLD AUTO: 0.32 K/UL — SIGNIFICANT CHANGE UP (ref 0–0.9)
MONOCYTES NFR BLD AUTO: 7.5 % — SIGNIFICANT CHANGE UP (ref 2–14)
NEUTROPHILS # BLD AUTO: 3.06 K/UL — SIGNIFICANT CHANGE UP (ref 1.8–7.4)
NEUTROPHILS NFR BLD AUTO: 71.5 % — SIGNIFICANT CHANGE UP (ref 43–77)
NRBC # BLD: 0 /100 WBCS — SIGNIFICANT CHANGE UP (ref 0–0)
PLATELET # BLD AUTO: 180 K/UL — SIGNIFICANT CHANGE UP (ref 150–400)
POTASSIUM SERPL-MCNC: 4.4 MMOL/L — SIGNIFICANT CHANGE UP (ref 3.5–5.3)
POTASSIUM SERPL-SCNC: 4.4 MMOL/L — SIGNIFICANT CHANGE UP (ref 3.5–5.3)
PROT SERPL-MCNC: 6.5 G/DL — SIGNIFICANT CHANGE UP (ref 6–8.3)
RBC # BLD: 4.77 M/UL — SIGNIFICANT CHANGE UP (ref 4.2–5.8)
RBC # FLD: 18.9 % — HIGH (ref 10.3–14.5)
SODIUM SERPL-SCNC: 135 MMOL/L — SIGNIFICANT CHANGE UP (ref 135–145)
URATE SERPL-MCNC: 6.4 MG/DL — SIGNIFICANT CHANGE UP (ref 3.4–8.8)
WBC # BLD: 4.28 K/UL — SIGNIFICANT CHANGE UP (ref 3.8–10.5)
WBC # FLD AUTO: 4.28 K/UL — SIGNIFICANT CHANGE UP (ref 3.8–10.5)

## 2023-02-15 PROCEDURE — 83615 LACTATE (LD) (LDH) ENZYME: CPT

## 2023-02-15 PROCEDURE — 87086 URINE CULTURE/COLONY COUNT: CPT

## 2023-02-15 PROCEDURE — 99223 1ST HOSP IP/OBS HIGH 75: CPT

## 2023-02-15 PROCEDURE — 70553 MRI BRAIN STEM W/O & W/DYE: CPT

## 2023-02-15 PROCEDURE — 0225U NFCT DS DNA&RNA 21 SARSCOV2: CPT

## 2023-02-15 PROCEDURE — 82947 ASSAY GLUCOSE BLOOD QUANT: CPT

## 2023-02-15 PROCEDURE — 85025 COMPLETE CBC W/AUTO DIFF WBC: CPT

## 2023-02-15 PROCEDURE — 71045 X-RAY EXAM CHEST 1 VIEW: CPT

## 2023-02-15 PROCEDURE — 84550 ASSAY OF BLOOD/URIC ACID: CPT

## 2023-02-15 PROCEDURE — 80053 COMPREHEN METABOLIC PANEL: CPT

## 2023-02-15 PROCEDURE — 82962 GLUCOSE BLOOD TEST: CPT

## 2023-02-15 PROCEDURE — 85018 HEMOGLOBIN: CPT

## 2023-02-15 PROCEDURE — 82803 BLOOD GASES ANY COMBINATION: CPT

## 2023-02-15 PROCEDURE — 83605 ASSAY OF LACTIC ACID: CPT

## 2023-02-15 PROCEDURE — 36415 COLL VENOUS BLD VENIPUNCTURE: CPT

## 2023-02-15 PROCEDURE — 83735 ASSAY OF MAGNESIUM: CPT

## 2023-02-15 PROCEDURE — 82435 ASSAY OF BLOOD CHLORIDE: CPT

## 2023-02-15 PROCEDURE — 85027 COMPLETE CBC AUTOMATED: CPT

## 2023-02-15 PROCEDURE — A9585: CPT

## 2023-02-15 PROCEDURE — 84100 ASSAY OF PHOSPHORUS: CPT

## 2023-02-15 PROCEDURE — 85610 PROTHROMBIN TIME: CPT

## 2023-02-15 PROCEDURE — 82330 ASSAY OF CALCIUM: CPT

## 2023-02-15 PROCEDURE — 97161 PT EVAL LOW COMPLEX 20 MIN: CPT

## 2023-02-15 PROCEDURE — 84439 ASSAY OF FREE THYROXINE: CPT

## 2023-02-15 PROCEDURE — 84443 ASSAY THYROID STIM HORMONE: CPT

## 2023-02-15 PROCEDURE — 80048 BASIC METABOLIC PNL TOTAL CA: CPT

## 2023-02-15 PROCEDURE — 92610 EVALUATE SWALLOWING FUNCTION: CPT

## 2023-02-15 PROCEDURE — 84132 ASSAY OF SERUM POTASSIUM: CPT

## 2023-02-15 PROCEDURE — 71275 CT ANGIOGRAPHY CHEST: CPT | Mod: MA

## 2023-02-15 PROCEDURE — 81001 URINALYSIS AUTO W/SCOPE: CPT

## 2023-02-15 PROCEDURE — 87040 BLOOD CULTURE FOR BACTERIA: CPT

## 2023-02-15 PROCEDURE — 85014 HEMATOCRIT: CPT

## 2023-02-15 PROCEDURE — 84295 ASSAY OF SERUM SODIUM: CPT

## 2023-02-15 PROCEDURE — 70460 CT HEAD/BRAIN W/DYE: CPT | Mod: MA

## 2023-02-15 PROCEDURE — 99285 EMERGENCY DEPT VISIT HI MDM: CPT

## 2023-02-15 PROCEDURE — 85730 THROMBOPLASTIN TIME PARTIAL: CPT

## 2023-02-15 RX ORDER — OMEGA-3 ACID ETHYL ESTERS 1 G
2 CAPSULE ORAL
Qty: 0 | Refills: 0 | DISCHARGE

## 2023-02-15 RX ORDER — DEXAMETHASONE 0.5 MG/5ML
1 ELIXIR ORAL
Qty: 28 | Refills: 0
Start: 2023-02-15 | End: 2023-02-21

## 2023-02-15 RX ORDER — ERGOCALCIFEROL 1.25 MG/1
1 CAPSULE ORAL
Qty: 0 | Refills: 0 | DISCHARGE

## 2023-02-15 RX ORDER — MAGNESIUM OXIDE 400 MG ORAL TABLET 241.3 MG
1 TABLET ORAL
Qty: 0 | Refills: 0 | DISCHARGE

## 2023-02-15 RX ADMIN — Medication 100 MILLIGRAM(S): at 13:00

## 2023-02-15 RX ADMIN — Medication 5 MILLIGRAM(S): at 06:39

## 2023-02-15 RX ADMIN — Medication 200 MICROGRAM(S): at 06:06

## 2023-02-15 RX ADMIN — Medication 4 MILLIGRAM(S): at 12:56

## 2023-02-15 RX ADMIN — CALCITRIOL 0.25 MICROGRAM(S): 0.5 CAPSULE ORAL at 12:57

## 2023-02-15 RX ADMIN — Medication 4 MILLIGRAM(S): at 06:39

## 2023-02-15 RX ADMIN — Medication 25 MILLIGRAM(S): at 10:31

## 2023-02-15 RX ADMIN — Medication 1 GRAM(S): at 10:31

## 2023-02-15 RX ADMIN — PANTOPRAZOLE SODIUM 40 MILLIGRAM(S): 20 TABLET, DELAYED RELEASE ORAL at 07:18

## 2023-02-15 NOTE — DISCHARGE NOTE NURSING/CASE MANAGEMENT/SOCIAL WORK - PATIENT PORTAL LINK FT
Per ED MD, patient will be discharged home and no further CAC services are needed at this time.   
You can access the FollowMyHealth Patient Portal offered by [facility] by registering at the following website: [email or web address]. By joining [facility] FollowMyHealth portal, you will also be able to view your health information using other applications (apps) compatible with our system.

## 2023-02-15 NOTE — CONSULT NOTE ADULT - CONSULT REQUESTED DATE/TIME
12-Feb-2023
14-Feb-2023 09:15
12-Feb-2023 09:28
12-Feb-2023 18:23
12-Feb-2023
14-Feb-2023 09:22
15-Feb-2023

## 2023-02-15 NOTE — CONSULT NOTE ADULT - PROBLEM SELECTOR RECOMMENDATION 4
signing off as goals are established. patient and family not interested in speaking palliative team further at this time.  SW, primary team and onc updated.   [phone]

## 2023-02-15 NOTE — DISCHARGE NOTE PROVIDER - CARE PROVIDER_API CALL
SOTO HANNAH  Internal Medicine  [address] [place] [address]  Phone: [phone]  Fax: ()-  Scheduled Appointment: 02/20/2023 10:15 AM    Mohseni, Haleh G (MD)  Family Medicine  [address] [place] [address]  Phone: [phone]  Fax: [phone]  Follow Up Time: 1 week

## 2023-02-15 NOTE — CONSULT NOTE ADULT - PROBLEM SELECTOR RECOMMENDATION 3
attempted to introduce self and team  family reluctant to have conversation with this provider  state patient to be discharged soon and goals are to follow up with oncology for further DMT

## 2023-02-15 NOTE — CONSULT NOTE ADULT - PROBLEM SELECTOR RECOMMENDATION 9
likely 2/2 metastatic disease, MRI reviewed  seen by rad onc who doesn't feel this would be clinically beneficial  unclear if they have communicated this information directly to patient and family

## 2023-02-15 NOTE — DISCHARGE NOTE NURSING/CASE MANAGEMENT/SOCIAL WORK - NSDCVIVACCINE_GEN_ALL_CORE_FT
Tdap; 18-Apr-2022 16:15; Nathaniel Nelson (RN); [employer]; [id number] (Exp. Date: 18-Jan-2024); IntraMuscular; Deltoid Right.; 0.5 milliLiter(s); VIS (VIS Published: 09-May-2013, VIS Presented: 18-Apr-2022);

## 2023-02-15 NOTE — DISCHARGE NOTE PROVIDER - NSDCCPCAREPLAN_GEN_ALL_CORE_FT
PRINCIPAL DISCHARGE DIAGNOSIS  Diagnosis: Acute encephalopathy  Assessment and Plan of Treatment: Secondary to lesions noted on CT head.  Follow up with your Oncologist as scheduled on Monday at 10:15.      SECONDARY DISCHARGE DIAGNOSES  Diagnosis: Melanoma  Assessment and Plan of Treatment: Continued follow up with Oncology.    Diagnosis: Gastric ulcer  Assessment and Plan of Treatment: Continue to take your protonix and carafate as prescribed.    Diagnosis: Stage 3 chronic kidney disease  Assessment and Plan of Treatment: Creatinine on date of discharge = 1.44.  Avoid taking (NSAIDs) - (ex: Ibuprofen, Advil, Celebrex, Naprosyn)  Avoid taking any nephrotoxic agents (can harm kidneys) - Intravenous contrast for diagnostic testing, combination cold medications.  Have all medications adjusted for your renal function by your Health Care Provider.  Blood pressure control is important.  Take all medication as prescribed.    Diagnosis: Hypothyroid  Assessment and Plan of Treatment: Continue to take your synthroid as prescribed.    Diagnosis: Hyperlipidemia  Assessment and Plan of Treatment: Continue to take your Crestor as prescribed.    Diagnosis: Essential hypertension  Assessment and Plan of Treatment: Continue to take your amlodipine and lopressor as prescribed.    Diagnosis: Follicular lymphoma  Assessment and Plan of Treatment: Continued follow up with your Oncologist at [facility].

## 2023-02-15 NOTE — CONSULT NOTE ADULT - SUBJECTIVE AND OBJECTIVE BOX
HPI:  82M w/ hx of follicular lymphoma, newly diagnosed metastatic melanoma 03/2022 w/ mets to liver, spleen, lymph node and lung, up to recently on immunotherapy (Encorafenib and Binimetinib), HTN, HLD, gastric ulcer, CKD, hypothyroid p/w altered mentation. Pt was at [facility] [place]2 weeks ago after discovering multiple new skin lesions. At that point was getting dual immunotherapy which was deemed to be ineffective. For the past week pt has seemed more confused and lethargic to his family. Also with significantly decreased PO. Today, pt also endorsed severe chills. Family called his oncologist's office today regarding these chills and other symptoms. Was recommended to come to ER for further evaluation. Pt denies any fevers, cough, new urinary symptoms, abd pain or diarrhea.  (12 Feb 2023 02:34)    PERTINENT PM/SXH:   Duodenal ulcer disease    SBO (small bowel obstruction)    BRIGID (acute kidney injury)    Hypertension    Hyperparathyroidism    Hypothyroid    HLD (hyperlipidemia)    GERD (gastroesophageal reflux disease)    Gout    2019 novel coronavirus disease (COVID-19)    COVID-19 vaccine series completed    Melanoma    Lymphoma      No significant past surgical history    S/P exploratory laparotomy      FAMILY HISTORY:  Family history of non-Hodgkin&#x27;s lymphoma    Family history of Hashimoto thyroiditis (Child)      Family Hx substance abuse [ ]yes [ x]no  ITEMS NOT CHECKED ARE NOT PRESENT    SOCIAL HISTORY:   Significant other/partner[x ]  Children[ x]  Cheondoism/Spirituality:  Substance hx:  [ ]   Tobacco hx:  [ ]   Alcohol hx: [ ]   Home Opioid hx:  [ ] I-Stop Reference No:  Living Situation: [x ]Home  [ ]Long term care  [ ]Rehab [ ]Other    ADVANCE DIRECTIVES:    DNR/MOLST  [ ]  Living Will  [ ]   DECISION MAKER(s):  [ ] Health Care Proxy(s)  [ X] Surrogate(s)  [ ] Guardian           Name(s): Phone Number(s):  Spouse, number per [facility], Mary    BASELINE (I)ADL(s) (prior to admission):  Zimmerman: [X ]Total  [ ] Moderate [ ]Dependent    Allergies    Cipro (Rash)  niacin (Flushing; Rash)  Reglan (Anaphylaxis)    Intolerances    MEDICATIONS  (STANDING):  alfuzosin 10 milliGRAM(s) Oral at bedtime  allopurinol 100 milliGRAM(s) Oral daily  amLODIPine   Tablet 2.5 milliGRAM(s) Oral at bedtime  calcitriol   Capsule 0.25 MICROGram(s) Oral daily  dexAMETHasone     Tablet 4 milliGRAM(s) Oral every 6 hours  lactated ringers. 1000 milliLiter(s) (200 mL/Hr) IV Continuous <Continuous>  levothyroxine 200 MICROGram(s) Oral daily  metoprolol tartrate 25 milliGRAM(s) Oral two times a day  oxybutynin 5 milliGRAM(s) Oral two times a day  pantoprazole    Tablet 40 milliGRAM(s) Oral before breakfast  rosuvastatin 20 milliGRAM(s) Oral at bedtime  sodium chloride 0.9%. 1000 milliLiter(s) (50 mL/Hr) IV Continuous <Continuous>  sucralfate 1 Gram(s) Oral <User Schedule>    MEDICATIONS  (PRN):  acetaminophen     Tablet .. 650 milliGRAM(s) Oral every 6 hours PRN Temp greater or equal to 38C (100.4F), Mild Pain (1 - 3)  melatonin 3 milliGRAM(s) Oral at bedtime PRN Insomnia  ondansetron Injectable 4 milliGRAM(s) IV Push every 8 hours PRN Nausea and/or Vomiting    PRESENT SYMPTOMS: [ ]Unable to self-report  [ ] CPOT [ ] PAINADs [ ] RDOS   Source if other than patient:  [ ]Family   [ ]Team   PATIENT AND FAMILY REFUSED PARTICIPATION  Pain: [ ]yes [X ]no  QOL impact -   Location -                    Aggravating factors -  Quality -  Radiation -  Timing-  Severity (0-10 scale):  Minimal acceptable level (0-10 scale):     CPOT:    https://www.[facility].org/getattachment/qad87a57-9w1i-[email or web address]-[email or web address]/Critical-Care-Pain-Observation-Tool-(CPOT)    PAIN AD Score:   http://geriatrictoolkit.[place].[facility]/cog/painad.pdf (press ctrl +  left click to view)    Dyspnea:                           [ ]Mild [ ]Moderate [ ]Severe      RDOS:  0 to 2  minimal or no respiratory distress   3  mild distress  4 to 6 moderate distress  >7 severe distress  https://homecareinformation.net/handouts/hen/Respiratory_Distress_Observation_Scale.pdf (Ctrl +  left click to view)     Anxiety:                             [ ]Mild [ ]Moderate [ ]Severe  Fatigue:                             [ ]Mild [ ]Moderate [ ]Severe  Nausea:                             [ ]Mild [ ]Moderate [ ]Severe  Loss of appetite:              [ ]Mild [ ]Moderate [ ]Severe  Constipation:                    [ ]Mild [ ]Moderate [ ]Severe    PCSSQ[Palliative Care Spiritual Screening Question]   Severity (0-10):  Score of 4 or > indicate consideration of Chaplaincy referral.  Chaplaincy Referral: [ ] yes [ ] refused [ ] following [ X] Deferred     Caregiver [place]? : [ ] yes [ ] no [ X] Deferred [ ] Declined             Social work referral [ ] Patient & Family Centered Care Referral [ ]     Anticipatory Grief present?:  [ ] yes [ ] no  [X ] Deferred                  Social work referral [ ] Chaplaincy Referral[ ]      Other Symptoms:  [ ]All other review of systems negative     Palliative Performance Status Version 2:    60     %    http://[facility].org/files/news/palliative_performance_scale_ppsv2.pdf  PHYSICAL EXAM:  Vital Signs Last 24 Hrs  T(C): 36.2 (15 Feb 2023 10:29), Max: 36.6 (14 Feb 2023 13:37)  T(F): 97.2 (15 Feb 2023 10:29), Max: 97.9 (15 Feb 2023 01:29)  HR: 69 (15 Feb 2023 10:29) (54 - 69)  BP: 136/69 (15 Feb 2023 10:29) (135/69 - 164/76)  BP(mean): --  RR: 18 (15 Feb 2023 10:29) (18 - 18)  SpO2: 95% (15 Feb 2023 10:29) (94% - 96%)    Parameters below as of 15 Feb 2023 10:29  Patient On (Oxygen Delivery Method): room air     I&O's Summary    14 Feb 2023 07:01  -  15 Feb 2023 07:00  --------------------------------------------------------  IN: 1120 mL / OUT: 0 mL / NET: 1120 mL    15 Feb 2023 07:01  -  15 Feb 2023 11:50  --------------------------------------------------------  IN: 270 mL / OUT: 0 mL / NET: 270 mL    PHYSICAL EXAM DEFERRED PER PATIENT AND FAMILY PREFERENCE  GENERAL: [ ]Cachexia    [X ]Alert  [X ]Oriented x   [ ]Lethargic  [ ]Unarousable  [X ]Verbal  [ ]Non-Verbal  Behavioral:   [ ] Anxiety  [ ] Delirium [ ] Agitation [ ] Other  HEENT:  [ ]Normal   [ ]Dry mouth   [ ]ET Tube/Trach  [ ]Oral lesions  PULMONARY:   [ ]Clear [ ]Tachypnea  [ ]Audible excessive secretions   [ ]Rhonchi        [ ]Right [ ]Left [ ]Bilateral  [ ]Crackles        [ ]Right [ ]Left [ ]Bilateral  [ ]Wheezing     [ ]Right [ ]Left [ ]Bilateral  [ ]Diminished breath sounds [ ]right [ ]left [ ]bilateral  CARDIOVASCULAR:    [ ]Regular [ ]Irregular [ ]Tachy  [ ]David [ ]Murmur [ ]Other  GASTROINTESTINAL:  [ ]Soft  [ ]Distended   [ ]+BS  [ ]Non tender [ ]Tender  [ ]Other [ ]PEG [ ]OGT/ NGT  Last BM:  GENITOURINARY:  [ ]Normal [ ] Incontinent   [ ]Oliguria/Anuria   [ ]Magaña  MUSCULOSKELETAL:   [ ]Normal   [ ]Weakness  [ ]Bed/Wheelchair bound [ ]Edema  NEUROLOGIC:   [ ]No focal deficits  [ ]Cognitive impairment  [ ]Dysphagia [ ]Dysarthria [ ]Paresis [ ]Other   SKIN:   [ ]Normal  [ ]Rash  [ ]Other  [ ]Pressure ulcer(s)       Present on admission [ ]y [ ]n    CRITICAL CARE:  [ ] Shock Present  [ ]Septic [ ]Cardiogenic [ ]Neurologic [ ]Hypovolemic  [ ]  Vasopressors [ ]  Inotropes   [ ]Respiratory failure present [ ]Mechanical ventilation [ ]Non-invasive ventilatory support [ ]High flow    [ ]Acute  [ ]Chronic [ ]Hypoxic  [ ]Hypercarbic [ ]Other  [ ]Other organ failure     LABS:                        11.4   4.28  )-----------( 180      ( 15 Feb 2023 07:09 )             38.0   02-15    135  |  101  |  30<H>  ----------------------------<  168<H>  4.4   |  21<L>  |  1.44<H>    Ca    10.6<H>      15 Feb 2023 07:09  Mg     1.7     02-13    TPro  6.5  /  Alb  3.3  /  TBili  0.2  /  DBili  x   /  AST  17  /  ALT  12  /  AlkPhos  220<H>  02-15        RADIOLOGY & ADDITIONAL STUDIES:  < from: MR Head w/wo IV Cont (02.13.23 @ 22:10) >    ACC: [id number] EXAM:  MR BRAIN WAW IC   ORDERED BY: MARTINEZ CARDENAS     PROCEDURE DATE:  02/13/2023          INTERPRETATION:  CLINICAL INFORMATION: Metastatic melanoma with new brain   metastases.    TECHNIQUE: Multisequence, multiplanar MRI ofthe brain was obtained   before and after the administration of intravenous contrast.    IV Contrast Administered: 10 cc administered Gadavist.  IV Contrast Discarded: 0 cc discarded    COMPARISON: CT head 2/11/2023 and 8/23/2019.    FINDINGS:    Enhancing foci are seen diffusely throughout the supratentorial brain,   predominantly at the gray-white matter junctions. Several of these   lesions demonstrate associated susceptibility artifact, with phase   contrast image characteristics consistent with hemosiderin, likely   representing small foci of hemorrhage. Several lesions demonstrate   restricted diffusion and associated vasogenic edema. A few lesions also   demonstrate increased signal on T1-weighted imaging, which may represent   melaninversus blood products. A few reference lesions are measured as   follows:  *  A lesion in the high left frontal lobe measures 2.3 x 2.3 cm ().  *  A lesion in the right perisylvian temporal cortex measures 1.9 x 1.7   cm (15-88).    No significant midline shift. No hydrocephalus.    A small nodular focus of enhancement is also seen along lateral wall of   the right lateral ventricle and the lesion is also seen adjacent to the   left choroid plexus in the posterior horn of the left lateral ventricle.    Major flow-voids at the base of the brain follow expected course and   contour.    Proportional prominence of the sulci and ventricles, within normal limits   for the patient's age.    The calvarium is intact. The visualized intraorbital compartments are   intact. Opacification of a a few ethmoid cells. Polyp/mucus retention   cyst in the left maxillary sinus.. The tympanomastoid cavities appear   free of acute disease.    IMPRESSION:    Diffuse supratentorial metastatic disease as described above.    No midline shift or central herniation.    --- End of Report ---           JUAN MIGUEL CHICAS MD; Resident Radiology  This document has been electronically signed.  NAIF KERN MD; Attending Radiologist  This document has been electronically signed. Feb 14 2023 11:38AM    < end of copied text >      PROTEIN CALORIE MALNUTRITION PRESENT: [ ]mild [ ]moderate [ ]severe [ ]underweight [ ]morbid obesity  https://www.andeal.org/vault/2440/web/files/ONC/Table_Clinical%20Characteristics%20to%20Document%20Malnutrition-White%20JV%20et%20al%202012.pdf    Height (cm): 190.5 (02-11-23 @ 18:00), 188 (05-21-22 @ 17:00), 182.9 (04-18-22 @ 14:58)  Weight (kg): 107 (02-11-23 @ 18:00), 108.9 (05-21-22 @ 17:00), 113.4 (04-18-22 @ 14:58)  BMI (kg/m2): 29.5 (02-11-23 @ 18:00), 30.8 (05-21-22 @ 17:00), 33.9 (04-18-22 @ 14:58)    [ ]PPSV2 < or = to 30% [ ]significant weight loss  [ ]poor nutritional intake  [ ]anasarca[ ]Artificial Nutrition      Other REFERRALS:  [ ]Hospice  [ ]Child Life  [ ]Social Work  [x ]Case management [ ]Holistic Therapy     Goals of Care Document: ADRIANNA Parker (05-25-22 @ 15:24)  Goals of Care Conversation:   Participants:  · Participants  Patient; Family  · Spouse  Mary Herbert    Advance Directives:  · Does patient have Advance Directive  Yes  · Indicate Type  Health Care Proxy (HCP)  · Agent's Name  Mary Herbert   [phone] (cell first)            Alternate Lillian Gutierrez (dtr) [phone]  · Phone #  alternate [phone] (home)  · Are any of the items on the chart  Yes  · Specify which ones are on chart  Health Care Proxy (HCP)  in patient window 2/2019  · Caregiver:  declines    Conversation Discussion:  · Conversation  HCP/Code Status  · Conversation Details  This alert and oriented x 4 patient was seen today for goals of care planning and conversation. Introduced self and role of PCE. Patient states understanding of health status . Patient states he  lives with spouse   and has been independent with all aspects of ADLs prior to admission. Patient states  has prior HCP which is still current and valid,  phone number for spouse update at patients request.       Patient offered  informational video however he refuse same. Patient expressed wish to be a full CPR , intubation procedures and possible complications explained. Patient reports that he has spoken to his wife and daughter multiple time regarding same. Mr Ck called spouse to verify same who states he believes in life at all cost and even if he is brain dead he would like to be kept alive. He is convinced he does not want DNR. Educated on TLT  and  Information videoprovided on  TLT and legacy and  were left  so he can view with family.    What Matters Most To Patient and Family:  · What matters most to patient and family  To beat his cancer. Patient states he was successfully treated for lymphoma  in 2020  which is now in remission and was recently diagnosed with metastatic melanoma of unknown primary. Presently undergoing treatment.    Personal Advance Directives Treatment Guidelines:   Treatment Guidelines:  · Decision Maker  Patient  · Treatment Guideline Comments  Full Code    Location of Discussion:   Time Spent on Advance Care Planning:  I spent 20 (in minutes) on advance care planning services with the patient.  This time is separate and distinct from any other care management services provided on this date.      Electronic Signatures:  Nilda Parker (AYUSH)  (Signed 25-May-2022 15:44)  	Authored: Goals of Care Conversation, Personal Advance Directives Treatment Guidelines, Location of Discussion      Last Updated: 25-May-2022 15:44 by Nilda Parker (AYUSH)

## 2023-02-15 NOTE — PROGRESS NOTE ADULT - NS ATTEND AMEND GEN_ALL_CORE FT
[place] [facility] with followup with med onc and rad onc.
83 y/o male with metastatic melanoma, presenting with altered mental status. He continues to have intermittent slow speech. Pending MRI for further evaluation

## 2023-02-15 NOTE — DISCHARGE NOTE PROVIDER - HOSPITAL COURSE
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 MRI consistent with brain metastasis.     Acute encephalopathy.   -Recent "brain fog" likely from new numerous lesions seen on CT head. Metabolic work up relatively negative.  -[facility] consult fu   - neuro fu appreciated  - sp code stroke      Chills.   - No obvious source of infection on initial work up.  -Trend fever curve  - cultures negative    Melanoma.   -Diagnosed in 03/2022. Was on Encorafenib and Binimetinib until recently. Mets to liver, lung, lymph node  -CT head showing new innumerable metastatic lesions in bilateral cerebral hemispheres  -[facility] Oncology consult.    Follicular lymphoma.   -S/p hx of treatment and follow up at [facility]  - onc fu     Essential hypertension.   -Trend BP  -Cont. amlodipine  -Cont. Lopressor BID.    Stage 3 chronic kidney disease.   -Crt seems relatively stable if not improved than prior  -Trend BMP  -Renal dose medications.     Hyperlipidemia.   --On crestor at home.    Gastric Ulcer   -Cont. PPI daily  -Cont. Sucrulfate daily.    Hypothyroid.   ·  Plan: -Cont. levothyroxine     Medically cleared for discharge.

## 2023-02-15 NOTE — PROGRESS NOTE ADULT - REASON FOR ADMISSION
AMS and chills

## 2023-02-15 NOTE — PROGRESS NOTE ADULT - ASSESSMENT
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 [place] [facility] [place] after discovering multiple new skin lesions. At that point was getting dual immunotherapy which was deemed to be ineffective. For the past week pt has seemed more confused and lethargic to his family. Also with significantly decreased PO. Today, pt also endorsed severe chills. Family called his oncologist's office today regarding these chills and other symptoms. Was recommended to come to ER for further evaluation. Pt denies any fevers, cough, new urinary symptoms, abd pain or diarrhea.  (12 Feb 2023 02:34)        CHRONIC KIDNEY DISEASE, STAGE 3 sodium chloride 0.9%. 1000 milliLiter(s) (50 mL/Hr) IV Continuous   Serum creatinine is stable at 1.36 approximating a GFR of is improved ml/min.   There is no progression.  No uremic symptoms. No evidence of  worsening  Anemia. Fluid status stable.   Will continue to avoid nephrotoxic drugs.  Patient remains asymptomatic.  Continue current therapy.      BP monitoring,continue current antihypertensive meds, low salt diet,followup with PMD in 1-2 weeks  amLODIPine   Tablet 2.5 milliGRAM(s) Oral at bedtime      
  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 [place] [facility] [place] after discovering multiple new skin lesions. At that point was getting dual immunotherapy which was deemed to be ineffective. For the past week pt has seemed more confused and lethargic to his family. Also with significantly decreased PO. Today, pt also endorsed severe chills. Family called his oncologist's office today regarding these chills and other symptoms. Was recommended to come to ER for further evaluation. Pt denies any fevers, cough, new urinary symptoms, abd pain or diarrhea.  (12 Feb 2023 02:34)        CHRONIC KIDNEY DISEASE, STAGE 3 sodium chloride 0.9%. 1000 milliLiter(s) (50 mL/Hr) IV Continuous   Serum creatinine is stable at 1.36 approximating a GFR of is improved ml/min.   There is no progression.  No uremic symptoms. No evidence of  worsening  Anemia. Fluid status stable.   Will continue to avoid nephrotoxic drugs.  Patient remains asymptomatic.  Continue current therapy.      BP monitoring,continue current antihypertensive meds, low salt diet,followup with PMD in 1-2 weeks  amLODIPine   Tablet 2.5 milliGRAM(s) Oral at bedtime      
1. Metastatic BRAF+ Malignant Melanoma    -- currently on Nivolumab+ Relatlimab, first dose on 1/30/23  -- has progressed on Pembrolizumab, Nivolumab/Ipililumab, and Encorafenib/Binimetinib  -- Known mets to Lung, Spleen, Lungs and LNs  -- [place] [facility]  -- CTA Chest w No PE but has pulm mets, liver mets and int mammary ln mets. Will ask [facility] to review    2. AMS    -- CT Head w new diffuse brain mets  -- s/p MRI Brain 2/13, results pending  -- mild  swelling w no midline shift  -- ont Dexamethasone 4mg PO Q 6 hrs w/ improvement of symptoms  -- rad/onc consulted, discussed rad/onc recommendations w/ [facility] team  -- will recommend palliative care for initiation of GOC discussions and possible hospice    Feli Levy NP  Hematology/ Oncology  [facility]  [phone] (office)  [phone] (alt office)  Evenings and weekends please call MD on call or office  
1. Metastatic BRAF+ Malignant Melanoma    -- currently on Nivolumab+ Relatlimab, first dose on 1/30/23  -- has progressed on Pembrolizumab, Nivolumab/Ipililumab, and Encorafenib/Binimetinib  -- Known mets to Lung, Spleen, Lungs and LNs  -- follows at [facility]  -- CTA Chest w No PE but has pulm mets, liver mets and int mammary ln mets.  -- MRI images reviewed by [facility], plan for treatment w/ temozolomide.     2. AMS    -- CT Head w new diffuse brain mets  -- s/p MRI Brain 2/13, results pending  -- mild  swelling w no midline shift  -- on Dexamethasone 4mg PO Q 6 hrs w/ improvement of symptoms  -- rad/onc consulted, discussed rad/onc recommendations w/ [facility] team  -- patient/family choose not to peruse palliative/ hospice care at this time    --from heme/onc perspective patient ok for discharge on steroids w/ outpatient f/up    Feli Levy NP  Hematology/ Oncology  [facility]  [phone] (office)  [phone] (alt office)  Evenings and weekends please call MD on call or office  
82M w/ hx of follicular lymphoma, newly diagnosed metastatic melanoma 03/2022 (on immunotherapy) w/ mets to liver, spleen, lymph node and lung, p/w 2 weeks of increased confusion and lethargy. CT head w/con shows multiple brain mets, largest in R temporal cortex (1.9cm) and in the high L frontal cortex (2.1 cm). Confusion improved after starting steroids. No focal deficits. Exam: intact    -no acute neurosurgical intervention  -MRI brain w/wo contrast   -Rad-onc consultation for [facility]   -Please update patient's [facility] oncologist (Dr. Nnamdi Cochran)  -Steroids per neuronc  
82M w/ hx of follicular lymphoma, newly diagnosed metastatic melanoma 03/2022 w/ mets to liver, spleen, lymph node and lung, up to recently on immunotherapy, HTN, HLD, gastric ulcer, CKD, hypothyroid p/w acute encephalopathy, fatigue and decreased PO    Problem/Plan - 1:  ·  Problem: Acute encephalopathy.   ·  Plan: Recent "brain fog" likely from new numerous lesions seen on CT head. Metabolic work up relatively negative.  -[facility] fu   - neuro fu appreciated  - sp code stroke     Problem/Plan - 2:  ·  Problem: Chills.   ·  Plan: No obvious source of infection on initial work up.  -Trend fever curve  - monitor cultures     Problem/Plan - 3:  ·  Problem: Melanoma.   ·  Plan: Diagnosed in 03/2022. Was on Encorafenib and Binimetinib until recently. Mets to liver, lung, lymph node  -CT head showing new innumerable metastatic lesions in bilateral cerebral hemispheres  -[facility].    Problem/Plan - 4:  ·  Problem: Follicular lymphoma.   ·  Plan: S/p hx of treatment and [facility]  - onc fu     Problem/Plan - 5:  ·  Problem: Essential hypertension.   ·  Plan: -Trend BP  -Cont. amlodipine  -Cont. Lopressor BID.    Problem/Plan - 6:  ·  Problem: Stage 3 chronic kidney disease.   ·  Plan: Crt seems relatively stable if not improved than prior  -Trend BMP  -Renal dose medications.    Problem/Plan - 7:  ·  Problem: Hyperlipidemia.   ·  Plan: -On crestor at home.    Problem/Plan - 8:  ·  Problem: Gastric ulcer.   ·  Plan: -Cont. PPI daily  -Cont. Sucrulfate daily.    Problem/Plan - 9:  ·  Problem: Hypothyroid.   ·  Plan: -Cont. levothyroxine     Palliative care consult   
1. Metastatic BRAF+ Malignant Melanoma    -- currently on Nivolumab+ Relatlimab, first dose on 1/30/23  -- has progressed on Pembrolizumab, Nivolumab/Ipililumab, and Encorafenib/Binimetinib  -- Known mets to Lung, Spleen, Lungs and LNs  -- [place] [facility]  -- CTA Chest w No PE but has pulm mets, liver mets and int mammary ln mets. Will ask [facility] to review    2. AMS    -- CT Head w new diffuse brain mets  -- MRI Brain with and without Contrast pending  -- mild  swelling w no midline shift  -- would consult Neurosurgery and Radiation Oncology  -- ont Dexamethasone 4mg PO Q 6 hrs w/ improvement of symptoms    Feli Levy NP  Hematology/ Oncology  [facility]  [phone] (office)  [phone] (alt office)  Evenings and weekends please call MD on call or office  
  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 Pt was at [facility] ~2 weeks ago after discovering multiple new skin lesions. At that point was getting dual immunotherapy which was deemed to be ineffective. For the past week pt has seemed more confused and lethargic to his family. Also with significantly decreased PO. Today, pt also endorsed severe chills. Family called his oncologist's office today regarding these chills and other symptoms. Was recommended to come to ER for further evaluation. Pt denies any fevers, cough, new urinary symptoms, abd pain or diarrhea.  (12 Feb 2023 02:34)        CHRONIC KIDNEY DISEASE, STAGE 3 sodium chloride 0.9%. 1000 milliLiter(s) (50 mL/Hr) IV Continuous   Serum creatinine is stable at 1.36 approximating a GFR of is improved ml/min.   There is no progression.  No uremic symptoms. No evidence of  worsening  Anemia. Fluid status stable.   Will continue to avoid nephrotoxic drugs.  Patient remains asymptomatic.  Continue current therapy.      BP monitoring,continue current antihypertensive meds, low salt diet,followup with PMD in 1-2 weeks  amLODIPine   Tablet 2.5 milliGRAM(s) Oral at bedtime      
82M w/ hx of follicular lymphoma, newly diagnosed metastatic melanoma 03/2022 w/ mets to liver, spleen, lymph node and lung, up to recently on immunotherapy, HTN, HLD, gastric ulcer, CKD, hypothyroid p/w acute encephalopathy, fatigue and decreased PO    Problem/Plan - 1:  ·  Problem: Acute encephalopathy.   ·  Plan: Recent "brain fog" likely from new numerous lesions seen on CT head. Metabolic work up relatively negative.  -[facility] [place] [place] fu   - neuro fu     Problem/Plan - 2:  ·  Problem: Chills.   ·  Plan: No obvious source of infection on initial work up.  -Trend fever curve  - monitor cultures     Problem/Plan - 3:  ·  Problem: Melanoma.   ·  Plan: Diagnosed in 03/2022. Was on Encorafenib and Binimetinib until recently. Mets to liver, lung, lymph node  -CT head showing new innumerable metastatic lesions in bilateral cerebral hemispheres  -[facility] [place].    Problem/Plan - 4:  ·  Problem: Follicular lymphoma.   ·  Plan: S/p hx of treatment and follow [place] [facility]  - onc fu     Problem/Plan - 5:  ·  Problem: Essential hypertension.   ·  Plan: -Trend BP  -Cont. amlodipine  -Cont. Lopressor BID.    Problem/Plan - 6:  ·  Problem: Stage 3 chronic kidney disease.   ·  Plan: Crt seems relatively stable if not improved than prior  -Trend BMP  -Renal dose medications.    Problem/Plan - 7:  ·  Problem: Hyperlipidemia.   ·  Plan: -On crestor at home.    Problem/Plan - 8:  ·  Problem: Gastric ulcer.   ·  Plan: -Cont. PPI daily  -Cont. Sucrulfate daily.    Problem/Plan - 9:  ·  Problem: Hypothyroid.   ·  Plan: -Cont. levothyroxine

## 2023-02-15 NOTE — DISCHARGE NOTE PROVIDER - PROVIDER TOKENS
PROVIDER:[TOKEN:[[id number]],SCHEDULEDAPPT:[02/20/2023],SCHEDULEDAPPTTIME:[10:15 AM]],PROVIDER:[TOKEN:[[id number]],FOLLOWUP:[1 week]]

## 2023-02-15 NOTE — DISCHARGE NOTE PROVIDER - NSDCMRMEDTOKEN_GEN_ALL_CORE_FT
alfuzosin 10 mg oral tablet, extended release: 1 tab(s) orally once a day (at bedtime)  allopurinol 100 mg oral tablet: 1 tab(s) orally once a day  amLODIPine 2.5 mg oral tablet: 1 tab(s) orally once a day (at bedtime)  calcitriol 0.25 mcg oral capsule: 1 cap(s) orally once a day  dexamethasone 4 mg oral tablet: 1 tab(s) orally every 6 hours  levothyroxine 200 mcg (0.2 mg) oral capsule: 1 cap(s) orally once a day  metoprolol tartrate 50 mg oral tablet: 0.5 tab(s) orally 2 times a day  oxybutynin 10 mg/24 hr oral tablet, extended release: 1 tab(s) orally once a day  Physical Therapy: 3x weekly    ICD 10: R53.1  Protonix 40 mg oral delayed release tablet: 1 tab(s) orally once a day  rosuvastatin 20 mg oral tablet: 1 tab(s) orally once a day (at bedtime)  sucralfate 1 g oral tablet: 1 tab(s) orally once a day

## 2023-02-15 NOTE — DISCHARGE NOTE NURSING/CASE MANAGEMENT/SOCIAL WORK - NSDCPEFALRISK_GEN_ALL_CORE
For information on Fall & Injury Prevention, visit: [email or web address] OR  [email or web address] OR  https://www.cdc.gov/steadi/patient.html

## 2023-02-15 NOTE — PROGRESS NOTE ADULT - SUBJECTIVE AND OBJECTIVE BOX
Patient is a 82y old  Male who presents with a chief complaint of AMS and chills (13 Feb 2023 13:02)    Patient seen and examined at bedside this morning. Patient resting comfortably, offers no complaints. Family at bedside    MEDICATIONS  (STANDING):  allopurinol 100 milliGRAM(s) Oral daily  amLODIPine   Tablet 2.5 milliGRAM(s) Oral at bedtime  calcitriol   Capsule 0.25 MICROGram(s) Oral daily  dexAMETHasone     Tablet 4 milliGRAM(s) Oral every 6 hours  lactated ringers. 1000 milliLiter(s) (200 mL/Hr) IV Continuous <Continuous>  levothyroxine 200 MICROGram(s) Oral daily  metoprolol tartrate 25 milliGRAM(s) Oral two times a day  oxybutynin 5 milliGRAM(s) Oral two times a day  pantoprazole    Tablet 40 milliGRAM(s) Oral before breakfast  sodium chloride 0.9%. 1000 milliLiter(s) (50 mL/Hr) IV Continuous <Continuous>  sucralfate 1 Gram(s) Oral <User Schedule>    MEDICATIONS  (PRN):  acetaminophen     Tablet .. 650 milliGRAM(s) Oral every 6 hours PRN Temp greater or equal to 38C (100.4F), Mild Pain (1 - 3)  melatonin 3 milliGRAM(s) Oral at bedtime PRN Insomnia  ondansetron Injectable 4 milliGRAM(s) IV Push every 8 hours PRN Nausea and/or Vomiting      ROS  No fever, sweats, chills  No epistaxis, HA, sore throat  No CP, SOB, cough, sputum  No n/v/d, abd pain, melena, hematochezia  No edema  No rash  No anxiety  No back pain, joint pain  No bleeding, bruising  No dysuria, hematuria    Vital Signs Last 24 Hrs  T(C): 36.8 (13 Feb 2023 13:23), Max: 37.3 (12 Feb 2023 21:16)  T(F): 98.2 (13 Feb 2023 13:23), Max: 99.1 (12 Feb 2023 21:16)  HR: 69 (13 Feb 2023 13:23) (55 - 76)  BP: 121/63 (13 Feb 2023 13:23) (121/63 - 170/79)  BP(mean): --  RR: 18 (13 Feb 2023 13:23) (17 - 18)  SpO2: 97% (13 Feb 2023 13:23) (94% - 99%)    Parameters below as of 13 Feb 2023 13:23  Patient On (Oxygen Delivery Method): room air        PE  NAD  Awake, alert  Anicteric, MMM  RRR  CTAB  Abd soft, NT, ND  No c/c/e  No rash grossly                            12.4   4.78  )-----------( 160      ( 11 Feb 2023 19:27 )             40.6       02-11    134<L>  |  98  |  18  ----------------------------<  105<H>  4.7   |  22  |  1.36<H>    Ca    10.6<H>      11 Feb 2023 19:27  Phos  2.9     02-11  Mg     1.9     02-11    TPro  7.9  /  Alb  3.7  /  TBili  0.3  /  DBili  x   /  AST  27  /  ALT  13  /  AlkPhos  250<H>  02-11      
  Patient is a 82y Male whom presented to the hospital with ckd and brigid     PAST MEDICAL & SURGICAL HISTORY:  Duodenal ulcer disease      SBO (small bowel obstruction)      BRIGID (acute kidney injury)      Hypertension      Hyperparathyroidism      Hypothyroid      HLD (hyperlipidemia)      GERD (gastroesophageal reflux disease)      Gout      2019 novel coronavirus disease (COVID-19)      COVID-19 vaccine series completed      Melanoma      Lymphoma      S/P exploratory laparotomy          MEDICATIONS  (STANDING):  allopurinol 100 milliGRAM(s) Oral daily  amLODIPine   Tablet 2.5 milliGRAM(s) Oral at bedtime  calcitriol   Capsule 0.25 MICROGram(s) Oral daily  dexAMETHasone     Tablet 4 milliGRAM(s) Oral every 6 hours  lactated ringers. 1000 milliLiter(s) (200 mL/Hr) IV Continuous <Continuous>  levothyroxine 200 MICROGram(s) Oral daily  metoprolol tartrate 25 milliGRAM(s) Oral two times a day  oxybutynin 5 milliGRAM(s) Oral two times a day  pantoprazole    Tablet 40 milliGRAM(s) Oral before breakfast  sodium chloride 0.9%. 1000 milliLiter(s) (50 mL/Hr) IV Continuous <Continuous>  sucralfate 1 Gram(s) Oral <User Schedule>      Allergies    Cipro (Rash)  niacin (Flushing; Rash)  Reglan (Anaphylaxis)    Intolerances        SOCIAL HISTORY:  Denies ETOh,Smoking,     FAMILY HISTORY:  Family history of non-Hodgkin&#x27;s lymphoma    Family history of Hashimoto thyroiditis (Child)        REVIEW OF SYSTEMS:    CONSTITUTIONAL: No weakness, fevers or chills  RESPIRATORY: No cough, wheezing, hemoptysis; No shortness of breath  CARDIOVASCULAR: No chest pain or palpitations  GASTROINTESTINAL: No abdominal or epigastric pain. No nausea, vomiting,     No diarrhea or constipation. No melena   GENITOURINARY: No dysuria, frequency or hematuria  NEUROLOGICAL: No numbness or weakness  SKIN: dry      VITAL:  T(C): , Max: 37.4 (23 @ 21:03)  T(F): , Max: 99.4 (23 @ 21:03)  HR: 72 (23 @ 11:34)  BP: 145/69 (23 @ 11:34)  BP(mean): --  RR: 18 (23 @ 11:34)  SpO2: 97% (02-12-23 @ 11:34)  Wt(kg): --    I and O's:     @ 07:01  -   @ 16:35  --------------------------------------------------------  IN: 0 mL / OUT: 2 mL / NET: -2 mL      Height (cm): 190.5 ( @ 18:00)  Weight (kg): 107 ( @ 18:00)  BMI (kg/m2): 29.5 ( @ 18:00)  BSA (m2): 2.35 ( @ :00)    PHYSICAL EXAM:    Constitutional: NAD  HEENT: conjunctive   clear   Neck:  No JVD  Respiratory: CTAB  Cardiovascular: S1 and S2  Gastrointestinal: BS+, soft, NT/ND  Extremities: No peripheral edema  : No [place]  Skin: dry   Access: Not applicable    LABS:                        12.4   4.78  )-----------( 160      ( 2023 19:27 )             40.6         134<L>  |  98  |  18  ----------------------------<  105<H>  4.7   |  22  |  1.36<H>    Ca    10.6<H>      2023 19:27  Phos  2.9       Mg     1.9         TPro  7.9  /  Alb  3.7  /  TBili  0.3  /  DBili  x   /  AST  27  /  ALT  13  /  AlkPhos  250<H>  11      Urine Studies:  Urinalysis Basic - ( 2023 22:30 )    Color: Light Yellow / Appearance: Clear / S.038 / pH: x  Gluc: x / Ketone: Negative  / Bili: Negative / Urobili: Negative   Blood: x / Protein: 100 mg/dL / Nitrite: Negative   Leuk Esterase: Negative / RBC: 18 /hpf / WBC 0 /HPF   Sq Epi: x / Non Sq Epi: 0 /hpf / Bacteria: Negative            RADIOLOGY & ADDITIONAL STUDIES:        MEDICATIONS  (STANDING):  allopurinol 100 milliGRAM(s) Oral daily  amLODIPine   Tablet 2.5 milliGRAM(s) Oral at bedtime  calcitriol   Capsule 0.25 MICROGram(s) Oral daily  dexAMETHasone     Tablet 4 milliGRAM(s) Oral every 6 hours  lactated ringers. 1000 milliLiter(s) (200 mL/Hr) IV Continuous <Continuous>  levothyroxine 200 MICROGram(s) Oral daily  metoprolol tartrate 25 milliGRAM(s) Oral two times a day  oxybutynin 5 milliGRAM(s) Oral two times a day  pantoprazole    Tablet 40 milliGRAM(s) Oral before breakfast  sodium chloride 0.9%. 1000 milliLiter(s) (50 mL/Hr) IV Continuous <Continuous>  sucralfate 1 Gram(s) Oral <User Schedule>          
Patient is a 82y old  Male who presents with a chief complaint of AMS and chills (14 Feb 2023 10:21)    Date of servie : 02-14-23 @ 16:37  INTERVAL HPI/OVERNIGHT EVENTS:  T(C): 36.6 (02-14-23 @ 13:37), Max: 36.7 (02-13-23 @ 21:40)  HR: 58 (02-14-23 @ 13:37) (52 - 64)  BP: 135/69 (02-14-23 @ 13:37) (135/69 - 153/74)  RR: 18 (02-14-23 @ 13:37) (16 - 18)  SpO2: 96% (02-14-23 @ 13:37) (95% - 96%)  Wt(kg): --  I&O's Summary    14 Feb 2023 07:01  -  14 Feb 2023 16:37  --------------------------------------------------------  IN: 500 mL / OUT: 0 mL / NET: 500 mL        LABS:                        11.5   4.71  )-----------( 185      ( 14 Feb 2023 07:16 )             37.6     02-14    138  |  104  |  26<H>  ----------------------------<  159<H>  4.3   |  21<L>  |  1.36<H>    Ca    10.5      14 Feb 2023 07:13  Mg     1.7     02-13    TPro  6.7  /  Alb  3.3  /  TBili  0.2  /  DBili  x   /  AST  18  /  ALT  13  /  AlkPhos  225<H>  02-13        CAPILLARY BLOOD GLUCOSE      POCT Blood Glucose.: 249 mg/dL (14 Feb 2023 12:16)  POCT Blood Glucose.: 193 mg/dL (13 Feb 2023 21:39)            MEDICATIONS  (STANDING):  allopurinol 100 milliGRAM(s) Oral daily  amLODIPine   Tablet 2.5 milliGRAM(s) Oral at bedtime  calcitriol   Capsule 0.25 MICROGram(s) Oral daily  dexAMETHasone     Tablet 4 milliGRAM(s) Oral every 6 hours  lactated ringers. 1000 milliLiter(s) (200 mL/Hr) IV Continuous <Continuous>  levothyroxine 200 MICROGram(s) Oral daily  metoprolol tartrate 25 milliGRAM(s) Oral two times a day  oxybutynin 5 milliGRAM(s) Oral two times a day  pantoprazole    Tablet 40 milliGRAM(s) Oral before breakfast  rosuvastatin 20 milliGRAM(s) Oral at bedtime  sodium chloride 0.9%. 1000 milliLiter(s) (50 mL/Hr) IV Continuous <Continuous>  sucralfate 1 Gram(s) Oral <User Schedule>    MEDICATIONS  (PRN):  acetaminophen     Tablet .. 650 milliGRAM(s) Oral every 6 hours PRN Temp greater or equal to 38C (100.4F), Mild Pain (1 - 3)  melatonin 3 milliGRAM(s) Oral at bedtime PRN Insomnia  ondansetron Injectable 4 milliGRAM(s) IV Push every 8 hours PRN Nausea and/or Vomiting          PHYSICAL EXAM:  GENERAL: frail  CHEST/LUNG: Clear to percussion bilaterally; No rales, rhonchi, wheezing, or rubs  HEART: Regular rate and rhythm; No murmurs, rubs, or gallops  ABDOMEN: Soft, Nontender, Nondistended; Bowel sounds present  EXTREMITIES:  edema +    Care Discussed with Consultants/Other Providers [ ] YES  [ ] NO
Patient is a 82y old  Male who presents with a chief complaint of AMS and chills (2023 12:51)    Date of servie : 23 @ 13:02  INTERVAL HPI/OVERNIGHT EVENTS:  T(C): 36.7 (23 @ 10:01), Max: 37.3 (23 @ 21:16)  HR: 61 (23 @ 10:01) (55 - 76)  BP: 128/75 (23 @ 10:01) (126/70 - 170/79)  RR: 18 (23 @ 10:01) (17 - 18)  SpO2: 96% (23 @ 10:01) (94% - 99%)  Wt(kg): --  I&O's Summary    2023 07:01  -  2023 07:00  --------------------------------------------------------  IN: 950 mL / OUT: 2 mL / NET: 948 mL        LABS:                        12.4   4.78  )-----------( 160      ( 2023 19:27 )             40.6     02-11    134<L>  |  98  |  18  ----------------------------<  105<H>  4.7   |  22  |  1.36<H>    Ca    10.6<H>      2023 19:27  Phos  2.9     -11  Mg     1.9     11    TPro  7.9  /  Alb  3.7  /  TBili  0.3  /  DBili  x   /  AST  27  /  ALT  13  /  AlkPhos  250<H>  02-11    PT/INR - ( 2023 19:27 )   PT: 13.2 sec;   INR: 1.14 ratio         PTT - ( 2023 19:27 )  PTT:29.3 sec  Urinalysis Basic - ( 2023 22:30 )    Color: Light Yellow / Appearance: Clear / S.038 / pH: x  Gluc: x / Ketone: Negative  / Bili: Negative / Urobili: Negative   Blood: x / Protein: 100 mg/dL / Nitrite: Negative   Leuk Esterase: Negative / RBC: 18 /hpf / WBC 0 /HPF   Sq Epi: x / Non Sq Epi: 0 /hpf / Bacteria: Negative      CAPILLARY BLOOD GLUCOSE            Urinalysis Basic - ( 2023 22:30 )    Color: Light Yellow / Appearance: Clear / S.038 / pH: x  Gluc: x / Ketone: Negative  / Bili: Negative / Urobili: Negative   Blood: x / Protein: 100 mg/dL / Nitrite: Negative   Leuk Esterase: Negative / RBC: 18 /hpf / WBC 0 /HPF   Sq Epi: x / Non Sq Epi: 0 /hpf / Bacteria: Negative        MEDICATIONS  (STANDING):  allopurinol 100 milliGRAM(s) Oral daily  amLODIPine   Tablet 2.5 milliGRAM(s) Oral at bedtime  calcitriol   Capsule 0.25 MICROGram(s) Oral daily  dexAMETHasone     Tablet 4 milliGRAM(s) Oral every 6 hours  lactated ringers. 1000 milliLiter(s) (200 mL/Hr) IV Continuous <Continuous>  levothyroxine 200 MICROGram(s) Oral daily  metoprolol tartrate 25 milliGRAM(s) Oral two times a day  oxybutynin 5 milliGRAM(s) Oral two times a day  pantoprazole    Tablet 40 milliGRAM(s) Oral before breakfast  sodium chloride 0.9%. 1000 milliLiter(s) (50 mL/Hr) IV Continuous <Continuous>  sucralfate 1 Gram(s) Oral <User Schedule>    MEDICATIONS  (PRN):  acetaminophen     Tablet .. 650 milliGRAM(s) Oral every 6 hours PRN Temp greater or equal to 38C (100.4F), Mild Pain (1 - 3)  melatonin 3 milliGRAM(s) Oral at bedtime PRN Insomnia  ondansetron Injectable 4 milliGRAM(s) IV Push every 8 hours PRN Nausea and/or Vomiting          PHYSICAL EXAM:  GENERAL: NAD, well-groomed, well-developed  HEAD:  Atraumatic, Normocephalic  CHEST/LUNG: Clear to percussion bilaterally; No rales, rhonchi, wheezing, or rubs  HEART: Regular rate and rhythm; No murmurs, rubs, or gallops  ABDOMEN: Soft, Nontender, Nondistended; Bowel sounds present  EXTREMITIES:  2+ Peripheral Pulses, No clubbing, cyanosis, or edema  LYMPH: No lymphadenopathy noted  SKIN: No rashes or lesions    Care Discussed with Consultants/Other Providers [ ] YES  [ ] NO
Patient is a 82y old  Male who presents with a chief complaint of AMS and chills (14 Feb 2023 08:36)    Patient seen and examined this morning at bedside. Patient resting comfortably in bed, offers no complaints.      MEDICATIONS  (STANDING):  allopurinol 100 milliGRAM(s) Oral daily  amLODIPine   Tablet 2.5 milliGRAM(s) Oral at bedtime  calcitriol   Capsule 0.25 MICROGram(s) Oral daily  dexAMETHasone     Tablet 4 milliGRAM(s) Oral every 6 hours  lactated ringers. 1000 milliLiter(s) (200 mL/Hr) IV Continuous <Continuous>  levothyroxine 200 MICROGram(s) Oral daily  metoprolol tartrate 25 milliGRAM(s) Oral two times a day  oxybutynin 5 milliGRAM(s) Oral two times a day  pantoprazole    Tablet 40 milliGRAM(s) Oral before breakfast  sodium chloride 0.9%. 1000 milliLiter(s) (50 mL/Hr) IV Continuous <Continuous>  sucralfate 1 Gram(s) Oral <User Schedule>    MEDICATIONS  (PRN):  acetaminophen     Tablet .. 650 milliGRAM(s) Oral every 6 hours PRN Temp greater or equal to 38C (100.4F), Mild Pain (1 - 3)  melatonin 3 milliGRAM(s) Oral at bedtime PRN Insomnia  ondansetron Injectable 4 milliGRAM(s) IV Push every 8 hours PRN Nausea and/or Vomiting      ROS  No fever, sweats, chills  No epistaxis, HA, sore throat  No CP, SOB, cough, sputum  No n/v/d, abd pain, melena, hematochezia  No edema  No rash  No anxiety  No back pain, joint pain  No bleeding, bruising  No dysuria, hematuria    Vital Signs Last 24 Hrs  T(C): 36.6 (14 Feb 2023 08:51), Max: 36.8 (13 Feb 2023 13:23)  T(F): 97.9 (14 Feb 2023 08:51), Max: 98.2 (13 Feb 2023 13:23)  HR: 63 (14 Feb 2023 08:51) (52 - 69)  BP: 148/75 (14 Feb 2023 08:51) (121/63 - 153/74)  BP(mean): 100 (14 Feb 2023 08:51) (100 - 100)  RR: 16 (14 Feb 2023 08:51) (16 - 18)  SpO2: 96% (14 Feb 2023 08:51) (95% - 97%)    Parameters below as of 14 Feb 2023 08:51  Patient On (Oxygen Delivery Method): room air        PE  NAD  Awake, alert  Anicteric, MMM  RRR  CTAB  Abd soft, NT, ND  No c/c/e  No rash grossly  FROM                          11.5   4.71  )-----------( 185      ( 14 Feb 2023 07:16 )             37.6       02-14    138  |  104  |  26<H>  ----------------------------<  159<H>  4.3   |  21<L>  |  1.36<H>    Ca    10.5      14 Feb 2023 07:13  Mg     1.7     02-13    TPro  6.7  /  Alb  3.3  /  TBili  0.2  /  DBili  x   /  AST  18  /  ALT  13  /  AlkPhos  225<H>  02-13      
Patient is a 82y old  Male who presents with a chief complaint of AMS and chills (14 Feb 2023 18:48)    Patient seen and examined this morning. Patient resting comfortably in bed. No complaints reported.    MEDICATIONS  (STANDING):  alfuzosin 10 milliGRAM(s) Oral at bedtime  allopurinol 100 milliGRAM(s) Oral daily  amLODIPine   Tablet 2.5 milliGRAM(s) Oral at bedtime  calcitriol   Capsule 0.25 MICROGram(s) Oral daily  dexAMETHasone     Tablet 4 milliGRAM(s) Oral every 6 hours  lactated ringers. 1000 milliLiter(s) (200 mL/Hr) IV Continuous <Continuous>  levothyroxine 200 MICROGram(s) Oral daily  metoprolol tartrate 25 milliGRAM(s) Oral two times a day  oxybutynin 5 milliGRAM(s) Oral two times a day  pantoprazole    Tablet 40 milliGRAM(s) Oral before breakfast  rosuvastatin 20 milliGRAM(s) Oral at bedtime  sodium chloride 0.9%. 1000 milliLiter(s) (50 mL/Hr) IV Continuous <Continuous>  sucralfate 1 Gram(s) Oral <User Schedule>    MEDICATIONS  (PRN):  acetaminophen     Tablet .. 650 milliGRAM(s) Oral every 6 hours PRN Temp greater or equal to 38C (100.4F), Mild Pain (1 - 3)  melatonin 3 milliGRAM(s) Oral at bedtime PRN Insomnia  ondansetron Injectable 4 milliGRAM(s) IV Push every 8 hours PRN Nausea and/or Vomiting      ROS  No fever, sweats, chills  No epistaxis, HA, sore throat  No CP, SOB, cough, sputum  No n/v/d, abd pain, melena, hematochezia  No edema  No rash  No anxiety  No back pain, joint pain  No bleeding, bruising  No dysuria, hematuria    Vital Signs Last 24 Hrs  T(C): 36.2 (15 Feb 2023 10:29), Max: 36.6 (14 Feb 2023 13:37)  T(F): 97.2 (15 Feb 2023 10:29), Max: 97.9 (15 Feb 2023 01:29)  HR: 69 (15 Feb 2023 10:29) (54 - 69)  BP: 136/69 (15 Feb 2023 10:29) (135/69 - 164/76)  BP(mean): --  RR: 18 (15 Feb 2023 10:29) (18 - 18)  SpO2: 95% (15 Feb 2023 10:29) (94% - 96%)    Parameters below as of 15 Feb 2023 10:29  Patient On (Oxygen Delivery Method): room air        PE  NAD  Awake, alert  Anicteric, MMM  RRR  CTAB  Abd soft, NT, ND  No c/c/e  No rash grossly                          11.4   4.28  )-----------( 180      ( 15 Feb 2023 07:09 )             38.0       02-15    135  |  101  |  30<H>  ----------------------------<  168<H>  4.4   |  21<L>  |  1.44<H>    Ca    10.6<H>      15 Feb 2023 07:09  Mg     1.7     02-13    TPro  6.5  /  Alb  3.3  /  TBili  0.2  /  DBili  x   /  AST  17  /  ALT  12  /  AlkPhos  220<H>  02-15      
  Patient is a 82y Male whom presented to the hospital with ckd and brigid     PAST MEDICAL & SURGICAL HISTORY:  Duodenal ulcer disease      SBO (small bowel obstruction)      BRIGID (acute kidney injury)      Hypertension      Hyperparathyroidism      Hypothyroid      HLD (hyperlipidemia)      GERD (gastroesophageal reflux disease)      Gout      2019 novel coronavirus disease (COVID-19)      COVID-19 vaccine series completed      Melanoma      Lymphoma      S/P exploratory laparotomy          MEDICATIONS  (STANDING):  allopurinol 100 milliGRAM(s) Oral daily  amLODIPine   Tablet 2.5 milliGRAM(s) Oral at bedtime  calcitriol   Capsule 0.25 MICROGram(s) Oral daily  dexAMETHasone     Tablet 4 milliGRAM(s) Oral every 6 hours  lactated ringers. 1000 milliLiter(s) (200 mL/Hr) IV Continuous <Continuous>  levothyroxine 200 MICROGram(s) Oral daily  metoprolol tartrate 25 milliGRAM(s) Oral two times a day  oxybutynin 5 milliGRAM(s) Oral two times a day  pantoprazole    Tablet 40 milliGRAM(s) Oral before breakfast  sodium chloride 0.9%. 1000 milliLiter(s) (50 mL/Hr) IV Continuous <Continuous>  sucralfate 1 Gram(s) Oral <User Schedule>      Allergies    Cipro (Rash)  niacin (Flushing; Rash)  Reglan (Anaphylaxis)    Intolerances        SOCIAL HISTORY:  Denies ETOh,Smoking,     FAMILY HISTORY:  Family history of non-Hodgkin&#x27;s lymphoma    Family history of Hashimoto thyroiditis (Child)        REVIEW OF SYSTEMS:    CONSTITUTIONAL: No weakness, fevers or chills  RESPIRATORY: No cough, wheezing, hemoptysis; No shortness of breath  CARDIOVASCULAR: No chest pain or palpitations  GASTROINTESTINAL: No abdominal or epigastric pain. No nausea, vomiting,     No diarrhea or constipation. No melena   GENITOURINARY: No dysuria, frequency or hematuria                            1                      11.4   4.28  )-----------( 180      ( 15 Feb 2023 07:09 )             38.0       CBC Full  -  ( 15 Feb 2023 07:09 )  WBC Count : 4.28 K/uL  RBC Count : 4.77 M/uL  Hemoglobin : 11.4 g/dL  Hematocrit : 38.0 %  Platelet Count - Automated : 180 K/uL  Mean Cell Volume : 79.7 fl  Mean Cell Hemoglobin : 23.9 pg  Mean Cell Hemoglobin Concentration : 30.0 gm/dL  Auto Neutrophil # : 3.06 K/uL  Auto Lymphocyte # : 0.80 K/uL  Auto Monocyte # : 0.32 K/uL  Auto Eosinophil # : 0.00 K/uL  Auto Basophil # : 0.01 K/uL  Auto Neutrophil % : 71.5 %  Auto Lymphocyte % : 18.7 %  Auto Monocyte % : 7.5 %  Auto Eosinophil % : 0.0 %  Auto Basophil % : 0.2 %      02-15    135  |  101  |  30<H>  ----------------------------<  168<H>  4.4   |  21<L>  |  1.44<H>    Ca    10.6<H>      15 Feb 2023 07:09  Mg     1.7     02-13    TPro  6.5  /  Alb  3.3  /  TBili  0.2  /  DBili  x   /  AST  17  /  ALT  12  /  AlkPhos  220<H>  02-15      CAPILLARY BLOOD GLUCOSE      POCT Blood Glucose.: 194 mg/dL (15 Feb 2023 12:55)  POCT Blood Glucose.: 149 mg/dL (15 Feb 2023 07:56)  POCT Blood Glucose.: 167 mg/dL (14 Feb 2023 22:05)  POCT Blood Glucose.: 173 mg/dL (14 Feb 2023 18:02)      Vital Signs Last 24 Hrs  T(C): 36.2 (15 Feb 2023 13:30), Max: 36.6 (14 Feb 2023 22:06)  T(F): 97.1 (15 Feb 2023 13:30), Max: 97.9 (15 Feb 2023 01:29)  HR: 58 (15 Feb 2023 13:30) (54 - 69)  BP: 148/73 (15 Feb 2023 13:30) (136/69 - 164/76)  BP(mean): --  RR: 18 (15 Feb 2023 13:30) (18 - 18)  SpO2: 95% (15 Feb 2023 13:30) (94% - 96%)    Parameters below as of 15 Feb 2023 13:30  Patient On (Oxygen Delivery Method): room air                    PHYSICAL EXAM:    Constitutional: NAD  HEENT: conjunctive   clear   Neck:  No JVD  Respiratory: CTAB  Cardiovascular: S1 and S2  Gastrointestinal: BS+, soft, NT/ND  Extremities: No peripheral edema  : No [place]  Skin: dry   Access: Not applicable  
  Patient is a 82y Male whom presented to the hospital with ckd and brigid     PAST MEDICAL & SURGICAL HISTORY:  Duodenal ulcer disease      SBO (small bowel obstruction)      BRIGID (acute kidney injury)      Hypertension      Hyperparathyroidism      Hypothyroid      HLD (hyperlipidemia)      GERD (gastroesophageal reflux disease)      Gout      2019 novel coronavirus disease (COVID-19)      COVID-19 vaccine series completed      Melanoma      Lymphoma      S/P exploratory laparotomy          MEDICATIONS  (STANDING):  allopurinol 100 milliGRAM(s) Oral daily  amLODIPine   Tablet 2.5 milliGRAM(s) Oral at bedtime  calcitriol   Capsule 0.25 MICROGram(s) Oral daily  dexAMETHasone     Tablet 4 milliGRAM(s) Oral every 6 hours  lactated ringers. 1000 milliLiter(s) (200 mL/Hr) IV Continuous <Continuous>  levothyroxine 200 MICROGram(s) Oral daily  metoprolol tartrate 25 milliGRAM(s) Oral two times a day  oxybutynin 5 milliGRAM(s) Oral two times a day  pantoprazole    Tablet 40 milliGRAM(s) Oral before breakfast  sodium chloride 0.9%. 1000 milliLiter(s) (50 mL/Hr) IV Continuous <Continuous>  sucralfate 1 Gram(s) Oral <User Schedule>      Allergies    Cipro (Rash)  niacin (Flushing; Rash)  Reglan (Anaphylaxis)    Intolerances        SOCIAL HISTORY:  Denies ETOh,Smoking,     FAMILY HISTORY:  Family history of non-Hodgkin&#x27;s lymphoma    Family history of Hashimoto thyroiditis (Child)        REVIEW OF SYSTEMS:    CONSTITUTIONAL: No weakness, fevers or chills  RESPIRATORY: No cough, wheezing, hemoptysis; No shortness of breath  CARDIOVASCULAR: No chest pain or palpitations  GASTROINTESTINAL: No abdominal or epigastric pain. No nausea, vomiting,     No diarrhea or constipation. No melena   GENITOURINARY: No dysuria, frequency or hematuria                            11.5   4.71  )-----------( 185      ( 14 Feb 2023 07:16 )             37.6       CBC Full  -  ( 14 Feb 2023 07:16 )  WBC Count : 4.71 K/uL  RBC Count : 4.76 M/uL  Hemoglobin : 11.5 g/dL  Hematocrit : 37.6 %  Platelet Count - Automated : 185 K/uL  Mean Cell Volume : 79.0 fl  Mean Cell Hemoglobin : 24.2 pg  Mean Cell Hemoglobin Concentration : 30.6 gm/dL  Auto Neutrophil # : x  Auto Lymphocyte # : x  Auto Monocyte # : x  Auto Eosinophil # : x  Auto Basophil # : x  Auto Neutrophil % : x  Auto Lymphocyte % : x  Auto Monocyte % : x  Auto Eosinophil % : x  Auto Basophil % : x      02-14    138  |  104  |  26<H>  ----------------------------<  159<H>  4.3   |  21<L>  |  1.36<H>    Ca    10.5      14 Feb 2023 07:13  Mg     1.7     02-13    TPro  6.7  /  Alb  3.3  /  TBili  0.2  /  DBili  x   /  AST  18  /  ALT  13  /  AlkPhos  225<H>  02-13      CAPILLARY BLOOD GLUCOSE      POCT Blood Glucose.: 173 mg/dL (14 Feb 2023 18:02)  POCT Blood Glucose.: 249 mg/dL (14 Feb 2023 12:16)  POCT Blood Glucose.: 193 mg/dL (13 Feb 2023 21:39)      Vital Signs Last 24 Hrs  T(C): 36.4 (14 Feb 2023 17:11), Max: 36.7 (13 Feb 2023 21:40)  T(F): 97.6 (14 Feb 2023 17:11), Max: 98 (13 Feb 2023 21:40)  HR: 56 (14 Feb 2023 17:11) (52 - 64)  BP: 156/72 (14 Feb 2023 17:11) (135/69 - 156/72)  BP(mean): 100 (14 Feb 2023 08:51) (100 - 100)  RR: 18 (14 Feb 2023 17:11) (16 - 18)  SpO2: 95% (14 Feb 2023 17:11) (95% - 96%)    Parameters below as of 14 Feb 2023 17:11  Patient On (Oxygen Delivery Method): room air                      PHYSICAL EXAM:    Constitutional: NAD  HEENT: conjunctive   clear   Neck:  No JVD  Respiratory: CTAB  Cardiovascular: S1 and S2  Gastrointestinal: BS+, soft, NT/ND  Extremities: No peripheral edema  : No [place]  Skin: dry   Access: Not applicable  
Patient seen and examined at bedside.    --Anticoagulation--    T(C): 36.4 (02-13-23 @ 05:05), Max: 37.3 (02-12-23 @ 21:16)  HR: 56 (02-13-23 @ 05:05) (56 - 76)  BP: 159/72 (02-13-23 @ 05:05) (133/63 - 170/79)  RR: 18 (02-13-23 @ 05:05) (17 - 18)  SpO2: 96% (02-13-23 @ 05:05) (94% - 99%)  Wt(kg): --    Exam: AO3, PERRL, EOMI, no droop/drift, FERNANDEZ 5/5

## 2023-02-15 NOTE — PROGRESS NOTE ADULT - NUTRITIONAL ASSESSMENT
MEDICATIONS  (STANDING):  alfuzosin 10 milliGRAM(s) Oral at bedtime  allopurinol 100 milliGRAM(s) Oral daily  amLODIPine   Tablet 2.5 milliGRAM(s) Oral at bedtime  calcitriol   Capsule 0.25 MICROGram(s) Oral daily  dexAMETHasone     Tablet 4 milliGRAM(s) Oral every 6 hours  lactated ringers. 1000 milliLiter(s) (200 mL/Hr) IV Continuous <Continuous>  levothyroxine 200 MICROGram(s) Oral daily  metoprolol tartrate 25 milliGRAM(s) Oral two times a day  oxybutynin 5 milliGRAM(s) Oral two times a day  pantoprazole    Tablet 40 milliGRAM(s) Oral before breakfast  rosuvastatin 20 milliGRAM(s) Oral at bedtime  sodium chloride 0.9%. 1000 milliLiter(s) (50 mL/Hr) IV Continuous <Continuous>  sucralfate 1 Gram(s) Oral <User Schedule>
MEDICATIONS  (STANDING):  alfuzosin 10 milliGRAM(s) Oral at bedtime  allopurinol 100 milliGRAM(s) Oral daily  amLODIPine   Tablet 2.5 milliGRAM(s) Oral at bedtime  calcitriol   Capsule 0.25 MICROGram(s) Oral daily  dexAMETHasone     Tablet 4 milliGRAM(s) Oral every 6 hours  lactated ringers. 1000 milliLiter(s) (200 mL/Hr) IV Continuous <Continuous>  levothyroxine 200 MICROGram(s) Oral daily  metoprolol tartrate 25 milliGRAM(s) Oral two times a day  oxybutynin 5 milliGRAM(s) Oral two times a day  pantoprazole    Tablet 40 milliGRAM(s) Oral before breakfast  rosuvastatin 20 milliGRAM(s) Oral at bedtime  sodium chloride 0.9%. 1000 milliLiter(s) (50 mL/Hr) IV Continuous <Continuous>  sucralfate 1 Gram(s) Oral <User Schedule>

## 2023-02-15 NOTE — CONSULT NOTE ADULT - ASSESSMENT
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 MRI consistent with brain metastasis. palliative consulted to assist with goals of care discussion

## 2023-02-15 NOTE — CONSULT NOTE ADULT - CONSULT REASON
Neuro oncologic evaluation
AMS, brain metastases
AMS
brain mets
Melanoma
ckf and vanessa
goals of care discussion

## 2023-02-15 NOTE — CONSULT NOTE ADULT - REASON FOR ADMISSION
AMS and chills

## 2023-02-19 ENCOUNTER — INPATIENT (INPATIENT)
Facility: HOSPITAL | Age: 83
LOS: 0 days | Discharge: HOSPICE | DRG: 951 | End: 2023-02-20
Attending: INTERNAL MEDICINE | Admitting: INTERNAL MEDICINE
Payer: COMMERCIAL

## 2023-02-19 VITALS
HEIGHT: 75 IN | SYSTOLIC BLOOD PRESSURE: 217 MMHG | RESPIRATION RATE: 18 BRPM | DIASTOLIC BLOOD PRESSURE: 119 MMHG | OXYGEN SATURATION: 94 % | HEART RATE: 106 BPM | TEMPERATURE: 97 F | WEIGHT: 222.67 LBS

## 2023-02-19 DIAGNOSIS — I62.9 NONTRAUMATIC INTRACRANIAL HEMORRHAGE, UNSPECIFIED: ICD-10-CM

## 2023-02-19 DIAGNOSIS — Z98.890 OTHER SPECIFIED POSTPROCEDURAL STATES: Chronic | ICD-10-CM

## 2023-02-19 LAB
ALBUMIN SERPL ELPH-MCNC: 3.1 G/DL — LOW (ref 3.3–5)
ALP SERPL-CCNC: 212 U/L — HIGH (ref 30–120)
ALT FLD-CCNC: 27 U/L DA — SIGNIFICANT CHANGE UP (ref 10–60)
ANION GAP SERPL CALC-SCNC: 14 MMOL/L — SIGNIFICANT CHANGE UP (ref 5–17)
APTT BLD: 26.1 SEC — LOW (ref 27.5–35.5)
AST SERPL-CCNC: 26 U/L — SIGNIFICANT CHANGE UP (ref 10–40)
BASOPHILS # BLD AUTO: 0 K/UL — SIGNIFICANT CHANGE UP (ref 0–0.2)
BASOPHILS NFR BLD AUTO: 0 % — SIGNIFICANT CHANGE UP (ref 0–2)
BILIRUB SERPL-MCNC: 0.4 MG/DL — SIGNIFICANT CHANGE UP (ref 0.2–1.2)
BUN SERPL-MCNC: 44 MG/DL — HIGH (ref 7–23)
CALCIUM SERPL-MCNC: 10.4 MG/DL — SIGNIFICANT CHANGE UP (ref 8.4–10.5)
CHLORIDE SERPL-SCNC: 97 MMOL/L — SIGNIFICANT CHANGE UP (ref 96–108)
CO2 SERPL-SCNC: 22 MMOL/L — SIGNIFICANT CHANGE UP (ref 22–31)
CREAT SERPL-MCNC: 1.5 MG/DL — HIGH (ref 0.5–1.3)
CULTURE RESULTS: SIGNIFICANT CHANGE UP
EGFR: 47 ML/MIN/1.73M2 — LOW
EOSINOPHIL # BLD AUTO: 0.09 K/UL — SIGNIFICANT CHANGE UP (ref 0–0.5)
EOSINOPHIL NFR BLD AUTO: 1 % — SIGNIFICANT CHANGE UP (ref 0–6)
ETHANOL SERPL-MCNC: <3 MG/DL — SIGNIFICANT CHANGE UP (ref 0–3)
GLUCOSE SERPL-MCNC: 176 MG/DL — HIGH (ref 70–99)
HCT VFR BLD CALC: 46.3 % — SIGNIFICANT CHANGE UP (ref 39–50)
HGB BLD-MCNC: 14.7 G/DL — SIGNIFICANT CHANGE UP (ref 13–17)
INR BLD: 1.03 RATIO — SIGNIFICANT CHANGE UP (ref 0.88–1.16)
LYMPHOCYTES # BLD AUTO: 1.06 K/UL — SIGNIFICANT CHANGE UP (ref 1–3.3)
LYMPHOCYTES # BLD AUTO: 12 % — LOW (ref 13–44)
MCHC RBC-ENTMCNC: 24.3 PG — LOW (ref 27–34)
MCHC RBC-ENTMCNC: 31.7 GM/DL — LOW (ref 32–36)
MCV RBC AUTO: 76.7 FL — LOW (ref 80–100)
MONOCYTES # BLD AUTO: 0.7 K/UL — SIGNIFICANT CHANGE UP (ref 0–0.9)
MONOCYTES NFR BLD AUTO: 8 % — SIGNIFICANT CHANGE UP (ref 2–14)
NEUTROPHILS # BLD AUTO: 6.87 K/UL — SIGNIFICANT CHANGE UP (ref 1.8–7.4)
NEUTROPHILS NFR BLD AUTO: 78 % — HIGH (ref 43–77)
NRBC # BLD: SIGNIFICANT CHANGE UP /100 WBCS (ref 0–0)
PLATELET # BLD AUTO: 208 K/UL — SIGNIFICANT CHANGE UP (ref 150–400)
POTASSIUM SERPL-MCNC: 4 MMOL/L — SIGNIFICANT CHANGE UP (ref 3.5–5.3)
POTASSIUM SERPL-SCNC: 4 MMOL/L — SIGNIFICANT CHANGE UP (ref 3.5–5.3)
PROT SERPL-MCNC: 7.1 G/DL — SIGNIFICANT CHANGE UP (ref 6–8.3)
PROTHROM AB SERPL-ACNC: 11.8 SEC — SIGNIFICANT CHANGE UP (ref 10.5–13.4)
RBC # BLD: 6.04 M/UL — HIGH (ref 4.2–5.8)
RBC # FLD: 18.9 % — HIGH (ref 10.3–14.5)
SARS-COV-2 RNA SPEC QL NAA+PROBE: SIGNIFICANT CHANGE UP
SODIUM SERPL-SCNC: 133 MMOL/L — LOW (ref 135–145)
SPECIMEN SOURCE: SIGNIFICANT CHANGE UP
TROPONIN I, HIGH SENSITIVITY RESULT: 12.8 NG/L — SIGNIFICANT CHANGE UP
WBC # BLD: 8.81 K/UL — SIGNIFICANT CHANGE UP (ref 3.8–10.5)
WBC # FLD AUTO: 8.81 K/UL — SIGNIFICANT CHANGE UP (ref 3.8–10.5)

## 2023-02-19 PROCEDURE — 70450 CT HEAD/BRAIN W/O DYE: CPT | Mod: 26,MA

## 2023-02-19 PROCEDURE — 99223 1ST HOSP IP/OBS HIGH 75: CPT

## 2023-02-19 PROCEDURE — 99285 EMERGENCY DEPT VISIT HI MDM: CPT

## 2023-02-19 PROCEDURE — 93010 ELECTROCARDIOGRAM REPORT: CPT

## 2023-02-19 RX ORDER — LABETALOL HCL 100 MG
10 TABLET ORAL ONCE
Refills: 0 | Status: COMPLETED | OUTPATIENT
Start: 2023-02-19 | End: 2023-02-19

## 2023-02-19 RX ORDER — SODIUM CHLORIDE 9 MG/ML
1000 INJECTION, SOLUTION INTRAVENOUS
Refills: 0 | Status: DISCONTINUED | OUTPATIENT
Start: 2023-02-19 | End: 2023-02-20

## 2023-02-19 RX ORDER — ONDANSETRON 8 MG/1
4 TABLET, FILM COATED ORAL EVERY 6 HOURS
Refills: 0 | Status: DISCONTINUED | OUTPATIENT
Start: 2023-02-19 | End: 2023-02-20

## 2023-02-19 RX ORDER — MORPHINE SULFATE 50 MG/1
2 CAPSULE, EXTENDED RELEASE ORAL ONCE
Refills: 0 | Status: DISCONTINUED | OUTPATIENT
Start: 2023-02-19 | End: 2023-02-19

## 2023-02-19 RX ORDER — MORPHINE SULFATE 50 MG/1
2 CAPSULE, EXTENDED RELEASE ORAL
Refills: 0 | Status: DISCONTINUED | OUTPATIENT
Start: 2023-02-19 | End: 2023-02-20

## 2023-02-19 RX ORDER — LEVETIRACETAM 250 MG/1
1000 TABLET, FILM COATED ORAL ONCE
Refills: 0 | Status: COMPLETED | OUTPATIENT
Start: 2023-02-19 | End: 2023-02-19

## 2023-02-19 RX ADMIN — MORPHINE SULFATE 2 MILLIGRAM(S): 50 CAPSULE, EXTENDED RELEASE ORAL at 21:00

## 2023-02-19 RX ADMIN — LEVETIRACETAM 400 MILLIGRAM(S): 250 TABLET, FILM COATED ORAL at 20:45

## 2023-02-19 RX ADMIN — Medication 10 MILLIGRAM(S): at 20:45

## 2023-02-19 RX ADMIN — LEVETIRACETAM 1000 MILLIGRAM(S): 250 TABLET, FILM COATED ORAL at 21:00

## 2023-02-19 RX ADMIN — MORPHINE SULFATE 2 MILLIGRAM(S): 50 CAPSULE, EXTENDED RELEASE ORAL at 20:45

## 2023-02-19 NOTE — PATIENT PROFILE ADULT - FUNCTIONAL ASSESSMENT - BASIC MOBILITY 6.
4-calculated by average/Not able to assess (calculate score using [facility] averaging method)

## 2023-02-19 NOTE — ED PROVIDER NOTE - PROGRESS NOTE DETAILS
Multiple areas of bleeding noted from mets on CT brain. Family aware. Call placed ot transfer center. Discussed with Dr Douglas (neurosurgery) who advised he would accept the transfer however would offer no intervention given pts medical history and severity of the bleeds. Advised if we transfer would intubate patient however, he would suggest comfort care/ palliative care at this time. Lengthy dissuasion had with daughter and wife and it was decided to make the patient DNR/ DNI and provide palliation. Discussed with Dr Collier and pt was admitted.

## 2023-02-19 NOTE — H&P ADULT - NSHPPHYSICALEXAM_GEN_ALL_CORE
-    Vital Signs Last 24 Hrs  T(C): 36.3 (19 Feb 2023 20:10), Max: 36.3 (19 Feb 2023 20:10)  T(F): 97.4 (19 Feb 2023 20:10), Max: 97.4 (19 Feb 2023 20:10)  HR: 86 (19 Feb 2023 22:12) (75 - 106)  BP: 181/92 (19 Feb 2023 22:12) (181/92 - 230/112)  BP(mean): --  RR: 18 (19 Feb 2023 22:12) (18 - 18)  SpO2: 97% (19 Feb 2023 22:12) (94% - 97%)    Parameters below as of 19 Feb 2023 20:02  Patient On (Oxygen Delivery Method): room air        PHYSICAL EXAM:  		  GENERAL: NAD, well-groomed, well-developed.  HEAD:  Atraumatic, Norm cephalic.  EYES: PERRLA, conjunctiva clear.  ENMT: no nasal discharge, MMM.   NECK: Supple, No JVD.  NERVOUS SYSTEM:  non responsive, moves his whole left lower extremity in response to EPR.  CHEST/LUNG: Fair air entry B/L, no rales, rhonchi, or wheezing.  HEART: Normal S1 & acc S2, grade III/VI PAT over cardiac base propagated to apex, no extra sounds.  ABDOMEN: Soft, non-distended; bowel sounds present, no palpable masses or organomegaly.  EXTREMITIES:  No clubbing, cyanosis, or edema.  VASCULAR: 2+ radial, DPA / PTA pulses B/L.  SKIN: No rashes or lesions.  PSYCH: No agitation.

## 2023-02-19 NOTE — H&P ADULT - HISTORY OF PRESENT ILLNESS
This is an 81 y/o M with PMH of HTN, Dyslipidemia, Lymphoma, Stage 4 Melanoma with unknown primary, COVID-19, CRISTÓBAL, Hypothyroidism, Gout, BPH, Duodenal Ulcer, and GERD who presented with unresponsiveness. Patient was diagnosed with stage 4 melanoma with mets to lungs & unknown primary in March, tried several combinations of immunotherapy with no progress, and 8 days ago was diagnosed with brain mets causing right lower extremity monoparesis, had an appointment with his oncologist day after tomorrow, but today around noon he was unable to talk, staring at family members with very few slurred words not making sense, was laid down to sleep around 4 pm and a couple of hours later they couldn't awake him up, EMS were called & code stroke was activated. AT the ED his NCCT showed that the small foci of bleeding seen in the MRI 6 days ago now became much larger with extensive vasogenic edema. Family requested comfort care with a MOLST form signed in the ED.

## 2023-02-19 NOTE — ED ADULT NURSE NOTE - AS SC BRADEN MOISTURE
Topical Clindamycin Pregnancy And Lactation Text: This medication is Pregnancy Category B and is considered safe during pregnancy. It is unknown if it is excreted in breast milk. (1) constantly moist

## 2023-02-19 NOTE — ED PROVIDER NOTE - OBJECTIVE STATEMENT
81 y/o m with PMH melanoma on immune therapy, GERD, HTN, SBO, hypothyroid BIBEMS for AMS/ unresponsive. Per daughter pt noticed change in mental status and R sided facial droop since 3 pm. Daughter states pt has been having waxing waning mental status over the last 2 weeks. Was found to have new brain mets on MRI on Friday 9 2 days ago). Is scheduled to start radiation and PO chemo this week at [facility]. Per daughter pt is a full code.  blood glucose 161 per EMS.

## 2023-02-19 NOTE — H&P ADULT - ASSESSMENT
83 y/o M with PMH of HTN, Dyslipidemia, Lymphoma, Stage 4 Melanoma with unknown primary, COVID-19, CRISTÓBAL, Hypothyroidism, Gout, BPH, Duodenal Ulcer, and GERD presented with unresponsiveness.

## 2023-02-19 NOTE — ED ADULT TRIAGE NOTE - CHIEF COMPLAINT QUOTE
PT BIB EMS from home as stroke code; pt was "talking" at 3pm; daughter went to wake him around 6pm and pt was unresponsive; right side facial droop

## 2023-02-19 NOTE — PATIENT PROFILE ADULT - NSPROMEDSBROUGHTTOHOSP_GEN_A_NUR
INTERVAL HISTORY  Patient seen and examined at bedside. Reports this morning, got dizzy with PT and BP dropped, symptoms resolved after getting back in bed and receiving fluids. Encouraged patient to spend more time out of bed to build tolerance, hydrate orally with water. Continues to endorse RLE > LLE weakness.    CURRENT FUNCTIONAL STATUS  Bed mobility mod A x 2.  Transfers mod A x 2.    REVIEW OF SYSTEMS: +gait impairment; +RLE > LLE weakness; No fevers, chills, headache, dizziness, blurry vision, palpitations, chest pain, shortness of breath, nausea, vomiting, constipation or diarrhea.     RECENT LABS/IMAGING  CBC Full  -  ( 17 Dec 2018 06:35 )  WBC Count : 12.0 K/uL  Hemoglobin : 12.0 g/dL  Hematocrit : 37.3 %  Platelet Count - Automated : 219 K/uL  Mean Cell Volume : 90.1 fl  Mean Cell Hemoglobin : 28.9 pg  Mean Cell Hemoglobin Concentration : 32.1 gm/dL      12-17    137  |  97  |  34<H>  ----------------------------<  111<H>  4.2   |  27  |  0.96    Ca    11.0<H>      17 Dec 2018 06:32      MEDICATIONS   acetaminophen   Tablet .. 650 milliGRAM(s) every 6 hours PRN  ALBUTerol    90 MICROgram(s) HFA Inhaler 1 Puff(s) every 4 hours  ALBUTerol/ipratropium for Nebulization 3 milliLiter(s) every 6 hours  amiodarone    Tablet 200 milliGRAM(s) daily  aspirin enteric coated 81 milliGRAM(s) daily  cholestyramine Powder (Sugar-Free) 4 Gram(s) daily  dextrose 40% Gel 15 Gram(s) once PRN  dextrose 5%. 1000 milliLiter(s) <Continuous>  dextrose 50% Injectable 50 milliLiter(s) every 15 minutes  fluticasone propionate/ salmeterol 500-50 MICROgram(s) Diskus 1 Dose(s) two times a day  gabapentin 300 milliGRAM(s) at bedtime  gabapentin 100 milliGRAM(s) daily  glucagon  Injectable 1 milliGRAM(s) once PRN  heparin  Injectable 5000 Unit(s) every 8 hours  insulin glargine Injectable (LANTUS) 43 Unit(s) at bedtime  insulin lispro (HumaLOG) corrective regimen sliding scale   three times a day before meals  insulin lispro (HumaLOG) corrective regimen sliding scale   at bedtime  insulin lispro Injectable (HumaLOG) 16 Unit(s) three times a day before meals  levothyroxine 137 MICROGram(s) daily  montelukast 10 milliGRAM(s) daily  oxyCODONE    5 mG/acetaminophen 325 mG 1 Tablet(s) every 4 hours PRN  pantoprazole    Tablet 40 milliGRAM(s) before breakfast  polyethylene glycol 3350 17 Gram(s) daily  predniSONE   Tablet 7.5 milliGRAM(s) every 12 hours  senna 2 Tablet(s) at bedtime  tiotropium 18 MICROgram(s) Capsule 1 Capsule(s) daily    VITAL SIGNS  Vital Signs Last 24 Hrs  T(C): 36.4 (17 Dec 2018 13:46), Max: 36.9 (17 Dec 2018 04:37)  T(F): 97.6 (17 Dec 2018 13:46), Max: 98.5 (17 Dec 2018 04:37)  HR: 87 (17 Dec 2018 13:46) (80 - 90)  BP: 127/73 (17 Dec 2018 10:14) (68/55 - 130/41)  RR: 18 (17 Dec 2018 13:46) (18 - 18)  SpO2: 94% (17 Dec 2018 13:46) (93% - 98%)    PHYSICAL EXAMINATION  Constitutional - NAD, Comfortable  HEENT - NCAT  Chest - Breathing comfortably, No wheezing  Cardiovascular - RRR   Abdomen - Soft, obese  Extremities - No C/C/E, No calf tenderness   Neurologic Exam -                    Cognitive - Awake, Alert, Oriented to self, place, date, situation     Communication - Fluent, No dysarthria     Tone - Normal     Motor -                     LEFT    UE - ShAB 4/5, EF 5/5, EE 5/5, WE 5/5,  5/5 (shoulder ROM limited 2/2 chronic RTC impingement syndromes B/L)                    LEFT    LE - HF 3/5, KE 3/5, DF 4/5, PF 4/5                    RIGHT UE - ShAB 4/5, EF 5/5, EE 5/5, WE 5/5,  5/5                    RIGHT LE - HF 3/5, KE 2/5, DF 1/5, PF 1/5     Psychiatric - Mood stable, Affect WNL yes

## 2023-02-19 NOTE — ED PROVIDER NOTE - PHYSICAL EXAMINATION
PE:   GEN: withdraws to pain x 4, nonverbal,   HEAD: Atraumatic  EYES: Sclera white,BL pupils fixed   CARDIAC: Reg rate and rhythm, S1,S2, no murmur/rub/gallop. Strong central and peripheral pulses, Brisk cap refill, no evident pedal edema.   RESP: shallow breathing, No distress noted. L/S clear = Bilat without accessory muscle use, wheeze, rhonchi, rales.   ABD: soft, supple, non-tender, no guarding. BS x 4, normoactive.   NEURO: responds to sternal rub only, no spontaneous movements, left gaze preference,   MSK:  no apparent deformities.   SKIN: Warm, dry, normal color, without apparent rashes.

## 2023-02-19 NOTE — H&P ADULT - NSHPADDITIONALINFOADULT_GEN_ALL_CORE
Management plan was discussed with patient's wife & daughter  at the bedside, he understand & agree.

## 2023-02-19 NOTE — ED ADULT NURSE NOTE - OBJECTIVE STATEMENT
PT BIB EMS from home as stroke code; pt was "talking" at 3pm; daughter went to wake him around 6pm and pt was unresponsive; right side facial droop  unresponsive, blinking on arrival, slow eye movement from side to side, able to move r shoulder and l foot on arousal

## 2023-02-19 NOTE — PATIENT PROFILE ADULT - FALL HARM RISK - HARM RISK INTERVENTIONS

## 2023-02-19 NOTE — ED ADULT NURSE NOTE - NSIMPLEMENTINTERV_GEN_ALL_ED
Implemented All Fall Risk Interventions:  Nice to call system. Call bell, personal items and telephone within reach. Instruct patient to call for assistance. Room bathroom lighting operational. Non-slip footwear when patient is off stretcher. Physically safe environment: no spills, clutter or unnecessary equipment. Stretcher in lowest position, wheels locked, appropriate side rails in place. Provide visual cue, wrist band, yellow gown, etc. Monitor gait and stability. Monitor for mental status changes and reorient to person, place, and time. Review medications for side effects contributing to fall risk. Reinforce activity limits and safety measures with patient and family.

## 2023-02-19 NOTE — ED PROVIDER NOTE - CONSIDERATION OF ADMISSION OBSERVATION
Consideration of Admission/Observation ICH consider transfer to tertiary care center for neurosurgery vs palliative care

## 2023-02-19 NOTE — H&P ADULT - NSHPLABSRESULTS_GEN_ALL_CORE
-                   14.7   8.81   )----------(  208       ( 19 Feb 2023 20:03 )               46.3        133    |  97     |  44     ----------------------------<  176        ( 19 Feb 2023 20:03 )  4.0     |  22     |  1.50     Ca    10.4       ( 19 Feb 2023 20:03 )    TPro  7.1    /  Alb  3.1    /  TBili  0.4    /  DBili  x      /  AST  26     /  ALT  27     /  AlkPhos  212    ( 19 Feb 2023 20:03 )    LIVER FUNCTIONS - ( 19 Feb 2023 20:03 )  Alb: 3.1 g/dL / Pro: 7.1 g/dL / ALK PHOS: 212 U/L / ALT: 27 U/L DA / AST: 26 U/L / GGT: x           PT/INR -  11.8 sec / 1.03 ratio   ( 19 Feb 2023 20:03 )  PTT -  26.1 sec   ( 19 Feb 2023 20:03 ) -                   14.7   8.81   )----------(  208       ( 19 Feb 2023 20:03 )               46.3        133    |  97     |  44     ----------------------------<  176        ( 19 Feb 2023 20:03 )  4.0     |  22     |  1.50     Ca    10.4       ( 19 Feb 2023 20:03 )    TPro  7.1    /  Alb  3.1    /  TBili  0.4    /  DBili  x      /  AST  26     /  ALT  27     /  AlkPhos  212    ( 19 Feb 2023 20:03 )    LIVER FUNCTIONS - ( 19 Feb 2023 20:03 )  Alb: 3.1 g/dL / Pro: 7.1 g/dL / ALK PHOS: 212 U/L / ALT: 27 U/L DA / AST: 26 U/L / GGT: x           PT/INR -  11.8 sec / 1.03 ratio   ( 19 Feb 2023 20:03 )  PTT -  26.1 sec   ( 19 Feb 2023 20:03 )      COVID-19 PCR: NotDetec (19 Feb 2023 20:03)  SARS-CoV-2: NotDetec (11 Feb 2023 19:28)      Troponin I, High Sensitivity (02.19.23 @ 20:03)   Troponin I, High Sensitivity Result: 12.8.  `Alcohol, Blood (02.19.23 @ 20:03)   Alcohol, Blood: <3        CT BRAIN STROKE PROTOCOL     PROCEDURE DATE:  02/19/2023    INTERPRETATION:  CLINICAL INDICATION:  Stroke Code  TECHNIQUE: Noncontrast CT examination of the head was performed using   contiguous 5 mm CT images.  COMPARISON: 2/13/2023  FINDINGS:  Again noted are innumerable bilateral supratentorial metastatic lesions   with new and larger hemorrhagic lesions noted bilaterally, measuring up   to 6.4 cm in the left frontoparietal lobes and 4.2 cm in the left basal   ganglia. The hemorrhagic lesions on the right measure up to 3.3 cm. There   is extensive vasogenic edema noted bilaterally with effacement of the   lateral ventricles. An additional subependymal lesion is noted in the   right lateral ventricle. There is no evidence of hydrocephalus at this time.  Mild polypoid mucosal thickening of the paranasal sinuses. The   tympanomastoid spaces are clear. Orbits and orbital contents are unremarkable.  There is no depressed calvarial fracture.  IMPRESSION:  Innumerable bilateral metastatic lesions with new and larger hemorrhagic   lesions noted bilaterally as described above.  Personally reviewed by me.        EKG:    As per my review shows ST at 110/min, normal MS & QTc intervals, normal QRS voltage, duration, and axis (-15), with normal transition, no ST-T abnormality.    -

## 2023-02-19 NOTE — H&P ADULT - PROBLEM SELECTOR PLAN 1
2ry to multiple B/L metastatic melanoma lesions with extensive vasogenic edema & effacement of lateral ventricles, family chose to start him on comfort care only, no tube feeding, no vitals, no lab work, and no meds, but agree on IVF hydration, admitted to medicine on comfort care, palliative care consult with Dr. Stevan Salomon was called, case management consult in am.

## 2023-02-20 ENCOUNTER — TRANSCRIPTION ENCOUNTER (OUTPATIENT)
Age: 83
End: 2023-02-20

## 2023-02-20 VITALS
DIASTOLIC BLOOD PRESSURE: 87 MMHG | SYSTOLIC BLOOD PRESSURE: 177 MMHG | RESPIRATION RATE: 18 BRPM | TEMPERATURE: 98 F | OXYGEN SATURATION: 96 % | HEART RATE: 96 BPM

## 2023-02-20 DIAGNOSIS — R41.89 OTHER SYMPTOMS AND SIGNS INVOLVING COGNITIVE FUNCTIONS AND AWARENESS: ICD-10-CM

## 2023-02-20 DIAGNOSIS — I61.9 NONTRAUMATIC INTRACEREBRAL HEMORRHAGE, UNSPECIFIED: ICD-10-CM

## 2023-02-20 DIAGNOSIS — Z29.9 ENCOUNTER FOR PROPHYLACTIC MEASURES, UNSPECIFIED: ICD-10-CM

## 2023-02-20 PROBLEM — K21.9 GASTRO-ESOPHAGEAL REFLUX DISEASE WITHOUT ESOPHAGITIS: Chronic | Status: ACTIVE | Noted: 2023-02-11

## 2023-02-20 PROBLEM — M10.9 GOUT, UNSPECIFIED: Chronic | Status: ACTIVE | Noted: 2023-02-11

## 2023-02-20 PROBLEM — E03.9 HYPOTHYROIDISM, UNSPECIFIED: Chronic | Status: ACTIVE | Noted: 2023-02-11

## 2023-02-20 PROBLEM — E78.5 HYPERLIPIDEMIA, UNSPECIFIED: Chronic | Status: ACTIVE | Noted: 2023-02-11

## 2023-02-20 PROBLEM — Z92.29 PERSONAL HISTORY OF OTHER DRUG THERAPY: Chronic | Status: ACTIVE | Noted: 2023-02-11

## 2023-02-20 PROBLEM — C85.90 NON-HODGKIN LYMPHOMA, UNSPECIFIED, UNSPECIFIED SITE: Chronic | Status: ACTIVE | Noted: 2023-02-11

## 2023-02-20 PROBLEM — C43.9 MALIGNANT MELANOMA OF SKIN, UNSPECIFIED: Chronic | Status: ACTIVE | Noted: 2023-02-11

## 2023-02-20 PROBLEM — U07.1 COVID-19: Chronic | Status: ACTIVE | Noted: 2023-02-11

## 2023-02-20 LAB
APPEARANCE UR: CLEAR — SIGNIFICANT CHANGE UP
BILIRUB UR-MCNC: NEGATIVE — SIGNIFICANT CHANGE UP
COLOR SPEC: YELLOW — SIGNIFICANT CHANGE UP
DIFF PNL FLD: ABNORMAL
GLUCOSE UR QL: NEGATIVE MG/DL — SIGNIFICANT CHANGE UP
KETONES UR-MCNC: NEGATIVE — SIGNIFICANT CHANGE UP
LEUKOCYTE ESTERASE UR-ACNC: NEGATIVE — SIGNIFICANT CHANGE UP
NITRITE UR-MCNC: NEGATIVE — SIGNIFICANT CHANGE UP
PH UR: 5 — SIGNIFICANT CHANGE UP (ref 5–8)
PROT UR-MCNC: 500 MG/DL
SP GR SPEC: 1.02 — SIGNIFICANT CHANGE UP (ref 1.01–1.02)
UROBILINOGEN FLD QL: NEGATIVE MG/DL — SIGNIFICANT CHANGE UP

## 2023-02-20 PROCEDURE — 80307 DRUG TEST PRSMV CHEM ANLYZR: CPT

## 2023-02-20 PROCEDURE — 85025 COMPLETE CBC W/AUTO DIFF WBC: CPT

## 2023-02-20 PROCEDURE — 80053 COMPREHEN METABOLIC PANEL: CPT

## 2023-02-20 PROCEDURE — 81001 URINALYSIS AUTO W/SCOPE: CPT

## 2023-02-20 PROCEDURE — 99239 HOSP IP/OBS DSCHRG MGMT >30: CPT

## 2023-02-20 PROCEDURE — 85610 PROTHROMBIN TIME: CPT

## 2023-02-20 PROCEDURE — 82962 GLUCOSE BLOOD TEST: CPT

## 2023-02-20 PROCEDURE — 99285 EMERGENCY DEPT VISIT HI MDM: CPT

## 2023-02-20 PROCEDURE — 36415 COLL VENOUS BLD VENIPUNCTURE: CPT

## 2023-02-20 PROCEDURE — 85730 THROMBOPLASTIN TIME PARTIAL: CPT

## 2023-02-20 PROCEDURE — 96375 TX/PRO/DX INJ NEW DRUG ADDON: CPT

## 2023-02-20 PROCEDURE — 70450 CT HEAD/BRAIN W/O DYE: CPT | Mod: MA

## 2023-02-20 PROCEDURE — 96374 THER/PROPH/DIAG INJ IV PUSH: CPT

## 2023-02-20 PROCEDURE — 87635 SARS-COV-2 COVID-19 AMP PRB: CPT

## 2023-02-20 PROCEDURE — 84484 ASSAY OF TROPONIN QUANT: CPT

## 2023-02-20 PROCEDURE — 93005 ELECTROCARDIOGRAM TRACING: CPT

## 2023-02-20 RX ORDER — LEVOTHYROXINE SODIUM 125 MCG
1 TABLET ORAL
Qty: 0 | Refills: 0 | DISCHARGE

## 2023-02-20 RX ORDER — ALLOPURINOL 300 MG
1 TABLET ORAL
Qty: 0 | Refills: 0 | DISCHARGE

## 2023-02-20 RX ORDER — CALCITRIOL 0.5 UG/1
1 CAPSULE ORAL
Qty: 0 | Refills: 0 | DISCHARGE

## 2023-02-20 RX ORDER — SUCRALFATE 1 G
1 TABLET ORAL
Qty: 0 | Refills: 0 | DISCHARGE

## 2023-02-20 RX ORDER — ALFUZOSIN HYDROCHLORIDE 10 MG/1
1 TABLET, EXTENDED RELEASE ORAL
Qty: 0 | Refills: 0 | DISCHARGE

## 2023-02-20 RX ORDER — OXYBUTYNIN CHLORIDE 5 MG
1 TABLET ORAL
Qty: 0 | Refills: 0 | DISCHARGE

## 2023-02-20 RX ORDER — ONDANSETRON 8 MG/1
0 TABLET, FILM COATED ORAL
Qty: 0 | Refills: 0 | DISCHARGE
Start: 2023-02-20

## 2023-02-20 RX ORDER — MORPHINE SULFATE 50 MG/1
2 CAPSULE, EXTENDED RELEASE ORAL
Qty: 0 | Refills: 0 | DISCHARGE
Start: 2023-02-20

## 2023-02-20 RX ORDER — SCOPALAMINE 1 MG/3D
1 PATCH, EXTENDED RELEASE TRANSDERMAL
Qty: 0 | Refills: 0 | DISCHARGE
Start: 2023-02-20

## 2023-02-20 RX ORDER — PANTOPRAZOLE SODIUM 20 MG/1
1 TABLET, DELAYED RELEASE ORAL
Qty: 0 | Refills: 0 | DISCHARGE

## 2023-02-20 RX ORDER — ROSUVASTATIN CALCIUM 5 MG/1
1 TABLET ORAL
Qty: 0 | Refills: 0 | DISCHARGE

## 2023-02-20 RX ORDER — SCOPALAMINE 1 MG/3D
1 PATCH, EXTENDED RELEASE TRANSDERMAL
Refills: 0 | Status: DISCONTINUED | OUTPATIENT
Start: 2023-02-20 | End: 2023-02-20

## 2023-02-20 RX ORDER — AMLODIPINE BESYLATE 2.5 MG/1
1 TABLET ORAL
Qty: 0 | Refills: 0 | DISCHARGE

## 2023-02-20 RX ADMIN — MORPHINE SULFATE 2 MILLIGRAM(S): 50 CAPSULE, EXTENDED RELEASE ORAL at 00:36

## 2023-02-20 RX ADMIN — SODIUM CHLORIDE 125 MILLILITER(S): 9 INJECTION, SOLUTION INTRAVENOUS at 00:20

## 2023-02-20 NOTE — SOCIAL WORK PROGRESS NOTE - NSSWPROGRESSNOTE_GEN_ALL_CORE
Pt will be discharged today to [facility] at [address] [place] [address].  SW received call from [facility] at  [phone] that patient has bed at the [facility] for today.  Pt has been referred to [facility] at prior hospital and was scheduled to have a home visit tomorrow, however, family brought patient to hospital.   Pts family is at bedside and is in agreement with plan.  Pt will be transported via [facility] as soon as possible.  James Coronado to call the [facility] with report.

## 2023-02-20 NOTE — DISCHARGE NOTE NURSING/CASE MANAGEMENT/SOCIAL WORK - NSDCPEFALRISK_GEN_ALL_CORE
For information on Fall & Injury Prevention, visit: [email or web address] OR  [email or web address] OR  https://www.cdc.gov/steadi/patient.html

## 2023-02-20 NOTE — DISCHARGE NOTE PROVIDER - NSDCMRMEDTOKEN_GEN_ALL_CORE_FT
LORazepam 1 mg/mL-D5% intravenous solution: 1 milligram(s) intravenous every 4 hours, As Needed  morphine 2 mg/mL-D5% intravenous solution: 2 milligram(s) intravenous every hour, As Needed for pain, air hunger  ondansetron 2 mg/mL injectable solution: injectable every 6 hours, As Needed  scopolamine 1 mg/72 hr transdermal film, extended release: 1 film(s) transdermal every 72 hours

## 2023-02-20 NOTE — DISCHARGE NOTE NURSING/CASE MANAGEMENT/SOCIAL WORK - NSDCVIVACCINE_GEN_ALL_CORE_FT
Tdap; 18-Apr-2022 16:15; Nathaniel Nelson (RN); [employer]; [id number] (Exp. Date: 18-Jan-2024); IntraMuscular; Deltoid Right.; 0.5 milliLiter(s); VIS (VIS Published: 09-May-2013, VIS Presented: 18-Apr-2022);

## 2023-02-20 NOTE — DISCHARGE NOTE NURSING/CASE MANAGEMENT/SOCIAL WORK - HAS THE PATIENT USED TOBACCO IN THE PAST 30 DAYS?
64 yr old female, history of HLD, presents to the Emergency Department with nausea and vomiting. Pt in usual state of health No 64 yr old female, history of HLD, presents to the Emergency Department with nausea and vomiting. Pt in usual state of health yesterday throughout the day. Around 6pm pt started w nausea and vomiting. Multiple episodes of non-bloody emesis since this time. Has primarily been food she ate. No abd pain. Was told by PMD to come to ED for r/o SBO. Last BM this afternoon. No diarrhea. No dark / bloody stools.   No episodes of similar in past. No hx abdominal surgeries. No hx SBO.   No fever, chest pain, SOB, urinary symptoms, leg swelling, headache, dizziness.

## 2023-02-20 NOTE — DISCHARGE NOTE NURSING/CASE MANAGEMENT/SOCIAL WORK - PATIENT PORTAL LINK FT
You can access the FollowMyHealth Patient Portal offered by [facility] by registering at the following website: [email or web address]. By joining [facility]’s FollowMyHealth portal, you will also be able to view your health information using other applications (apps) compatible with our system.

## 2023-02-20 NOTE — DISCHARGE NOTE PROVIDER - ATTENDING DISCHARGE PHYSICAL EXAMINATION:
VSS  CONSTITUTIONAL: elderly male, obtunded  SKIN: Skin exam is warm and dry, no acute rash.  HEAD: Normocephalic; atraumatic.  EYES: eyes closed, not reactive   ENT: No nasal discharge; airway clear. TMs clear.  NECK: no jvd noted  CARD: S1, S2 normal; no murmurs,   RESP: No wheezes, rales or rhonchi.  ABD: Normal bowel sounds; soft; non-distended   EXT: no edema noted, no cyanosis  LYMPH: No acute cervical adenopathy.  NEURO: obtunded, not arousable

## 2023-02-20 NOTE — DISCHARGE NOTE PROVIDER - HOSPITAL COURSE
81 yo male, pmh of metastatic melanoma with mets to brain, hypertension, lymphhoma, hypothyroidism, presenting with progressive unresponsiveness, found to have intracranial hemorrhage with extensive vasogenic edema on CT. Patient was admitted to medicine, decided to have comfort measures only. Patient was accepted to inpatient hospice. To be dc'd today to hospice inn.

## 2023-02-20 NOTE — DISCHARGE NOTE PROVIDER - NSDCCPCAREPLAN_GEN_ALL_CORE_FT
PRINCIPAL DISCHARGE DIAGNOSIS  Diagnosis: Acute intracranial hemorrhage  Assessment and Plan of Treatment:

## 2023-02-20 NOTE — CASE MANAGEMENT PROGRESS NOTE - NSCMPROGRESSNOTE_GEN_ALL_CORE
CM consult noted. Patient is on CMO, planned to be transferred to hospice inn. SW is following. CM remains available.

## 2023-06-15 ENCOUNTER — APPOINTMENT (OUTPATIENT)
Dept: UROLOGY | Facility: CLINIC | Age: 83
End: 2023-06-15

## 2023-08-14 NOTE — ED ADULT TRIAGE NOTE - ARRIVAL FROM
"I spoke with Mike today about his issues:    1) COVID - he had significant exposure and while initially he was negative, he tested positive this past Thursday, with symptoms starting just 1-2 days prior.  He had no fever associated with this, but early on he had some body aches, mild cough and runny nose.    This morning, he \"feels fine\", with no runny nose and just a mild cough.  At this point, I do not think he need Paxlovid as he is improving significantly and is mostly back to normal.  Continue supportive care with rest and fluids and monitor for any worsening symptoms specifically.  Recommend continued quarantine per CDC guidelines.    2) Right ankle - he was seen in the office on July 31. He had a negative LE ultrasound for DVT on that day. By that point, he'd had some swelling for a few day. He had a popliteal fluid collection.  He was referred to [facility] and told his \"bones were fine\" and an order for a \"vascular survey\" which was negative for any arterial issues.  Over the weekend he's been icing and elevating his right lower extremity and his swelling is virtually gone at this point.    Rec: continued elevation and monitor for worsening.  "
Home

## 2023-11-14 NOTE — PROGRESS NOTE ADULT - PROBLEM SELECTOR PLAN 3
Armaan Jason,     If you are due for any health screening(s) below please notify me so we can arrange them to be ordered and scheduled. Most healthy patients at your age complete them, but you are free to accept or refuse.     If you can't do it, I'll definitely understand. If you can, I'd certainly appreciate it!    All of your core healthy metrics are met.              Follow up in about 3 months (around 2/14/2024), or if symptoms worsen or fail to improve, for Med refills.     Dear patient,   As a result of recent federal legislation (The Federal Cures Act), you may receive lab or pathology results from your visit in your [facility] account before your physician is able to contact you. Your physician or their representative will relay the results to you with their recommendations at their soonest availability.     If no improvement in symptoms or symptoms worsen, please be advised to call MD, follow-up at clinic and/or go to ER if becomes severe.    Cosme Gaspar M.D.        We Offer TELEHEALTH & Same Day Appointments!   Book your Telehealth appointment with me through my nurse or   Clinic appointments on [employer]!    [address] [place] [address]    Office: [phone]   FAX: [phone]    Check out my Facebook Page and Follow Me at: [email or web address]    Check out my website at [employer] by clicking on: [email or web address]    To Schedule appointments online, go to [employer]: [email or web address]         
Hyponatremia hypovolemia likely 2/2 volume depletion   - on admission Na 129, glucose normal 108, UA specific gravity 1.023  - Improving
Underwent biopsy of mesenteric lymphadenopathy negative for lymphoma. Is consistent with reactive disease. 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   - hepatitis B surface antibody and antigen and core total antibody negative, ESR and CRP elevated, ferritin WNL. HIV negative  - f/u HHV-8, SPEP, free light chains, ferritin as recommended by GI.  - Could complete workup as outpatient. Will need rheum f/u
improved   Hyperkalemia likely 2/2 Acute on CKD is improving   - on admission K 6.2, downtrend to 5.6,
Underwent biopsy of mesenteric lymphadenopathy negative for lymphoma. Is consistent with reactive disease. 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   - hepatitis B surface antibody and antigen and core total antibody negative, ESR and CRP elevated, ferritin WNL. HIV negative  - f/u HHV-8  - Could complete workup as outpatient. Spoke with PCP regarding adenopathy and that pt may need Onc and Rheum f/u on d/c
improved   Hyperkalemia likely 2/2 Acute on CKD is improving   - on admission K 6.2, downtrend to 5.6,

## 2024-03-19 NOTE — PHYSICAL THERAPY INITIAL EVALUATION ADULT - LEVEL OF CONSCIOUSNESS, REHAB EVAL
alert What Type Of Note Output Would You Prefer (Optional)?: Standard Output Hpi Title: Evaluation of a Skin Lesion Have Your Spot(S) Been Treated In The Past?: has not been treated

## 2024-05-03 NOTE — PROGRESS NOTE ADULT - SUBJECTIVE AND OBJECTIVE BOX
BiVP reduced, known finding, overall stable, now 80%, previously 83%.   Alert received for AF - he has a known history - on warfarin.     Reset his AF alerts, will re-alert tomorrow if he is still in AF for 12 hours/day.  Postpone to Monday to watch for repeat alert.   Date of Service: 05-23-22 @ 12:04    Patient is a 81y old  Male who presents with a chief complaint of fever, sob (23 May 2022 10:21)      Any change in ROS: he feels much better;  nosob:  some cough:       MEDICATIONS  (STANDING):  acetylcysteine 10%  Inhalation 4 milliLiter(s) Inhalation two times a day  allopurinol 100 milliGRAM(s) Oral daily  amLODIPine   Tablet 2.5 milliGRAM(s) Oral at bedtime  atorvastatin 80 milliGRAM(s) Oral at bedtime  azithromycin  IVPB 500 milliGRAM(s) IV Intermittent every 24 hours  azithromycin  IVPB      ipratropium    for Nebulization 500 MICROGram(s) Nebulizer every 6 hours  levothyroxine 200 MICROGram(s) Oral daily  magnesium oxide 400 milliGRAM(s) Oral daily  metoprolol tartrate 25 milliGRAM(s) Oral two times a day  oxybutynin XL 10 milliGRAM(s) Oral daily  pantoprazole    Tablet 40 milliGRAM(s) Oral before breakfast  piperacillin/tazobactam IVPB.. 3.375 Gram(s) IV Intermittent every 8 hours  sertraline 25 milliGRAM(s) Oral daily  sucralfate 1 Gram(s) Oral two times a day    MEDICATIONS  (PRN):  acetaminophen     Tablet .. 650 milliGRAM(s) Oral every 6 hours PRN Moderate Pain (4 - 6)    Vital Signs Last 24 Hrs  T(C): 36.6 (23 May 2022 09:34), Max: 36.9 (22 May 2022 20:24)  T(F): 97.8 (23 May 2022 09:34), Max: 98.5 (22 May 2022 20:24)  HR: 71 (23 May 2022 09:34) (71 - 86)  BP: 158/85 (23 May 2022 09:34) (132/97 - 158/85)  BP(mean): --  RR: 18 (23 May 2022 09:34) (18 - 18)  SpO2: 94% (23 May 2022 09:34) (92% - 98%)    I&O's Summary    22 May 2022 07:01  -  23 May 2022 07:00  --------------------------------------------------------  IN: 1270 mL / OUT: 0 mL / NET: 1270 mL    23 May 2022 07:01  -  23 May 2022 12:04  --------------------------------------------------------  IN: 240 mL / OUT: 0 mL / NET: 240 mL          Physical Exam:   GENERAL: NAD, well-groomed, well-developed  HEENT: LYDIA/   Atraumatic, Normocephalic  ENMT: No tonsillar erythema, exudates, or enlargement; Moist mucous membranes, Good dentition, No lesions  NECK: Supple, No JVD, Normal thyroid  CHEST/LUNG: Clear to auscultaion  CVS: Regular rate and rhythm; No murmurs, rubs, or gallops  GI: : Soft, Nontender, Nondistended; Bowel sounds present  NERVOUS SYSTEM:  Alert & Oriented X3  EXTREMITIES:  2+ Peripheral Pulses, No clubbing, cyanosis, or edema  LYMPH: No lymphadenopathy noted  SKIN: No rashes or lesions  ENDOCRINOLOGY: No Thyromegaly  PSYCH: Appropriate    Labs:  22                            12.3   1.62  )-----------( 107      ( 23 May 2022 11:08 )             41.7                         12.1   2.13  )-----------( 70       ( 20 May 2022 16:17 )             41.5     05-23    134<L>  |  99  |  13  ----------------------------<  150<H>  3.7   |  21<L>  |  1.76<H>  05-20    134<L>  |  101  |  18  ----------------------------<  112<H>  5.0   |  18<L>  |  1.49<H>    Ca    9.8      23 May 2022 11:08    TPro  7.0  /  Alb  3.5  /  TBili  0.3  /  DBili  x   /  AST  24  /  ALT  11  /  AlkPhos  109  05-23  TPro  7.3  /  Alb  3.7  /  TBili  0.3  /  DBili  x   /  AST  38  /  ALT  13  /  AlkPhos  130<H>  05-20    CAPILLARY BLOOD GLUCOSE          LIVER FUNCTIONS - ( 23 May 2022 11:08 )  Alb: 3.5 g/dL / Pro: 7.0 g/dL / ALK PHOS: 109 U/L / ALT: 11 U/L / AST: 24 U/L / GGT: x                     RECENT CULTURES:  05-20 @ 17:55 Clean Catch Clean Catch (Midstream)         rad< from: CT Angio Chest PE Protocol w/ IV Cont (05.20.22 @ 20:54) >  IMPRESSION:    No main, lobar or segmental pulmonary embolism. Limited evaluation of the   subsegmental pulmonary arteries.    Patchy consolidative and groundglass opacities in the bilateral lower   lobes with additional groundglass, and nodular opacities seen in the left   upper lobe. Findings suspicious for multifocal pneumonia, although   underlying tumor, especially in the right lower lobe, cannot be excluded.    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 Comparison with prior studies to be   useful.    < end of copied text >         <10,000 CFU/mL Normal Urogenital [place]    05-20 @ 15:35 .Blood Blood-Peripheral                No growth to date.    05-20 @ 15:30 .Blood Blood-Peripheral                No growth to date.          RESPIRATORY CULTURES:          Studies  Chest X-RAY  CT SCAN Chest   Venous Dopplers: LE:   CT Abdomen  Others

## 2024-09-06 NOTE — ED ADULT TRIAGE NOTE - SPO2 (%)
Patient has an appointment with Dr. Foster on 9/10- follow up bilateral knee pain. At patient's LOV, he received bilateral knee Durolane injections. Called and LVM for patient, [facility] office number given for patient to call back and clarify if he would like Durolane injections at this appointment. If so, an authorization would need to be placed.   96

## 2024-09-22 NOTE — PROGRESS NOTE ADULT - PROBLEM SELECTOR PROBLEM 1
BRIGID (acute kidney injury)
SBO (small bowel obstruction)
BRGIID (acute kidney injury)
BRIGID (acute kidney injury)
SBO (small bowel obstruction)
BRIGID (acute kidney injury)
Acute kidney injury superimposed on CKD
Yes

## 2024-11-05 NOTE — ED ADULT NURSE NOTE - EXTENSIONS OF SELF_ADULT
Is This A New Presentation, Or A Follow-Up?: Skin Lesion How Severe Is Your Skin Lesion?: mild Has Your Skin Lesion Been Treated?: not been treated What Type Of Note Output Would You Prefer (Optional)?: Standard Output Which Family Member (Optional)?: Father None

## 2025-01-25 NOTE — ED ADULT NURSE NOTE - URINE COLOR
Throughout ED observation period, pt remained clinically and hemodynamically stable.  pt w/ persistent episodes of nausea/vomiting  abd exams benign, recent labs and imaging from prev hospitalization reviewed  Lab results as interpreted by me- no anemia, no significant electrolyte abnormalities, normal renal function  ekg reviewed given numerous qt prolonging meds, mag given w/ improvement, however, qt re-prolonged  given pt will req multiple qt prolonging meds for daily use and sx control, will admit to tele for monitoring and further care
yellow